# Patient Record
Sex: MALE | Race: WHITE
[De-identification: names, ages, dates, MRNs, and addresses within clinical notes are randomized per-mention and may not be internally consistent; named-entity substitution may affect disease eponyms.]

---

## 2017-03-31 ENCOUNTER — HOSPITAL ENCOUNTER (EMERGENCY)
Dept: HOSPITAL 47 - EC | Age: 82
Discharge: HOME | End: 2017-03-31
Payer: MEDICARE

## 2017-03-31 VITALS — TEMPERATURE: 97.8 F | SYSTOLIC BLOOD PRESSURE: 173 MMHG | HEART RATE: 62 BPM | DIASTOLIC BLOOD PRESSURE: 82 MMHG

## 2017-03-31 VITALS — RESPIRATION RATE: 16 BRPM

## 2017-03-31 DIAGNOSIS — J44.9: ICD-10-CM

## 2017-03-31 DIAGNOSIS — Z79.51: ICD-10-CM

## 2017-03-31 DIAGNOSIS — I82.431: Primary | ICD-10-CM

## 2017-03-31 DIAGNOSIS — Z79.899: ICD-10-CM

## 2017-03-31 DIAGNOSIS — Z79.52: ICD-10-CM

## 2017-03-31 DIAGNOSIS — M19.90: ICD-10-CM

## 2017-03-31 DIAGNOSIS — E78.5: ICD-10-CM

## 2017-03-31 DIAGNOSIS — Z87.891: ICD-10-CM

## 2017-03-31 DIAGNOSIS — Z87.01: ICD-10-CM

## 2017-03-31 PROCEDURE — 99284 EMERGENCY DEPT VISIT MOD MDM: CPT

## 2017-03-31 NOTE — ED
General Adult HPI





- General


Chief complaint: Extremity Injury, Lower


Stated complaint: POSS BLOODCLOT RT LEG


Time Seen by Provider: 17 15:18


Source: patient, RN notes reviewed, old records reviewed


Mode of arrival: ambulatory


Limitations: no limitations





- History of Present Illness


Initial comments: 





This is an 81-year-old man to the ER for evaluation.  The patient Bhanu dose 

of right lower extremity pain and swelling.  Patient was seen in the emergency 

center urgent care prior to arrival to ER.  Patient states he noted swelling 

for about 3 days with pain increased pain and swelling today.  No history of DVT





- Related Data


 Home Medications











 Medication  Instructions  Recorded  Confirmed


 


Albuterol Sulfate [Proair Hfa] 2 puff INHALATION RT-Q6H PRN 16


 


Simvastatin [Zocor] 40 mg PO DAILY 16


 


Ipratropium-Albuterol Nebulize 3 ml INHALATION RT-QID 16





[Duoneb 0.5 mg-3 mg/3 ml Soln]   


 


Fluticasone/Vilanterol [Breo 1 puff INHALATION RT-DAILY 17





Ellipta 200-25 Mcg INH]   


 


Lisinopril/Hydrochlorothiazide 1 tab PO DAILY 17





[Zestoretic 20-12.5 mg Tablet]   


 


predniSONE 5 mg PO DAILY 17








 Previous Rx's











 Medication  Instructions  Recorded


 


Apixaban [Eliquis] 5 mg PO BID #28 tab 17


 


Apixaban [Eliquis] 5 mg PO DAILY #30 tablet 17











 Allergies











Allergy/AdvReac Type Severity Reaction Status Date / Time


 


No Known Allergies Allergy   Verified 17 16:14














Review of Systems


ROS Statement: 


Those systems with pertinent positive or pertinent negative responses have been 

documented in the HPI.





ROS Other: All systems not noted in ROS Statement are negative.





Past Medical History


Past Medical History: COPD, Hearing Disorder / Deafness, Hyperlipidemia, 

Osteoarthritis (OA), Pneumonia


Additional Past Medical History / Comment(s): 16  Pt presented to [facility] ER 

via EMS with having started about 2 hours before presentation some L sided 

chest pain.  He thought it felt like heart burn so he took tums without relief.

  He called EMS and pain subsided.  Pain then returned and EMS gave NTG  with 

relief.  Pt was wheezing and having some UBALDO.  He is being admitted with 

clinical impression of unstable angina pectoris, renal insufficiency, elevated D

-dimer and pancreatitis. Pt was recently admitted to [facility] on 1/3/16 with acute 

exacerbation COPD, purulent tracheobronchitis, chronic bronchitis.    Other HX:

  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 

2L/NC.


History of Any Multi-Drug Resistant Organisms: None Reported


Past Surgical History: Tonsillectomy


Additional Past Surgical History / Comment(s): vasectomy


Past Anesthesia/Blood Transfusion Reactions: No Reported Reaction


Past Psychological History: No Psychological Hx Reported


Additional Psychological History / Comment(s): Pt resides with his spouse.  He 

has a nebulizer.  He just had home O2 delivered last nite and has been wearing 

it on and off at 2L/NC


Smoking Status: Former smoker


Past Alcohol Use History: Occasional


Additional Past Alcohol Use History / Comment(s): Pt started smoking in  

and quit in .  He at one time was smoking 2-3 ppd.


Past Drug Use History: None Reported





- Past Family History


  ** Father


Family Medical History: Cancer


Additional Family Medical History / Comment(s): cancer. client reported father 

had scoliosis.





  ** Mother


Additional Family Medical History / Comment(s): mother . unknown history





General Exam


Limitations: no limitations


General appearance: alert, in no apparent distress


Head exam: Present: atraumatic, normocephalic, normal inspection


Eye exam: Present: normal appearance, PERRL, EOMI.  Absent: scleral icterus, 

conjunctival injection, periorbital swelling


ENT exam: Present: normal exam, mucous membranes moist


Neck exam: Present: normal inspection.  Absent: tenderness, meningismus, 

lymphadenopathy


Respiratory exam: Present: normal lung sounds bilaterally.  Absent: respiratory 

distress, wheezes, rales, rhonchi, stridor


Cardiovascular Exam: Present: regular rate, normal rhythm, normal heart sounds.

  Absent: systolic murmur, diastolic murmur, rubs, gallop, clicks


GI/Abdominal exam: Present: soft, normal bowel sounds.  Absent: distended, 

tenderness, guarding, rebound, rigid


Extremities exam: Present: normal inspection, full ROM, normal capillary 

refill.  Absent: tenderness, pedal edema, joint swelling, calf tenderness


Back exam: Present: normal inspection


Neurological exam: Present: alert, oriented X3, CN II-XII intact


Psychiatric exam: Present: normal affect, normal mood


Skin exam: Present: warm, dry, intact, normal color.  Absent: rash





Course


 Vital Signs











  17





  15:59


 


Temperature 97.6 F


 


Pulse Rate 60


 


Respiratory 16





Rate 


 


Blood Pressure 136/60


 


O2 Sat by Pulse 98





Oximetry 














Medical Decision Making





- Medical Decision Making





A 1 Rachael with positive DVT, patient given ELICIA Hayes, discharge: Joss to follow 

up with primary care, patient is understanding and agreeable





- Radiology Data


Radiology results: report reviewed (Ultrasound is positive for DVT), image 

reviewed





Disposition


Clinical Impression: 


 Right leg DVT





Disposition: HOME SELF-CARE


Condition: Good


Instructions:  Deep Venous Thrombosis (ED)


Prescriptions: 


Apixaban [Eliquis] 5 mg PO BID #28 tab


Apixaban [Eliquis] 5 mg PO DAILY #30 tablet


Referrals: 


Nik Whatley MD [Primary Care Provider] - 1-2 days

## 2017-03-31 NOTE — US
EXAMINATION TYPE: US venous doppler duplex LE RT

 

DATE OF EXAM: 3/31/2017 4:31 PM

 

COMPARISON: Previous study

 

CLINICAL HISTORY: Pain. Tenderness right lower leg

 

SIDE PERFORMED: Right  

 

VESSELS IMAGED:

External Iliac Vein (EIV)

Common Femoral Vein

Deep Femoral Vein

Greater Saphenous Vein *

Femoral Vein

Popliteal Vein

Small Saphenous Vein *

Proximal Calf Veins

(* superficial vessels)

 

Right Leg:  ++Positive for DVT right lower popliteal vein

 

No popliteal fossa lesion was identified.

 

IMPRESSION:

## 2017-05-27 ENCOUNTER — HOSPITAL ENCOUNTER (EMERGENCY)
Dept: HOSPITAL 47 - EC | Age: 82
Discharge: HOME | End: 2017-05-27
Payer: MEDICARE

## 2017-05-27 VITALS — SYSTOLIC BLOOD PRESSURE: 138 MMHG | HEART RATE: 76 BPM | RESPIRATION RATE: 16 BRPM | DIASTOLIC BLOOD PRESSURE: 74 MMHG

## 2017-05-27 VITALS — TEMPERATURE: 97.2 F

## 2017-05-27 DIAGNOSIS — R31.9: Primary | ICD-10-CM

## 2017-05-27 DIAGNOSIS — Z79.899: ICD-10-CM

## 2017-05-27 DIAGNOSIS — M19.90: ICD-10-CM

## 2017-05-27 DIAGNOSIS — Z87.891: ICD-10-CM

## 2017-05-27 DIAGNOSIS — Z79.52: ICD-10-CM

## 2017-05-27 DIAGNOSIS — Z79.51: ICD-10-CM

## 2017-05-27 DIAGNOSIS — J44.9: ICD-10-CM

## 2017-05-27 DIAGNOSIS — Z86.718: ICD-10-CM

## 2017-05-27 DIAGNOSIS — E78.5: ICD-10-CM

## 2017-05-27 DIAGNOSIS — H91.90: ICD-10-CM

## 2017-05-27 LAB
ALP SERPL-CCNC: 61 U/L (ref 38–126)
ALT SERPL-CCNC: 20 U/L (ref 21–72)
ANION GAP SERPL CALC-SCNC: 9 MMOL/L
APTT BLD: 23.8 SEC (ref 22–30)
AST SERPL-CCNC: 25 U/L (ref 17–59)
BASOPHILS # BLD AUTO: 0.1 K/UL (ref 0–0.2)
BASOPHILS NFR BLD AUTO: 1 %
BUN SERPL-SCNC: 30 MG/DL (ref 9–20)
CALCIUM SPEC-MCNC: 9.7 MG/DL (ref 8.4–10.2)
CH: 29.6
CHCM: 32
CHLORIDE SERPL-SCNC: 105 MMOL/L (ref 98–107)
CO2 SERPL-SCNC: 27 MMOL/L (ref 22–30)
EOSINOPHIL # BLD AUTO: 0.6 K/UL (ref 0–0.7)
EOSINOPHIL NFR BLD AUTO: 8 %
ERYTHROCYTE [DISTWIDTH] IN BLOOD BY AUTOMATED COUNT: 3.76 M/UL (ref 4.3–5.9)
ERYTHROCYTE [DISTWIDTH] IN BLOOD: 14.6 % (ref 11.5–15.5)
GLUCOSE SERPL-MCNC: 84 MG/DL (ref 74–99)
HCT VFR BLD AUTO: 34.9 % (ref 39–53)
HDW: 2.45
HGB BLD-MCNC: 11.5 GM/DL (ref 13–17.5)
INR PPP: 1 (ref ?–1.1)
LUC NFR BLD AUTO: 1 %
LYMPHOCYTES # SPEC AUTO: 0.8 K/UL (ref 1–4.8)
LYMPHOCYTES NFR SPEC AUTO: 11 %
MCH RBC QN AUTO: 30.6 PG (ref 25–35)
MCHC RBC AUTO-ENTMCNC: 32.9 G/DL (ref 31–37)
MCV RBC AUTO: 92.9 FL (ref 80–100)
MONOCYTES # BLD AUTO: 0.4 K/UL (ref 0–1)
MONOCYTES NFR BLD AUTO: 5 %
NEUTROPHILS # BLD AUTO: 5.3 K/UL (ref 1.3–7.7)
NEUTROPHILS NFR BLD AUTO: 74 %
NON-AFRICAN AMERICAN GFR(MDRD): >60
PARTICLE COUNT: (no result)
POTASSIUM SERPL-SCNC: 4.5 MMOL/L (ref 3.5–5.1)
PROT SERPL-MCNC: 7 G/DL (ref 6.3–8.2)
PT BLD: 9.9 SEC (ref 9–12)
RBC UR QL: >182 /HPF (ref 0–5)
SODIUM SERPL-SCNC: 141 MMOL/L (ref 137–145)
UA BILLING (MACRO VS. MICRO): (no result)
WBC # BLD AUTO: 0.08 10*3/UL
WBC # BLD AUTO: 7.2 K/UL (ref 3.8–10.6)
WBC #/AREA URNS HPF: 65 /HPF (ref 0–5)
WBC (PEROX): 7.89

## 2017-05-27 PROCEDURE — 85025 COMPLETE CBC W/AUTO DIFF WBC: CPT

## 2017-05-27 PROCEDURE — 81001 URINALYSIS AUTO W/SCOPE: CPT

## 2017-05-27 PROCEDURE — 74000: CPT

## 2017-05-27 PROCEDURE — 99284 EMERGENCY DEPT VISIT MOD MDM: CPT

## 2017-05-27 PROCEDURE — 87086 URINE CULTURE/COLONY COUNT: CPT

## 2017-05-27 PROCEDURE — 85610 PROTHROMBIN TIME: CPT

## 2017-05-27 PROCEDURE — 80053 COMPREHEN METABOLIC PANEL: CPT

## 2017-05-27 PROCEDURE — 85730 THROMBOPLASTIN TIME PARTIAL: CPT

## 2017-05-27 PROCEDURE — 36415 COLL VENOUS BLD VENIPUNCTURE: CPT

## 2017-05-27 NOTE — US
EXAMINATION TYPE: US venous doppler duplex LE RT

 

DATE OF EXAM: 5/27/2017 10:42 AM

 

COMPARISON: Previous exam 31 March 2017

 

CLINICAL HISTORY: dvt, swelling. Prior DVT right leg 3/31/17, patient on blood thinner

 

SIDE PERFORMED: right  

 

TECHNIQUE:  The lower extremity deep venous system is examined utilizing real time linear array sonog
radha with graded compression, doppler sonography and color-flow sonography.

 

VESSELS IMAGED:

External Iliac Vein (EIV)

Common Femoral Vein

Deep Femoral Vein

Greater Saphenous Vein *

Femoral Vein

Popliteal Vein

Small Saphenous Vein *

Proximal Calf Veins

(* superficial vessels)

 

Popliteal vein appears somewhat less distended.

 

Right Leg:  Deep venous thrombosis right popliteal vein may be somewhat improved.

 

 

Some low-level internal echoes persists within the popliteal vein although there is some color flow,

 

IMPRESSION:  Grayscale, color doppler, spectral doppler imaging performed of the deep veins of the lo
wer extremities.  There is normal flow, compressibility, vascular waveforms bilaterally.  Deep venous
 thrombosis popliteal vein appears somewhat improved.

## 2017-05-27 NOTE — XR
Abdomen

 

HISTORY: Gross hematuria

 

Frontal view of the abdomen on 2 images correlated to previous of 11 April 2011

 

No significant interval change is evident. Suspect colonic interposition, bowel suspected beneath rig
ht hemidiaphragm. No evident bowel obstruction or pneumoperitoneum. There is a levoscoliosis of the m
id lumbar spine. No pathologic calcification is evident but technique is somewhat limited. Calcificat
ions within the pelvis may be vascular.

 

IMPRESSION: No acute abnormalities evident

## 2017-05-27 NOTE — ED
General Adult HPI





- General


Chief complaint: Urogenital


Stated complaint: hematuria


Time Seen by Provider: 17 09:45


Source: patient, family, RN notes reviewed


Mode of arrival: ambulatory


Limitations: no limitations





- History of Present Illness


Initial comments: 





Patient is a pleasant 81-year-old male presenting to the emergency Department 

with hematuria.  Patient had an episode last week.  Patient had 3 episodes 

prior to arrival this morning and another one while in the emergency 

department.  Patient is on elquis for a blood clot in the right leg.  This was 

started almost 2 months ago.  No chest pain or difficulty in breathing.  No 

other areas of bleeding.  No dysuria.  No pelvic pain.





- Related Data


 Home Medications











 Medication  Instructions  Recorded  Confirmed


 


Albuterol Sulfate [Proair Hfa] 2 puff INHALATION RT-Q6H PRN 16


 


Simvastatin [Zocor] 40 mg PO DAILY 16


 


Ipratropium-Albuterol Nebulize 3 ml INHALATION RT-QID 16





[Duoneb 0.5 mg-3 mg/3 ml Soln]   


 


Fluticasone/Vilanterol [Breo 1 puff INHALATION RT-DAILY 17





Ellipta 200-25 Mcg INH]   


 


Lisinopril/Hydrochlorothiazide 1 tab PO DAILY 17





[Zestoretic 20-12.5 mg Tablet]   


 


predniSONE 5 mg PO DAILY 17








 Previous Rx's











 Medication  Instructions  Recorded


 


Apixaban [Eliquis] 5 mg PO DAILY #30 tablet 17


 


Cephalexin [Keflex] 500 mg PO QID #28 cap 17











 Allergies











Allergy/AdvReac Type Severity Reaction Status Date / Time


 


No Known Allergies Allergy   Verified 17 11:24














Review of Systems


ROS Statement: 


Those systems with pertinent positive or pertinent negative responses have been 

documented in the HPI.





ROS Other: All systems not noted in ROS Statement are negative.


Constitutional: Denies: fever, chills


Eyes: Denies: eye pain


ENT: Denies: ear pain


Respiratory: Denies: cough


Cardiovascular: Denies: chest pain


Endocrine: Denies: fatigue


Gastrointestinal: Denies: abdominal pain


Genitourinary: Reports: hematuria.  Denies: dysuria


Musculoskeletal: Denies: back pain


Skin: Denies: rash


Neurological: Denies: weakness





Past Medical History


Past Medical History: COPD, Deep Vein Thrombosis (DVT), Hearing Disorder / 

Deafness, Hyperlipidemia, Osteoarthritis (OA), Pneumonia


Additional Past Medical History / Comment(s): 16  Pt presented to [facility] ER 

via EMS with having started about 2 hours before presentation some L sided 

chest pain.  He thought it felt like heart burn so he took tums without relief.

  He called EMS and pain subsided.  Pain then returned and EMS gave NTG  with 

relief.  Pt was wheezing and having some UBALDO.  He is being admitted with 

clinical impression of unstable angina pectoris, renal insufficiency, elevated D

-dimer and pancreatitis. Pt was recently admitted to [facility] on 1/3/16 with acute 

exacerbation COPD, purulent tracheobronchitis, chronic bronchitis.    Other HX:

  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 

2L/NC.


History of Any Multi-Drug Resistant Organisms: None Reported


Past Surgical History: Tonsillectomy


Additional Past Surgical History / Comment(s): vasectomy


Past Anesthesia/Blood Transfusion Reactions: No Reported Reaction


Past Psychological History: No Psychological Hx Reported


Additional Psychological History / Comment(s): Pt resides with his spouse.  He 

has a nebulizer.  He just had home O2 delivered last nite and has been wearing 

it on and off at 2L/NC


Smoking Status: Former smoker


Past Alcohol Use History: Occasional


Additional Past Alcohol Use History / Comment(s): Pt started smoking in 1955 

and quit in .  He at one time was smoking 2-3 ppd.


Past Drug Use History: None Reported





- Past Family History


  ** Father


Family Medical History: Cancer


Additional Family Medical History / Comment(s): cancer. client reported father 

had scoliosis.





  ** Mother


Additional Family Medical History / Comment(s): mother . unknown history





General Exam


Limitations: no limitations


General appearance: alert, in no apparent distress


Head exam: Present: atraumatic


Eye exam: Present: normal appearance, PERRL


ENT exam: Present: normal oropharynx


Neck exam: Present: normal inspection


Respiratory exam: Present: normal lung sounds bilaterally


Cardiovascular Exam: Present: regular rate, normal rhythm


  ** Expanded


Peripheral pulses: 2+: Dorsalis Pedis (R), Dorsalis Pedis (L)


GI/Abdominal exam: Present: soft.  Absent: tenderness


 exam: Present: normal inspection, other (Small easily reducible left 

inguinal hernia).  Absent: testicular tenderness, urethral discharge, scrotal 

swelling


Extremities exam: Present: normal inspection.  Absent: pedal edema, calf 

tenderness


Neurological exam: Present: alert


Psychiatric exam: Present: normal affect, normal mood


Skin exam: Present: normal color





Course


 Vital Signs











  17





  09:24


 


Temperature 97.2 F L


 


Pulse Rate 83


 


Respiratory 18





Rate 


 


Blood Pressure 185/73


 


O2 Sat by Pulse 96





Oximetry 














Medical Decision Making





- Medical Decision Making





Patient reevaluated and resting comfortably in bed.  Patient symptom-free.  

Patient and family updated on results.  Case discussed in detail with Dr. Frank

, covering for Dr. Whatley.  He does feel patient can be safely discharged.  

Recommends holding eliquis until follow-up on Tuesday with urology and primary 

care physician.  Messages will be left for primary care physician and urology 

for follow-up.





- Lab Data


Result diagrams: 


 17 10:23





 17 10:23


 Lab Results











  17 Range/Units





  10:23 10:23 10:23 


 


WBC  7.2    (3.8-10.6)  k/uL


 


RBC  3.76 L    (4.30-5.90)  m/uL


 


Hgb  11.5 L    (13.0-17.5)  gm/dL


 


Hct  34.9 L    (39.0-53.0)  %


 


MCV  92.9    (80.0-100.0)  fL


 


MCH  30.6    (25.0-35.0)  pg


 


MCHC  32.9    (31.0-37.0)  g/dL


 


RDW  14.6    (11.5-15.5)  %


 


Plt Count  366    (150-450)  k/uL


 


Neutrophils %  74    %


 


Lymphocytes %  11    %


 


Monocytes %  5    %


 


Eosinophils %  8    %


 


Basophils %  1    %


 


Neutrophils #  5.3    (1.3-7.7)  k/uL


 


Lymphocytes #  0.8 L    (1.0-4.8)  k/uL


 


Monocytes #  0.4    (0-1.0)  k/uL


 


Eosinophils #  0.6    (0-0.7)  k/uL


 


Basophils #  0.1    (0-0.2)  k/uL


 


PT    9.9  (9.0-12.0)  sec


 


INR    1.0  (<1.1)  


 


APTT    23.8  (22.0-30.0)  sec


 


Sodium   141   (137-145)  mmol/L


 


Potassium   4.5   (3.5-5.1)  mmol/L


 


Chloride   105   ()  mmol/L


 


Carbon Dioxide   27   (22-30)  mmol/L


 


Anion Gap   9   mmol/L


 


BUN   30 H   (9-20)  mg/dL


 


Creatinine   1.12   (0.66-1.25)  mg/dL


 


Est GFR (MDRD) Af Amer   >60   (>60 ml/min/1.73 sqM)  


 


Est GFR (MDRD) Non-Af   >60   (>60 ml/min/1.73 sqM)  


 


Glucose   84   (74-99)  mg/dL


 


Calcium   9.7   (8.4-10.2)  mg/dL


 


Total Bilirubin   0.5   (0.2-1.3)  mg/dL


 


AST   25   (17-59)  U/L


 


ALT   20 L   (21-72)  U/L


 


Alkaline Phosphatase   61   ()  U/L


 


Total Protein   7.0   (6.3-8.2)  g/dL


 


Albumin   4.0   (3.5-5.0)  g/dL


 


Urine Color     


 


Urine Appearance     (Clear)  


 


Urine RBC     (0-5)  /hpf


 


Urine WBC     (0-5)  /hpf














  17 Range/Units





  10:23 


 


WBC   (3.8-10.6)  k/uL


 


RBC   (4.30-5.90)  m/uL


 


Hgb   (13.0-17.5)  gm/dL


 


Hct   (39.0-53.0)  %


 


MCV   (80.0-100.0)  fL


 


MCH   (25.0-35.0)  pg


 


MCHC   (31.0-37.0)  g/dL


 


RDW   (11.5-15.5)  %


 


Plt Count   (150-450)  k/uL


 


Neutrophils %   %


 


Lymphocytes %   %


 


Monocytes %   %


 


Eosinophils %   %


 


Basophils %   %


 


Neutrophils #   (1.3-7.7)  k/uL


 


Lymphocytes #   (1.0-4.8)  k/uL


 


Monocytes #   (0-1.0)  k/uL


 


Eosinophils #   (0-0.7)  k/uL


 


Basophils #   (0-0.2)  k/uL


 


PT   (9.0-12.0)  sec


 


INR   (<1.1)  


 


APTT   (22.0-30.0)  sec


 


Sodium   (137-145)  mmol/L


 


Potassium   (3.5-5.1)  mmol/L


 


Chloride   ()  mmol/L


 


Carbon Dioxide   (22-30)  mmol/L


 


Anion Gap   mmol/L


 


BUN   (9-20)  mg/dL


 


Creatinine   (0.66-1.25)  mg/dL


 


Est GFR (MDRD) Af Amer   (>60 ml/min/1.73 sqM)  


 


Est GFR (MDRD) Non-Af   (>60 ml/min/1.73 sqM)  


 


Glucose   (74-99)  mg/dL


 


Calcium   (8.4-10.2)  mg/dL


 


Total Bilirubin   (0.2-1.3)  mg/dL


 


AST   (17-59)  U/L


 


ALT   (21-72)  U/L


 


Alkaline Phosphatase   ()  U/L


 


Total Protein   (6.3-8.2)  g/dL


 


Albumin   (3.5-5.0)  g/dL


 


Urine Color  Red  


 


Urine Appearance  Bloody  (Clear)  


 


Urine RBC  >182 H  (0-5)  /hpf


 


Urine WBC  65 H  (0-5)  /hpf














- Radiology Data


Radiology results: report reviewed (Ultrasound leg shows DVT in the popliteal 

vein appears improved.), image reviewed (KUB shows no acute process)





Disposition


Clinical Impression: 


 Hematuria





Disposition: HOME SELF-CARE


Condition: Stable


Instructions:  Hematuria (ED)


Additional Instructions: 


Please hold Eliquis until follow-up on Tuesday.  Please follow-up Tuesday with 

both urology and Dr. Whatley.  Return for increased bleeding, shortness of 

breath or shortness of breath with exertion, weakness, worsening symptoms or 

other concerns.


Prescriptions: 


Cephalexin [Keflex] 500 mg PO QID #28 cap


Referrals: 


Nik Whatley MD [Primary Care Provider] - 1-2 days


Asher Kim MD [STAFF PHYSICIAN] - 1-2 days


Time of Disposition: 11:31

## 2017-06-21 ENCOUNTER — HOSPITAL ENCOUNTER (OUTPATIENT)
Dept: HOSPITAL 47 - RADCTMAIN | Age: 82
Discharge: HOME | End: 2017-06-21
Payer: MEDICARE

## 2017-06-21 DIAGNOSIS — K40.90: Primary | ICD-10-CM

## 2017-06-21 DIAGNOSIS — N32.89: ICD-10-CM

## 2017-06-21 LAB
BUN SERPL-SCNC: 34 MG/DL (ref 9–20)
NON-AFRICAN AMERICAN GFR(MDRD): 60

## 2017-06-21 PROCEDURE — 36415 COLL VENOUS BLD VENIPUNCTURE: CPT

## 2017-06-21 PROCEDURE — 74178 CT ABD&PLV WO CNTR FLWD CNTR: CPT

## 2017-06-21 PROCEDURE — 74400 UROGRAPHY IV +-KUB TOMOG: CPT

## 2017-06-21 PROCEDURE — 82565 ASSAY OF CREATININE: CPT

## 2017-06-21 PROCEDURE — 84520 ASSAY OF UREA NITROGEN: CPT

## 2017-06-21 NOTE — CT
EXAMINATION TYPE: CT urogram wo/w con

 

DATE OF EXAM: 6/21/2017

 

COMPARISON: NONE

 

HISTORY: Hematuria for 2 months without pain

 

CT DLP: 995.6 mGycm, Automated Exposure Control for Dose Reduction was Utilized.

 

CONTRAST: 

CT scan of the abdomen and pelvis is performed without oral and without and with IV Contrast, patient
 injected with 100 mL of Visipaque 320. Urogram protocol with Three-D reconstructed images created on
 independent workstation and reviewed

 

FINDINGS:

 

KUB:  Noncontrast images show no renal calculi bilaterally. Postcontrast images show symmetric cortic
al medullary uptake and excretion from both kidneys without evidence of concerning solid or cystic re
nal mass or hydronephrosis bilaterally. There is eccentric enhancing mass involving the posterior rig
ht lateral margin just above the UVJ measuring 4.1 x 2.2 cm seen best on axial image 67 strongly susp
icious for uroepithelial bladder carcinoma.

 

LUNG BASES: Mild emphysematous change in lung bases is felt present. There is posterior left basilar 
scarring.

 

LIVER/GB:   No significant abnormality is appreciated.

 

PANCREAS:  No significant abnormality is seen.

 

SPLEEN:  No significant abnormality is seen.

 

ADRENALS:  No significant abnormality is seen.

 

KIDNEYS:  No significant abnormality is seen.

 

BOWEL: Sigmoid colonic diverticulosis is present. No suspicious bowel dilatation currently.

 

PROSTATE/SEMINAL VESICLES: Central zone calcification is seen in normal size prostate gland.

 

LYMPH NODES:  No greater than 1cm abdominal or pelvic lymph nodes are appreciated.

 

OSSEOUS STRUCTURES: Osseous structures are demineralized. Levoconvex scoliosis centered at L3 level i
s seen. There is straightening of lumbar spine. There is moderate to severe multilevel disc space fouzia
rowing and vacuum disc phenomenon with moderate to severe multilevel anterior and lateral spurring.

 

OTHER: There is moderate size left inguinal hernia containing fat portion of sigmoid colon seen best 
image 73 series 7 and coronal image 51.

 

IMPRESSION:

1. A 4.1 x 2.2 cm posterior right lateral bladder wall mass strongly suspicious for urothelial carcin
yashira. Cystoscopy for tissue confirmation advised.

2. Note is made of left inguinal hernia containing portion of sigmoid colon. No bowel obstruction cur
rently.

## 2017-11-30 ENCOUNTER — HOSPITAL ENCOUNTER (EMERGENCY)
Dept: HOSPITAL 47 - EC | Age: 82
Discharge: HOME | End: 2017-11-30
Payer: MEDICARE

## 2017-11-30 VITALS — SYSTOLIC BLOOD PRESSURE: 136 MMHG | DIASTOLIC BLOOD PRESSURE: 63 MMHG | HEART RATE: 79 BPM | RESPIRATION RATE: 16 BRPM

## 2017-11-30 VITALS — TEMPERATURE: 97.9 F

## 2017-11-30 DIAGNOSIS — R05: ICD-10-CM

## 2017-11-30 DIAGNOSIS — Z79.52: ICD-10-CM

## 2017-11-30 DIAGNOSIS — Z87.891: ICD-10-CM

## 2017-11-30 DIAGNOSIS — J44.9: ICD-10-CM

## 2017-11-30 DIAGNOSIS — M19.90: ICD-10-CM

## 2017-11-30 DIAGNOSIS — Z79.51: ICD-10-CM

## 2017-11-30 DIAGNOSIS — E78.5: ICD-10-CM

## 2017-11-30 DIAGNOSIS — M54.2: Primary | ICD-10-CM

## 2017-11-30 DIAGNOSIS — Z86.718: ICD-10-CM

## 2017-11-30 DIAGNOSIS — Z79.899: ICD-10-CM

## 2017-11-30 DIAGNOSIS — H91.90: ICD-10-CM

## 2017-11-30 LAB
ALP SERPL-CCNC: 76 U/L (ref 38–126)
ALT SERPL-CCNC: 28 U/L (ref 21–72)
ANION GAP SERPL CALC-SCNC: 6 MMOL/L
APTT BLD: 23.4 SEC (ref 22–30)
AST SERPL-CCNC: 19 U/L (ref 17–59)
BASOPHILS # BLD AUTO: 0 K/UL (ref 0–0.2)
BASOPHILS NFR BLD AUTO: 0 %
BUN SERPL-SCNC: 28 MG/DL (ref 9–20)
CALCIUM SPEC-MCNC: 9.1 MG/DL (ref 8.4–10.2)
CH: 28
CHCM: 31.6
CHLORIDE SERPL-SCNC: 103 MMOL/L (ref 98–107)
CK SERPL-CCNC: 110 U/L (ref 55–170)
CO2 SERPL-SCNC: 26 MMOL/L (ref 22–30)
EOSINOPHIL # BLD AUTO: 0.6 K/UL (ref 0–0.7)
EOSINOPHIL NFR BLD AUTO: 5 %
ERYTHROCYTE [DISTWIDTH] IN BLOOD BY AUTOMATED COUNT: 3.7 M/UL (ref 4.3–5.9)
ERYTHROCYTE [DISTWIDTH] IN BLOOD: 15.3 % (ref 11.5–15.5)
GLUCOSE SERPL-MCNC: 117 MG/DL (ref 74–99)
HCT VFR BLD AUTO: 33 % (ref 39–53)
HDW: 2.34
HGB BLD-MCNC: 10.5 GM/DL (ref 13–17.5)
INR PPP: 1 (ref ?–1.2)
LUC NFR BLD AUTO: 1 %
LYMPHOCYTES # SPEC AUTO: 0.9 K/UL (ref 1–4.8)
LYMPHOCYTES NFR SPEC AUTO: 8 %
MAGNESIUM SPEC-SCNC: 2 MG/DL (ref 1.6–2.3)
MCH RBC QN AUTO: 28.4 PG (ref 25–35)
MCHC RBC AUTO-ENTMCNC: 31.9 G/DL (ref 31–37)
MCV RBC AUTO: 89.1 FL (ref 80–100)
MONOCYTES # BLD AUTO: 0.8 K/UL (ref 0–1)
MONOCYTES NFR BLD AUTO: 7 %
NEUTROPHILS # BLD AUTO: 9 K/UL (ref 1.3–7.7)
NEUTROPHILS NFR BLD AUTO: 79 %
NON-AFRICAN AMERICAN GFR(MDRD): >60
POTASSIUM SERPL-SCNC: 4.3 MMOL/L (ref 3.5–5.1)
PROT SERPL-MCNC: 6.6 G/DL (ref 6.3–8.2)
PT BLD: 10.1 SEC (ref 9–12)
SODIUM SERPL-SCNC: 135 MMOL/L (ref 137–145)
TROPONIN I SERPL-MCNC: <0.012 NG/ML (ref 0–0.03)
WBC # BLD AUTO: 0.08 10*3/UL
WBC # BLD AUTO: 11.4 K/UL (ref 3.8–10.6)
WBC (PEROX): 11.82

## 2017-11-30 PROCEDURE — 85025 COMPLETE CBC W/AUTO DIFF WBC: CPT

## 2017-11-30 PROCEDURE — 36415 COLL VENOUS BLD VENIPUNCTURE: CPT

## 2017-11-30 PROCEDURE — 82553 CREATINE MB FRACTION: CPT

## 2017-11-30 PROCEDURE — 82550 ASSAY OF CK (CPK): CPT

## 2017-11-30 PROCEDURE — 85730 THROMBOPLASTIN TIME PARTIAL: CPT

## 2017-11-30 PROCEDURE — 94640 AIRWAY INHALATION TREATMENT: CPT

## 2017-11-30 PROCEDURE — 96374 THER/PROPH/DIAG INJ IV PUSH: CPT

## 2017-11-30 PROCEDURE — 99284 EMERGENCY DEPT VISIT MOD MDM: CPT

## 2017-11-30 PROCEDURE — 93005 ELECTROCARDIOGRAM TRACING: CPT

## 2017-11-30 PROCEDURE — 85610 PROTHROMBIN TIME: CPT

## 2017-11-30 PROCEDURE — 83880 ASSAY OF NATRIURETIC PEPTIDE: CPT

## 2017-11-30 PROCEDURE — 83735 ASSAY OF MAGNESIUM: CPT

## 2017-11-30 PROCEDURE — 80053 COMPREHEN METABOLIC PANEL: CPT

## 2017-11-30 PROCEDURE — 72050 X-RAY EXAM NECK SPINE 4/5VWS: CPT

## 2017-11-30 PROCEDURE — 84484 ASSAY OF TROPONIN QUANT: CPT

## 2017-11-30 PROCEDURE — 96375 TX/PRO/DX INJ NEW DRUG ADDON: CPT

## 2017-11-30 PROCEDURE — 71020: CPT

## 2017-11-30 NOTE — XR
EXAMINATION TYPE: XR cervical spine comp

 

DATE OF EXAM: 11/30/2017

 

TECHNIQUE: Frontal, lateral, oblique, swimmers, and open mouth view of the cervical spine are obtaine
d.

 

HISTORY:  Pain left sided chronic neck pain

 

COMPARISON: None

 

FINDINGS:  The cervical spine is visualized in its entirety from C1 thru the top of T1 level, there i
s grade 1 retrolisthesis of C5 on C6 without evidence of acute fracture or dislocation.  The pre-vert
ebral soft tissue appears within normal limits.  The C1-C2 articulation is within normal limits on th
e open mouth view.  

 

Vertebral body heights are maintained. There is moderate to severe spurring and disc space narrowing 
C5-C6 level. There is mild disc space narrowing C4-C5 and C6-C7 levels The oblique images show neural
 foraminal narrowing bilaterally C5-C6 level due to marginal spurring and to lesser degree at C6-C7 l
evel. There is calcification bilateral carotid bulbs more prominent in the left neck. Consider noneme
rgent carotid ultrasound follow-up.

 

IMPRESSION:  No acute fracture or dislocation is seen in the cervical spine. Spondylolisthesis with a
ssociated moderate to advanced degenerative changes C5-C6 level.

## 2017-11-30 NOTE — XR
EXAMINATION TYPE: XR chest 2V

 

DATE OF EXAM: 11/30/2017

 

COMPARISON: Chest x-ray January 8, 2016. CTA chest January 11, 2016

 

HISTORY: Difficulty in breathing, history of COPD

 

TECHNIQUE:  Frontal and lateral views of the chest are obtained.

 

FINDINGS: There is chronic emphysematous change redemonstrated There is no focal air space opacity, p
leural effusion, or pneumothorax seen.  The cardiac silhouette size is within normal limits.   The os
seous structures are intact.

 

IMPRESSION:  Chronic emphysematous change without acute pulmonary process.

## 2018-01-15 ENCOUNTER — HOSPITAL ENCOUNTER (INPATIENT)
Dept: HOSPITAL 47 - EC | Age: 83
LOS: 7 days | Discharge: HOME | DRG: 167 | End: 2018-01-22
Attending: FAMILY MEDICINE | Admitting: FAMILY MEDICINE
Payer: MEDICARE

## 2018-01-15 DIAGNOSIS — E78.5: ICD-10-CM

## 2018-01-15 DIAGNOSIS — J98.01: ICD-10-CM

## 2018-01-15 DIAGNOSIS — Z87.01: ICD-10-CM

## 2018-01-15 DIAGNOSIS — N17.9: ICD-10-CM

## 2018-01-15 DIAGNOSIS — F17.201: ICD-10-CM

## 2018-01-15 DIAGNOSIS — K21.0: ICD-10-CM

## 2018-01-15 DIAGNOSIS — Z85.51: ICD-10-CM
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
## 2018-01-15 DIAGNOSIS — J44.1: Primary | ICD-10-CM

## 2018-01-15 DIAGNOSIS — Z79.899: ICD-10-CM

## 2018-01-15 DIAGNOSIS — E87.5: ICD-10-CM

## 2018-01-15 DIAGNOSIS — T37.0X5A: ICD-10-CM

## 2018-01-15 DIAGNOSIS — T46.4X5A: ICD-10-CM

## 2018-01-15 DIAGNOSIS — Z79.52: ICD-10-CM

## 2018-01-15 DIAGNOSIS — J96.11: ICD-10-CM

## 2018-01-15 DIAGNOSIS — I10: ICD-10-CM

## 2018-01-15 DIAGNOSIS — Z86.718: ICD-10-CM

## 2018-01-15 DIAGNOSIS — M19.90: ICD-10-CM

## 2018-01-15 DIAGNOSIS — D64.9: ICD-10-CM

## 2018-01-15 DIAGNOSIS — H91.92: ICD-10-CM

## 2018-01-15 LAB
ALBUMIN SERPL-MCNC: 4 G/DL (ref 3.5–5)
ALP SERPL-CCNC: 74 U/L (ref 38–126)
ALT SERPL-CCNC: 29 U/L (ref 21–72)
ANION GAP SERPL CALC-SCNC: 10 MMOL/L
APTT BLD: 23.4 SEC (ref 22–30)
AST SERPL-CCNC: 23 U/L (ref 17–59)
BASOPHILS # BLD AUTO: 0 K/UL (ref 0–0.2)
BASOPHILS NFR BLD AUTO: 1 %
BUN SERPL-SCNC: 28 MG/DL (ref 9–20)
CALCIUM SPEC-MCNC: 9.4 MG/DL (ref 8.4–10.2)
CHLORIDE SERPL-SCNC: 102 MMOL/L (ref 98–107)
CK SERPL-CCNC: 151 U/L (ref 55–170)
CO2 SERPL-SCNC: 27 MMOL/L (ref 22–30)
EOSINOPHIL # BLD AUTO: 0.1 K/UL (ref 0–0.7)
EOSINOPHIL NFR BLD AUTO: 1 %
ERYTHROCYTE [DISTWIDTH] IN BLOOD BY AUTOMATED COUNT: 3.78 M/UL (ref 4.3–5.9)
ERYTHROCYTE [DISTWIDTH] IN BLOOD: 14.9 % (ref 11.5–15.5)
GLUCOSE BLD-MCNC: 168 MG/DL (ref 75–99)
GLUCOSE SERPL-MCNC: 119 MG/DL (ref 74–99)
HCT VFR BLD AUTO: 33.6 % (ref 39–53)
HGB BLD-MCNC: 10.5 GM/DL (ref 13–17.5)
INR PPP: 1 (ref ?–1.2)
LYMPHOCYTES # SPEC AUTO: 0.5 K/UL (ref 1–4.8)
LYMPHOCYTES NFR SPEC AUTO: 8 %
MAGNESIUM SPEC-SCNC: 2.3 MG/DL (ref 1.6–2.3)
MCH RBC QN AUTO: 27.7 PG (ref 25–35)
MCHC RBC AUTO-ENTMCNC: 31.1 G/DL (ref 31–37)
MCV RBC AUTO: 89.1 FL (ref 80–100)
MONOCYTES # BLD AUTO: 0.4 K/UL (ref 0–1)
MONOCYTES NFR BLD AUTO: 7 %
NEUTROPHILS # BLD AUTO: 5.2 K/UL (ref 1.3–7.7)
NEUTROPHILS NFR BLD AUTO: 82 %
PLATELET # BLD AUTO: 428 K/UL (ref 150–450)
POTASSIUM SERPL-SCNC: 5.3 MMOL/L (ref 3.5–5.1)
PROT SERPL-MCNC: 7.3 G/DL (ref 6.3–8.2)
PT BLD: 9.5 SEC (ref 9–12)
SODIUM SERPL-SCNC: 139 MMOL/L (ref 137–145)
TROPONIN I SERPL-MCNC: <0.012 NG/ML (ref 0–0.03)
WBC # BLD AUTO: 6.3 K/UL (ref 3.8–10.6)

## 2018-01-15 PROCEDURE — 36415 COLL VENOUS BLD VENIPUNCTURE: CPT

## 2018-01-15 PROCEDURE — 96372 THER/PROPH/DIAG INJ SC/IM: CPT

## 2018-01-15 PROCEDURE — 81001 URINALYSIS AUTO W/SCOPE: CPT

## 2018-01-15 PROCEDURE — 87529 HSV DNA AMP PROBE: CPT

## 2018-01-15 PROCEDURE — 87206 SMEAR FLUORESCENT/ACID STAI: CPT

## 2018-01-15 PROCEDURE — 31624 DX BRONCHOSCOPE/LAVAGE: CPT

## 2018-01-15 PROCEDURE — 85027 COMPLETE CBC AUTOMATED: CPT

## 2018-01-15 PROCEDURE — 87634 RSV DNA/RNA AMP PROBE: CPT

## 2018-01-15 PROCEDURE — 94760 N-INVAS EAR/PLS OXIMETRY 1: CPT

## 2018-01-15 PROCEDURE — 99291 CRITICAL CARE FIRST HOUR: CPT

## 2018-01-15 PROCEDURE — 83735 ASSAY OF MAGNESIUM: CPT

## 2018-01-15 PROCEDURE — 80048 BASIC METABOLIC PNL TOTAL CA: CPT

## 2018-01-15 PROCEDURE — 87205 SMEAR GRAM STAIN: CPT

## 2018-01-15 PROCEDURE — 71046 X-RAY EXAM CHEST 2 VIEWS: CPT

## 2018-01-15 PROCEDURE — 99214 OFFICE O/P EST MOD 30 MIN: CPT

## 2018-01-15 PROCEDURE — 93005 ELECTROCARDIOGRAM TRACING: CPT

## 2018-01-15 PROCEDURE — 85025 COMPLETE CBC W/AUTO DIFF WBC: CPT

## 2018-01-15 PROCEDURE — 96374 THER/PROPH/DIAG INJ IV PUSH: CPT

## 2018-01-15 PROCEDURE — 94644 CONT INHLJ TX 1ST HOUR: CPT

## 2018-01-15 PROCEDURE — 82553 CREATINE MB FRACTION: CPT

## 2018-01-15 PROCEDURE — 88305 TISSUE EXAM BY PATHOLOGIST: CPT

## 2018-01-15 PROCEDURE — 87502 INFLUENZA DNA AMP PROBE: CPT

## 2018-01-15 PROCEDURE — 87252 VIRUS INOCULATION TISSUE: CPT

## 2018-01-15 PROCEDURE — 85610 PROTHROMBIN TIME: CPT

## 2018-01-15 PROCEDURE — 87798 DETECT AGENT NOS DNA AMP: CPT

## 2018-01-15 PROCEDURE — 80053 COMPREHEN METABOLIC PANEL: CPT

## 2018-01-15 PROCEDURE — 87102 FUNGUS ISOLATION CULTURE: CPT

## 2018-01-15 PROCEDURE — 94660 CPAP INITIATION&MGMT: CPT

## 2018-01-15 PROCEDURE — 87070 CULTURE OTHR SPECIMN AEROBIC: CPT

## 2018-01-15 PROCEDURE — 84484 ASSAY OF TROPONIN QUANT: CPT

## 2018-01-15 PROCEDURE — 88108 CYTOPATH CONCENTRATE TECH: CPT

## 2018-01-15 PROCEDURE — 87040 BLOOD CULTURE FOR BACTERIA: CPT

## 2018-01-15 PROCEDURE — 87116 MYCOBACTERIA CULTURE: CPT

## 2018-01-15 PROCEDURE — 87498 ENTEROVIRUS PROBE&REVRS TRNS: CPT

## 2018-01-15 PROCEDURE — 83036 HEMOGLOBIN GLYCOSYLATED A1C: CPT

## 2018-01-15 PROCEDURE — 85730 THROMBOPLASTIN TIME PARTIAL: CPT

## 2018-01-15 PROCEDURE — 87496 CYTOMEG DNA AMP PROBE: CPT

## 2018-01-15 PROCEDURE — 89050 BODY FLUID CELL COUNT: CPT

## 2018-01-15 PROCEDURE — 94640 AIRWAY INHALATION TREATMENT: CPT

## 2018-01-15 PROCEDURE — 82550 ASSAY OF CK (CPK): CPT

## 2018-01-15 RX ADMIN — IPRATROPIUM BROMIDE AND ALBUTEROL SULFATE PRN ML: .5; 3 SOLUTION RESPIRATORY (INHALATION) at 21:46

## 2018-01-15 RX ADMIN — METHYLPREDNISOLONE SODIUM SUCCINATE SCH MG: 125 INJECTION, POWDER, FOR SOLUTION INTRAMUSCULAR; INTRAVENOUS at 23:24

## 2018-01-15 RX ADMIN — INSULIN ASPART SCH UNIT: 100 INJECTION, SOLUTION INTRAVENOUS; SUBCUTANEOUS at 21:29

## 2018-01-15 RX ADMIN — TAMSULOSIN HYDROCHLORIDE SCH MG: 0.4 CAPSULE ORAL at 21:29

## 2018-01-15 RX ADMIN — METHYLPREDNISOLONE SODIUM SUCCINATE SCH MG: 125 INJECTION, POWDER, FOR SOLUTION INTRAMUSCULAR; INTRAVENOUS at 18:50

## 2018-01-15 NOTE — XR
EXAMINATION TYPE: XR chest 2V

 

DATE OF EXAM: 1/15/2018

 

COMPARISON: 11/30/2017

 

HISTORY: Shortness of breath

 

TECHNIQUE:  Frontal and lateral views of the chest are obtained.

 

FINDINGS:

 

Scattered senescent parenchymal changes noted. Hyperinflation compatible with COPD. Chronic reticulon
odular density right lung base.

 

No evidence for infiltrate. No evidence for atelectasis.

 

Heart size is stable.

 

Mediastinal structures are stable and grossly unremarkable.

 

No evidence for hilar prominence.

 

Degenerative changes dorsal spine. 

 

IMPRESSION:

1. No evidence for acute pulmonary disease.

## 2018-01-15 NOTE — ED
General Adult HPI





- General


Chief complaint: Shortness of Breath


Stated complaint: SOB


Time Seen by Provider: 01/15/18 14:20


Source: patient, RN notes reviewed


Mode of arrival: wheelchair


Limitations: no limitations, physical limitation





- History of Present Illness


Initial comments: 





This is an 82-year-old male who presents emergency Department complaining of 

difficulty breathing.  Patient states been going on for 2-3 days.  Patient 

states he believes this is COPD.  Patient states he has an increased cough but 

not a lot of production.  Patient denies any palpitations.  Patient denies any 

chest pain.  Patient denies any fever chills.  Patient denies being lightheaded 

dizzy or having any near syncopal episodes.  Patient denies abdominal pain 

patient denies nausea vomiting diarrhea.  Patient denies any dysuria hematuria 

urinary frequency.  Patient denies any recent injury or trauma.





- Related Data


 Home Medications











 Medication  Instructions  Recorded  Confirmed


 


Albuterol Sulfate [Proair Hfa] 2 puff INHALATION RT-Q6H PRN 01/03/16 01/15/18


 


Simvastatin [Zocor] 40 mg PO DAILY 01/03/16 01/15/18


 


Ipratropium-Albuterol Nebulize 3 ml INHALATION RT-QID 01/04/16 01/15/18





[Duoneb 0.5 mg-3 mg/3 ml Soln]   


 


Fluticasone/Vilanterol [Breo 1 puff INHALATION RT-DAILY 03/31/17 01/15/18





Ellipta 200-25 Mcg INH]   


 


predniSONE 5 mg PO DAILY 03/31/17 01/15/18


 


Tamsulosin HCl [Flomax] 0.4 mg PO BID 11/30/17 01/15/18


 


Lisinopril [Zestril] 10 mg PO DAILY 01/15/18 01/15/18


 


Sulfamethox-Tmp 800-160Mg [Bactrim 1 tab PO Q12HR 01/15/18 01/15/18





-160 mg]   











 Allergies











Allergy/AdvReac Type Severity Reaction Status Date / Time


 


No Known Allergies Allergy   Verified 01/15/18 14:39














Review of Systems


ROS Statement: 


Those systems with pertinent positive or pertinent negative responses have been 

documented in the HPI.





ROS Other: All systems not noted in ROS Statement are negative.





Past Medical History


Past Medical History: COPD, Deep Vein Thrombosis (DVT), Hearing Disorder / 

Deafness, Hyperlipidemia, Osteoarthritis (OA), Pneumonia


Additional Past Medical History / Comment(s): 16  Pt presented to [facility] ER 

via EMS with having started about 2 hours before presentation some L sided 

chest pain.  He thought it felt like heart burn so he took tums without relief.

  He called EMS and pain subsided.  Pain then returned and EMS gave NTG  with 

relief.  Pt was wheezing and having some UBALDO.  He is being admitted with 

clinical impression of unstable angina pectoris, renal insufficiency, elevated D

-dimer and pancreatitis. Pt was recently admitted to [facility] on 1/3/16 with acute 

exacerbation COPD, purulent tracheobronchitis, chronic bronchitis.    Other HX:

  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 

2L/NC.


History of Any Multi-Drug Resistant Organisms: None Reported


Past Surgical History: Tonsillectomy


Additional Past Surgical History / Comment(s): vasectomy


Past Anesthesia/Blood Transfusion Reactions: No Reported Reaction


Past Psychological History: No Psychological Hx Reported


Smoking Status: Former smoker


Past Alcohol Use History: Occasional


Past Drug Use History: None Reported





- Past Family History


  ** Father


Family Medical History: Cancer


Additional Family Medical History / Comment(s): cancer. client reported father 

had scoliosis.





  ** Mother


Additional Family Medical History / Comment(s): mother . unknown history





General Exam





- General Exam Comments


Initial Comments: 





GENERAL:


Patient is well-developed and well-nourished.  Patient is nontoxic and well-

hydrated and is in moderate distress.





ENT:


Neck is soft and supple.  No significant lymphadenopathy is noted.  Oropharynx 

is clear.  Moist mucous membranes.  Neck has full range of motion without 

eliciting any pain.  





EYES:


The sclera were anicteric and conjunctiva were pink and moist.  Extraocular 

movements were intact and pupils were equal round and reactive to light.  

Eyelids were unremarkable.





PULMONARY:


Extra wheezing diffusely





CARDIOVASCULAR:


There is a regular rate and rhythm without any murmurs gallops or rubs. 





ABDOMEN:


Soft and nontender with normal bowel sounds.  No palpable organomegaly was 

noted.  There is no palpable pulsatile mass.





SKIN:


Skin is clear with no lesions or rashes and otherwise unremarkable.





NEUROLOGIC:


Patient is alert and oriented x3.  Cranial nerves II through XII are grossly 

intact.  Motor and sensory are also intact.  Normal speech, volume and content.

  Symmetrical smile.  





MUSCULOSKELETAL:


Normal extremities with adequate strength and full range of motion.  No lower 

extremity swelling or edema.  No calf tenderness.





LYMPHATICS:


No significant lymphadenopathy is noted





PSYCHIATRIC:


Normal psychiatric evaluation.  Normal interpersonal interactions appears 

functionally intact in deals appropriately with others.  No signs of 

depression.  No signs of anxiety. 


Limitations: no limitations, physical limitation





Course


 Vital Signs











  01/15/18 01/15/18 01/15/18





  14:19 14:40 15:07


 


Temperature 97.0 F L  


 


Pulse Rate 95  83


 


Respiratory 26 H 26 H 





Rate   


 


Blood Pressure 203/84  


 


O2 Sat by Pulse 95  





Oximetry   














  01/15/18





  15:32


 


Temperature 


 


Pulse Rate 88


 


Respiratory 





Rate 


 


Blood Pressure 


 


O2 Sat by Pulse 





Oximetry 














Medical Decision Making





- Medical Decision Making





EKG shows normal sinus rhythm at 94 bpm TN interval is 136 QRS is 84 QT 

interval 368 QT intervals 460.  Patient's EKG shows no ST segment elevation or 

depression or T wave abnormalities are noted.





Chest x-ray showed no acute abnormality.





I gave the patient 3 breathing treatments and steroids though the patient felt 

as though he was improved he did not really sound much better.





I spoke with Dr. Whatley he agreed to admit the patient I admitted the patient I 

wrote admitting orders I continued breathing treatments and steroids on the 

floor.





- Lab Data


Result diagrams: 


 01/15/18 14:30





 01/15/18 14:30


 Lab Results











  01/15/18 01/15/18 01/15/18 Range/Units





  14:30 14:30 14:30 


 


WBC   6.3   (3.8-10.6)  k/uL


 


RBC   3.78 L   (4.30-5.90)  m/uL


 


Hgb   10.5 L   (13.0-17.5)  gm/dL


 


Hct   33.6 L   (39.0-53.0)  %


 


MCV   89.1   (80.0-100.0)  fL


 


MCH   27.7   (25.0-35.0)  pg


 


MCHC   31.1   (31.0-37.0)  g/dL


 


RDW   14.9   (11.5-15.5)  %


 


Plt Count   428   (150-450)  k/uL


 


Neutrophils %   82   %


 


Lymphocytes %   8   %


 


Monocytes %   7   %


 


Eosinophils %   1   %


 


Basophils %   1   %


 


Neutrophils #   5.2   (1.3-7.7)  k/uL


 


Lymphocytes #   0.5 L   (1.0-4.8)  k/uL


 


Monocytes #   0.4   (0-1.0)  k/uL


 


Eosinophils #   0.1   (0-0.7)  k/uL


 


Basophils #   0.0   (0-0.2)  k/uL


 


Hypochromasia   Slight   


 


PT     (9.0-12.0)  sec


 


INR     (<1.2)  


 


APTT     (22.0-30.0)  sec


 


Sodium    139  (137-145)  mmol/L


 


Potassium    5.3 H  (3.5-5.1)  mmol/L


 


Chloride    102  ()  mmol/L


 


Carbon Dioxide    27  (22-30)  mmol/L


 


Anion Gap    10  mmol/L


 


BUN    28 H  (9-20)  mg/dL


 


Creatinine    1.44 H  (0.66-1.25)  mg/dL


 


Est GFR (MDRD) Af Amer    57  (>60 ml/min/1.73 sqM)  


 


Est GFR (MDRD) Non-Af    47  (>60 ml/min/1.73 sqM)  


 


Glucose    119 H  (74-99)  mg/dL


 


Calcium    9.4  (8.4-10.2)  mg/dL


 


Magnesium    2.3  (1.6-2.3)  mg/dL


 


Total Bilirubin    0.3  (0.2-1.3)  mg/dL


 


AST    23  (17-59)  U/L


 


ALT    29  (21-72)  U/L


 


Alkaline Phosphatase    74  ()  U/L


 


Total Creatine Kinase  151    ()  U/L


 


CK-MB (CK-2)  8.3 H*    (0.0-2.4)  ng/mL


 


CK-MB (CK-2) Rel Index  5.5    


 


Troponin I  <0.012    (0.000-0.034)  ng/mL


 


Total Protein    7.3  (6.3-8.2)  g/dL


 


Albumin    4.0  (3.5-5.0)  g/dL














  01/15/18 Range/Units





  14:30 


 


WBC   (3.8-10.6)  k/uL


 


RBC   (4.30-5.90)  m/uL


 


Hgb   (13.0-17.5)  gm/dL


 


Hct   (39.0-53.0)  %


 


MCV   (80.0-100.0)  fL


 


MCH   (25.0-35.0)  pg


 


MCHC   (31.0-37.0)  g/dL


 


RDW   (11.5-15.5)  %


 


Plt Count   (150-450)  k/uL


 


Neutrophils %   %


 


Lymphocytes %   %


 


Monocytes %   %


 


Eosinophils %   %


 


Basophils %   %


 


Neutrophils #   (1.3-7.7)  k/uL


 


Lymphocytes #   (1.0-4.8)  k/uL


 


Monocytes #   (0-1.0)  k/uL


 


Eosinophils #   (0-0.7)  k/uL


 


Basophils #   (0-0.2)  k/uL


 


Hypochromasia   


 


PT  9.5  (9.0-12.0)  sec


 


INR  1.0  (<1.2)  


 


APTT  23.4  (22.0-30.0)  sec


 


Sodium   (137-145)  mmol/L


 


Potassium   (3.5-5.1)  mmol/L


 


Chloride   ()  mmol/L


 


Carbon Dioxide   (22-30)  mmol/L


 


Anion Gap   mmol/L


 


BUN   (9-20)  mg/dL


 


Creatinine   (0.66-1.25)  mg/dL


 


Est GFR (MDRD) Af Amer   (>60 ml/min/1.73 sqM)  


 


Est GFR (MDRD) Non-Af   (>60 ml/min/1.73 sqM)  


 


Glucose   (74-99)  mg/dL


 


Calcium   (8.4-10.2)  mg/dL


 


Magnesium   (1.6-2.3)  mg/dL


 


Total Bilirubin   (0.2-1.3)  mg/dL


 


AST   (17-59)  U/L


 


ALT   (21-72)  U/L


 


Alkaline Phosphatase   ()  U/L


 


Total Creatine Kinase   ()  U/L


 


CK-MB (CK-2)   (0.0-2.4)  ng/mL


 


CK-MB (CK-2) Rel Index   


 


Troponin I   (0.000-0.034)  ng/mL


 


Total Protein   (6.3-8.2)  g/dL


 


Albumin   (3.5-5.0)  g/dL














Critical Care Time


Critical Care Time: Yes


Total Critical Care Time: 35





Disposition


Clinical Impression: 


 COPD with acute exacerbation





Disposition: ADMITTED AS IP TO THIS HOSP


Referrals: 


Nik Whatley MD [Primary Care Provider] - 1-2 days


Time of Disposition: 16:12

## 2018-01-16 LAB
ALBUMIN SERPL-MCNC: 3.8 G/DL (ref 3.5–5)
ALP SERPL-CCNC: 70 U/L (ref 38–126)
ALT SERPL-CCNC: 31 U/L (ref 21–72)
ANION GAP SERPL CALC-SCNC: 11 MMOL/L
AST SERPL-CCNC: 24 U/L (ref 17–59)
BUN SERPL-SCNC: 38 MG/DL (ref 9–20)
CALCIUM SPEC-MCNC: 9.6 MG/DL (ref 8.4–10.2)
CHLORIDE SERPL-SCNC: 102 MMOL/L (ref 98–107)
CO2 SERPL-SCNC: 27 MMOL/L (ref 22–30)
ERYTHROCYTE [DISTWIDTH] IN BLOOD BY AUTOMATED COUNT: 3.84 M/UL (ref 4.3–5.9)
ERYTHROCYTE [DISTWIDTH] IN BLOOD: 15.8 % (ref 11.5–15.5)
GLUCOSE BLD-MCNC: 134 MG/DL (ref 75–99)
GLUCOSE BLD-MCNC: 142 MG/DL (ref 75–99)
GLUCOSE BLD-MCNC: 151 MG/DL (ref 75–99)
GLUCOSE BLD-MCNC: 154 MG/DL (ref 75–99)
GLUCOSE SERPL-MCNC: 134 MG/DL (ref 74–99)
HBA1C MFR BLD: 6.3 % (ref 4–6)
HCT VFR BLD AUTO: 33.6 % (ref 39–53)
HGB BLD-MCNC: 10.3 GM/DL (ref 13–17.5)
MCH RBC QN AUTO: 26.9 PG (ref 25–35)
MCHC RBC AUTO-ENTMCNC: 30.8 G/DL (ref 31–37)
MCV RBC AUTO: 87.4 FL (ref 80–100)
PLATELET # BLD AUTO: 432 K/UL (ref 150–450)
POTASSIUM SERPL-SCNC: 5.7 MMOL/L (ref 3.5–5.1)
PROT SERPL-MCNC: 7.1 G/DL (ref 6.3–8.2)
SODIUM SERPL-SCNC: 140 MMOL/L (ref 137–145)
WBC # BLD AUTO: 5 K/UL (ref 3.8–10.6)

## 2018-01-16 RX ADMIN — IPRATROPIUM BROMIDE AND ALBUTEROL SULFATE SCH ML: .5; 3 SOLUTION RESPIRATORY (INHALATION) at 20:35

## 2018-01-16 RX ADMIN — INSULIN ASPART SCH UNIT: 100 INJECTION, SOLUTION INTRAVENOUS; SUBCUTANEOUS at 08:51

## 2018-01-16 RX ADMIN — CEFUROXIME AXETIL SCH MG: 250 TABLET ORAL at 20:10

## 2018-01-16 RX ADMIN — BUDESONIDE SCH MG: 1 SUSPENSION RESPIRATORY (INHALATION) at 20:35

## 2018-01-16 RX ADMIN — IPRATROPIUM BROMIDE AND ALBUTEROL SULFATE PRN ML: .5; 3 SOLUTION RESPIRATORY (INHALATION) at 07:34

## 2018-01-16 RX ADMIN — METHYLPREDNISOLONE SODIUM SUCCINATE SCH MG: 125 INJECTION, POWDER, FOR SOLUTION INTRAMUSCULAR; INTRAVENOUS at 12:03

## 2018-01-16 RX ADMIN — TAMSULOSIN HYDROCHLORIDE SCH MG: 0.4 CAPSULE ORAL at 20:10

## 2018-01-16 RX ADMIN — IPRATROPIUM BROMIDE AND ALBUTEROL SULFATE SCH ML: .5; 3 SOLUTION RESPIRATORY (INHALATION) at 16:41

## 2018-01-16 RX ADMIN — INSULIN ASPART SCH UNIT: 100 INJECTION, SOLUTION INTRAVENOUS; SUBCUTANEOUS at 12:48

## 2018-01-16 RX ADMIN — FORMOTEROL FUMARATE DIHYDRATE SCH MCG: 20 SOLUTION RESPIRATORY (INHALATION) at 20:35

## 2018-01-16 RX ADMIN — CEFUROXIME AXETIL SCH MG: 250 TABLET ORAL at 12:29

## 2018-01-16 RX ADMIN — INSULIN ASPART SCH UNIT: 100 INJECTION, SOLUTION INTRAVENOUS; SUBCUTANEOUS at 17:48

## 2018-01-16 RX ADMIN — IPRATROPIUM BROMIDE AND ALBUTEROL SULFATE SCH ML: .5; 3 SOLUTION RESPIRATORY (INHALATION) at 11:25

## 2018-01-16 RX ADMIN — INSULIN ASPART SCH UNIT: 100 INJECTION, SOLUTION INTRAVENOUS; SUBCUTANEOUS at 21:46

## 2018-01-16 RX ADMIN — LISINOPRIL SCH MG: 10 TABLET ORAL at 08:47

## 2018-01-16 RX ADMIN — METHYLPREDNISOLONE SODIUM SUCCINATE SCH MG: 125 INJECTION, POWDER, FOR SOLUTION INTRAMUSCULAR; INTRAVENOUS at 05:59

## 2018-01-16 RX ADMIN — TAMSULOSIN HYDROCHLORIDE SCH MG: 0.4 CAPSULE ORAL at 08:47

## 2018-01-16 RX ADMIN — METHYLPREDNISOLONE SODIUM SUCCINATE SCH MG: 125 INJECTION, POWDER, FOR SOLUTION INTRAMUSCULAR; INTRAVENOUS at 17:49

## 2018-01-16 RX ADMIN — ATORVASTATIN CALCIUM SCH MG: 20 TABLET, FILM COATED ORAL at 08:47

## 2018-01-16 RX ADMIN — SODIUM BICARBONATE SCH MLS/HR: 84 INJECTION, SOLUTION INTRAVENOUS at 14:52

## 2018-01-16 NOTE — P.HPIM
History of Present Illness


H&P Date: 18


Chief Complaint: Dyspnea





This is a history of physical and 82-year-old white male with known history of 

hypertension who has an underlying history of COPD.  He states for last several 

days she's becoming more dyspneic.  In fact, I recently saw the patient about 2 

weeks ago and he is fairly "stable."  The patient states that he has had 

similar episodes in the past.  Typically, he gets admitted for exacerbation 

about every year or every 6-12 months.  The patient states similar symptoms.  

The patient is currently a nonsmoker.  We will go ahead and consult pulmonology 

at this time.  He's been placed on appropriate COPD exacerbation protocol.  He 

is O2 dependent at this time and is holding conversation appropriately.  No 

voiding difficulties are stated.





Review of Systems


Constitutional: Denies chills, Denies fever


Eyes: denies blurred vision, denies pain


Ears, nose, mouth and throat: Denies headache, Denies sore throat


Cardiovascular: Denies chest pain, Denies shortness of breath


Respiratory: Reports as per HPI, Reports dyspnea, Reports pleurisy


Gastrointestinal: Denies abdominal pain, Denies diarrhea, Denies nausea, Denies 

vomiting


Musculoskeletal: Denies myalgias


Integumentary: Denies pruritus, Denies rash


Neurological: Denies numbness, Denies weakness





Past Medical History


Past Medical History: Cancer, Chest Pain / Angina, COPD, Deep Vein Thrombosis (

DVT), Hyperlipidemia, Hypertension, Osteoarthritis (OA), Pneumonia


Additional Past Medical History / Comment(s): unstable angina pectoris. COPD, 

purulent tracheobronchitis, chronic bronchitis.   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 

OA, back pain, pneumonia , home O2 at 2L/NC.prn, bladder cancer


History of Any Multi-Drug Resistant Organisms: None Reported


Past Surgical History: Bladder Surgery, Tonsillectomy


Additional Past Surgical History / Comment(s): vasectomy


Past Anesthesia/Blood Transfusion Reactions: No Reported Reaction


Smoking Status: Former smoker





- Past Family History


  ** Father


Family Medical History: Cancer


Additional Family Medical History / Comment(s): cancer. client reported father 

had scoliosis.





  ** Mother


Additional Family Medical History / Comment(s): mother . unknown history





Medications and Allergies


 Home Medications











 Medication  Instructions  Recorded  Confirmed  Type


 


Albuterol Sulfate [Proair Hfa] 2 puff INHALATION RT-Q6H PRN 01/03/16 01/15/18 

History


 


Simvastatin [Zocor] 40 mg PO DAILY 01/03/16 01/15/18 History


 


Ipratropium-Albuterol Nebulize 3 ml INHALATION RT-QID 01/04/16 01/15/18 History





[Duoneb 0.5 mg-3 mg/3 ml Soln]    


 


Fluticasone/Vilanterol [Breo 1 puff INHALATION RT-DAILY 03/31/17 01/15/18 

History





Ellipta 200-25 Mcg INH]    


 


predniSONE 5 mg PO DAILY 03/31/17 01/15/18 History


 


Tamsulosin HCl [Flomax] 0.4 mg PO BID 11/30/17 01/15/18 History


 


Lisinopril [Zestril] 10 mg PO DAILY 01/15/18 01/15/18 History


 


Sulfamethox-Tmp 800-160Mg [Bactrim 1 tab PO Q12HR 01/15/18 01/15/18 History





-160 mg]    











 Allergies











Allergy/AdvReac Type Severity Reaction Status Date / Time


 


No Known Allergies Allergy   Verified 01/15/18 14:39














Physical Exam


Vitals: 


 Vital Signs











  Temp Pulse Pulse Resp BP BP Pulse Ox


 


 01/15/18 23:00  96.3 F L   86  16   136/64  96


 


 01/15/18 21:56   80     


 


 01/15/18 21:46   86     


 


 01/15/18 20:35        95


 


 01/15/18 18:05  96.6 F L   100  18   148/65  93 L


 


 01/15/18 17:43   100   18  149/68   94 L


 


 01/15/18 16:32   92   18  141/61   94 L


 


 01/15/18 15:32   88     


 


 01/15/18 15:07   83     


 


 01/15/18 14:40     26 H   


 


 01/15/18 14:19  97.0 F L  95   26 H  203/84   95








 Intake and Output











 01/15/18 01/16/18 01/16/18





 22:59 06:59 14:59


 


Other:   


 


  Voiding Method Toilet Toilet 





 Urinal Urinal 


 


  # Voids 1 1 














- Constitutional


General appearance: no acute distress





- EENT


Eyes: EOMI





- Neck


Neck: no lymphadenopathy





- Respiratory


Respiratory: bilateral: CTA





- Cardiovascular


Rhythm: regular


Heart sounds: normal: S1, S2





- Gastrointestinal


General gastrointestinal: soft, no tenderness





- Integumentary


Integumentary: no cellulitis





- Neurologic


Neurologic: CNII-XII intact





- Musculoskeletal


Musculoskeletal: gait normal





Results


CBC & Chem 7: 


 01/15/18 14:30





 01/15/18 14:30


Labs: 


 Abnormal Lab Results - Last 24 Hours (Table)











  01/15/18 01/15/18 01/15/18 Range/Units





  14:30 14:30 14:30 


 


RBC   3.78 L   (4.30-5.90)  m/uL


 


Hgb   10.5 L   (13.0-17.5)  gm/dL


 


Hct   33.6 L   (39.0-53.0)  %


 


Lymphocytes #   0.5 L   (1.0-4.8)  k/uL


 


Potassium    5.3 H  (3.5-5.1)  mmol/L


 


BUN    28 H  (9-20)  mg/dL


 


Creatinine    1.44 H  (0.66-1.25)  mg/dL


 


Glucose    119 H  (74-99)  mg/dL


 


POC Glucose (mg/dL)     (75-99)  mg/dL


 


CK-MB (CK-2)  8.3 H*    (0.0-2.4)  ng/mL














  01/15/18 Range/Units





  21:06 


 


RBC   (4.30-5.90)  m/uL


 


Hgb   (13.0-17.5)  gm/dL


 


Hct   (39.0-53.0)  %


 


Lymphocytes #   (1.0-4.8)  k/uL


 


Potassium   (3.5-5.1)  mmol/L


 


BUN   (9-20)  mg/dL


 


Creatinine   (0.66-1.25)  mg/dL


 


Glucose   (74-99)  mg/dL


 


POC Glucose (mg/dL)  168 H  (75-99)  mg/dL


 


CK-MB (CK-2)   (0.0-2.4)  ng/mL














Thrombosis Risk Factor Assmnt





- Choose All That Apply


Any of the Below Risk Factors Present?: Yes


Each Factor Represents 1 point: Abnormal pulmonary function (COPD)


Other Risk Factors: Yes


Each Risk Factor Represents 2 Points: Malignancy


Each Risk Factor Represents 3 Points: Age 75 years or older, History of DVT/PE


Other congenital or acquired thrombophilia - If yes, enter type in comment: No


Thrombosis Risk Factor Assessment Total Risk Factor Score: 9


Thrombosis Risk Factor Assessment Level: High Risk





Assessment and Plan


(1) Hypertension


Current Visit: Yes   Status: Acute   Code(s): I10 - ESSENTIAL (PRIMARY) 

HYPERTENSION   SNOMED Code(s): 15532782


   





(2) Hyperlipidemia


Current Visit: Yes   Status: Acute   Code(s): E78.5 - HYPERLIPIDEMIA, 

UNSPECIFIED   SNOMED Code(s): 33945024


   





(3) COPD with acute exacerbation


Current Visit: Yes   Status: Acute   Code(s): J44.1 - CHRONIC OBSTRUCTIVE 

PULMONARY DISEASE W (ACUTE) EXACERBATION   SNOMED Code(s): 211162932


   


Plan: 





Reconcile home medications.





Check labs in a.m.  New





Consult Dr. Terrazas.





Continue current regimen of treatment.





We'll continue to follow.





Hopefully we can discharge the patient within the next 72 hours if stabilizing.





At this time, patient is a full code.

## 2018-01-16 NOTE — P.CNPUL
History of Present Illness


Consult date: 18


Requesting physician: Nik Whatley


Reason for consult: dyspnea, cough, COPD


Chief complaint: increased shortness of breath, cough, chest congestion


History of present illness: 


Linwood is a 82-year-old white male patient, who sees Dr. Terrazas in our office 

for his advanced steroid-dependent COPD, with the baseline FEV1 of 33% 

predicted value, presented to the emergency room on 01/15/2018 at 1400 with 

complaints of increasing shortness of breath, wheezing, chest congestion, 

productive cough with yellow sputum.  Denied any fever, did have some chills.  

Denied any chest pain, hemoptysis.  Patient was seen by Dr. Terrazas in the office 

last Friday on 2018 with COPD exacerbation, he was given Depo-Medrol IM, 

was started on his own taper and Bactrim DS.  However over the weekend his 

symptoms did not improve and actually got worse, and patient did present to the 

ED for further evaluation and treatment. Chest x-ray from 01/15/2018 shows no 

evidence for acute pulmonary disease. Lab work showed that CBC within normal 

limits of 6.3, hemoglobin of 10.5, no evidence of coagulopathy, sodium is 139, 

potassium is 5.3, BUN of 28, and creatinine is 1.44. CK-MB was 8.3, troponin 

was negative 1.  Follow-up blood work from today still shows normal white count

, still with stable hemoglobin of 10.3, sodium of 140, his potassium is 

slightly increased to 5.7, BUN is 38, creatinine is 1.46. There is evidence of 

of acute kidney injury, patient denies decreased oral intake, no evidence of 

hypotension, on presentation to the emergency room, he was actually very 

hypertensive with a blood pressure of 203/84, but at the time he was in 

respiratory distress as well, and they're ranging from systolic blood pressure 

in the 130s to 180s, and diastolic pressure from 60s to 90s.  Patient was 

started on DuoNeb nebulized treatments, IV Solu-Medrol, and admitted to the 

floor for further management.





Review of Systems


All systems: negative


Constitutional: Denies chills, Denies fever


Eyes: denies blurred vision, denies pain


Ears, nose, mouth and throat: Denies headache, Denies sore throat


Cardiovascular: Denies chest pain, Denies shortness of breath


Respiratory: Denies cough


Gastrointestinal: Denies abdominal pain, Denies diarrhea, Denies nausea, Denies 

vomiting


Musculoskeletal: Denies myalgias


Integumentary: Denies pruritus, Denies rash


Neurological: Denies numbness, Denies weakness


Psychiatric: Denies anxiety, Denies depression


Endocrine: Denies fatigue, Denies weight change





Past Medical History


Past Medical History: Cancer, Chest Pain / Angina, COPD, Deep Vein Thrombosis (

DVT), Hyperlipidemia, Hypertension, Osteoarthritis (OA), Pneumonia


Additional Past Medical History / Comment(s): unstable angina pectoris. COPD, 

purulent tracheobronchitis, chronic bronchitis.   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 

OA, back pain, pneumonia , home O2 at 2L/NC.prn, bladder cancer


History of Any Multi-Drug Resistant Organisms: None Reported


Past Surgical History: Bladder Surgery, Tonsillectomy


Additional Past Surgical History / Comment(s): vasectomy


Past Anesthesia/Blood Transfusion Reactions: No Reported Reaction


Smoking Status: Former smoker





- Past Family History


  ** Father


Family Medical History: Cancer


Additional Family Medical History / Comment(s): cancer. client reported father 

had scoliosis.





  ** Mother


Additional Family Medical History / Comment(s): mother . unknown history





Medications and Allergies


 Home Medications











 Medication  Instructions  Recorded  Confirmed  Type


 


Albuterol Sulfate [Proair Hfa] 2 puff INHALATION RT-Q6H PRN 01/03/16 01/15/18 

History


 


Simvastatin [Zocor] 40 mg PO DAILY 01/03/16 01/15/18 History


 


Ipratropium-Albuterol Nebulize 3 ml INHALATION RT-QID 01/04/16 01/15/18 History





[Duoneb 0.5 mg-3 mg/3 ml Soln]    


 


Fluticasone/Vilanterol [Breo 1 puff INHALATION RT-DAILY 03/31/17 01/15/18 

History





Ellipta 200-25 Mcg INH]    


 


predniSONE 5 mg PO DAILY 03/31/17 01/15/18 History


 


Tamsulosin HCl [Flomax] 0.4 mg PO BID 11/30/17 01/15/18 History


 


Lisinopril [Zestril] 10 mg PO DAILY 01/15/18 01/15/18 History


 


Sulfamethox-Tmp 800-160Mg [Bactrim 1 tab PO Q12HR 01/15/18 01/15/18 History





-160 mg]    











 Allergies











Allergy/AdvReac Type Severity Reaction Status Date / Time


 


No Known Allergies Allergy   Verified 01/15/18 14:39














Physical Exam


Vitals: 


 Vital Signs











  Temp Pulse Pulse Resp BP BP BP


 


 18 10:46    100     155/67


 


 18 07:50   80     


 


 18 07:37   86     


 


 18 07:00  97.8 F   91  18   182/97 


 


 01/15/18 23:00  96.3 F L   86  16   136/64 


 


 01/15/18 21:56   80     


 


 01/15/18 21:46   86     


 


 01/15/18 20:35       


 


 01/15/18 18:05  96.6 F L   100  18   148/65 


 


 01/15/18 17:43   100   18  149/68  


 


 01/15/18 16:32   92   18  141/61  


 


 01/15/18 15:32   88     


 


 01/15/18 15:07   83     


 


 01/15/18 14:40     26 H   


 


 01/15/18 14:19  97.0 F L  95   26 H  203/84  














  Pulse Ox


 


 18 10:46  97


 


 18 07:50 


 


 18 07:37  96


 


 18 07:00  99


 


 01/15/18 23:00  96


 


 01/15/18 21:56 


 


 01/15/18 21:46 


 


 01/15/18 20:35  95


 


 01/15/18 18:05  93 L


 


 01/15/18 17:43  94 L


 


 01/15/18 16:32  94 L


 


 01/15/18 15:32 


 


 01/15/18 15:07 


 


 01/15/18 14:40 


 


 01/15/18 14:19  95








 Intake and Output











 01/15/18 01/16/18 01/16/18





 22:59 06:59 14:59


 


Intake Total   200


 


Balance   200


 


Intake:   


 


  Oral   200


 


Other:   


 


  Voiding Method Toilet Toilet 





 Urinal Urinal 


 


  # Voids 1 1 











GENERAL EXAM: Alert, pleasant, thin, 82-year-old white male, mildly short of 

breath at rest, but fairly comfortable


HEAD: Normocephalic/atraumatic.


EYES: Normal reaction of pupils, equal size.  Conjunctiva pink, sclera white.


NOSE: Clear with pink turbinates.


THROAT: No erythema or exudates.


NECK: No masses, no JVD, no thyroid enlargement, no adenopathy.


CHEST: No chest wall deformity.  Symmetrical expansion. 


LUNGS: Equal air entry with scattered rhonchi, and wheezing throughout the lung 

fields.


CVS: Regular rate and rhythm, normal S1 and S2, no gallops, no murmurs, no rubs


ABDOMEN: Soft, nontender.  No hepatosplenomegaly, normal bowel sounds, no 

guarding or rigidity.


EXTREMITIES: No clubbing, no edema, no cyanosis, 2+ pulses and upper and lower 

extremities.


MUSCULOSKELETAL: Muscle strength and tone normal.


SPINE: No scoliosis or deformity


SKIN: No rashes


CENTRAL NERVOUS SYSTEM: Alert and oriented -3.  No focal deficits, tone is 

normal in all 4 extremities.


PSYCHIATRIC: Alert and oriented -3.  Appropriate affect.  Intact judgment and 

insight. 











Results





- Laboratory Findings


CBC and BMP: 


 18 07:19





 18 07:19


PT/INR, D-dimer











PT  9.5 sec (9.0-12.0)   01/15/18  14:30    


 


INR  1.0  (<1.2)   01/15/18  14:30    








Abnormal lab findings: 


 Abnormal Labs











  01/15/18 01/15/18 01/15/18





  14:30 14:30 14:30


 


RBC   3.78 L 


 


Hgb   10.5 L 


 


Hct   33.6 L 


 


MCHC   


 


RDW   


 


Lymphocytes #   0.5 L 


 


Potassium    5.3 H


 


BUN    28 H


 


Creatinine    1.44 H


 


Glucose    119 H


 


POC Glucose (mg/dL)   


 


CK-MB (CK-2)  8.3 H*  














  01/15/18 01/16/18 01/16/18





  21:06 07:06 07:19


 


RBC    3.84 L


 


Hgb    10.3 L


 


Hct    33.6 L


 


MCHC    30.8 L


 


RDW    15.8 H


 


Lymphocytes #   


 


Potassium   


 


BUN   


 


Creatinine   


 


Glucose   


 


POC Glucose (mg/dL)  168 H  134 H 


 


CK-MB (CK-2)   














  18





  07:19


 


RBC 


 


Hgb 


 


Hct 


 


MCHC 


 


RDW 


 


Lymphocytes # 


 


Potassium  5.7 H


 


BUN  38 H


 


Creatinine  1.46 H


 


Glucose  134 H


 


POC Glucose (mg/dL) 


 


CK-MB (CK-2) 














- Diagnostic Findings


Chest x-ray: report reviewed


Additional studies: 


Twelve-lead EKG reviewed.








Assessment and Plan


Plan: 


Assessment:





#1.  Acute COPD exacerbation completed by tracheobronchitis, with failed 

outpatient treatment





#2.  Advanced steroid dependent COPD, with a baseline FEV1 of 33% of predicted





#3.  Acute kidney injury, of unclear etiology, patient presented with a BUN of 

28, and creatinine of 1.44, which subsequently increased to 38 and 1.46 

respectively on today's lab work from 2018. his previous creatinine from 

2017 was within normal limits at 1.10. 





#4.  Hyperkalemia, possibly related to acute kidney injury or Bactrim therapy 





#5. nicotine dependence, currently in remission,





#6. hyperlipidemia





#7.  Hypertension





#8.  Neoplasm of bladder





#9.  History of deep venous thrombosis





Plan:





Continue DuoNeb nebulized treatments, we will add Pulmicort and Perforomist.  

Agree with IV Solu-Medrol, we initially restarted his Bactrim DS that was 

prescribed for him on 2018, but upon further review patient is 

hyperkalemic, could be due to Bactrim.  We will stop the Bactrim and switch to 

a different antibiotic, oral Ceftin. Will obtain influenza screen. Continue all 

other treatments. 





I performed a history & physical examination of the patient and discussed their 

management with my nurse practitioner, Mayda Álvarez.  I reviewed the nurse 

practitioner's note and agree with the documented findings and plan of care.  

Lung sounds are positive for diffuse wheezes and rhonchi throughout the lung 

fields.  The findings and the impression was discussed with the patient.  I 

attest to the documentation by the nurse practitioner. 








Time with Patient: Greater than 30

## 2018-01-17 LAB
ALBUMIN SERPL-MCNC: 3.7 G/DL (ref 3.5–5)
ALP SERPL-CCNC: 54 U/L (ref 38–126)
ALT SERPL-CCNC: 29 U/L (ref 21–72)
ANION GAP SERPL CALC-SCNC: 18 MMOL/L
ANION GAP SERPL CALC-SCNC: 9 MMOL/L
AST SERPL-CCNC: 35 U/L (ref 17–59)
BUN SERPL-SCNC: 54 MG/DL (ref 9–20)
BUN SERPL-SCNC: 66 MG/DL (ref 9–20)
CALCIUM SPEC-MCNC: 9.4 MG/DL (ref 8.4–10.2)
CALCIUM SPEC-MCNC: 9.7 MG/DL (ref 8.4–10.2)
CHLORIDE SERPL-SCNC: 101 MMOL/L (ref 98–107)
CHLORIDE SERPL-SCNC: 103 MMOL/L (ref 98–107)
CO2 SERPL-SCNC: 21 MMOL/L (ref 22–30)
CO2 SERPL-SCNC: 27 MMOL/L (ref 22–30)
ERYTHROCYTE [DISTWIDTH] IN BLOOD BY AUTOMATED COUNT: 3.67 M/UL (ref 4.3–5.9)
ERYTHROCYTE [DISTWIDTH] IN BLOOD: 14.8 % (ref 11.5–15.5)
GLUCOSE BLD-MCNC: 109 MG/DL (ref 75–99)
GLUCOSE BLD-MCNC: 122 MG/DL (ref 75–99)
GLUCOSE BLD-MCNC: 127 MG/DL (ref 75–99)
GLUCOSE BLD-MCNC: 70 MG/DL (ref 75–99)
GLUCOSE SERPL-MCNC: 108 MG/DL (ref 74–99)
GLUCOSE SERPL-MCNC: 121 MG/DL (ref 74–99)
HCT VFR BLD AUTO: 33.1 % (ref 39–53)
HGB BLD-MCNC: 9.9 GM/DL (ref 13–17.5)
MCH RBC QN AUTO: 27 PG (ref 25–35)
MCHC RBC AUTO-ENTMCNC: 29.9 G/DL (ref 31–37)
MCV RBC AUTO: 90.2 FL (ref 80–100)
PLATELET # BLD AUTO: 444 K/UL (ref 150–450)
POTASSIUM SERPL-SCNC: 5.9 MMOL/L (ref 3.5–5.1)
POTASSIUM SERPL-SCNC: 6 MMOL/L (ref 3.5–5.1)
PROT SERPL-MCNC: 7 G/DL (ref 6.3–8.2)
SODIUM SERPL-SCNC: 139 MMOL/L (ref 137–145)
SODIUM SERPL-SCNC: 140 MMOL/L (ref 137–145)
WBC # BLD AUTO: 10.1 K/UL (ref 3.8–10.6)

## 2018-01-17 RX ADMIN — ATORVASTATIN CALCIUM SCH MG: 20 TABLET, FILM COATED ORAL at 09:06

## 2018-01-17 RX ADMIN — FORMOTEROL FUMARATE DIHYDRATE SCH MCG: 20 SOLUTION RESPIRATORY (INHALATION) at 20:06

## 2018-01-17 RX ADMIN — CEFUROXIME AXETIL SCH MG: 250 TABLET ORAL at 09:06

## 2018-01-17 RX ADMIN — METHYLPREDNISOLONE SODIUM SUCCINATE SCH MG: 125 INJECTION, POWDER, FOR SOLUTION INTRAMUSCULAR; INTRAVENOUS at 18:01

## 2018-01-17 RX ADMIN — IPRATROPIUM BROMIDE AND ALBUTEROL SULFATE SCH ML: .5; 3 SOLUTION RESPIRATORY (INHALATION) at 12:00

## 2018-01-17 RX ADMIN — TAMSULOSIN HYDROCHLORIDE SCH MG: 0.4 CAPSULE ORAL at 21:41

## 2018-01-17 RX ADMIN — INSULIN ASPART SCH: 100 INJECTION, SOLUTION INTRAVENOUS; SUBCUTANEOUS at 21:42

## 2018-01-17 RX ADMIN — LISINOPRIL SCH MG: 10 TABLET ORAL at 09:05

## 2018-01-17 RX ADMIN — IPRATROPIUM BROMIDE AND ALBUTEROL SULFATE SCH ML: .5; 3 SOLUTION RESPIRATORY (INHALATION) at 08:30

## 2018-01-17 RX ADMIN — SODIUM BICARBONATE SCH: 84 INJECTION, SOLUTION INTRAVENOUS at 09:06

## 2018-01-17 RX ADMIN — TAMSULOSIN HYDROCHLORIDE SCH MG: 0.4 CAPSULE ORAL at 09:05

## 2018-01-17 RX ADMIN — METHYLPREDNISOLONE SODIUM SUCCINATE SCH MG: 125 INJECTION, POWDER, FOR SOLUTION INTRAMUSCULAR; INTRAVENOUS at 06:24

## 2018-01-17 RX ADMIN — METHYLPREDNISOLONE SODIUM SUCCINATE SCH MG: 125 INJECTION, POWDER, FOR SOLUTION INTRAMUSCULAR; INTRAVENOUS at 00:20

## 2018-01-17 RX ADMIN — BUDESONIDE SCH MG: 1 SUSPENSION RESPIRATORY (INHALATION) at 08:30

## 2018-01-17 RX ADMIN — INSULIN ASPART SCH: 100 INJECTION, SOLUTION INTRAVENOUS; SUBCUTANEOUS at 07:52

## 2018-01-17 RX ADMIN — SODIUM BICARBONATE SCH MLS/HR: 84 INJECTION, SOLUTION INTRAVENOUS at 21:41

## 2018-01-17 RX ADMIN — METHYLPREDNISOLONE SODIUM SUCCINATE SCH MG: 125 INJECTION, POWDER, FOR SOLUTION INTRAMUSCULAR; INTRAVENOUS at 12:37

## 2018-01-17 RX ADMIN — FORMOTEROL FUMARATE DIHYDRATE SCH MCG: 20 SOLUTION RESPIRATORY (INHALATION) at 08:30

## 2018-01-17 RX ADMIN — BUDESONIDE SCH MG: 1 SUSPENSION RESPIRATORY (INHALATION) at 20:06

## 2018-01-17 RX ADMIN — SODIUM BICARBONATE SCH: 84 INJECTION, SOLUTION INTRAVENOUS at 15:44

## 2018-01-17 RX ADMIN — INSULIN ASPART SCH: 100 INJECTION, SOLUTION INTRAVENOUS; SUBCUTANEOUS at 12:34

## 2018-01-17 RX ADMIN — IPRATROPIUM BROMIDE AND ALBUTEROL SULFATE SCH ML: .5; 3 SOLUTION RESPIRATORY (INHALATION) at 16:06

## 2018-01-17 RX ADMIN — CEFUROXIME AXETIL SCH MG: 250 TABLET ORAL at 21:40

## 2018-01-17 RX ADMIN — IPRATROPIUM BROMIDE AND ALBUTEROL SULFATE SCH: .5; 3 SOLUTION RESPIRATORY (INHALATION) at 20:07

## 2018-01-17 RX ADMIN — SODIUM POLYSTYRENE SULFONATE SCH GM: 15 SUSPENSION ORAL; RECTAL at 14:29

## 2018-01-17 RX ADMIN — INSULIN ASPART SCH: 100 INJECTION, SOLUTION INTRAVENOUS; SUBCUTANEOUS at 17:44

## 2018-01-17 RX ADMIN — SODIUM POLYSTYRENE SULFONATE SCH GM: 15 SUSPENSION ORAL; RECTAL at 22:02

## 2018-01-17 NOTE — P.PN
Subjective


Principal diagnosis: 





Continuing care.  Exacerbation of COPD.





This is an 82-year-old white male with known history of COPD who comes in for 

side exacerbation.  The patient has been doing quite well up until 3 days ago 

where he progressively became worse.  He was seen by pulmonology and was 

started on Bactrim last week.  The patient now after having Solu-Medrol is 

improved.  No voiding difficulties.  He seems to be tolerating diet properly.  

No significant pain is noted.





Objective





- Vital Signs


Vital signs: 


 Vital Signs











Temp  97.1 F L  01/17/18 07:00


 


Pulse  80   01/17/18 08:30


 


Resp  20   01/17/18 07:00


 


BP  153/67   01/17/18 07:00


 


Pulse Ox  96   01/17/18 07:05








 Intake & Output











 01/16/18 01/17/18 01/17/18





 18:59 06:59 18:59


 


Intake Total 440  


 


Balance 440  


 


Intake:   


 


  Oral 440  


 


Other:   


 


  Voiding Method  Toilet 





  Urinal 


 


  # Voids 2 1 


 


  # Bowel Movements 0  














- Constitutional


General appearance: Present: average body habitus





- EENT


Eyes: Absent: abnormal pupil





- Respiratory


Respiratory: bilateral: prolonged expiration





- Cardiovascular


Rhythm: regular


Heart sounds: normal: S1, S2





- Gastrointestinal


General gastrointestinal: Present: soft.  Absent: tenderness





- Neurologic


Neurologic: Absent: focal deficits





- Labs


CBC & Chem 7: 


 01/17/18 07:34





 01/17/18 07:34


Labs: 


 Abnormal Lab Results - Last 24 Hours (Table)











  01/15/18 01/16/18 01/16/18 Range/Units





  14:30 11:33 17:33 


 


RBC     (4.30-5.90)  m/uL


 


Hgb     (13.0-17.5)  gm/dL


 


Hct     (39.0-53.0)  %


 


MCHC     (31.0-37.0)  g/dL


 


Potassium     (3.5-5.1)  mmol/L


 


BUN     (9-20)  mg/dL


 


Creatinine     (0.66-1.25)  mg/dL


 


Glucose     (74-99)  mg/dL


 


POC Glucose (mg/dL)   142 H  154 H  (75-99)  mg/dL


 


Hemoglobin A1c  6.3 H    (4.0-6.0)  %














  01/16/18 01/17/18 01/17/18 Range/Units





  21:03 07:13 07:34 


 


RBC    3.67 L  (4.30-5.90)  m/uL


 


Hgb    9.9 L  (13.0-17.5)  gm/dL


 


Hct    33.1 L  (39.0-53.0)  %


 


MCHC    29.9 L  (31.0-37.0)  g/dL


 


Potassium     (3.5-5.1)  mmol/L


 


BUN     (9-20)  mg/dL


 


Creatinine     (0.66-1.25)  mg/dL


 


Glucose     (74-99)  mg/dL


 


POC Glucose (mg/dL)  151 H  127 H   (75-99)  mg/dL


 


Hemoglobin A1c     (4.0-6.0)  %














  01/17/18 Range/Units





  07:34 


 


RBC   (4.30-5.90)  m/uL


 


Hgb   (13.0-17.5)  gm/dL


 


Hct   (39.0-53.0)  %


 


MCHC   (31.0-37.0)  g/dL


 


Potassium  6.0 H  (3.5-5.1)  mmol/L


 


BUN  54 H  (9-20)  mg/dL


 


Creatinine  1.52 H  (0.66-1.25)  mg/dL


 


Glucose  121 H  (74-99)  mg/dL


 


POC Glucose (mg/dL)   (75-99)  mg/dL


 


Hemoglobin A1c   (4.0-6.0)  %








 Microbiology - Last 24 Hours (Table)











 01/15/18 14:30 Blood Culture - Preliminary





 Blood    No Growth after 24 hours














Assessment and Plan


(1) Hypertension


Current Visit: Yes   Status: Acute   Code(s): I10 - ESSENTIAL (PRIMARY) 

HYPERTENSION   SNOMED Code(s): 12011129


   





(2) Hyperlipidemia


Current Visit: Yes   Status: Acute   Code(s): E78.5 - HYPERLIPIDEMIA, 

UNSPECIFIED   SNOMED Code(s): 32690533


   





(3) COPD with acute exacerbation


Current Visit: Yes   Status: Acute   Code(s): J44.1 - CHRONIC OBSTRUCTIVE 

PULMONARY DISEASE W (ACUTE) EXACERBATION   SNOMED Code(s): 959364209


   


Plan: 





Continue current regimen





Check CBC ad CMP in AM





Definite progressive improvement

## 2018-01-17 NOTE — CDI
Documentation Clarification Form







Date: 1/17/2018 09:45 A.M.

From: Jes Lam RN

Phone: [phone]

MRN: [id number]

Admit Date: 01/15/2018

Patient Name: Linwood Barragan

Visit number: [id number] 





Dr. Nik Whatley,





The patient presented with the following respiratory symptoms,  difficulty 
breathing

Documentation in the H&P states "Pt is 02 dependent " 



History/Risk Factors:

   COPD, chronic bronchitis, pneumonia

   Home oxygen: yes

   smoker



Clinical Indicators: 

   Vital signs: on admission  T 97.0, P95, R 26, 203/84, 95%RA

   Pulse oximetry: 94% 2L currently



Treatment:

   O2 2L

   Pulmonary consult



In your professional opinion, can you please clarify if these findings signify 
one of the following conditions?  



Chronic Respiratory Failure, further specify (if known):

     With hypercapnia?

     With hypoxia?

Other Diagnosis, please specify 

Unable to determine

________________________________________________________________________________
______________________________________________________________

MTDD

## 2018-01-17 NOTE — P.PN
Subjective


Progress Note Date: 01/17/18


Principal diagnosis: 


Acute COPD exacerbation, tracheobronchitis, with failed outpatient treatment





Linwood is a 82-year-old white male patient, who sees Dr. Terrazas in our office 

for his advanced steroid-dependent COPD, with the baseline FEV1 of 33% 

predicted value, presented to the emergency room on 01/15/2018 at 1400 with 

complaints of increasing shortness of breath, wheezing, chest congestion, 

productive cough with yellow sputum.  Denied any fever, did have some chills.  

Denied any chest pain, hemoptysis.  Patient was seen by Dr. Terrazas in the office 

last Friday on 01/12/2018 with COPD exacerbation, he was given Depo-Medrol IM, 

was started on his own taper and Bactrim DS.  However over the weekend his 

symptoms did not improve and actually got worse, and patient did present to the 

ED for further evaluation and treatment. Chest x-ray from 01/15/2018 shows no 

evidence for acute pulmonary disease. Lab work showed that CBC within normal 

limits of 6.3, hemoglobin of 10.5, no evidence of coagulopathy, sodium is 139, 

potassium is 5.3, BUN of 28, and creatinine is 1.44. CK-MB was 8.3, troponin 

was negative 1.  Follow-up blood work from today still shows normal white count

, still with stable hemoglobin of 10.3, sodium of 140, his potassium is 

slightly increased to 5.7, BUN is 38, creatinine is 1.46. There is evidence of 

of acute kidney injury, patient denies decreased oral intake, no evidence of 

hypotension, on presentation to the emergency room, he was actually very 

hypertensive with a blood pressure of 203/84, but at the time he was in 

respiratory distress as well, and they're ranging from systolic blood pressure 

in the 130s to 180s, and diastolic pressure from 60s to 90s.  Patient was 

started on DuoNeb nebulized treatments, IV Solu-Medrol, and admitted to the 

floor for further management.





On 01/17/2018 patient is seen in follow-up.  Continues to be significantly 

congested, wheezy, and rhonchorous.  Is not able to bring up much sputum.  

Patient's vital signs remain stable, on 3 L per nasal cannula he is satting 96%

.  Afebrile, hemodynamically stable.  Today's lab work was reviewed, WBC is 10.1

, hemoglobin is 9.9, sodium is 139, potassium is up to 6.0, there is further 

worsening of his renal function, his BUN is 54, and creatinine is 1.52.  We 

will ask the attending physician whether he wants to consult nephrology in 

regards to the patient's worsening renal function.  We will go ahead and treat 

his hyperkalemia, we will stop the lisinopril, and give calcium gluconate 1 g 

IV piggyback 1, 1 amp of D50, 10 units of Humulin R, and start the patient on 

Kayexalate 30 g by mouth twice a day.  We will recheck his BMP in 4 hours. 








Objective





- Vital Signs


Vital signs: 


 Vital Signs











Temp  97.1 F L  01/17/18 07:00


 


Pulse  80   01/17/18 12:16


 


Resp  20   01/17/18 07:00


 


BP  153/67   01/17/18 07:00


 


Pulse Ox  96   01/17/18 07:05








 Intake & Output











 01/16/18 01/17/18 01/17/18





 18:59 06:59 18:59


 


Intake Total 440  


 


Balance 440  


 


Intake:   


 


  Oral 440  


 


Other:   


 


  Voiding Method  Toilet 





  Urinal 


 


  # Voids 2 1 


 


  # Bowel Movements 0  














- Exam


GENERAL EXAM: Alert, pleasant, thin, 82-year-old white male, mildly short of 

breath at rest, but fairly comfortable


HEAD: Normocephalic/atraumatic.


EYES: Normal reaction of pupils, equal size.  Conjunctiva pink, sclera white.


NOSE: Clear with pink turbinates.


THROAT: No erythema or exudates.


NECK: No masses, no JVD, no thyroid enlargement, no adenopathy.


CHEST: No chest wall deformity.  Symmetrical expansion. 


LUNGS: Equal air entry with scattered rhonchi, and wheezing throughout the lung 

fields.


CVS: Regular rate and rhythm, normal S1 and S2, no gallops, no murmurs, no rubs


ABDOMEN: Soft, nontender.  No hepatosplenomegaly, normal bowel sounds, no 

guarding or rigidity.


EXTREMITIES: No clubbing, no edema, no cyanosis, 2+ pulses and upper and lower 

extremities.


MUSCULOSKELETAL: Muscle strength and tone normal.


SPINE: No scoliosis or deformity


SKIN: No rashes


CENTRAL NERVOUS SYSTEM: Alert and oriented -3.  No focal deficits, tone is 

normal in all 4 extremities.


PSYCHIATRIC: Alert and oriented -3.  Appropriate affect.  Intact judgment and 

insight. 

















- Labs


CBC & Chem 7: 


 01/17/18 07:34





 01/17/18 07:34


Labs: 


 Abnormal Lab Results - Last 24 Hours (Table)











  01/15/18 01/16/18 01/16/18 Range/Units





  14:30 17:33 21:03 


 


RBC     (4.30-5.90)  m/uL


 


Hgb     (13.0-17.5)  gm/dL


 


Hct     (39.0-53.0)  %


 


MCHC     (31.0-37.0)  g/dL


 


Potassium     (3.5-5.1)  mmol/L


 


BUN     (9-20)  mg/dL


 


Creatinine     (0.66-1.25)  mg/dL


 


Glucose     (74-99)  mg/dL


 


POC Glucose (mg/dL)   154 H  151 H  (75-99)  mg/dL


 


Hemoglobin A1c  6.3 H    (4.0-6.0)  %














  01/17/18 01/17/18 01/17/18 Range/Units





  07:13 07:34 07:34 


 


RBC   3.67 L   (4.30-5.90)  m/uL


 


Hgb   9.9 L   (13.0-17.5)  gm/dL


 


Hct   33.1 L   (39.0-53.0)  %


 


MCHC   29.9 L   (31.0-37.0)  g/dL


 


Potassium    6.0 H  (3.5-5.1)  mmol/L


 


BUN    54 H  (9-20)  mg/dL


 


Creatinine    1.52 H  (0.66-1.25)  mg/dL


 


Glucose    121 H  (74-99)  mg/dL


 


POC Glucose (mg/dL)  127 H    (75-99)  mg/dL


 


Hemoglobin A1c     (4.0-6.0)  %














  01/17/18 Range/Units





  12:18 


 


RBC   (4.30-5.90)  m/uL


 


Hgb   (13.0-17.5)  gm/dL


 


Hct   (39.0-53.0)  %


 


MCHC   (31.0-37.0)  g/dL


 


Potassium   (3.5-5.1)  mmol/L


 


BUN   (9-20)  mg/dL


 


Creatinine   (0.66-1.25)  mg/dL


 


Glucose   (74-99)  mg/dL


 


POC Glucose (mg/dL)  122 H  (75-99)  mg/dL


 


Hemoglobin A1c   (4.0-6.0)  %








 Microbiology - Last 24 Hours (Table)











 01/15/18 14:30 Blood Culture - Preliminary





 Blood    No Growth after 24 hours














Assessment and Plan


Plan: 


Assessment:





#1.  Acute COPD exacerbation completed by tracheobronchitis, with failed 

outpatient treatment





#2.  Advanced steroid dependent COPD, with a baseline FEV1 of 33% of predicted





#3.  Acute kidney injury, possibly related to administration of Bactrim DS and 

lisinopril, patient presented with a BUN of 28, and creatinine of 1.44, which 

subsequently increased to 38 and 1.46 respectively on today's lab work from 01/ 16/2018. his previous creatinine from 11/30/2017 was within normal limits at 

1.10.  Today's blood work shows further worsening of his renal function BUN is 

up to 54, and creatinine is up to 1.5 to





#4.  Hyperkalemia, possibly related to acute kidney injury or Bactrim and 

lisinopril therapy, today's blood work shows serum potassium is up to 6.0, 

Bactrim has been discontinued yesterday, we will stop the lisinopril, we will 

treat the hyperkalemia





#5. nicotine dependence, currently in remission,





#6. hyperlipidemia





#7.  Hypertension





#8.  Neoplasm of bladder





#9.  History of deep venous thrombosis





Plan:





Patient continues to be significantly congested on today's exam, and dyspneic. 

Continue DuoNeb nebulized treatments, we will add Pulmicort and Perforomist.  

Continue IV Solu-Medrol, his antibiotics were switched from Bactrim to Ceftin 

yesterday in view of his worsening renal failure.  We will stop the lisinopril. 

Today's lab work shows further increase in patient's BUN and creatinine, as 

well as his serum potassium.  We will give 1 g of calcium gluconate IV piggyback

, we will give 10 units of Humulin R, with 1 amp of D50, we will start 

Kayexalate 30 g twice a day, we will recheck the BMP in 4 hours.  We will check 

with the attending physician whether he wants to consult nephrology.  Patient 

is scheduled for bronchoscopy with bronchoalveolar lavage tomorrow with Dr. Terrazas, but if his electrolyte profile does not become normal there is a 

likelihood the procedure may be canceled.





I performed a history & physical examination of the patient and discussed their 

management with my nurse practitioner, Mayda Álvarez.  I reviewed the nurse 

practitioner's note and agree with the documented findings and plan of care.  

Lung sounds are positive for diffuse wheezes and rhonchi throughout the lung 

fields.  The findings and the impression was discussed with the patient.  I 

attest to the documentation by the nurse practitioner. 








Time with Patient: Less than 30

## 2018-01-18 LAB
ANION GAP SERPL CALC-SCNC: 11 MMOL/L
ANION GAP SERPL CALC-SCNC: 12 MMOL/L
BUN SERPL-SCNC: 65 MG/DL (ref 9–20)
BUN SERPL-SCNC: 79 MG/DL (ref 9–20)
CALCIUM SPEC-MCNC: 8.7 MG/DL (ref 8.4–10.2)
CALCIUM SPEC-MCNC: 9.4 MG/DL (ref 8.4–10.2)
CELL CNT PNL FLD: 100
CHLORIDE SERPL-SCNC: 101 MMOL/L (ref 98–107)
CHLORIDE SERPL-SCNC: 98 MMOL/L (ref 98–107)
CO2 SERPL-SCNC: 27 MMOL/L (ref 22–30)
CO2 SERPL-SCNC: 30 MMOL/L (ref 22–30)
DEPRECATED POLYS # FLD: 97 %
GLUCOSE BLD-MCNC: 129 MG/DL (ref 75–99)
GLUCOSE BLD-MCNC: 136 MG/DL (ref 75–99)
GLUCOSE BLD-MCNC: 136 MG/DL (ref 75–99)
GLUCOSE BLD-MCNC: 151 MG/DL (ref 75–99)
GLUCOSE BLD-MCNC: 160 MG/DL (ref 75–99)
GLUCOSE SERPL-MCNC: 126 MG/DL (ref 74–99)
GLUCOSE SERPL-MCNC: 163 MG/DL (ref 74–99)
HYALINE CASTS UR QL AUTO: 40 /LPF (ref 0–2)
MONONUC CELLS # FLD: 3 %
NUC CELL # FLD: 785 /UL
PH UR: 5.5 [PH] (ref 5–8)
POTASSIUM SERPL-SCNC: 4.7 MMOL/L (ref 3.5–5.1)
POTASSIUM SERPL-SCNC: 5.1 MMOL/L (ref 3.5–5.1)
RBC UR QL: 6 /HPF (ref 0–5)
SODIUM SERPL-SCNC: 137 MMOL/L (ref 137–145)
SODIUM SERPL-SCNC: 142 MMOL/L (ref 137–145)
SP GR UR: 1.02 (ref 1–1.03)
SQUAMOUS UR QL AUTO: 4 /HPF (ref 0–4)
UROBILINOGEN UR QL STRIP: <2 MG/DL (ref ?–2)
WBC #/AREA URNS HPF: 13 /HPF (ref 0–5)

## 2018-01-18 PROCEDURE — 0B9J8ZX DRAINAGE OF LEFT LOWER LUNG LOBE, VIA NATURAL OR ARTIFICIAL OPENING ENDOSCOPIC, DIAGNOSTIC: ICD-10-PCS

## 2018-01-18 PROCEDURE — 5A09357 ASSISTANCE WITH RESPIRATORY VENTILATION, LESS THAN 24 CONSECUTIVE HOURS, CONTINUOUS POSITIVE AIRWAY PRESSURE: ICD-10-PCS

## 2018-01-18 RX ADMIN — SODIUM POLYSTYRENE SULFONATE SCH: 15 SUSPENSION ORAL; RECTAL at 21:52

## 2018-01-18 RX ADMIN — IPRATROPIUM BROMIDE AND ALBUTEROL SULFATE SCH ML: .5; 3 SOLUTION RESPIRATORY (INHALATION) at 16:02

## 2018-01-18 RX ADMIN — INSULIN ASPART SCH: 100 INJECTION, SOLUTION INTRAVENOUS; SUBCUTANEOUS at 07:36

## 2018-01-18 RX ADMIN — BUDESONIDE SCH MG: 1 SUSPENSION RESPIRATORY (INHALATION) at 20:14

## 2018-01-18 RX ADMIN — FORMOTEROL FUMARATE DIHYDRATE SCH MCG: 20 SOLUTION RESPIRATORY (INHALATION) at 20:14

## 2018-01-18 RX ADMIN — INSULIN ASPART SCH UNIT: 100 INJECTION, SOLUTION INTRAVENOUS; SUBCUTANEOUS at 12:05

## 2018-01-18 RX ADMIN — SODIUM POLYSTYRENE SULFONATE SCH GM: 15 SUSPENSION ORAL; RECTAL at 08:08

## 2018-01-18 RX ADMIN — IPRATROPIUM BROMIDE AND ALBUTEROL SULFATE SCH ML: .5; 3 SOLUTION RESPIRATORY (INHALATION) at 10:35

## 2018-01-18 RX ADMIN — CEFUROXIME AXETIL SCH MG: 250 TABLET ORAL at 08:09

## 2018-01-18 RX ADMIN — SODIUM POLYSTYRENE SULFONATE SCH GM: 15 SUSPENSION ORAL; RECTAL at 21:49

## 2018-01-18 RX ADMIN — BUDESONIDE SCH MG: 1 SUSPENSION RESPIRATORY (INHALATION) at 07:36

## 2018-01-18 RX ADMIN — METHYLPREDNISOLONE SODIUM SUCCINATE SCH MG: 125 INJECTION, POWDER, FOR SOLUTION INTRAMUSCULAR; INTRAVENOUS at 11:51

## 2018-01-18 RX ADMIN — SODIUM BICARBONATE SCH MLS/HR: 84 INJECTION, SOLUTION INTRAVENOUS at 08:08

## 2018-01-18 RX ADMIN — ATORVASTATIN CALCIUM SCH MG: 20 TABLET, FILM COATED ORAL at 08:09

## 2018-01-18 RX ADMIN — METHYLPREDNISOLONE SODIUM SUCCINATE SCH MG: 125 INJECTION, POWDER, FOR SOLUTION INTRAMUSCULAR; INTRAVENOUS at 17:24

## 2018-01-18 RX ADMIN — CEFUROXIME AXETIL SCH MG: 250 TABLET ORAL at 21:48

## 2018-01-18 RX ADMIN — SODIUM POLYSTYRENE SULFONATE SCH: 15 SUSPENSION ORAL; RECTAL at 10:12

## 2018-01-18 RX ADMIN — TAMSULOSIN HYDROCHLORIDE SCH MG: 0.4 CAPSULE ORAL at 08:09

## 2018-01-18 RX ADMIN — METHYLPREDNISOLONE SODIUM SUCCINATE SCH MG: 125 INJECTION, POWDER, FOR SOLUTION INTRAMUSCULAR; INTRAVENOUS at 00:57

## 2018-01-18 RX ADMIN — TAMSULOSIN HYDROCHLORIDE SCH MG: 0.4 CAPSULE ORAL at 21:50

## 2018-01-18 RX ADMIN — METHYLPREDNISOLONE SODIUM SUCCINATE SCH MG: 125 INJECTION, POWDER, FOR SOLUTION INTRAMUSCULAR; INTRAVENOUS at 06:46

## 2018-01-18 RX ADMIN — INSULIN ASPART SCH UNIT: 100 INJECTION, SOLUTION INTRAVENOUS; SUBCUTANEOUS at 21:49

## 2018-01-18 RX ADMIN — INSULIN ASPART SCH: 100 INJECTION, SOLUTION INTRAVENOUS; SUBCUTANEOUS at 17:55

## 2018-01-18 RX ADMIN — IPRATROPIUM BROMIDE AND ALBUTEROL SULFATE SCH ML: .5; 3 SOLUTION RESPIRATORY (INHALATION) at 07:36

## 2018-01-18 RX ADMIN — SODIUM BICARBONATE SCH MLS/HR: 84 INJECTION, SOLUTION INTRAVENOUS at 16:07

## 2018-01-18 RX ADMIN — FORMOTEROL FUMARATE DIHYDRATE SCH MCG: 20 SOLUTION RESPIRATORY (INHALATION) at 07:36

## 2018-01-18 RX ADMIN — IPRATROPIUM BROMIDE AND ALBUTEROL SULFATE SCH ML: .5; 3 SOLUTION RESPIRATORY (INHALATION) at 20:15

## 2018-01-18 NOTE — PCN
PROCEDURE NOTE



PROCEDURE:

Bronchoscopy airway examination, therapeutic lavage, BAL left lower lobe.



PREOP DIAGNOSIS:

Severe COPD, retained secretions.



POSTOPERATIVE DIAGNOSIS:

Severe COPD, retained secretions.



There was informed consent. There was universal timeout, CRNA provided conscious

sedation with general anesthesia.  After the patient was adequately sedated and being

fully monitored, the bronchoscope was inserted through the right nostril.  It passed

through the right nasopharynx into the oropharynx.  The hypopharynx was identified and

topicalized.  The hypopharyngeal structures appeared to be relatively normal.  This

includes anterior commissure, true cords, false cords, arytenoids, piriform sinuses,

right and left vallecula.  After topicalization, the bronchoscope was placed through

the glottic opening into the trachea.  There was secretions noted bubbling up to the

mid trachea.  They were foamy and yellow-green in color.  They were suctioned without

difficulty.  Tracheal hugh was sharp.  The right and left mainstem were topicalized.

After topicalization, the bronchoscope was pushed into the right lung.  The right upper

lobe and its 3 segments, right middle lobe and its 2 segments, right lower lobe and its

5 segments, left upper lobe and its 2 segments, lingula and its 2 segments and left

lower lobe and its 4 segments all had similar findings of diffuse moderate bronchitis.

There was mucosal erythema and hyperemia.  The mucosa was somewhat friable.  There were

thick secretions noted throughout.  They were yellow in color.  No dominant mass or

tumor.  No active bleeding.  The bronchoscope was wedged into the left lower lobe.  The

BAL took place.  Patient tolerated the procedure well.  The specimens were sent to the

laboratory for analysis.  There was no immediate complication.





MMODL / IJN: [id number] / Job#: [id number]

## 2018-01-18 NOTE — P.PN
Subjective


Progress Note Date: 01/18/18


Principal diagnosis: 





Acute COPD exacerbation, tracheobronchitis, failed outpatient therapy.








Linwood is a 82-year-old white male patient, who sees Dr. Terrazas in our office 

for his advanced steroid-dependent COPD, with the baseline FEV1 of 33% 

predicted value, presented to the emergency room on 01/15/2018 at 1400 with 

complaints of increasing shortness of breath, wheezing, chest congestion, 

productive cough with yellow sputum.  Denied any fever, did have some chills.  

Denied any chest pain, hemoptysis.  Patient was seen by Dr. Terrazas in the office 

last Friday on 01/12/2018 with COPD exacerbation, he was given Depo-Medrol IM, 

was started on his own taper and Bactrim DS.  However over the weekend his 

symptoms did not improve and actually got worse, and patient did present to the 

ED for further evaluation and treatment. Chest x-ray from 01/15/2018 shows no 

evidence for acute pulmonary disease. Lab work showed that CBC within normal 

limits of 6.3, hemoglobin of 10.5, no evidence of coagulopathy, sodium is 139, 

potassium is 5.3, BUN of 28, and creatinine is 1.44. CK-MB was 8.3, troponin 

was negative 1.  Follow-up blood work from today still shows normal white count

, still with stable hemoglobin of 10.3, sodium of 140, his potassium is 

slightly increased to 5.7, BUN is 38, creatinine is 1.46. There is evidence of 

of acute kidney injury, patient denies decreased oral intake, no evidence of 

hypotension, on presentation to the emergency room, he was actually very 

hypertensive with a blood pressure of 203/84, but at the time he was in 

respiratory distress as well, and they're ranging from systolic blood pressure 

in the 130s to 180s, and diastolic pressure from 60s to 90s.  Patient was 

started on DuoNeb nebulized treatments, IV Solu-Medrol, and admitted to the 

floor for further management.





On 01/17/2018 patient is seen in follow-up.  Continues to be significantly 

congested, wheezy, and rhonchorous.  Is not able to bring up much sputum.  

Patient's vital signs remain stable, on 3 L per nasal cannula he is satting 96%

.  Afebrile, hemodynamically stable.  Today's lab work was reviewed, WBC is 10.1

, hemoglobin is 9.9, sodium is 139, potassium is up to 6.0, there is further 

worsening of his renal function, his BUN is 54, and creatinine is 1.52.  We 

will ask the attending physician whether he wants to consult nephrology in 

regards to the patient's worsening renal function.  We will go ahead and treat 

his hyperkalemia, we will stop the lisinopril, and give calcium gluconate 1 g 

IV piggyback 1, 1 amp of D50, 10 units of Humulin R, and start the patient on 

Kayexalate 30 g by mouth twice a day.  We will recheck his BMP in 4 hours. 





The patient was seen again today 01/18/2018 in follow-up in the [facility] suite.  

He is awake and alert in no acute distress.  He continues with a loose 

nonproductive cough.  He is maintaining good O2 saturations in the 90s on 2 L/m 

per nasal cannula.  He's been afebrile.  Hemodynamically stable.  He is 

undergoing bronchoscopy with BAL with Dr. Terrazas today.  Blood cultures reveal 

no growth.





Objective





- Vital Signs


Vital signs: 


 Vital Signs











Temp  97.9 F   01/18/18 08:14


 


Pulse  95   01/18/18 08:14


 


Resp  20   01/18/18 08:14


 


BP  142/80   01/18/18 08:14


 


Pulse Ox  99   01/18/18 07:00








 Intake & Output











 01/17/18 01/18/18 01/18/18





 18:59 06:59 18:59


 


Intake Total 400  


 


Output Total  1850 


 


Balance 400 -1850 


 


Intake:   


 


  Intake, IV Titration 400  





  Amount   


 


    Calcium Gluconate 1,000 100  





    mg In Sodium Chloride 0.9   





    % 100 ml @ 100 mls/hr   





    IVPB ONCE ONE Rx#:   





    [id number]   


 


    Sodium Chloride 0.45% 1, 300  





    000 ml @ 100 mls/hr IV .   





    Q10H [facility] Rx#:[id number]   


 


Output:   


 


  Urine  1850 


 


    Uretheral (Null)  425 


 


Other:   


 


  Voiding Method Toilet  





 Urinal  


 


  # Voids 2  


 


  # Bowel Movements 1  














- Exam





GENERAL EXAM: Alert, pleasant, thin, 82-year-old white male, mildly short of 

breath at rest, but fairly comfortable


HEAD: Normocephalic/atraumatic.


EYES: Normal reaction of pupils, equal size.  Conjunctiva pink, sclera white.


NOSE: Clear with pink turbinates.


THROAT: No erythema or exudates.


NECK: No masses, no JVD, no thyroid enlargement, no adenopathy.


CHEST: No chest wall deformity.  Symmetrical expansion. 


LUNGS: Equal air entry with scattered rhonchi, and wheezing throughout the lung 

fields.


CVS: Regular rate and rhythm, normal S1 and S2, no gallops, no murmurs, no rubs


ABDOMEN: Soft, nontender.  No hepatosplenomegaly, normal bowel sounds, no 

guarding or rigidity.


EXTREMITIES: No clubbing, no edema, no cyanosis, 2+ pulses and upper and lower 

extremities.


MUSCULOSKELETAL: Muscle strength and tone normal.


SPINE: No scoliosis or deformity


SKIN: No rashes


CENTRAL NERVOUS SYSTEM: Alert and oriented -3.  No focal deficits, tone is 

normal in all 4 extremities.


PSYCHIATRIC: Alert and oriented -3.  Appropriate affect.  Intact judgment and 

insight. 





- Labs


CBC & Chem 7: 


 01/17/18 07:34





 01/18/18 07:27


Labs: 


 Abnormal Lab Results - Last 24 Hours (Table)











  01/17/18 01/17/18 01/17/18 Range/Units





  12:18 17:07 17:17 


 


Potassium   5.9 H   (3.5-5.1)  mmol/L


 


Carbon Dioxide   21 L   (22-30)  mmol/L


 


BUN   66 H   (9-20)  mg/dL


 


Creatinine   1.90 H   (0.66-1.25)  mg/dL


 


Glucose   108 H   (74-99)  mg/dL


 


POC Glucose (mg/dL)  122 H   109 H  (75-99)  mg/dL














  01/17/18 01/18/18 01/18/18 Range/Units





  20:33 00:03 00:06 


 


Potassium     (3.5-5.1)  mmol/L


 


Carbon Dioxide     (22-30)  mmol/L


 


BUN   79 H   (9-20)  mg/dL


 


Creatinine   2.30 H   (0.66-1.25)  mg/dL


 


Glucose   163 H   (74-99)  mg/dL


 


POC Glucose (mg/dL)  70 L   160 H  (75-99)  mg/dL














  01/18/18 01/18/18 Range/Units





  07:22 07:27 


 


Potassium    (3.5-5.1)  mmol/L


 


Carbon Dioxide    (22-30)  mmol/L


 


BUN   65 H  (9-20)  mg/dL


 


Creatinine   1.66 H  (0.66-1.25)  mg/dL


 


Glucose   126 H  (74-99)  mg/dL


 


POC Glucose (mg/dL)  136 H   (75-99)  mg/dL








 Microbiology - Last 24 Hours (Table)











 01/15/18 14:30 Blood Culture - Preliminary





 Blood    No Growth after 48 hours














Assessment and Plan


Assessment: 





Assessment:





#1.  Acute COPD exacerbation completed by tracheobronchitis, with failed 

outpatient treatment





#2.  Advanced steroid dependent COPD, with a baseline FEV1 of 33% of predicted





#3.  Acute kidney injury, possibly related to administration of Bactrim DS and 

lisinopril, patient presented with a BUN of 28, and creatinine of 1.44, which 

subsequently increased to 38 and 1.46 respectively on today's lab work from 01/ 16/2018. his previous creatinine from 11/30/2017 was within normal limits at 

1.10.  Today's blood work shows further worsening of his renal function BUN is 

up to 54, and creatinine is up to 1.5 to





#4.  Hyperkalemia, possibly related to acute kidney injury or Bactrim and 

lisinopril therapy, today's blood work shows serum potassium is up to 6.0, 

Bactrim has been discontinued yesterday, we will stop the lisinopril, we will 

treat the hyperkalemia





#5. nicotine dependence, currently in remission,





#6. hyperlipidemia





#7.  Hypertension





#8.  Neoplasm of bladder





#9.  History of deep venous thrombosis





Plan:





The patient was seen and evaluated by Dr. Terrazas.  His labs were reviewed.  

Potassium 4.7.  Creatinine 1.66.  He will undergo bronchoscopy with BAL today.  

Bronchial washings will be sent to lab for analysis.  We'll continue with his 

current medications.  We'll increase his activity as tolerated.  We'll continue 

to follow.





I, the cosigning physician, performed a history & physical examination of the 

patient. Lungs sounds have bilateral scattered rhonchi.  Diminished..  

Maintaining good O2 saturations in the 90s on 2 L/m per nasal cannula. I 

discussed the assessment and plan of care with my nurse practitioner, Khalida Rodriguez. I attest to the above note as dictated by her.

## 2018-01-18 NOTE — P.PN
Subjective


Principal diagnosis: 





Continuing care.  Exacerbation of COPD.





This is a continue pressure 82-year-old white male who essentially minute of 

acute exacerbation of COPD.  The patient has developed hyperkalemia over the 

last 48 hours and was given insulin and Kayexalate.  His potassium level is now 

controlled.  I suspect he had an element of prerenal azotemia and after 

increasing his IV fluid, he seems to have been stabilized.  He is scheduled for 

bronchoscopy today.  The patient otherwise seems tachypneic this morning.





Objective





- Vital Signs


Vital signs: 


 Vital Signs











Temp  97.9 F   01/18/18 07:00


 


Pulse  100   01/18/18 07:50


 


Resp  22   01/18/18 07:00


 


BP  142/80   01/18/18 07:00


 


Pulse Ox  99   01/18/18 07:00








 Intake & Output











 01/17/18 01/18/18 01/18/18





 18:59 06:59 18:59


 


Intake Total 400  


 


Output Total  1850 


 


Balance 400 -1850 


 


Intake:   


 


  Intake, IV Titration 400  





  Amount   


 


    Calcium Gluconate 1,000 100  





    mg In Sodium Chloride 0.9   





    % 100 ml @ 100 mls/hr   





    IVPB ONCE ONE Rx#:   





    [id number]   


 


    Sodium Chloride 0.45% 1, 300  





    000 ml @ 100 mls/hr IV .   





    Q10H [facility] Rx#:[id number]   


 


Output:   


 


  Urine  1850 


 


    Uretheral (Null)  425 


 


Other:   


 


  Voiding Method Toilet  





 Urinal  


 


  # Voids 2  


 


  # Bowel Movements 1  














- Constitutional


General appearance: Present: average body habitus





- EENT


Eyes: Absent: abnormal pupil





- Neck


Neck: Absent: lymphadenopathy





- Respiratory


Respiratory: bilateral: wheezing, prolonged expiration





- Cardiovascular


Rhythm: regular


Abnormal Heart Sounds: Absent: S3 Gallop





- Gastrointestinal


General gastrointestinal: Present: soft.  Absent: tenderness





- Labs


CBC & Chem 7: 


 01/17/18 07:34





 01/18/18 00:03


Labs: 


 Abnormal Lab Results - Last 24 Hours (Table)











  01/17/18 01/17/18 01/17/18 Range/Units





  07:34 07:34 12:18 


 


RBC  3.67 L    (4.30-5.90)  m/uL


 


Hgb  9.9 L    (13.0-17.5)  gm/dL


 


Hct  33.1 L    (39.0-53.0)  %


 


MCHC  29.9 L    (31.0-37.0)  g/dL


 


Potassium   6.0 H   (3.5-5.1)  mmol/L


 


Carbon Dioxide     (22-30)  mmol/L


 


BUN   54 H   (9-20)  mg/dL


 


Creatinine   1.52 H   (0.66-1.25)  mg/dL


 


Glucose   121 H   (74-99)  mg/dL


 


POC Glucose (mg/dL)    122 H  (75-99)  mg/dL














  01/17/18 01/17/18 01/17/18 Range/Units





  17:07 17:17 20:33 


 


RBC     (4.30-5.90)  m/uL


 


Hgb     (13.0-17.5)  gm/dL


 


Hct     (39.0-53.0)  %


 


MCHC     (31.0-37.0)  g/dL


 


Potassium  5.9 H    (3.5-5.1)  mmol/L


 


Carbon Dioxide  21 L    (22-30)  mmol/L


 


BUN  66 H    (9-20)  mg/dL


 


Creatinine  1.90 H    (0.66-1.25)  mg/dL


 


Glucose  108 H    (74-99)  mg/dL


 


POC Glucose (mg/dL)   109 H  70 L  (75-99)  mg/dL














  01/18/18 01/18/18 01/18/18 Range/Units





  00:03 00:06 07:22 


 


RBC     (4.30-5.90)  m/uL


 


Hgb     (13.0-17.5)  gm/dL


 


Hct     (39.0-53.0)  %


 


MCHC     (31.0-37.0)  g/dL


 


Potassium     (3.5-5.1)  mmol/L


 


Carbon Dioxide     (22-30)  mmol/L


 


BUN  79 H    (9-20)  mg/dL


 


Creatinine  2.30 H    (0.66-1.25)  mg/dL


 


Glucose  163 H    (74-99)  mg/dL


 


POC Glucose (mg/dL)   160 H  136 H  (75-99)  mg/dL








 Microbiology - Last 24 Hours (Table)











 01/15/18 14:30 Blood Culture - Preliminary





 Blood    No Growth after 48 hours














Assessment and Plan


(1) Hypertension


Current Visit: Yes   Status: Acute   Code(s): I10 - ESSENTIAL (PRIMARY) 

HYPERTENSION   SNOMED Code(s): 08287896


   





(2) Hyperlipidemia


Current Visit: Yes   Status: Acute   Code(s): E78.5 - HYPERLIPIDEMIA, 

UNSPECIFIED   SNOMED Code(s): 70917425


   





(3) COPD with acute exacerbation


Current Visit: Yes   Status: Acute   Code(s): J44.1 - CHRONIC OBSTRUCTIVE 

PULMONARY DISEASE W (ACUTE) EXACERBATION   SNOMED Code(s): 298197755


   





(4) Acute hyperkalemia


Current Visit: Yes   Status: Acute   Code(s): E87.5 - HYPERKALEMIA   SNOMED Code

(s): 1579268


   


Plan: 





Watch her I lites closely.





Check CMP in a.m.











We'll continue to follow with appropriate consultants.





Slow improvement is noted otherwise.





See orders otherwise.

## 2018-01-18 NOTE — P.NPCON
History of Present Illness





- Reason for Consult


acute renal failure





- History of Present Illness





Reason for consultation: Acute kidney injury and hyperkalemia





History of present illness:


Patient is a 82-year-old male seen in renal consultation for acute kidney 

injury.  Patient's baseline creatinine is near 1 and peaked at 2.3 this 

admission.  It is down to 1.66 today.  Patient presented to the hospital with 

dyspnea and is currently being treated for COPD exacerbation.  Patient remains 

dyspneic and underwent a bronchoscopy this morning.  He was noted to have quite 

a bit of retained secretions which were removed.  He was subsequently 

transferred to the intensive care unit.  He is currently wearing a BiPAP and is 

therefore not a very reliable historian.  He denies regular use of NSAIDs.  He 

admits to good urine output.  No hematuria or dysuria.  Denies chest pain.  His 

potassium level was also up to 6.0 as of yesterday which was medically treated.

  It is down to 4.7 this morning.  Bactrim as well as lisinopril have been 

discontinued.  Hemodynamically stable.  He is nonoliguric.





Vital signs are stable.


General: The patient appeared well nourished and normally developed. 


HEENT: Head exam is unremarkable. Neck is without jugular venous distension.


LUNGS: Breath sounds decreased.  Scattered rhonchi.


HEART: Rate and Rhythm are regular. First and second heart sounds normal. No 

murmurs, rubs or gallops. 


ABDOMEN: Abdominal exam reveals normal bowel sounds. Non-tender and non-

distended. No evidence of peritonitis.


EXTREMITITES: No clubbing, cyanosis, or edema.





Past Medical History


Past Medical History: Cancer, Chest Pain / Angina, COPD, Deep Vein Thrombosis (

DVT), Hyperlipidemia, Hypertension, Osteoarthritis (OA), Pneumonia


Additional Past Medical History / Comment(s): unstable angina pectoris. COPD, 

purulent tracheobronchitis, chronic bronchitis.   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 

OA, back pain, pneumonia , home O2 at 2L/NC.prn, bladder cancer


History of Any Multi-Drug Resistant Organisms: None Reported


Past Surgical History: Bladder Surgery, Tonsillectomy


Additional Past Surgical History / Comment(s): vasectomy


Past Anesthesia/Blood Transfusion Reactions: No Reported Reaction


Smoking Status: Former smoker





- Past Family History


  ** Father


Family Medical History: Cancer


Additional Family Medical History / Comment(s): cancer. client reported father 

had scoliosis.





  ** Mother


Additional Family Medical History / Comment(s): mother . unknown history





Medications and Allergies


 Home Medications











 Medication  Instructions  Recorded  Confirmed  Type


 


Albuterol Sulfate [Proair Hfa] 2 puff INHALATION RT-Q6H PRN 01/03/16 01/15/18 

History


 


Simvastatin [Zocor] 40 mg PO DAILY 01/03/16 01/15/18 History


 


Ipratropium-Albuterol Nebulize 3 ml INHALATION RT-QID 01/04/16 01/15/18 History





[Duoneb 0.5 mg-3 mg/3 ml Soln]    


 


Fluticasone/Vilanterol [Breo 1 puff INHALATION RT-DAILY 03/31/17 01/15/18 

History





Ellipta 200-25 Mcg INH]    


 


predniSONE 5 mg PO DAILY 03/31/17 01/15/18 History


 


Tamsulosin HCl [Flomax] 0.4 mg PO BID 11/30/17 01/15/18 History


 


Lisinopril [Zestril] 10 mg PO DAILY 01/15/18 01/15/18 History


 


Sulfamethox-Tmp 800-160Mg [Bactrim 1 tab PO Q12HR 01/15/18 01/15/18 History





-160 mg]    











 Allergies











Allergy/AdvReac Type Severity Reaction Status Date / Time


 


No Known Allergies Allergy   Verified 01/15/18 14:39














Physical Exam


Vitals: 


 Vital Signs











  Temp Pulse Pulse Resp BP BP BP


 


 18 11:10   101 H   22  143/64  


 


 18 11:00  97.1 F L  107 H   22  143/64  


 


 18 10:45   110 H     


 


 18 10:35   108 H     


 


 18 08:14  97.9 F   95  20    142/80


 


 18 08:03   100     


 


 18 07:50   100     


 


 18 07:49   100     


 


 18 07:36   96     


 


 18 07:00  97.9 F   95  22    142/80


 


 18 23:00  97.3 F L   110 H  16    147/65


 


 18 20:33   108 H     


 


 18 20:23   108 H     


 


 18 20:22   108 H     


 


 18 20:16   106 H     


 


 18 20:15   106 H     


 


 18 19:20   98     


 


 18 19:03   92     


 


 18 16:19   80     


 


 18 16:09       


 


 18 16:06   78     


 


 18 14:55  97.6 F   101 H  22   137/53 


 


 18 12:16   80     


 


 18 12:00   80     














  Pulse Ox


 


 18 11:10  97


 


 18 11:00  96


 


 18 10:45 


 


 18 10:35 


 


 18 08:14 


 


 18 08:03 


 


 18 07:50 


 


 18 07:49 


 


 18 07:36 


 


 18 07:00  99


 


 18 23:00  96


 


 18 20:33 


 


 18 20:23 


 


 18 20:22 


 


 18 20:16 


 


 18 20:15 


 


 18 19:20 


 


 18 19:03 


 


 18 16:19 


 


 18 16:09  97


 


 18 16:06 


 


 18 14:55  97


 


 18 12:16 


 


 18 12:00 








 Intake and Output











 18





 22:59 06:59 14:59


 


Intake Total 400  


 


Output Total 850 1000 1000


 


Balance -450 -1000 -1000


 


Intake:   


 


  Intake, IV Titration 400  





  Amount   


 


    Calcium Gluconate 1,000 100  





    mg In Sodium Chloride 0.9   





    % 100 ml @ 100 mls/hr   





    IVPB ONCE ONE Rx#:   





    [id number]   


 


    Sodium Chloride 0.45% 1, 300  





    000 ml @ 100 mls/hr IV .   





    Q10H [facility] Rx#:[id number]   


 


Output:   


 


  Urine 850 1000 1000


 


    Uretheral (Null) 425  


 


Other:   


 


  Voiding Method Toilet  





 Urinal  


 


  # Voids 2  


 


  # Bowel Movements 1  














Results





- Lab Results


 Most recent lab results











Calcium  9.4 mg/dL (8.4-10.2)   18  07:27    


 


Magnesium  2.3 mg/dL (1.6-2.3)   01/15/18  14:30    














 18 07:34





 18 07:27





Assessment and Plan


Plan: 





Assessment:


#1.  Nonoliguric acute kidney injury mostly prerenal from poor oral intake and 

from use of ACE inhibitor as well as Bactrim.  Creatinine peaked at 2.3 this 

admission and is down to 1.66 today.  Baseline creatinine is near 1.


#2.  Acute COPD exacerbation.  Status post bronchoscopy this morning.


#3.  Tracheobronchitis maintained on antibiotics.


#4.  Hyperkalemia secondary to acute kidney injury and further worsened with 

the use of lisinopril and Bactrim.  Resolved.


#5.  Bladder cancer.  





Plan:


Maintain half-normal saline to be run at 100 mL an hour.


Avoid nephrotoxic agents and hypotensive episodes.


Continue to monitor renal function and urine output.


Repeat electrolytes in the morning.





Thank you for the consultation.  I will continue to follow the patient with you 

during his hospital stay.

## 2018-01-19 LAB
ANION GAP SERPL CALC-SCNC: 8 MMOL/L
BUN SERPL-SCNC: 45 MG/DL (ref 9–20)
CALCIUM SPEC-MCNC: 9.1 MG/DL (ref 8.4–10.2)
CHLORIDE SERPL-SCNC: 102 MMOL/L (ref 98–107)
CO2 SERPL-SCNC: 32 MMOL/L (ref 22–30)
GLUCOSE BLD-MCNC: 122 MG/DL (ref 75–99)
GLUCOSE BLD-MCNC: 139 MG/DL (ref 75–99)
GLUCOSE BLD-MCNC: 149 MG/DL (ref 75–99)
GLUCOSE BLD-MCNC: 152 MG/DL (ref 75–99)
GLUCOSE SERPL-MCNC: 131 MG/DL (ref 74–99)
MAGNESIUM SPEC-SCNC: 2.4 MG/DL (ref 1.6–2.3)
POTASSIUM SERPL-SCNC: 4.7 MMOL/L (ref 3.5–5.1)
SODIUM SERPL-SCNC: 142 MMOL/L (ref 137–145)

## 2018-01-19 RX ADMIN — IPRATROPIUM BROMIDE AND ALBUTEROL SULFATE SCH ML: .5; 3 SOLUTION RESPIRATORY (INHALATION) at 11:15

## 2018-01-19 RX ADMIN — IPRATROPIUM BROMIDE AND ALBUTEROL SULFATE SCH ML: .5; 3 SOLUTION RESPIRATORY (INHALATION) at 07:35

## 2018-01-19 RX ADMIN — ATORVASTATIN CALCIUM SCH MG: 20 TABLET, FILM COATED ORAL at 07:44

## 2018-01-19 RX ADMIN — INSULIN ASPART SCH UNIT: 100 INJECTION, SOLUTION INTRAVENOUS; SUBCUTANEOUS at 21:59

## 2018-01-19 RX ADMIN — SODIUM BICARBONATE SCH MLS/HR: 84 INJECTION, SOLUTION INTRAVENOUS at 12:33

## 2018-01-19 RX ADMIN — BUDESONIDE SCH MG: 1 SUSPENSION RESPIRATORY (INHALATION) at 07:35

## 2018-01-19 RX ADMIN — SODIUM POLYSTYRENE SULFONATE SCH: 15 SUSPENSION ORAL; RECTAL at 10:27

## 2018-01-19 RX ADMIN — IPRATROPIUM BROMIDE AND ALBUTEROL SULFATE SCH ML: .5; 3 SOLUTION RESPIRATORY (INHALATION) at 14:56

## 2018-01-19 RX ADMIN — CEFUROXIME AXETIL SCH MG: 250 TABLET ORAL at 22:48

## 2018-01-19 RX ADMIN — METHYLPREDNISOLONE SODIUM SUCCINATE SCH MG: 125 INJECTION, POWDER, FOR SOLUTION INTRAMUSCULAR; INTRAVENOUS at 18:44

## 2018-01-19 RX ADMIN — METHYLPREDNISOLONE SODIUM SUCCINATE SCH MG: 125 INJECTION, POWDER, FOR SOLUTION INTRAMUSCULAR; INTRAVENOUS at 23:13

## 2018-01-19 RX ADMIN — IPRATROPIUM BROMIDE AND ALBUTEROL SULFATE PRN ML: .5; 3 SOLUTION RESPIRATORY (INHALATION) at 04:24

## 2018-01-19 RX ADMIN — FORMOTEROL FUMARATE DIHYDRATE SCH MCG: 20 SOLUTION RESPIRATORY (INHALATION) at 20:48

## 2018-01-19 RX ADMIN — CEFUROXIME AXETIL SCH MG: 250 TABLET ORAL at 07:44

## 2018-01-19 RX ADMIN — METHYLPREDNISOLONE SODIUM SUCCINATE SCH MG: 125 INJECTION, POWDER, FOR SOLUTION INTRAMUSCULAR; INTRAVENOUS at 00:05

## 2018-01-19 RX ADMIN — IPRATROPIUM BROMIDE AND ALBUTEROL SULFATE SCH ML: .5; 3 SOLUTION RESPIRATORY (INHALATION) at 20:48

## 2018-01-19 RX ADMIN — TAMSULOSIN HYDROCHLORIDE SCH MG: 0.4 CAPSULE ORAL at 07:45

## 2018-01-19 RX ADMIN — SODIUM BICARBONATE SCH MLS/HR: 84 INJECTION, SOLUTION INTRAVENOUS at 04:02

## 2018-01-19 RX ADMIN — METHYLPREDNISOLONE SODIUM SUCCINATE SCH MG: 125 INJECTION, POWDER, FOR SOLUTION INTRAMUSCULAR; INTRAVENOUS at 05:56

## 2018-01-19 RX ADMIN — SODIUM POLYSTYRENE SULFONATE SCH: 15 SUSPENSION ORAL; RECTAL at 22:47

## 2018-01-19 RX ADMIN — INSULIN ASPART SCH: 100 INJECTION, SOLUTION INTRAVENOUS; SUBCUTANEOUS at 17:20

## 2018-01-19 RX ADMIN — METHYLPREDNISOLONE SODIUM SUCCINATE SCH MG: 125 INJECTION, POWDER, FOR SOLUTION INTRAMUSCULAR; INTRAVENOUS at 12:31

## 2018-01-19 RX ADMIN — TAMSULOSIN HYDROCHLORIDE SCH MG: 0.4 CAPSULE ORAL at 22:48

## 2018-01-19 RX ADMIN — BUDESONIDE SCH MG: 1 SUSPENSION RESPIRATORY (INHALATION) at 20:48

## 2018-01-19 RX ADMIN — INSULIN ASPART SCH UNIT: 100 INJECTION, SOLUTION INTRAVENOUS; SUBCUTANEOUS at 12:30

## 2018-01-19 RX ADMIN — INSULIN ASPART SCH UNIT: 100 INJECTION, SOLUTION INTRAVENOUS; SUBCUTANEOUS at 07:44

## 2018-01-19 RX ADMIN — SODIUM BICARBONATE SCH MLS/HR: 84 INJECTION, SOLUTION INTRAVENOUS at 15:30

## 2018-01-19 RX ADMIN — FORMOTEROL FUMARATE DIHYDRATE SCH MCG: 20 SOLUTION RESPIRATORY (INHALATION) at 07:35

## 2018-01-19 NOTE — P.PN
Subjective


Principal diagnosis: 





Continuing care.  Exacerbation of COPD.





This continue present 82-year-old white male essentially admitted for acute 

exacerbation of COPD.  He is postop day #1 for bronchoscopy.  Chest x-ray is 

now pending.  He states he had somewhat of a rough night secondary to heartburn/

reflux esophagitis.  Otherwise no new voiding difficulties.  No significant 

nausea or vomiting.  He seems somewhat more gasping today.





Objective





- Vital Signs


Vital signs: 


 Vital Signs











Temp  97.1 F L  01/19/18 07:00


 


Pulse  88   01/19/18 08:04


 


Resp  20   01/19/18 07:00


 


BP  157/69   01/19/18 07:00


 


Pulse Ox  96   01/19/18 07:00








 Intake & Output











 01/18/18 01/19/18 01/19/18





 18:59 06:59 18:59


 


Intake Total 400 350 


 


Output Total 1605 1800 


 


Balance -1205 -1450 


 


Intake:   


 


  Intake, IV Titration 400  





  Amount   


 


    Sodium Chloride 0.45% 1, 400  





    000 ml @ 100 mls/hr IV .   





    Q10H [facility] Rx#:[id number]   


 


  Oral  350 


 


Output:   


 


  Urine 1605 1800 


 


    Uretheral (Null) 425  


 


Other:   


 


  Voiding Method Toilet  





 Urinal  














- Constitutional


General appearance: Present: average body habitus





- EENT


Eyes: Absent: abnormal pupil





- Respiratory


Respiratory: bilateral: diminished, wheezing





- Cardiovascular


Rhythm: regular


Heart sounds: normal: S1, S2





- Gastrointestinal


General gastrointestinal: Present: soft.  Absent: tenderness, umbilical hernia





- Integumentary


Integumentary: Absent: cellulitis





- Neurologic


Neurologic: Present: CNII-XII intact.  Absent: focal deficits





- Musculoskeletal


Musculoskeletal: Present: gait normal





- Psychiatric


Psychiatric: Present: A&O x's 3, appropriate affect





- Labs


CBC & Chem 7: 


 01/17/18 07:34





 01/18/18 07:27


Labs: 


 Abnormal Lab Results - Last 24 Hours (Table)











  01/18/18 01/18/18 01/18/18 Range/Units





  10:57 12:00 17:13 


 


POC Glucose (mg/dL)  136 H   129 H  (75-99)  mg/dL


 


Urine Blood   Small H   (Negative)  


 


Ur Leukocyte Esterase   Small H   (Negative)  


 


Urine RBC   6 H   (0-5)  /hpf


 


Urine WBC   13 H   (0-5)  /hpf


 


Urine Bacteria   Occasional H   (None)  /hpf


 


Hyaline Casts   40 H   (0-2)  /lpf


 


Urine Mucus   Rare H   (None)  /hpf














  01/18/18 01/19/18 Range/Units





  21:05 07:37 


 


POC Glucose (mg/dL)  151 H  152 H  (75-99)  mg/dL


 


Urine Blood    (Negative)  


 


Ur Leukocyte Esterase    (Negative)  


 


Urine RBC    (0-5)  /hpf


 


Urine WBC    (0-5)  /hpf


 


Urine Bacteria    (None)  /hpf


 


Hyaline Casts    (0-2)  /lpf


 


Urine Mucus    (None)  /hpf








 Microbiology - Last 24 Hours (Table)











 01/18/18 10:00 Acid Fast Bacilli Smear - Final





 Bronchial Washings - Left Acid Fast Bacilli Culture - Preliminary


 


 01/18/18 10:00 Gram Stain - Preliminary





 Bronchial Washings - Left Bronchial Washings Culture - Preliminary


 


 01/18/18 10:00 Fungal Culture - Preliminary





 Bronchial Washings - Left 


 


 01/15/18 14:30 Blood Culture - Preliminary





 Blood    No Growth after 72 hours














Assessment and Plan


(1) Hypertension


Current Visit: Yes   Status: Acute   Code(s): I10 - ESSENTIAL (PRIMARY) 

HYPERTENSION   SNOMED Code(s): 40313147


   





(2) Hyperlipidemia


Current Visit: Yes   Status: Acute   Code(s): E78.5 - HYPERLIPIDEMIA, 

UNSPECIFIED   SNOMED Code(s): 14159497


   





(3) COPD with acute exacerbation


Current Visit: Yes   Status: Acute   Code(s): J44.1 - CHRONIC OBSTRUCTIVE 

PULMONARY DISEASE W (ACUTE) EXACERBATION   SNOMED Code(s): 673182263


   





(4) Acute hyperkalemia


Current Visit: Yes   Status: Acute   Code(s): E87.5 - HYPERKALEMIA   SNOMED Code

(s): 9907506


   


Plan: 





Continue current regimen or treatment.





We will continue steroids at 60 mg every 6 until chest x-ray and more clinical 

improvement is ascertained.





Check CBC and CMP in a.m. secondary to his nonoliguric renal hitch this week.





Dr. Frank's group will be covering for the weekend.





Anticipate discharge in next 48-72 hours once we can wean Medrol Dosing.





See Orders Otherwise.


Time with Patient: Greater than 30

## 2018-01-19 NOTE — P.PN
Subjective





Patient is seen in follow-up for acute kidney injury.  Renal function is 

improving with creatinine down to 1.12 today.  Patient's currently being 

treated for COPD.  He underwent a bronchoscopy yesterday with secretions 

removed.  He does admit to a cough.  Still has dyspnea but improved compared to 

yesterday.  No vomiting or diarrhea.  Oral intake is fair.  Admits to good 

urine output.





Vital signs are stable.


General: The patient appeared well nourished and normally developed. 


HEENT: Head exam is unremarkable. Neck is without jugular venous distension.


LUNGS: Breath sounds decreased.  Scattered rhonchi.


HEART: Rate and Rhythm are regular. First and second heart sounds normal. No 

murmurs, rubs or gallops. 


ABDOMEN: Abdominal exam reveals normal bowel sounds. Non-tender and non-

distended. No evidence of peritonitis.


EXTREMITITES: No clubbing, cyanosis, or edema.





Objective





- Vital Signs


Vital signs: 


 Vital Signs











Temp  97.1 F L  01/19/18 07:00


 


Pulse  108 H  01/19/18 11:29


 


Resp  20   01/19/18 07:00


 


BP  157/69   01/19/18 07:00


 


Pulse Ox  96   01/19/18 07:00








 Intake & Output











 01/18/18 01/19/18 01/19/18





 18:59 06:59 18:59


 


Intake Total 400 350 


 


Output Total 1605 1800 


 


Balance -1205 -1450 


 


Intake:   


 


  Intake, IV Titration 400  





  Amount   


 


    Sodium Chloride 0.45% 1, 400  





    000 ml @ 100 mls/hr IV .   





    Q10H [facility] Rx#:[id number]   


 


  Oral  350 


 


Output:   


 


  Urine 1605 1800 


 


    Uretheral (Null) 425  


 


Other:   


 


  Voiding Method Toilet  





 Urinal  














- Labs


CBC & Chem 7: 


 01/17/18 07:34





 01/19/18 08:11


Labs: 


 Abnormal Lab Results - Last 24 Hours (Table)











  01/18/18 01/18/18 01/19/18 Range/Units





  17:13 21:05 07:37 


 


Carbon Dioxide     (22-30)  mmol/L


 


BUN     (9-20)  mg/dL


 


Glucose     (74-99)  mg/dL


 


POC Glucose (mg/dL)  129 H  151 H  152 H  (75-99)  mg/dL


 


Magnesium     (1.6-2.3)  mg/dL














  01/19/18 01/19/18 Range/Units





  08:11 12:10 


 


Carbon Dioxide  32 H   (22-30)  mmol/L


 


BUN  45 H   (9-20)  mg/dL


 


Glucose  131 H   (74-99)  mg/dL


 


POC Glucose (mg/dL)   149 H  (75-99)  mg/dL


 


Magnesium  2.4 H   (1.6-2.3)  mg/dL








 Microbiology - Last 24 Hours (Table)











 01/18/18 10:00 Acid Fast Bacilli Smear - Final





 Bronchial Washings - Left Acid Fast Bacilli Culture - Preliminary


 


 01/18/18 10:00 Gram Stain - Preliminary





 Bronchial Washings - Left Bronchial Washings Culture - Preliminary


 


 01/18/18 10:00 Fungal Culture - Preliminary





 Bronchial Washings - Left 


 


 01/15/18 14:30 Blood Culture - Preliminary





 Blood    No Growth after 72 hours














Assessment and Plan


Plan: 





Assessment:


#1.  Nonoliguric acute kidney injury mostly prerenal from poor oral intake and 

from use of ACE inhibitor as well as Bactrim.  Creatinine peaked at 2.3 this 

admission and is down to 1.12 today.  Baseline creatinine is near 1.


#2.  Acute COPD exacerbation.  Status post bronchoscopy 1/18/18.


#3.  Tracheobronchitis maintained on antibiotics.


#4.  Hyperkalemia secondary to acute kidney injury and further worsened with 

the use of lisinopril and Bactrim.  Resolved.


#5.  Bladder cancer.  





Plan:


Hep-Lock IV fluids.


Encouraged oral intake.


Avoid nephrotoxic agents and hypotensive episodes.


Continue to monitor renal function and urine output.


Repeat electrolytes in the morning.

## 2018-01-19 NOTE — XR
EXAMINATION TYPE: XR chest 2V

 

DATE OF EXAM: 1/19/2018

 

COMPARISON: 1/15/2018

 

HISTORY: 82 year-old male shortness of breath

 

TECHNIQUE:  Frontal and lateral views

 

FINDINGS:  

Heart normal size. Mild atherosclerotic arch calcifications. Vasculature within normal limits. Multip
le lines projecting at the right base. Strandy atelectasis in the lower lungs. No consolidation or pl
eural effusion seen. Nipple shadow at the left base. Mild hyperinflation.

 

 

IMPRESSION:  

Correlate for possible underlying COPD. Otherwise, stable exam without acute process seen.

## 2018-01-19 NOTE — P.PN
Subjective


Progress Note Date: 01/19/18


Principal diagnosis: 


Acute COPD exacerbation, tracheobronchitis, with failed outpatient treatment





Linwood is a 82-year-old white male patient, who sees Dr. Terrazas in our office 

for his advanced steroid-dependent COPD, with the baseline FEV1 of 33% 

predicted value, presented to the emergency room on 01/15/2018 at 1400 with 

complaints of increasing shortness of breath, wheezing, chest congestion, 

productive cough with yellow sputum.  Denied any fever, did have some chills.  

Denied any chest pain, hemoptysis.  Patient was seen by Dr. Terrazas in the office 

last Friday on 01/12/2018 with COPD exacerbation, he was given Depo-Medrol IM, 

was started on his own taper and Bactrim DS.  However over the weekend his 

symptoms did not improve and actually got worse, and patient did present to the 

ED for further evaluation and treatment. Chest x-ray from 01/15/2018 shows no 

evidence for acute pulmonary disease. Lab work showed that CBC within normal 

limits of 6.3, hemoglobin of 10.5, no evidence of coagulopathy, sodium is 139, 

potassium is 5.3, BUN of 28, and creatinine is 1.44. CK-MB was 8.3, troponin 

was negative 1.  Follow-up blood work from today still shows normal white count

, still with stable hemoglobin of 10.3, sodium of 140, his potassium is 

slightly increased to 5.7, BUN is 38, creatinine is 1.46. There is evidence of 

of acute kidney injury, patient denies decreased oral intake, no evidence of 

hypotension, on presentation to the emergency room, he was actually very 

hypertensive with a blood pressure of 203/84, but at the time he was in 

respiratory distress as well, and they're ranging from systolic blood pressure 

in the 130s to 180s, and diastolic pressure from 60s to 90s.  Patient was 

started on DuoNeb nebulized treatments, IV Solu-Medrol, and admitted to the 

floor for further management.





On 01/17/2018 patient is seen in follow-up.  Continues to be significantly 

congested, wheezy, and rhonchorous.  Is not able to bring up much sputum.  

Patient's vital signs remain stable, on 3 L per nasal cannula he is satting 96%

.  Afebrile, hemodynamically stable.  Today's lab work was reviewed, WBC is 10.1

, hemoglobin is 9.9, sodium is 139, potassium is up to 6.0, there is further 

worsening of his renal function, his BUN is 54, and creatinine is 1.52.  We 

will ask the attending physician whether he wants to consult nephrology in 

regards to the patient's worsening renal function.  We will go ahead and treat 

his hyperkalemia, we will stop the lisinopril, and give calcium gluconate 1 g 

IV piggyback 1, 1 amp of D50, 10 units of Humulin R, and start the patient on 

Kayexalate 30 g by mouth twice a day.  We will recheck his BMP in 4 hours. 





On 01/19/2018 patient is seen in follow-up on medical surgical floor.  Still 

complains of significant exertional dyspnea, activity limitation.  Lung sounds 

are improved, but still positive for scattered wheezes, and remained diminished 

overall.  Was complaining of heartburn last night.  Remains afebrile, vital 

signs are stable, yesterday after the bronchoscopy he was briefly placed on 

BiPAP for significant bronchospasm and dyspnea, and monitored in the ICU for a 

few hours, improved, and transferred back to medical surgical floor.  He is 

currently off BiPAP, on 3 L per nasal cannula, O2 sat at 96%.  Bronchial 

washing cultures are pending.  Continue with current medical treatments, not 

ready for discharge as.  We will decrease the IV fluids down KVO.  Patient has 

adequate oral intake. 








Objective





- Vital Signs


Vital signs: 


 Vital Signs











Temp  97.1 F L  01/19/18 07:00


 


Pulse  104 H  01/19/18 11:15


 


Resp  20   01/19/18 07:00


 


BP  157/69   01/19/18 07:00


 


Pulse Ox  96   01/19/18 07:00








 Intake & Output











 01/18/18 01/19/18 01/19/18





 18:59 06:59 18:59


 


Intake Total 400 350 


 


Output Total 1605 1800 


 


Balance -1205 -1450 


 


Intake:   


 


  Intake, IV Titration 400  





  Amount   


 


    Sodium Chloride 0.45% 1, 400  





    000 ml @ 100 mls/hr IV .   





    Q10H [facility] Rx#:[id number]   


 


  Oral  350 


 


Output:   


 


  Urine 1605 1800 


 


    Uretheral (Null) 425  


 


Other:   


 


  Voiding Method Toilet  





 Urinal  














- Exam


GENERAL EXAM: Alert, pleasant, thin, 82-year-old white male, mildly short of 

breath at rest, but fairly comfortable


HEAD: Normocephalic/atraumatic.


EYES: Normal reaction of pupils, equal size.  Conjunctiva pink, sclera white.


NOSE: Clear with pink turbinates.


THROAT: No erythema or exudates.


NECK: No masses, no JVD, no thyroid enlargement, no adenopathy.


CHEST: No chest wall deformity.  Symmetrical expansion. 


LUNGS: Equal air entry with scattered wheezing throughout the lung fields.


CVS: Regular rate and rhythm, normal S1 and S2, no gallops, no murmurs, no rubs


ABDOMEN: Soft, nontender.  No hepatosplenomegaly, normal bowel sounds, no 

guarding or rigidity.


EXTREMITIES: No clubbing, no edema, no cyanosis, 2+ pulses and upper and lower 

extremities.


MUSCULOSKELETAL: Muscle strength and tone normal.


SPINE: No scoliosis or deformity


SKIN: No rashes


CENTRAL NERVOUS SYSTEM: Alert and oriented -3.  No focal deficits, tone is 

normal in all 4 extremities.


PSYCHIATRIC: Alert and oriented -3.  Appropriate affect.  Intact judgment and 

insight. 

















- Labs


CBC & Chem 7: 


 01/17/18 07:34





 01/19/18 08:11


Labs: 


 Abnormal Lab Results - Last 24 Hours (Table)











  01/18/18 01/18/18 01/18/18 Range/Units





  12:00 17:13 21:05 


 


Carbon Dioxide     (22-30)  mmol/L


 


BUN     (9-20)  mg/dL


 


Glucose     (74-99)  mg/dL


 


POC Glucose (mg/dL)   129 H  151 H  (75-99)  mg/dL


 


Magnesium     (1.6-2.3)  mg/dL


 


Urine Blood  Small H    (Negative)  


 


Ur Leukocyte Esterase  Small H    (Negative)  


 


Urine RBC  6 H    (0-5)  /hpf


 


Urine WBC  13 H    (0-5)  /hpf


 


Urine Bacteria  Occasional H    (None)  /hpf


 


Hyaline Casts  40 H    (0-2)  /lpf


 


Urine Mucus  Rare H    (None)  /hpf














  01/19/18 01/19/18 Range/Units





  07:37 08:11 


 


Carbon Dioxide   32 H  (22-30)  mmol/L


 


BUN   45 H  (9-20)  mg/dL


 


Glucose   131 H  (74-99)  mg/dL


 


POC Glucose (mg/dL)  152 H   (75-99)  mg/dL


 


Magnesium   2.4 H  (1.6-2.3)  mg/dL


 


Urine Blood    (Negative)  


 


Ur Leukocyte Esterase    (Negative)  


 


Urine RBC    (0-5)  /hpf


 


Urine WBC    (0-5)  /hpf


 


Urine Bacteria    (None)  /hpf


 


Hyaline Casts    (0-2)  /lpf


 


Urine Mucus    (None)  /hpf








 Microbiology - Last 24 Hours (Table)











 01/18/18 10:00 Acid Fast Bacilli Smear - Final





 Bronchial Washings - Left Acid Fast Bacilli Culture - Preliminary


 


 01/18/18 10:00 Gram Stain - Preliminary





 Bronchial Washings - Left Bronchial Washings Culture - Preliminary


 


 01/18/18 10:00 Fungal Culture - Preliminary





 Bronchial Washings - Left 


 


 01/15/18 14:30 Blood Culture - Preliminary





 Blood    No Growth after 72 hours














Assessment and Plan


Plan: 


Assessment:





#1.  Acute COPD exacerbation completed by tracheobronchitis, with failed 

outpatient treatment, status post bronchoscopy with BAL on 01/18/2018





#2.  Advanced steroid dependent COPD, with a baseline FEV1 of 33% of predicted





#3.  Acute kidney injury, possibly related to administration of Bactrim DS and 

lisinopril, improving.  Bactrim and lisinopril had been discontinued, patient 

was given IV hydration, on today's blood work BUN is 45, and creatinine is 1.12





#4.  Hyperkalemia, possibly related to acute kidney injury or Bactrim and 

lisinopril therapy, today's blood work shows serum potassium is up to 6.0, 

Bactrim has been discontinued yesterday, we will stop the lisinopril, we will 

treat the hyperkalemia.  Improved, today's  potassium is 4.7.





#5. nicotine dependence, currently in remission,





#6. hyperlipidemia





#7.  Hypertension





#8.  Neoplasm of bladder





#9.  History of deep venous thrombosis





Plan:





Patient is status post bronchoscopy with BAL on 01/18/2018.  Did require brief 

BiPAP therapy for significant bronchospasm after the bronchoscopy, was 

monitored for a few hours in the ICU, improved, placed back on nasal cannula 

and transferred back to the medical surgical floor last night.  Sounds better 

today, although remains significantly dyspneic with any exertion, and the 

wheezing persists although improved.  Continue DuoNeb nebulized treatments, 

Pulmicort and Perforomist.  Continue IV Solu-Medrol, Ceftin.  Patient's renal 

profile is improving, BUN is down to 45, creatinine is 1.12.  Patient is having 

adequate oral intake, we will decrease his IV fluids down to KVO.  Chest x-ray 

was reviewed and shows underlying COPD, without any acute process.





I performed a history & physical examination of the patient and discussed their 

management with my nurse practitioner, Mayda Álvarez.  I reviewed the nurse 

practitioner's note and agree with the documented findings and plan of care.  

Lung sounds are positive for diffuse wheezes.  The findings and the impression 

was discussed with the patient.  I attest to the documentation by the nurse 

practitioner. 








Time with Patient: Less than 30

## 2018-01-20 LAB
ALBUMIN SERPL-MCNC: 3.3 G/DL (ref 3.5–5)
ALP SERPL-CCNC: 50 U/L (ref 38–126)
ALT SERPL-CCNC: 49 U/L (ref 21–72)
ANION GAP SERPL CALC-SCNC: 6 MMOL/L
AST SERPL-CCNC: 52 U/L (ref 17–59)
BUN SERPL-SCNC: 42 MG/DL (ref 9–20)
CALCIUM SPEC-MCNC: 8.7 MG/DL (ref 8.4–10.2)
CHLORIDE SERPL-SCNC: 102 MMOL/L (ref 98–107)
CO2 SERPL-SCNC: 35 MMOL/L (ref 22–30)
ERYTHROCYTE [DISTWIDTH] IN BLOOD BY AUTOMATED COUNT: 3.65 M/UL (ref 4.3–5.9)
ERYTHROCYTE [DISTWIDTH] IN BLOOD: 16.2 % (ref 11.5–15.5)
GLUCOSE BLD-MCNC: 119 MG/DL (ref 75–99)
GLUCOSE BLD-MCNC: 128 MG/DL (ref 75–99)
GLUCOSE BLD-MCNC: 141 MG/DL (ref 75–99)
GLUCOSE BLD-MCNC: 176 MG/DL (ref 75–99)
GLUCOSE SERPL-MCNC: 129 MG/DL (ref 74–99)
HCT VFR BLD AUTO: 33.3 % (ref 39–53)
HGB BLD-MCNC: 10 GM/DL (ref 13–17.5)
MCH RBC QN AUTO: 27.3 PG (ref 25–35)
MCHC RBC AUTO-ENTMCNC: 30 G/DL (ref 31–37)
MCV RBC AUTO: 91.2 FL (ref 80–100)
PLATELET # BLD AUTO: 411 K/UL (ref 150–450)
POTASSIUM SERPL-SCNC: 4.6 MMOL/L (ref 3.5–5.1)
PROT SERPL-MCNC: 6.1 G/DL (ref 6.3–8.2)
SODIUM SERPL-SCNC: 143 MMOL/L (ref 137–145)
WBC # BLD AUTO: 6.8 K/UL (ref 3.8–10.6)

## 2018-01-20 RX ADMIN — SODIUM POLYSTYRENE SULFONATE SCH GM: 15 SUSPENSION ORAL; RECTAL at 21:50

## 2018-01-20 RX ADMIN — IPRATROPIUM BROMIDE AND ALBUTEROL SULFATE SCH ML: .5; 3 SOLUTION RESPIRATORY (INHALATION) at 08:12

## 2018-01-20 RX ADMIN — BUDESONIDE AND FORMOTEROL FUMARATE DIHYDRATE SCH PUFF: 160; 4.5 AEROSOL RESPIRATORY (INHALATION) at 20:39

## 2018-01-20 RX ADMIN — SODIUM BICARBONATE SCH MLS/HR: 84 INJECTION, SOLUTION INTRAVENOUS at 21:49

## 2018-01-20 RX ADMIN — INSULIN ASPART SCH: 100 INJECTION, SOLUTION INTRAVENOUS; SUBCUTANEOUS at 12:23

## 2018-01-20 RX ADMIN — TAMSULOSIN HYDROCHLORIDE SCH MG: 0.4 CAPSULE ORAL at 21:49

## 2018-01-20 RX ADMIN — SODIUM POLYSTYRENE SULFONATE SCH GM: 15 SUSPENSION ORAL; RECTAL at 08:04

## 2018-01-20 RX ADMIN — CEFUROXIME AXETIL SCH MG: 250 TABLET ORAL at 08:04

## 2018-01-20 RX ADMIN — INSULIN ASPART SCH: 100 INJECTION, SOLUTION INTRAVENOUS; SUBCUTANEOUS at 17:43

## 2018-01-20 RX ADMIN — METHYLPREDNISOLONE SODIUM SUCCINATE SCH MG: 125 INJECTION, POWDER, FOR SOLUTION INTRAMUSCULAR; INTRAVENOUS at 12:23

## 2018-01-20 RX ADMIN — INSULIN ASPART SCH UNIT: 100 INJECTION, SOLUTION INTRAVENOUS; SUBCUTANEOUS at 08:05

## 2018-01-20 RX ADMIN — TAMSULOSIN HYDROCHLORIDE SCH MG: 0.4 CAPSULE ORAL at 08:04

## 2018-01-20 RX ADMIN — METHYLPREDNISOLONE SODIUM SUCCINATE SCH MG: 125 INJECTION, POWDER, FOR SOLUTION INTRAMUSCULAR; INTRAVENOUS at 05:23

## 2018-01-20 RX ADMIN — ATORVASTATIN CALCIUM SCH MG: 20 TABLET, FILM COATED ORAL at 08:05

## 2018-01-20 RX ADMIN — INSULIN ASPART SCH UNIT: 100 INJECTION, SOLUTION INTRAVENOUS; SUBCUTANEOUS at 21:49

## 2018-01-20 RX ADMIN — IPRATROPIUM BROMIDE AND ALBUTEROL SULFATE SCH ML: .5; 3 SOLUTION RESPIRATORY (INHALATION) at 15:57

## 2018-01-20 RX ADMIN — BUDESONIDE SCH MG: 1 SUSPENSION RESPIRATORY (INHALATION) at 08:12

## 2018-01-20 RX ADMIN — FORMOTEROL FUMARATE DIHYDRATE SCH MCG: 20 SOLUTION RESPIRATORY (INHALATION) at 08:12

## 2018-01-20 RX ADMIN — CEFUROXIME AXETIL SCH MG: 250 TABLET ORAL at 21:49

## 2018-01-20 RX ADMIN — IPRATROPIUM BROMIDE AND ALBUTEROL SULFATE SCH ML: .5; 3 SOLUTION RESPIRATORY (INHALATION) at 11:29

## 2018-01-20 RX ADMIN — IPRATROPIUM BROMIDE AND ALBUTEROL SULFATE SCH ML: .5; 3 SOLUTION RESPIRATORY (INHALATION) at 20:39

## 2018-01-20 NOTE — P.PN
Subjective


Progress Note Date: 01/20/18


Principal diagnosis: 





COPD exacerbation





Progress note dated 01/20/2018





The patient is doing much better today.  Feeling much better.  Today he is in 

the room with his 3 daughters.  The patient underwent bronchoscopy on Thursday.

  He developed some acute bronchospasm after the procedure and we kept him up 

into the ICU for about 6-8 hours on BiPAP.  He recovered nicely was transferred 

back down to the fourth floor.  He likely could be discharged home in a day or 

so.  We'll look his medications since make the appropriate changes.  The patient

's coughing a bit.  Not producing any phlegm.  Much less short of breath.  The 

patient had a good night last night.  Patient's not having any fever or chills.

  No chest pain.  No nausea vomiting or diarrhea.  As I mentioned, he sitting 

at side of the bed talking to his 3 daughters.





Objective





- Vital Signs


Vital signs: 


 Vital Signs











Temp  97.2 F L  01/20/18 06:05


 


Pulse  92   01/20/18 11:39


 


Resp  16   01/20/18 08:00


 


BP  159/78   01/20/18 06:05


 


Pulse Ox  97   01/20/18 06:05








 Intake & Output











 01/19/18 01/20/18 01/20/18





 18:59 06:59 18:59


 


Intake Total 800  


 


Output Total  400 


 


Balance 800 -400 


 


Intake:   


 


  Oral 800  


 


Output:   


 


  Urine  400 


 


Other:   


 


  Voiding Method Toilet Urinal 





 Urinal  


 


  # Voids 1 1 














- Exam





No acute distress, oriented 3.





HEENT examination is grossly unremarkable.  Mucous membranes are moist.  No 

oral lesions.





Neck supple.  Full range of motion.  No adenopathy thyromegaly or neck vein 

distention.





Cardiovascular examination reveals regular rhythm rate.  S1-S2 normal.  No S3 

or S4.  No discernible murmur noted.





Lungs reveal diffuse bilateral rhonchi.  Breath sounds are coarse.  This 

prolongation on forced maneuver.  There is some expiratory wheezing.  No 

crackles..





Abdomen soft bowel sounds are heard.  No masses or tenderness.





Extremities are intact.  No cyanosis clubbing or edema.





Skin is without rash or lesion.





Neurologic examination is brief but nonfocal.





- Labs


CBC & Chem 7: 


 01/20/18 07:50





 01/20/18 07:50


Labs: 


 Abnormal Lab Results - Last 24 Hours (Table)











  01/18/18 01/19/18 01/19/18 Range/Units





  10:00 17:08 20:47 


 


RBC     (4.30-5.90)  m/uL


 


Hgb     (13.0-17.5)  gm/dL


 


Hct     (39.0-53.0)  %


 


MCHC     (31.0-37.0)  g/dL


 


RDW     (11.5-15.5)  %


 


Carbon Dioxide     (22-30)  mmol/L


 


BUN     (9-20)  mg/dL


 


Glucose     (74-99)  mg/dL


 


POC Glucose (mg/dL)   122 H  139 H  (75-99)  mg/dL


 


Total Protein     (6.3-8.2)  g/dL


 


Albumin     (3.5-5.0)  g/dL


 


Viral Test  See Below H    














  01/20/18 01/20/18 01/20/18 Range/Units





  07:05 07:50 07:50 


 


RBC    3.65 L  (4.30-5.90)  m/uL


 


Hgb    10.0 L  (13.0-17.5)  gm/dL


 


Hct    33.3 L  (39.0-53.0)  %


 


MCHC    30.0 L  (31.0-37.0)  g/dL


 


RDW    16.2 H  (11.5-15.5)  %


 


Carbon Dioxide   35 H   (22-30)  mmol/L


 


BUN   42 H   (9-20)  mg/dL


 


Glucose   129 H   (74-99)  mg/dL


 


POC Glucose (mg/dL)  141 H    (75-99)  mg/dL


 


Total Protein   6.1 L   (6.3-8.2)  g/dL


 


Albumin   3.3 L   (3.5-5.0)  g/dL


 


Viral Test     














  01/20/18 Range/Units





  12:08 


 


RBC   (4.30-5.90)  m/uL


 


Hgb   (13.0-17.5)  gm/dL


 


Hct   (39.0-53.0)  %


 


MCHC   (31.0-37.0)  g/dL


 


RDW   (11.5-15.5)  %


 


Carbon Dioxide   (22-30)  mmol/L


 


BUN   (9-20)  mg/dL


 


Glucose   (74-99)  mg/dL


 


POC Glucose (mg/dL)  128 H  (75-99)  mg/dL


 


Total Protein   (6.3-8.2)  g/dL


 


Albumin   (3.5-5.0)  g/dL


 


Viral Test   








 Microbiology - Last 24 Hours (Table)











 01/18/18 10:00 Gram Stain - Final





 Bronchial Washings - Left Bronchial Washings Culture - Final


 


 01/15/18 14:30 Blood Culture - Preliminary





 Blood    No Growth after 96 hours














Assessment and Plan


Assessment: 





Assessment





COPD exacerbation, treated by tracheobronchitis, status post bronchoscopy on 

January 18 with subsequent bronchospasm requiring a short stay in the intensive 

care unit





Advanced steroid and oxygen dependent COPD





Acute kidney injury





Hyperkalemia





Previous history of heavy tobacco use





Hyperlipidemia





Hypertension





Neoplasm of the bladder





History of DVT








Plan: 





Plan dated 01/20/2018





The patient's doing much better.  We'll continue to monitor his medications 

vital signs and so forth.  The patient's bronchus results will be monitored and 

followed.  Additional recommendations and suggestions are forthcoming.  Hopeful 

discharge in next 24-48 hours or sooner.


Time with Patient: Less than 30

## 2018-01-21 LAB
GLUCOSE BLD-MCNC: 121 MG/DL (ref 75–99)
GLUCOSE BLD-MCNC: 135 MG/DL (ref 75–99)
GLUCOSE BLD-MCNC: 174 MG/DL (ref 75–99)
GLUCOSE BLD-MCNC: 87 MG/DL (ref 75–99)

## 2018-01-21 RX ADMIN — INSULIN ASPART SCH UNIT: 100 INJECTION, SOLUTION INTRAVENOUS; SUBCUTANEOUS at 20:58

## 2018-01-21 RX ADMIN — INSULIN ASPART SCH: 100 INJECTION, SOLUTION INTRAVENOUS; SUBCUTANEOUS at 18:38

## 2018-01-21 RX ADMIN — BUDESONIDE AND FORMOTEROL FUMARATE DIHYDRATE SCH PUFF: 160; 4.5 AEROSOL RESPIRATORY (INHALATION) at 08:13

## 2018-01-21 RX ADMIN — ATORVASTATIN CALCIUM SCH MG: 20 TABLET, FILM COATED ORAL at 08:27

## 2018-01-21 RX ADMIN — SODIUM POLYSTYRENE SULFONATE SCH GM: 15 SUSPENSION ORAL; RECTAL at 08:27

## 2018-01-21 RX ADMIN — IPRATROPIUM BROMIDE AND ALBUTEROL SULFATE SCH ML: .5; 3 SOLUTION RESPIRATORY (INHALATION) at 19:43

## 2018-01-21 RX ADMIN — TAMSULOSIN HYDROCHLORIDE SCH MG: 0.4 CAPSULE ORAL at 20:40

## 2018-01-21 RX ADMIN — CEFUROXIME AXETIL SCH MG: 250 TABLET ORAL at 20:40

## 2018-01-21 RX ADMIN — IPRATROPIUM BROMIDE AND ALBUTEROL SULFATE SCH ML: .5; 3 SOLUTION RESPIRATORY (INHALATION) at 08:13

## 2018-01-21 RX ADMIN — IPRATROPIUM BROMIDE AND ALBUTEROL SULFATE SCH ML: .5; 3 SOLUTION RESPIRATORY (INHALATION) at 15:20

## 2018-01-21 RX ADMIN — SODIUM BICARBONATE SCH: 84 INJECTION, SOLUTION INTRAVENOUS at 18:38

## 2018-01-21 RX ADMIN — SODIUM POLYSTYRENE SULFONATE SCH GM: 15 SUSPENSION ORAL; RECTAL at 20:41

## 2018-01-21 RX ADMIN — CEFUROXIME AXETIL SCH MG: 250 TABLET ORAL at 08:27

## 2018-01-21 RX ADMIN — BUDESONIDE AND FORMOTEROL FUMARATE DIHYDRATE SCH PUFF: 160; 4.5 AEROSOL RESPIRATORY (INHALATION) at 19:43

## 2018-01-21 RX ADMIN — INSULIN ASPART SCH UNIT: 100 INJECTION, SOLUTION INTRAVENOUS; SUBCUTANEOUS at 13:25

## 2018-01-21 RX ADMIN — TAMSULOSIN HYDROCHLORIDE SCH MG: 0.4 CAPSULE ORAL at 08:27

## 2018-01-21 RX ADMIN — INSULIN ASPART SCH: 100 INJECTION, SOLUTION INTRAVENOUS; SUBCUTANEOUS at 08:28

## 2018-01-21 RX ADMIN — IPRATROPIUM BROMIDE AND ALBUTEROL SULFATE SCH ML: .5; 3 SOLUTION RESPIRATORY (INHALATION) at 11:21

## 2018-01-21 NOTE — P.PN
Subjective


Progress Note Date: 01/21/18


Principal diagnosis: 





COPD exacerbation





Progress note dated 01/20/2018





The patient is doing much better today.  Feeling much better.  Today he is in 

the room with his 3 daughters.  The patient underwent bronchoscopy on Thursday.

  He developed some acute bronchospasm after the procedure and we kept him up 

into the ICU for about 6-8 hours on BiPAP.  He recovered nicely was transferred 

back down to the fourth floor.  He likely could be discharged home in a day or 

so.  We'll look his medications since make the appropriate changes.  The patient

's coughing a bit.  Not producing any phlegm.  Much less short of breath.  The 

patient had a good night last night.  Patient's not having any fever or chills.

  No chest pain.  No nausea vomiting or diarrhea.  As I mentioned, he sitting 

at side of the bed talking to his 3 daughters.





Progress note dated 01/21/2018





82-year-old male admitted with a diagnosis of COPD exacerbation.  The patient 

underwent bronchoscopy a couple days back.  Feeling much better.  He had lots 

of secretions in the lower respiratory tract.  There were suctioned.  Cultures 

are currently pending or negative.  His breathing is much improved.  He still 

coughing.  So short of breath.  He likely can be discharged in 24-48 hours.  

Other than that the patient seemed be doing relatively well.  We'll make sure 

that he follows up with us in the office.  Denies any fever chills.  No chest 

pain.  No chest discomfort.





Objective





- Vital Signs


Vital signs: 


 Vital Signs











Temp  96.7 F L  01/21/18 07:00


 


Pulse  96   01/21/18 11:34


 


Resp  22   01/21/18 07:00


 


BP  149/89   01/21/18 07:00


 


Pulse Ox  98   01/21/18 07:00








 Intake & Output











 01/20/18 01/21/18 01/21/18





 18:59 06:59 18:59


 


Intake Total   200


 


Output Total  400 


 


Balance  -400 200


 


Intake:   


 


  Oral   200


 


Output:   


 


  Urine  400 


 


Other:   


 


  # Voids 3 2 1














- Exam





No acute distress, oriented 3.





HEENT examination is grossly unremarkable.  Mucous membranes are moist.  No 

oral lesions.





Neck supple.  Full range of motion.  No adenopathy thyromegaly or neck vein 

distention.





Cardiovascular examination reveals regular rhythm rate.  S1-S2 normal.  No S3 

or S4.  No discernible murmur noted.





Lungs reveal diffuse bilateral rhonchi.  Breath sounds are coarse.  This 

prolongation on forced maneuver.  There is some expiratory wheezing.  No 

crackles..





Abdomen soft bowel sounds are heard.  No masses or tenderness.





Extremities are intact.  No cyanosis clubbing or edema.





Skin is without rash or lesion.





Neurologic examination is brief but nonfocal.





- Labs


CBC & Chem 7: 


 01/20/18 07:50





 01/20/18 07:50


Labs: 


 Abnormal Lab Results - Last 24 Hours (Table)











  01/20/18 01/20/18 01/21/18 Range/Units





  17:30 20:54 12:11 


 


POC Glucose (mg/dL)  119 H  176 H  135 H  (75-99)  mg/dL








 Microbiology - Last 24 Hours (Table)











 01/15/18 14:30 Blood Culture - Preliminary





 Blood    No Growth after 120 hours


 


 01/18/18 10:00 Gram Stain - Final





 Bronchial Washings - Left Bronchial Washings Culture - Final














Assessment and Plan


Assessment: 





Assessment





COPD exacerbation, treated by tracheobronchitis, status post bronchoscopy on 

January 18 with subsequent bronchospasm requiring a short stay in the intensive 

care unit





Advanced steroid and oxygen dependent COPD





Acute kidney injury





Hyperkalemia





Previous history of heavy tobacco use





Hyperlipidemia





Hypertension





Neoplasm of the bladder





History of DVT








Plan: 





Plan dated 01/20/2018





The patient's doing much better.  We'll continue to monitor his medications 

vital signs and so forth.  The patient's bronchus results will be monitored and 

followed.  Additional recommendations and suggestions are forthcoming.  Hopeful 

discharge in next 24-48 hours or sooner.





Plan dated 01/21/2018





The patient will likely be discharged in the next 24-48 hours.  Labs x-rays and 

medications as well as bronchial results are all reviewed.  The patient had a 

good night last night.  Seemed be moving in the right direction.  No discharge 

recommendations are made.


Time with Patient: Less than 30 Ambulatory

## 2018-01-22 VITALS — TEMPERATURE: 97.2 F | DIASTOLIC BLOOD PRESSURE: 95 MMHG | SYSTOLIC BLOOD PRESSURE: 158 MMHG | RESPIRATION RATE: 18 BRPM

## 2018-01-22 VITALS — HEART RATE: 90 BPM

## 2018-01-22 LAB — GLUCOSE BLD-MCNC: 78 MG/DL (ref 75–99)

## 2018-01-22 RX ADMIN — BUDESONIDE AND FORMOTEROL FUMARATE DIHYDRATE SCH PUFF: 160; 4.5 AEROSOL RESPIRATORY (INHALATION) at 08:08

## 2018-01-22 RX ADMIN — INSULIN ASPART SCH: 100 INJECTION, SOLUTION INTRAVENOUS; SUBCUTANEOUS at 08:31

## 2018-01-22 RX ADMIN — ATORVASTATIN CALCIUM SCH MG: 20 TABLET, FILM COATED ORAL at 08:31

## 2018-01-22 RX ADMIN — TAMSULOSIN HYDROCHLORIDE SCH MG: 0.4 CAPSULE ORAL at 08:31

## 2018-01-22 RX ADMIN — SODIUM POLYSTYRENE SULFONATE SCH: 15 SUSPENSION ORAL; RECTAL at 08:31

## 2018-01-22 RX ADMIN — IPRATROPIUM BROMIDE AND ALBUTEROL SULFATE SCH ML: .5; 3 SOLUTION RESPIRATORY (INHALATION) at 08:08

## 2018-01-22 RX ADMIN — CEFUROXIME AXETIL SCH MG: 250 TABLET ORAL at 08:31

## 2018-01-22 NOTE — P.PN
Subjective


Progress Note Date: 01/20/18


Principal diagnosis: 





Acute COPD exacerbation





This continue present 82-year-old white male essentially admitted for acute 

exacerbation of COPD.  He is postop day #2 for bronchoscopy.  Chest x-ray howed 

correlated for COPD. 





on 01/20/2018


Patient continues to have





The patient is doing much better today.  Feeling much better.  Today he is in 

the room with his 3 daughters.  The patient underwent bronchoscopy on Thursday.

  He developed some acute bronchospasm after the procedure and we kept him up 

into the ICU for about 6-8 hours on BiPAP.  He recovered nicely was transferred 

back down to the fourth floor.  He likely could be discharged home in a day or 

so.  We'll look his medications since make the appropriate changes.  The patient

's coughing a bit.  Not producing any phlegm.  Much less short of breath.  The 

patient had a good night last night.  Patient's not having any fever or chills.

  No chest pain.  No nausea vomiting or diarrhea.  As I mentioned, he sitting 

at side of the bed talking to his 3 daughters.





Objective





- Vital Signs


Vital signs: 


 Vital Signs











Temp  97.9 F   01/20/18 15:00


 


Pulse  96   01/20/18 20:56


 


Resp  16   01/20/18 16:00


 


BP  137/76   01/20/18 15:00


 


Pulse Ox  97   01/20/18 20:40








 Intake & Output











 01/20/18 01/20/18 01/21/18





 06:59 18:59 06:59


 


Output Total 400  


 


Balance -400  


 


Output:   


 


  Urine 400  


 


Other:   


 


  Voiding Method Urinal  


 


  # Voids 1 3 














- Labs


CBC & Chem 7: 


 01/20/18 07:50





 01/20/18 07:50


Labs: 


 Abnormal Lab Results - Last 24 Hours (Table)











  01/20/18 01/20/18 01/20/18 Range/Units





  07:05 07:50 07:50 


 


RBC    3.65 L  (4.30-5.90)  m/uL


 


Hgb    10.0 L  (13.0-17.5)  gm/dL


 


Hct    33.3 L  (39.0-53.0)  %


 


MCHC    30.0 L  (31.0-37.0)  g/dL


 


RDW    16.2 H  (11.5-15.5)  %


 


Carbon Dioxide   35 H   (22-30)  mmol/L


 


BUN   42 H   (9-20)  mg/dL


 


Glucose   129 H   (74-99)  mg/dL


 


POC Glucose (mg/dL)  141 H    (75-99)  mg/dL


 


Total Protein   6.1 L   (6.3-8.2)  g/dL


 


Albumin   3.3 L   (3.5-5.0)  g/dL














  01/20/18 01/20/18 01/20/18 Range/Units





  12:08 17:30 20:54 


 


RBC     (4.30-5.90)  m/uL


 


Hgb     (13.0-17.5)  gm/dL


 


Hct     (39.0-53.0)  %


 


MCHC     (31.0-37.0)  g/dL


 


RDW     (11.5-15.5)  %


 


Carbon Dioxide     (22-30)  mmol/L


 


BUN     (9-20)  mg/dL


 


Glucose     (74-99)  mg/dL


 


POC Glucose (mg/dL)  128 H  119 H  176 H  (75-99)  mg/dL


 


Total Protein     (6.3-8.2)  g/dL


 


Albumin     (3.5-5.0)  g/dL








 Microbiology - Last 24 Hours (Table)











 01/15/18 14:30 Blood Culture - Preliminary





 Blood    No Growth after 120 hours


 


 01/18/18 10:00 Gram Stain - Final





 Bronchial Washings - Left Bronchial Washings Culture - Final














Assessment and Plan


Assessment: 





Acute COPD exacerbation due totracheobronchitis status post bronchoscopyon 

January 18


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


Acute kidney injury due to Bactrim and ACEI


Hyperkalemia secondary to acute kidney injury


istory of smoking


Hypertension


hyperlipidemia


bladder cancer


ormocytic anemia


History of DVT





Patient be continued on steroids and breathing treatments and follow 

closely.continue with antibiotics in the form of Ceftin. BAL fluid cultures 

have been negative so far. pulmonary and nephrology is following.


Patient is improving slowly  Further recommendations based on the clinical 

course.





Time with Patient: Greater than 30

## 2018-01-22 NOTE — P.PN
Subjective


Progress Note Date: 01/22/18


Principal diagnosis: 


Acute COPD exacerbation, tracheobronchitis, with failed outpatient treatment





Linwood is a 82-year-old white male patient, who sees Dr. Terrazas in our office 

for his advanced steroid-dependent COPD, with the baseline FEV1 of 33% 

predicted value, presented to the emergency room on 01/15/2018 at 1400 with 

complaints of increasing shortness of breath, wheezing, chest congestion, 

productive cough with yellow sputum.  Denied any fever, did have some chills.  

Denied any chest pain, hemoptysis.  Patient was seen by Dr. Terrazas in the office 

last Friday on 01/12/2018 with COPD exacerbation, he was given Depo-Medrol IM, 

was started on his own taper and Bactrim DS.  However over the weekend his 

symptoms did not improve and actually got worse, and patient did present to the 

ED for further evaluation and treatment. Chest x-ray from 01/15/2018 shows no 

evidence for acute pulmonary disease. Lab work showed that CBC within normal 

limits of 6.3, hemoglobin of 10.5, no evidence of coagulopathy, sodium is 139, 

potassium is 5.3, BUN of 28, and creatinine is 1.44. CK-MB was 8.3, troponin 

was negative 1.  Follow-up blood work from today still shows normal white count

, still with stable hemoglobin of 10.3, sodium of 140, his potassium is 

slightly increased to 5.7, BUN is 38, creatinine is 1.46. There is evidence of 

of acute kidney injury, patient denies decreased oral intake, no evidence of 

hypotension, on presentation to the emergency room, he was actually very 

hypertensive with a blood pressure of 203/84, but at the time he was in 

respiratory distress as well, and they're ranging from systolic blood pressure 

in the 130s to 180s, and diastolic pressure from 60s to 90s.  Patient was 

started on DuoNeb nebulized treatments, IV Solu-Medrol, and admitted to the 

floor for further management.





On 01/17/2018 patient is seen in follow-up.  Continues to be significantly 

congested, wheezy, and rhonchorous.  Is not able to bring up much sputum.  

Patient's vital signs remain stable, on 3 L per nasal cannula he is satting 96%

.  Afebrile, hemodynamically stable.  Today's lab work was reviewed, WBC is 10.1

, hemoglobin is 9.9, sodium is 139, potassium is up to 6.0, there is further 

worsening of his renal function, his BUN is 54, and creatinine is 1.52.  We 

will ask the attending physician whether he wants to consult nephrology in 

regards to the patient's worsening renal function.  We will go ahead and treat 

his hyperkalemia, we will stop the lisinopril, and give calcium gluconate 1 g 

IV piggyback 1, 1 amp of D50, 10 units of Humulin R, and start the patient on 

Kayexalate 30 g by mouth twice a day.  We will recheck his BMP in 4 hours. 





On 01/19/2018 patient is seen in follow-up on medical surgical floor.  Still 

complains of significant exertional dyspnea, activity limitation.  Lung sounds 

are improved, but still positive for scattered wheezes, and remained diminished 

overall.  Was complaining of heartburn last night.  Remains afebrile, vital 

signs are stable, yesterday after the bronchoscopy he was briefly placed on 

BiPAP for significant bronchospasm and dyspnea, and monitored in the ICU for a 

few hours, improved, and transferred back to medical surgical floor.  He is 

currently off BiPAP, on 3 L per nasal cannula, O2 sat at 96%.  Bronchial 

washing cultures are pending.  Continue with current medical treatments, not 

ready for discharge as.  We will decrease the IV fluids down KVO.  Patient has 

adequate oral intake. 





On 01/22/2018 patient seen in follow-up on medical surgical floor.  Doing much 

better, denies any worsening dyspnea.  Has been ambulating with in the room, 

tolerating well.  Lung sounds are for a few scattered end expiratory wheezes, 

but good air entry noted bilaterally.  Afebrile, vital signs are stable, 

currently on 2 per nasal cannula with O2 sat at 97%.  Bronchial washing 

cultures positive for moderate normal respiratory irving, blood cultures 

negative.  Currently on oral Ceftin, nebulized treatments, and oral prednisone.

  From pulmonary standpoint he is stable for discharge home today.  He'll need 

a follow-up appointment with Dr. Terrazas in the office in one week.








Objective





- Vital Signs


Vital signs: 


 Vital Signs











Temp  97.2 F L  01/22/18 07:00


 


Pulse  90   01/22/18 08:19


 


Resp  18   01/22/18 07:00


 


BP  158/95   01/22/18 07:00


 


Pulse Ox  97   01/22/18 07:00








 Intake & Output











 01/21/18 01/22/18 01/22/18





 18:59 06:59 18:59


 


Intake Total 400 420 


 


Balance 400 420 


 


Intake:   


 


  Oral 400 420 


 


Other:   


 


  # Voids 2 2 














- Exam


GENERAL EXAM: Alert, pleasant, thin, 82-year-old white male, mildly short of 

breath at rest, but fairly comfortable


HEAD: Normocephalic/atraumatic.


EYES: Normal reaction of pupils, equal size.  Conjunctiva pink, sclera white.


NOSE: Clear with pink turbinates.


THROAT: No erythema or exudates.


NECK: No masses, no JVD, no thyroid enlargement, no adenopathy.


CHEST: No chest wall deformity.  Symmetrical expansion. 


LUNGS: Equal air entry with scattered wheezing throughout the lung fields.


CVS: Regular rate and rhythm, normal S1 and S2, no gallops, no murmurs, no rubs


ABDOMEN: Soft, nontender.  No hepatosplenomegaly, normal bowel sounds, no 

guarding or rigidity.


EXTREMITIES: No clubbing, no edema, no cyanosis, 2+ pulses and upper and lower 

extremities.


MUSCULOSKELETAL: Muscle strength and tone normal.


SPINE: No scoliosis or deformity


SKIN: No rashes


CENTRAL NERVOUS SYSTEM: Alert and oriented -3.  No focal deficits, tone is 

normal in all 4 extremities.


PSYCHIATRIC: Alert and oriented -3.  Appropriate affect.  Intact judgment and 

insight. 

















- Labs


CBC & Chem 7: 


 01/20/18 07:50





 01/20/18 07:50


Labs: 


 Abnormal Lab Results - Last 24 Hours (Table)











  01/21/18 01/21/18 01/21/18 Range/Units





  12:11 17:20 20:15 


 


POC Glucose (mg/dL)  135 H  121 H  174 H  (75-99)  mg/dL








 Microbiology - Last 24 Hours (Table)











 01/15/18 14:30 Blood Culture - Final





 Blood    No Growth after 144 hours














Assessment and Plan


Plan: 


Assessment:





#1.  Acute COPD exacerbation completed by tracheobronchitis, with failed 

outpatient treatment, status post bronchoscopy with BAL on 01/18/2018





#2.  Advanced steroid dependent COPD, with a baseline FEV1 of 33% of predicted





#3.  Acute kidney injury, possibly related to administration of Bactrim DS and 

lisinopril, improved.





#4.  Hyperkalemia, possibly related to acute kidney injury or Bactrim and 

lisinopril therapy, today's blood work shows serum potassium is up to 6.0, 

Bactrim has been discontinued yesterday, we will stop the lisinopril, we will 

treat the hyperkalemia.  Improved, today's  potassium is 4.6.





#5. nicotine dependence, currently in remission,





#6. hyperlipidemia





#7.  Hypertension





#8.  Neoplasm of bladder





#9.  History of deep venous thrombosis





Plan:





Is doing well, denies any worsening dyspnea, vital signs are stable, improved 

and cautioned cultures remain negative so far.  Pulmonary standpoint he stable 

for discharge home today, finish outpatient course of Ceftin, prednisone taper, 

continue maintenance inhalers and nebulized treatments.  Follow-up with Dr. Terrazas in the office in one week. 





I performed a history & physical examination of the patient and discussed their 

management with my nurse practitioner, Mayda Álvarez.  I reviewed the nurse 

practitioner's note and agree with the documented findings and plan of care.  

Lung sounds are positive for few scattered wheezes.  The findings and the 

impression was discussed with the patient.  I attest to the documentation by 

the nurse practitioner. 








Time with Patient: Less than 30

## 2018-01-22 NOTE — P.PN
Subjective


Progress Note Date: 01/21/18


Principal diagnosis: 





Acute COPD exacerbation





This continue present 82-year-old white male essentially admitted for acute 

exacerbation of COPD.  He is postop day #3 for bronchoscopy. patient was 

briefly in the due to acute bronchospasm after the procedure. Chest x-ray howed 

correlated for COPD. 





on 01/21/2018


Patient continues to improve slowly. Steroids have been changed to oral now. 

renal function is much improved. 


  Patient's not having any fever or chills.  No chest pain.  No nausea vomiting 

or diarrhea. all otherreview of systems negative except the above


current medications reviewed








Objective





- Vital Signs


Vital signs: 


 Vital Signs











Temp  97.7 F   01/21/18 15:00


 


Pulse  86   01/21/18 20:03


 


Resp  18   01/21/18 16:00


 


BP  145/75   01/21/18 15:00


 


Pulse Ox  91 L  01/21/18 15:20








 Intake & Output











 01/21/18 01/21/18 01/22/18





 06:59 18:59 06:59


 


Intake Total  400 320


 


Output Total 400  


 


Balance -400 400 320


 


Intake:   


 


  Oral  400 320


 


Output:   


 


  Urine 400  


 


Other:   


 


  # Voids 2 2 2














- Exam





PHYSICAL EXAMINATION: 


Patient is lying in the bed comfortably, no acute distress, awake alert and 

oriented.. 


HEENT: Normocephalic. Neck is supple. Pupils reactive. Nostrils clear. Oral 

cavity is moist. Ears reveal no drainage. 


Neck reveals no JVD, carotid bruits, or thyromegaly. 


CHEST EXAMINATION: Trachea is central. Symmetrical expansion. Bilateral diffuse 

wheezing. 


CARDIAC: Normal S1, S2 with no gallops. No murmurs 


ABDOMEN: Soft. Bowel sounds normal. No organomegaly. No abdominal bruits. 


Extremities: reveal no edema.  No clubbing or cyanosis


Neurologically awake, alert, oriented x3 with well-coordinated movements.  No 

focal deficits noted


Skin: No rash or skin lesions. 


Psychiatric: Coperative.  Nonsuicidal


Musculoskeletal: No joint swelling or deformity.  Normal range of motion.








- Labs


CBC & Chem 7: 


 01/20/18 07:50





 01/20/18 07:50


Labs: 


 Abnormal Lab Results - Last 24 Hours (Table)











  01/21/18 01/21/18 01/21/18 Range/Units





  12:11 17:20 20:15 


 


POC Glucose (mg/dL)  135 H  121 H  174 H  (75-99)  mg/dL








 Microbiology - Last 24 Hours (Table)











 01/15/18 14:30 Blood Culture - Final





 Blood    No Growth after 144 hours














Assessment and Plan


Assessment: 





Acute COPD exacerbation due totracheobronchitis status post bronchoscopyon 

January 18


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


Acute kidney injury due to Bactrim and ACEI  Improved


Hyperkalemia secondary to acute kidney injury


istory of smoking


Hypertension


hyperlipidemia


bladder cancer


ormocytic anemia


History of DVT





Patient be continued on steroids and breathing treatments and follow 

closely.continue with antibiotics in the form of Ceftin. BAL fluid cultures 

have been negative so far. pulmonary and nephrology is following.


Patient is improving slowly  Further recommendations based on the clinical 

course.

## 2018-02-09 ENCOUNTER — HOSPITAL ENCOUNTER (INPATIENT)
Dept: HOSPITAL 47 - EC | Age: 83
LOS: 9 days | Discharge: HOME HEALTH | DRG: 191 | End: 2018-02-18
Attending: FAMILY MEDICINE | Admitting: FAMILY MEDICINE
Payer: MEDICARE

## 2018-02-09 VITALS — BODY MASS INDEX: 18.7 KG/M2

## 2018-02-09 DIAGNOSIS — Z86.718: ICD-10-CM

## 2018-02-09 DIAGNOSIS — E78.5: ICD-10-CM

## 2018-02-09 DIAGNOSIS — I10: ICD-10-CM

## 2018-02-09 DIAGNOSIS — J44.1: Primary | ICD-10-CM

## 2018-02-09 DIAGNOSIS — J96.11: ICD-10-CM

## 2018-02-09 DIAGNOSIS — Z79.51: ICD-10-CM

## 2018-02-09 DIAGNOSIS — H91.90: ICD-10-CM

## 2018-02-09 DIAGNOSIS — Z85.51: ICD-10-CM

## 2018-02-09 DIAGNOSIS — Z87.01: ICD-10-CM

## 2018-02-09 DIAGNOSIS — Z79.899: ICD-10-CM

## 2018-02-09 DIAGNOSIS — Z87.891: ICD-10-CM

## 2018-02-09 DIAGNOSIS — Z80.9: ICD-10-CM

## 2018-02-09 DIAGNOSIS — Z79.52: ICD-10-CM

## 2018-02-09 DIAGNOSIS — Z91.19: ICD-10-CM

## 2018-02-09 DIAGNOSIS — Z99.81: ICD-10-CM

## 2018-02-09 LAB
ALBUMIN SERPL-MCNC: 3.6 G/DL (ref 3.5–5)
ALP SERPL-CCNC: 69 U/L (ref 38–126)
ALT SERPL-CCNC: 33 U/L (ref 21–72)
ANION GAP SERPL CALC-SCNC: 7 MMOL/L
APTT BLD: 21.1 SEC (ref 22–30)
AST SERPL-CCNC: 21 U/L (ref 17–59)
BASOPHILS # BLD AUTO: 0 K/UL (ref 0–0.2)
BASOPHILS NFR BLD AUTO: 0 %
BUN SERPL-SCNC: 50 MG/DL (ref 9–20)
CALCIUM SPEC-MCNC: 9.3 MG/DL (ref 8.4–10.2)
CHLORIDE SERPL-SCNC: 105 MMOL/L (ref 98–107)
CK SERPL-CCNC: 56 U/L (ref 55–170)
CO2 SERPL-SCNC: 30 MMOL/L (ref 22–30)
EOSINOPHIL # BLD AUTO: 0 K/UL (ref 0–0.7)
EOSINOPHIL NFR BLD AUTO: 0 %
ERYTHROCYTE [DISTWIDTH] IN BLOOD BY AUTOMATED COUNT: 3.86 M/UL (ref 4.3–5.9)
ERYTHROCYTE [DISTWIDTH] IN BLOOD: 16 % (ref 11.5–15.5)
GLUCOSE SERPL-MCNC: 108 MG/DL (ref 74–99)
HCT VFR BLD AUTO: 34.7 % (ref 39–53)
HGB BLD-MCNC: 10.4 GM/DL (ref 13–17.5)
INR PPP: 1 (ref ?–1.2)
LYMPHOCYTES # SPEC AUTO: 0.4 K/UL (ref 1–4.8)
LYMPHOCYTES NFR SPEC AUTO: 4 %
MCH RBC QN AUTO: 27 PG (ref 25–35)
MCHC RBC AUTO-ENTMCNC: 30 G/DL (ref 31–37)
MCV RBC AUTO: 90 FL (ref 80–100)
MONOCYTES # BLD AUTO: 0.5 K/UL (ref 0–1)
MONOCYTES NFR BLD AUTO: 6 %
NEUTROPHILS # BLD AUTO: 8 K/UL (ref 1.3–7.7)
NEUTROPHILS NFR BLD AUTO: 89 %
PLATELET # BLD AUTO: 380 K/UL (ref 150–450)
POTASSIUM SERPL-SCNC: 5.7 MMOL/L (ref 3.5–5.1)
PROT SERPL-MCNC: 6.5 G/DL (ref 6.3–8.2)
PT BLD: 9.6 SEC (ref 9–12)
SODIUM SERPL-SCNC: 142 MMOL/L (ref 137–145)
TROPONIN I SERPL-MCNC: 0.01 NG/ML (ref 0–0.03)
WBC # BLD AUTO: 9 K/UL (ref 3.8–10.6)

## 2018-02-09 PROCEDURE — 83036 HEMOGLOBIN GLYCOSYLATED A1C: CPT

## 2018-02-09 PROCEDURE — 99285 EMERGENCY DEPT VISIT HI MDM: CPT

## 2018-02-09 PROCEDURE — 85025 COMPLETE CBC W/AUTO DIFF WBC: CPT

## 2018-02-09 PROCEDURE — 85610 PROTHROMBIN TIME: CPT

## 2018-02-09 PROCEDURE — 94640 AIRWAY INHALATION TREATMENT: CPT

## 2018-02-09 PROCEDURE — 87502 INFLUENZA DNA AMP PROBE: CPT

## 2018-02-09 PROCEDURE — 96361 HYDRATE IV INFUSION ADD-ON: CPT

## 2018-02-09 PROCEDURE — 80048 BASIC METABOLIC PNL TOTAL CA: CPT

## 2018-02-09 PROCEDURE — 82553 CREATINE MB FRACTION: CPT

## 2018-02-09 PROCEDURE — 87186 SC STD MICRODIL/AGAR DIL: CPT

## 2018-02-09 PROCEDURE — 87070 CULTURE OTHR SPECIMN AEROBIC: CPT

## 2018-02-09 PROCEDURE — 87077 CULTURE AEROBIC IDENTIFY: CPT

## 2018-02-09 PROCEDURE — 82550 ASSAY OF CK (CPK): CPT

## 2018-02-09 PROCEDURE — 71046 X-RAY EXAM CHEST 2 VIEWS: CPT

## 2018-02-09 PROCEDURE — 85730 THROMBOPLASTIN TIME PARTIAL: CPT

## 2018-02-09 PROCEDURE — 36415 COLL VENOUS BLD VENIPUNCTURE: CPT

## 2018-02-09 PROCEDURE — 96375 TX/PRO/DX INJ NEW DRUG ADDON: CPT

## 2018-02-09 PROCEDURE — 87205 SMEAR GRAM STAIN: CPT

## 2018-02-09 PROCEDURE — 84484 ASSAY OF TROPONIN QUANT: CPT

## 2018-02-09 PROCEDURE — 94760 N-INVAS EAR/PLS OXIMETRY 1: CPT

## 2018-02-09 PROCEDURE — 93005 ELECTROCARDIOGRAM TRACING: CPT

## 2018-02-09 PROCEDURE — 80053 COMPREHEN METABOLIC PANEL: CPT

## 2018-02-09 PROCEDURE — 96374 THER/PROPH/DIAG INJ IV PUSH: CPT

## 2018-02-09 RX ADMIN — TAMSULOSIN HYDROCHLORIDE SCH MG: 0.4 CAPSULE ORAL at 21:18

## 2018-02-09 RX ADMIN — METHYLPREDNISOLONE SODIUM SUCCINATE SCH MG: 125 INJECTION, POWDER, FOR SOLUTION INTRAMUSCULAR; INTRAVENOUS at 23:14

## 2018-02-09 NOTE — XR
EXAMINATION TYPE: XR chest 2V

 

DATE OF EXAM: 2/9/2018

 

COMPARISON: CT chest 1/11/2016, chest x-ray 11/30/2017, 1/15/2018, 1/19/2018

 

INDICATION: Difficulty breathing

 

TECHNIQUE:  Frontal and lateral views of the chest are obtained.

 

FINDINGS:  

The heart size is normal.  

The pulmonary vasculature is normal.

The lateral projection there is a small nodular density in the retrosternal space is not clearly iden
tified in the frontal projection. Follow-up is recommended. This is not clearly identified on the CT 
of 1/11/2016.  Nipple shadows are present bilaterally. Hyperinflation is present compatible with COPD
. There is hyperinflation flattened diaphragms. Increased retrosternal airspace is present.

 

IMPRESSION:  

1. COPD.

2. Small nodularity may be in the retrosternal space on the lateral projection. Consider follow-up CT
.

## 2018-02-09 NOTE — ED
SOB HPI





- General


Chief Complaint: Shortness of Breath


Stated Complaint: Difficulty Breathing/Hypotension


Time Seen by Provider: 18 16:51


Source: patient, family


Mode of arrival: wheelchair


Limitations: no limitations





- History of Present Illness


Initial Comments: 


82 years old male has a history of severe COPD he been short winded for the 

last couple days he was recently hospitalized for the COPD exacerbation and his 

shortness of breath has gotten worse over the last 24 hours he denies any chest 

pain he denies any chest pain with deep breaths denies any fever has a large 

purulent sputum has been coughing denies any fever has some chills.  His wife 

said he had episode of low blood pressure at home his blood pressure was 89 

systolic couple of hours prior to coming to the ER though it bounced back on 

arrival to the review of system is unremarkable otherwise no headaches no neck 

pain he does have a shortness of breath no chest pain no abdominal pain no 

frequency urgency dysuria no symptoms of TIA or CVA








- Related Data


 Home Medications











 Medication  Instructions  Recorded  Confirmed


 


Albuterol Sulfate [Proair Hfa] 2 puff INHALATION RT-Q6H PRN 16


 


Simvastatin [Zocor] 40 mg PO DAILY 16


 


Ipratropium-Albuterol Nebulize 3 ml INHALATION RT-QID 16





[Duoneb 0.5 mg-3 mg/3 ml Soln]   


 


Fluticasone/Vilanterol [Breo 1 puff INHALATION RT-DAILY 17





Ellipta 200-25 Mcg INH]   


 


predniSONE 5 mg PO DAILY 17


 


Tamsulosin HCl [Flomax] 0.4 mg PO BID 17


 


Furosemide [Lasix] 20 mg PO DAILY 18


 


Lisinopril-Hctz 20-12.5 mg 1 tab PO DAILY 18





[Zestoretic 20-12.5]   


 


Potassium Chloride ER [K-Dur 20] 20 meq PO DAILY 18


 


Umeclidinium Bromide [Incruse 1 puff INHALATION RT-DAILY 18





Ellipta]   











 Allergies











Allergy/AdvReac Type Severity Reaction Status Date / Time


 


No Known Allergies Allergy   Verified 18 17:42














Review of Systems


ROS Statement: 


Those systems with pertinent positive or pertinent negative responses have been 

documented in the HPI.





ROS Other: All systems not noted in ROS Statement are negative.





Past Medical History


Past Medical History: Cancer, Chest Pain / Angina, COPD, Deep Vein Thrombosis (

DVT), Hyperlipidemia, Hypertension, Osteoarthritis (OA), Pneumonia


Additional Past Medical History / Comment(s): unstable angina pectoris. COPD, 

purulent tracheobronchitis, chronic bronchitis.   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 

OA, back pain, pneumonia , home O2 at 2L/NC.prn, bladder cancer


History of Any Multi-Drug Resistant Organisms: None Reported


Past Surgical History: Bladder Surgery, Tonsillectomy


Additional Past Surgical History / Comment(s): vasectomy


Past Anesthesia/Blood Transfusion Reactions: No Reported Reaction


Past Psychological History: No Psychological Hx Reported


Smoking Status: Former smoker


Past Alcohol Use History: None Reported


Past Drug Use History: None Reported





- Past Family History


  ** Father


Family Medical History: Cancer


Additional Family Medical History / Comment(s): cancer. client reported father 

had scoliosis.





  ** Mother


Additional Family Medical History / Comment(s): mother . unknown history





General Exam





- General Exam Comments


Initial Comments: 


General:  The patient is awake and alert, in no distress, and does not appear 

acutely ill. 


Skin:  Skin is warm and dry and no rashes or lesions are noted. 


Eye:  Pupils are equal, round and reactive to light, extra-ocular movements are 

intact; there is normal conjunctiva bilaterally.  


Ears, nose, mouth and throat:  There are moist mucous membranes and no oral 

lesions. 


Neck:  The neck is supple, there is no tenderness  or JVD.  


Cardiovascular:  There is a regular rate and rhythm. No murmur, rub or gallop 

is appreciated.


Respiratory: To auscultation bilateral, exam is consistent with a severe COPD


Gastrointestinal:  Soft, non-distended, non-tender abdomen without masses or 

organomegaly noted. There is no rebound or guarding present. Bowel sounds are 

unremarkable. 


Back:  There is no tenderness to palpation in the midline. There is no obvious 

deformity.


Musculoskeletal:  Normal ROM, no tenderness, There is no pedal edema. There is 

no calf tenderness or swelling. No cords were appreciated.  


Neurological:  CN II-XII intact, Cranial nerves III through XII are intact. 

There are no obvious motor or sensory deficits. Coordination appears grossly 

intact. Speech is normal.


Psychiatric:  Cooperative, appropriate mood & affect, normal judgment.  








Limitations: no limitations





Course


 Vital Signs











  18





  16:42 17:28 17:29


 


Temperature 97.9 F  


 


Pulse Rate 98 88 83


 


Respiratory 24  16





Rate   


 


Blood Pressure 141/63  115/56


 


O2 Sat by Pulse 98  99





Oximetry   














  18





  17:48 18:06


 


Temperature  


 


Pulse Rate 103 H 97


 


Respiratory  18





Rate  


 


Blood Pressure  119/60


 


O2 Sat by Pulse  98





Oximetry  








EKG is a sinus rhythm with a premature atrial complexes ventricular rate is 88 

UT interval is 152 QRS duration is 94 QT/QTc is 352/425 review of this EKG 

reveals T-wave inversion in aVL no ST elevation or ST depression noticed that 

his platelets





Patient is reassessed her labs are reviewed at 1815, his CBC is normal 

potassium is slightly elevated 5.7) is 1.3 chest x-rays unremarkable he did 

show some other is a question of a nodule on a lateral view only.  His vitals 

are good but he doesn't feel that he has side.  His baseline he had taken he is 

requesting admission.  His EKG was unremarkable his troponin is unremarkable 

seems like it is acute exacerbation of COPD and will go ahead and admit him 

under Dr. Nam Whatley's service and he will be seeing Dr. Leonardo who has been 

look in after his pulmonary problems





Medical Decision Making





- Lab Data


Result diagrams: 


 18 17:23





 18 17:23


 Lab Results











  18 Range/Units





  17:23 17:23 17:23 


 


WBC   9.0   (3.8-10.6)  k/uL


 


RBC   3.86 L   (4.30-5.90)  m/uL


 


Hgb   10.4 L   (13.0-17.5)  gm/dL


 


Hct   34.7 L   (39.0-53.0)  %


 


MCV   90.0   (80.0-100.0)  fL


 


MCH   27.0   (25.0-35.0)  pg


 


MCHC   30.0 L   (31.0-37.0)  g/dL


 


RDW   16.0 H   (11.5-15.5)  %


 


Plt Count   380   (150-450)  k/uL


 


Neutrophils %   89   %


 


Lymphocytes %   4   %


 


Monocytes %   6   %


 


Eosinophils %   0   %


 


Basophils %   0   %


 


Neutrophils #   8.0 H   (1.3-7.7)  k/uL


 


Lymphocytes #   0.4 L   (1.0-4.8)  k/uL


 


Monocytes #   0.5   (0-1.0)  k/uL


 


Eosinophils #   0.0   (0-0.7)  k/uL


 


Basophils #   0.0   (0-0.2)  k/uL


 


Hypochromasia   Slight   


 


PT     (9.0-12.0)  sec


 


INR     (<1.2)  


 


APTT     (22.0-30.0)  sec


 


Sodium    142  (137-145)  mmol/L


 


Potassium    5.7 H  (3.5-5.1)  mmol/L


 


Chloride    105  ()  mmol/L


 


Carbon Dioxide    30  (22-30)  mmol/L


 


Anion Gap    7  mmol/L


 


BUN    50 H  (9-20)  mg/dL


 


Creatinine    1.29 H  (0.66-1.25)  mg/dL


 


Est GFR (MDRD) Af Amer    >60  (>60 ml/min/1.73 sqM)  


 


Est GFR (MDRD) Non-Af    53  (>60 ml/min/1.73 sqM)  


 


Glucose    108 H  (74-99)  mg/dL


 


Calcium    9.3  (8.4-10.2)  mg/dL


 


Total Bilirubin    0.3  (0.2-1.3)  mg/dL


 


AST    21  (17-59)  U/L


 


ALT    33  (21-72)  U/L


 


Alkaline Phosphatase    69  ()  U/L


 


Total Creatine Kinase  56    ()  U/L


 


CK-MB (CK-2)  5.3 H*    (0.0-2.4)  ng/mL


 


CK-MB (CK-2) Rel Index  9.5    


 


Troponin I  0.013    (0.000-0.034)  ng/mL


 


Total Protein    6.5  (6.3-8.2)  g/dL


 


Albumin    3.6  (3.5-5.0)  g/dL














  18 Range/Units





  17:23 


 


WBC   (3.8-10.6)  k/uL


 


RBC   (4.30-5.90)  m/uL


 


Hgb   (13.0-17.5)  gm/dL


 


Hct   (39.0-53.0)  %


 


MCV   (80.0-100.0)  fL


 


MCH   (25.0-35.0)  pg


 


MCHC   (31.0-37.0)  g/dL


 


RDW   (11.5-15.5)  %


 


Plt Count   (150-450)  k/uL


 


Neutrophils %   %


 


Lymphocytes %   %


 


Monocytes %   %


 


Eosinophils %   %


 


Basophils %   %


 


Neutrophils #   (1.3-7.7)  k/uL


 


Lymphocytes #   (1.0-4.8)  k/uL


 


Monocytes #   (0-1.0)  k/uL


 


Eosinophils #   (0-0.7)  k/uL


 


Basophils #   (0-0.2)  k/uL


 


Hypochromasia   


 


PT  9.6  (9.0-12.0)  sec


 


INR  1.0  (<1.2)  


 


APTT  21.1 L  (22.0-30.0)  sec


 


Sodium   (137-145)  mmol/L


 


Potassium   (3.5-5.1)  mmol/L


 


Chloride   ()  mmol/L


 


Carbon Dioxide   (22-30)  mmol/L


 


Anion Gap   mmol/L


 


BUN   (9-20)  mg/dL


 


Creatinine   (0.66-1.25)  mg/dL


 


Est GFR (MDRD) Af Amer   (>60 ml/min/1.73 sqM)  


 


Est GFR (MDRD) Non-Af   (>60 ml/min/1.73 sqM)  


 


Glucose   (74-99)  mg/dL


 


Calcium   (8.4-10.2)  mg/dL


 


Total Bilirubin   (0.2-1.3)  mg/dL


 


AST   (17-59)  U/L


 


ALT   (21-72)  U/L


 


Alkaline Phosphatase   ()  U/L


 


Total Creatine Kinase   ()  U/L


 


CK-MB (CK-2)   (0.0-2.4)  ng/mL


 


CK-MB (CK-2) Rel Index   


 


Troponin I   (0.000-0.034)  ng/mL


 


Total Protein   (6.3-8.2)  g/dL


 


Albumin   (3.5-5.0)  g/dL














Critical Care Time


Total Critical Care Time: 30


Critical Care Time: 


Arrival respiratory rate was high was 24 with the neb treatments and IV 

steroids is no his respiratory rate is down to 18 his O2 sat is good chest x-

ray was done to rule out any pneumonia and the troponin EKG was done to rule 

out any cardiac event or ischemic heart disease although is was reviewed with 

the patient but clinically he does not feel well that's over the admit him for 

the more aggressive neb treatments and IV steroids be admitted to lamine Whatley M.D. and Dr. Terrazas being the consult








Disposition


Clinical Impression: 


 Dyspnea





Disposition: ADMITTED AS IP TO THIS HOSP


Condition: Good


Referrals: 


Nik Whatley MD [Primary Care Provider] - 1-2 days

## 2018-02-10 LAB
ANION GAP SERPL CALC-SCNC: 8 MMOL/L
BASOPHILS # BLD AUTO: 0 K/UL (ref 0–0.2)
BASOPHILS NFR BLD AUTO: 0 %
BUN SERPL-SCNC: 43 MG/DL (ref 9–20)
CALCIUM SPEC-MCNC: 8.9 MG/DL (ref 8.4–10.2)
CHLORIDE SERPL-SCNC: 105 MMOL/L (ref 98–107)
CO2 SERPL-SCNC: 31 MMOL/L (ref 22–30)
EOSINOPHIL # BLD AUTO: 0 K/UL (ref 0–0.7)
EOSINOPHIL NFR BLD AUTO: 0 %
ERYTHROCYTE [DISTWIDTH] IN BLOOD BY AUTOMATED COUNT: 3.53 M/UL (ref 4.3–5.9)
ERYTHROCYTE [DISTWIDTH] IN BLOOD: 16 % (ref 11.5–15.5)
GLUCOSE BLD-MCNC: 117 MG/DL (ref 75–99)
GLUCOSE BLD-MCNC: 129 MG/DL (ref 75–99)
GLUCOSE BLD-MCNC: 179 MG/DL (ref 75–99)
GLUCOSE SERPL-MCNC: 120 MG/DL (ref 74–99)
HBA1C MFR BLD: 6.2 % (ref 4–6)
HCT VFR BLD AUTO: 32.6 % (ref 39–53)
HGB BLD-MCNC: 9.9 GM/DL (ref 13–17.5)
LYMPHOCYTES # SPEC AUTO: 0.2 K/UL (ref 1–4.8)
LYMPHOCYTES NFR SPEC AUTO: 2 %
MCH RBC QN AUTO: 28.1 PG (ref 25–35)
MCHC RBC AUTO-ENTMCNC: 30.4 G/DL (ref 31–37)
MCV RBC AUTO: 92.4 FL (ref 80–100)
MONOCYTES # BLD AUTO: 0.3 K/UL (ref 0–1)
MONOCYTES NFR BLD AUTO: 4 %
NEUTROPHILS # BLD AUTO: 7.3 K/UL (ref 1.3–7.7)
NEUTROPHILS NFR BLD AUTO: 94 %
PLATELET # BLD AUTO: 365 K/UL (ref 150–450)
POTASSIUM SERPL-SCNC: 5.5 MMOL/L (ref 3.5–5.1)
SODIUM SERPL-SCNC: 144 MMOL/L (ref 137–145)
WBC # BLD AUTO: 7.8 K/UL (ref 3.8–10.6)

## 2018-02-10 RX ADMIN — INSULIN ASPART SCH: 100 INJECTION, SOLUTION INTRAVENOUS; SUBCUTANEOUS at 18:10

## 2018-02-10 RX ADMIN — INSULIN ASPART SCH: 100 INJECTION, SOLUTION INTRAVENOUS; SUBCUTANEOUS at 13:58

## 2018-02-10 RX ADMIN — FUROSEMIDE SCH MG: 20 TABLET ORAL at 07:51

## 2018-02-10 RX ADMIN — FORMOTEROL FUMARATE DIHYDRATE SCH MCG: 20 SOLUTION RESPIRATORY (INHALATION) at 19:56

## 2018-02-10 RX ADMIN — IPRATROPIUM BROMIDE AND ALBUTEROL SULFATE SCH ML: .5; 3 SOLUTION RESPIRATORY (INHALATION) at 08:35

## 2018-02-10 RX ADMIN — BUDESONIDE SCH MG: 1 SUSPENSION RESPIRATORY (INHALATION) at 19:55

## 2018-02-10 RX ADMIN — METHYLPREDNISOLONE SODIUM SUCCINATE SCH MG: 125 INJECTION, POWDER, FOR SOLUTION INTRAMUSCULAR; INTRAVENOUS at 18:16

## 2018-02-10 RX ADMIN — METHYLPREDNISOLONE SODIUM SUCCINATE SCH MG: 125 INJECTION, POWDER, FOR SOLUTION INTRAMUSCULAR; INTRAVENOUS at 06:17

## 2018-02-10 RX ADMIN — IPRATROPIUM BROMIDE AND ALBUTEROL SULFATE SCH ML: .5; 3 SOLUTION RESPIRATORY (INHALATION) at 12:23

## 2018-02-10 RX ADMIN — IPRATROPIUM BROMIDE AND ALBUTEROL SULFATE SCH ML: .5; 3 SOLUTION RESPIRATORY (INHALATION) at 19:55

## 2018-02-10 RX ADMIN — LISINOPRIL AND HYDROCHLOROTHIAZIDE SCH EACH: 12.5; 2 TABLET ORAL at 07:51

## 2018-02-10 RX ADMIN — TAMSULOSIN HYDROCHLORIDE SCH MG: 0.4 CAPSULE ORAL at 07:51

## 2018-02-10 RX ADMIN — INSULIN ASPART SCH UNIT: 100 INJECTION, SOLUTION INTRAVENOUS; SUBCUTANEOUS at 22:23

## 2018-02-10 RX ADMIN — ATORVASTATIN CALCIUM SCH MG: 20 TABLET, FILM COATED ORAL at 07:51

## 2018-02-10 RX ADMIN — IPRATROPIUM BROMIDE AND ALBUTEROL SULFATE SCH ML: .5; 3 SOLUTION RESPIRATORY (INHALATION) at 16:27

## 2018-02-10 RX ADMIN — METHYLPREDNISOLONE SODIUM SUCCINATE SCH MG: 125 INJECTION, POWDER, FOR SOLUTION INTRAMUSCULAR; INTRAVENOUS at 12:16

## 2018-02-10 RX ADMIN — LEVOFLOXACIN SCH MG: 500 TABLET, FILM COATED ORAL at 07:51

## 2018-02-10 RX ADMIN — TAMSULOSIN HYDROCHLORIDE SCH MG: 0.4 CAPSULE ORAL at 22:23

## 2018-02-10 RX ADMIN — HEPARIN SODIUM SCH UNIT: 5000 INJECTION, SOLUTION INTRAVENOUS; SUBCUTANEOUS at 22:23

## 2018-02-10 NOTE — HP
HISTORY AND PHYSICAL



I am covering for Dr. Whatley.



CHIEF COMPLAINT:

Shortness of breath.



HISTORY OF PRESENT ILLNESS:

This 82-year-old gentleman with a past medical history of multiple medical 
problems,

including history of COPD, history of DVT, hypertension, hyperlipidemia, 
history of

DJD, history of pneumonia, in the outpatient setting was followed by Dr. Whatley.  The

patient complained of shortness of breath as well hypertension.  The patient 
had a

cough and sputum for the last couple of days.  The patient was recently 
hospitalized

for COPD acute exacerbation and shortness of breath had gotten worse for the 
last 24

hours prior to admission.  The patient admitted for evaluation and treatment.  
A chest

x-ray done in the ER showed a small nodularity in  space as well as COPD.  There

is no history of any fever, rigors.  No headache, loss of consciousness, 
seizures.



PAST MEDICAL HISTORY:

History of COPD, history of chest pain, history of DVT, hypertension, 
hyperlipidemia,

DJD, history of pneumonia, unstable angina.



MEDICATIONS PRIOR TO ADMISSION:

Include.

1. Ellipta 1 puff daily.

2. ProAir HFA 2 puffs every 6 hours p.r.n.

3. Flomax 0.5 b.i.d.

4. Zocor 40 mg daily.

5. K-Dur 20 mg p.o. daily.

7. DuoNeb q.i.d.

8. Lasix 20 mg p.o. daily.

9. Breo Ellipta 1 puff daily.

10.Prednisone 5 mg p.o. daily.



ALLERGIES:

None.



FAMILY HISTORY:

History of cancer in the family.  Father had scoliosis.



SOCIAL HISTORY:

Previous history of smoking.  No history of current smoking or alcohol intake.



REVIEW OF SYSTEMS:

ENT:  Diminished hearing, diminished vision.

CARDIOVASCULAR:  As mentioned earlier.

RESPIRATORY:  As mentioned earlier.

GI:  No nausea or vomiting.

:  No dysuria.

NERVOUS:  No numbness or weakness.

ALLERGY/IMMUNOLOGY:  No asthma or hay fever.

MUSCULOSKELETAL:  As mentioned earlier.

HEMATOLOGY/ONCOLOGY:  No history of anemia.

ENDOCRINE:  No history of diabetes, hypothyroidism.

CONSTITUTIONAL:  As mentioned earlier.

DERMATOLOGY:  Negative.

RHEUMATOLOGY:  Negative.

PSYCHIATRY:  As mentioned earlier.



PHYSICAL EXAMINATION:

Alert and oriented x3.  Pulse 89, blood pressure 130/61, respirations 18, 
temperature

97.4, pulse ox 100% on 2L.

HEENT:  Conjunctivae normal.  Oral mucosa moist.

NECK:  No jugular venous distention.  No carotid bruits.  No lymph node 
enlargement.

CARDIOVASCULAR:  S1, S2 muffled.

RESPIRATORY:  Breath sounds diminished in the bases.  A few scattered rhonchi 
and

crackles.

ABDOMEN:  Soft, nontender.  No mass palpable.

LEGS:  No edema.  No swelling.

NERVOUS SYSTEM:  Higher functions as mentioned earlier.  Moves all 4 limbs.  No 
focal

motor or sensory deficits.

LYMPHATIC:  No lymphadenopathy in neck or axillae.

SKIN:  No ulcer, rash or bleeding.



LABS:

WBC 7.8, hemoglobin 9.9.  Sodium 142, potassium 5.5, glucose 120.



ASSESSMENT:

1. Chronic obstructive pulmonary disease acute exacerbation with acute purulent

    tracheobronchitis.

small nodularity in  retro sternal space in the cxr

2. History of deep venous thrombosis.

3. Hypertension.

4. History of bladder cancer.

5. Hyperlipidemia.

6. History of degenerative joint disease.

7. Chronic hypoxic respiratory failure on oxygen 2L nasal cannula.

8. History of bladder surgery.



RECOMMENDATIONS AND DISCUSSION:

I recommend to continue current medical management and symptomatic treatment.

Otherwise, I would recommend IV steroids, intensive bronchodilators and empiric

antibiotics.  Consult Dr. Terrazas.  Otherwise, we will continue to monitor.  See 
orders

for further details.  DVT prophylaxis, incentive spirometry.  Prognosis guarded 
because

of multiple complex medical issues.  Further recommendations to follow.  
Discussed with

the patient, who understands.





MMODL / IJN: [id number] / Job#: [id number]

MINERVA

## 2018-02-10 NOTE — P.CNPUL
History of Present Illness


Consult date: 02/10/18


Reason for consult: dyspnea, cough, COPD, hypoxemia


Chief complaint: Shortness of breath


History of present illness: 





Consult dated 02/10/2018





This is an 82-year-old male with a history of very severe end-stage COPD.  The 

patient came to the emergency room with complaints of a couple days with 

increasing shortness of breath chest congestion coughing wheezing and phlegm 

production.  The patient was recently in hot in the hospital for similar 

episode maybe 2-1/2 weeks ago.  I've been seeing him in the office for a number 

of years.  His wife and daughter and granddaughter in the room with him.  The 

patient is on oxygen therapy.  I saw him in the office between this admission 

and last.  The patient's overall situation has declined any getting worse and 

worse with each week that passes.  I did have a long talk with the wife today 

about CODE STATUS and about not going on life support.  She understands.  She 

will discuss that with him.  The patient does have a history of angina pectoris 

deep venous thrombosis hyperlipidemia hypertension osteoarthritis a previous 

episode of pneumonia and history of bladder cancer.  He does use home O2 2 L 

when necessary but he should be using it more frequently.  Heavy smoker in the 

past.  Does not smoke currently.





Review of Systems





A 12 point review of systems is positive for shortness of breath chest 

tightness wheezing cough and phlegm production.





Past Medical History


Past Medical History: Cancer, Chest Pain / Angina, COPD, Deep Vein Thrombosis (

DVT), Hyperlipidemia, Hypertension, Osteoarthritis (OA), Pneumonia


Additional Past Medical History / Comment(s): unstable angina pectoris. COPD, 

purulent tracheobronchitis, chronic bronchitis.   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 

OA, back pain, pneumonia , home O2 at 2L/NC.prn, bladder cancer


History of Any Multi-Drug Resistant Organisms: None Reported


Past Surgical History: Bladder Surgery, Tonsillectomy


Additional Past Surgical History / Comment(s): vasectomy


Past Anesthesia/Blood Transfusion Reactions: No Reported Reaction


Past Psychological History: No Psychological Hx Reported


Additional Psychological History / Comment(s): Pt resides with his spouse in a 

single level home that has 4 porchs steps..pt drives.  He has a nebulizer, 02 

uses prn.  no home care services.


Smoking Status: Former smoker


Past Alcohol Use History: None Reported


Additional Past Alcohol Use History / Comment(s): Pt started smoking in  

and quit in .  He at one time was smoking 2-3 ppd.


Past Drug Use History: None Reported





- Past Family History


  ** Father


Family Medical History: Cancer


Additional Family Medical History / Comment(s): cancer. client reported father 

had scoliosis.





  ** Mother


Additional Family Medical History / Comment(s): mother . unknown history





Medications and Allergies


 Home Medications











 Medication  Instructions  Recorded  Confirmed  Type


 


Albuterol Sulfate [Proair Hfa] 2 puff INHALATION RT-Q6H PRN 16 

History


 


Simvastatin [Zocor] 40 mg PO DAILY 16 History


 


Ipratropium-Albuterol Nebulize 3 ml INHALATION RT-QID 16 History





[Duoneb 0.5 mg-3 mg/3 ml Soln]    


 


Fluticasone/Vilanterol [Breo 1 puff INHALATION RT-DAILY 17 

History





Ellipta 200-25 Mcg INH]    


 


predniSONE 5 mg PO DAILY 17 History


 


Tamsulosin HCl [Flomax] 0.4 mg PO BID 17 History


 


Furosemide [Lasix] 20 mg PO DAILY 18 History


 


Lisinopril-Hctz 20-12.5 mg 1 tab PO DAILY 18 History





[Zestoretic 20-12.5]    


 


Potassium Chloride ER [K-Dur 20] 20 meq PO DAILY 18 History


 


Umeclidinium Bromide [Incruse 1 puff INHALATION RT-DAILY 18 

History





Ellipta]    











 Allergies











Allergy/AdvReac Type Severity Reaction Status Date / Time


 


No Known Allergies Allergy   Verified 18 17:42














Physical Exam


Osteopathic Statement: *.  No significant issues noted on an osteopathic 

structural exam other than those noted in the History and Physical/Consult.


Vitals: 


 Vital Signs











  Temp Pulse Pulse Resp BP BP Pulse Ox


 


 02/10/18 12:32   102 H     


 


 02/10/18 12:25   96     


 


 02/10/18 08:51   100     


 


 02/10/18 08:39   104 H      95


 


 02/10/18 08:00    104 H  18   


 


 02/10/18 07:00  97.7 F   104 H  18   165/69  91 L


 


 18 19:54   95   20  133/58   98


 


 18 19:30  97.9 F   97  16   121/64 


 


 18 19:04  97.8 F  99   22  114/60   94 L


 


 18 18:06   97   18  119/60   98


 


 18 17:48   103 H     


 


 18 17:29   83   16  115/56   99


 


 18 17:28   88     


 


 18 16:42  97.9 F  98   24  141/63   98








 Intake and Output











 02/09/18 02/10/18 02/10/18





 22:59 06:59 14:59


 


Intake Total  550 360


 


Balance  550 360


 


Intake:   


 


  Intake, IV Titration  550 





  Amount   


 


    Sodium Chloride 0.9% 1,  550 





    000 ml @ 75 mls/hr IV .   





    [id number] Rx#:[id number]   


 


  Oral   360


 


Other:   


 


  # Voids  1 3


 


  Weight 60.9 kg  














No acute distress, oriented 3.  Nasal O2 in place.





HEENT examination is grossly unremarkable.  Mucous membranes are moist.  No 

oral lesions.





Neck supple.  Full range of motion.  No adenopathy thyromegaly or neck vein 

distention.





Cardiovascular examination reveals regular rhythm rate.  S1-S2 normal.  No S3 

or S4.  No discernible murmur noted.





Lungs reveal coarse bilateral breath sounds.  The patient has expiratory 

wheezes and rhonchi.  No crackles.  Breath sounds are diminished.  There is 

prolongation on forced maneuver.  The patient's upper airway sound like there 

is lots of mucus and secretions which seemed to slosh back-and-forth.





Abdomen soft bowel sounds are heard.  No masses or tenderness.





Extremities are intact.  No cyanosis clubbing or edema.





Skin is without rash or lesion.





Neurologic examination is brief but nonfocal.





Results





- Laboratory Findings


CBC and BMP: 


 02/10/18 11:20





 02/10/18 11:20


PT/INR, D-dimer











PT  9.6 sec (9.0-12.0)   18  17:23    


 


INR  1.0  (<1.2)   18  17:23    








Abnormal lab findings: 


 Abnormal Labs











  18





  17:23 17:23 17:23


 


RBC   3.86 L 


 


Hgb   10.4 L 


 


Hct   34.7 L 


 


MCHC   30.0 L 


 


RDW   16.0 H 


 


Neutrophils #   8.0 H 


 


Lymphocytes #   0.4 L 


 


APTT   


 


Potassium    5.7 H


 


Carbon Dioxide   


 


BUN    50 H


 


Creatinine    1.29 H


 


Glucose    108 H


 


POC Glucose (mg/dL)   


 


CK-MB (CK-2)  5.3 H*  














  02/09/18 02/10/18 02/10/18





  17:23 11:20 11:20


 


RBC   3.53 L 


 


Hgb   9.9 L 


 


Hct   32.6 L 


 


MCHC   30.4 L 


 


RDW   16.0 H 


 


Neutrophils #   


 


Lymphocytes #   0.2 L 


 


APTT  21.1 L  


 


Potassium    5.5 H


 


Carbon Dioxide    31 H


 


BUN    43 H


 


Creatinine   


 


Glucose    120 H


 


POC Glucose (mg/dL)   


 


CK-MB (CK-2)   














  02/10/18





  11:50


 


RBC 


 


Hgb 


 


Hct 


 


MCHC 


 


RDW 


 


Neutrophils # 


 


Lymphocytes # 


 


APTT 


 


Potassium 


 


Carbon Dioxide 


 


BUN 


 


Creatinine 


 


Glucose 


 


POC Glucose (mg/dL)  129 H


 


CK-MB (CK-2) 














- Diagnostic Findings


Chest x-ray: image reviewed (X-rays labs and medications are all reviewed.)





Assessment and Plan


Assessment: 





COPD exacerbation, complicated by purulent tracheobronchitis 





History of bladder cancer





Chronic hypoxemia





Angina pectoris





History of deep venous thrombosis





History of hyperlipidemia





History of hypertension





History of DJD





Previous episode of pneumonia





Chronic hypoxemia particularly at nighttime and with exertion


Plan: 





Plan dated 02/10/2018





The patient was interviewed and examined.  I know the patient wants to see him 

in the office for a long period of time.  I believe his family doctor is Dr. Whatley.  The patient's medications are reviewed.  X-rays and labs are reviewed.

  I did have a talk with the wife and the granddaughter about his declining 

situation.  The patient should be a no code and should not go on life support 

should come to that.  The wife will give it some thought.  The patient's lung 

disease is quite severe.  His overall situation last 6 months a significantly 

declined.  We'll continue to follow.  Prognosis is guarded.


Time with Patient: Greater than 30

## 2018-02-11 LAB
ANION GAP SERPL CALC-SCNC: 8 MMOL/L
BASOPHILS # BLD AUTO: 0 K/UL (ref 0–0.2)
BASOPHILS NFR BLD AUTO: 0 %
BUN SERPL-SCNC: 41 MG/DL (ref 9–20)
CALCIUM SPEC-MCNC: 9 MG/DL (ref 8.4–10.2)
CHLORIDE SERPL-SCNC: 103 MMOL/L (ref 98–107)
CO2 SERPL-SCNC: 30 MMOL/L (ref 22–30)
EOSINOPHIL # BLD AUTO: 0 K/UL (ref 0–0.7)
EOSINOPHIL NFR BLD AUTO: 0 %
ERYTHROCYTE [DISTWIDTH] IN BLOOD BY AUTOMATED COUNT: 3.56 M/UL (ref 4.3–5.9)
ERYTHROCYTE [DISTWIDTH] IN BLOOD: 15.9 % (ref 11.5–15.5)
GLUCOSE BLD-MCNC: 104 MG/DL (ref 75–99)
GLUCOSE BLD-MCNC: 114 MG/DL (ref 75–99)
GLUCOSE BLD-MCNC: 151 MG/DL (ref 75–99)
GLUCOSE BLD-MCNC: 198 MG/DL (ref 75–99)
GLUCOSE SERPL-MCNC: 106 MG/DL (ref 74–99)
HCT VFR BLD AUTO: 32.4 % (ref 39–53)
HGB BLD-MCNC: 9.8 GM/DL (ref 13–17.5)
LYMPHOCYTES # SPEC AUTO: 0.5 K/UL (ref 1–4.8)
LYMPHOCYTES NFR SPEC AUTO: 5 %
MCH RBC QN AUTO: 27.6 PG (ref 25–35)
MCHC RBC AUTO-ENTMCNC: 30.4 G/DL (ref 31–37)
MCV RBC AUTO: 90.8 FL (ref 80–100)
MONOCYTES # BLD AUTO: 0.5 K/UL (ref 0–1)
MONOCYTES NFR BLD AUTO: 6 %
NEUTROPHILS # BLD AUTO: 8.4 K/UL (ref 1.3–7.7)
NEUTROPHILS NFR BLD AUTO: 89 %
PLATELET # BLD AUTO: 384 K/UL (ref 150–450)
POTASSIUM SERPL-SCNC: 4.9 MMOL/L (ref 3.5–5.1)
SODIUM SERPL-SCNC: 141 MMOL/L (ref 137–145)
WBC # BLD AUTO: 9.4 K/UL (ref 3.8–10.6)

## 2018-02-11 RX ADMIN — METHYLPREDNISOLONE SODIUM SUCCINATE SCH MG: 125 INJECTION, POWDER, FOR SOLUTION INTRAMUSCULAR; INTRAVENOUS at 00:17

## 2018-02-11 RX ADMIN — LISINOPRIL AND HYDROCHLOROTHIAZIDE SCH EACH: 12.5; 2 TABLET ORAL at 07:29

## 2018-02-11 RX ADMIN — INSULIN ASPART SCH: 100 INJECTION, SOLUTION INTRAVENOUS; SUBCUTANEOUS at 07:30

## 2018-02-11 RX ADMIN — LEVOFLOXACIN SCH MG: 500 TABLET, FILM COATED ORAL at 07:29

## 2018-02-11 RX ADMIN — METHYLPREDNISOLONE SODIUM SUCCINATE SCH MG: 125 INJECTION, POWDER, FOR SOLUTION INTRAMUSCULAR; INTRAVENOUS at 17:51

## 2018-02-11 RX ADMIN — TAMSULOSIN HYDROCHLORIDE SCH MG: 0.4 CAPSULE ORAL at 22:10

## 2018-02-11 RX ADMIN — FORMOTEROL FUMARATE DIHYDRATE SCH MCG: 20 SOLUTION RESPIRATORY (INHALATION) at 21:13

## 2018-02-11 RX ADMIN — INSULIN ASPART SCH UNIT: 100 INJECTION, SOLUTION INTRAVENOUS; SUBCUTANEOUS at 22:10

## 2018-02-11 RX ADMIN — ATORVASTATIN CALCIUM SCH MG: 20 TABLET, FILM COATED ORAL at 07:29

## 2018-02-11 RX ADMIN — IPRATROPIUM BROMIDE AND ALBUTEROL SULFATE SCH ML: .5; 3 SOLUTION RESPIRATORY (INHALATION) at 17:02

## 2018-02-11 RX ADMIN — TAMSULOSIN HYDROCHLORIDE SCH MG: 0.4 CAPSULE ORAL at 07:29

## 2018-02-11 RX ADMIN — INSULIN ASPART SCH: 100 INJECTION, SOLUTION INTRAVENOUS; SUBCUTANEOUS at 11:40

## 2018-02-11 RX ADMIN — METHYLPREDNISOLONE SODIUM SUCCINATE SCH MG: 125 INJECTION, POWDER, FOR SOLUTION INTRAMUSCULAR; INTRAVENOUS at 06:04

## 2018-02-11 RX ADMIN — FUROSEMIDE SCH MG: 20 TABLET ORAL at 07:29

## 2018-02-11 RX ADMIN — HEPARIN SODIUM SCH UNIT: 5000 INJECTION, SOLUTION INTRAVENOUS; SUBCUTANEOUS at 22:10

## 2018-02-11 RX ADMIN — BUDESONIDE SCH MG: 1 SUSPENSION RESPIRATORY (INHALATION) at 07:41

## 2018-02-11 RX ADMIN — METHYLPREDNISOLONE SODIUM SUCCINATE SCH MG: 125 INJECTION, POWDER, FOR SOLUTION INTRAMUSCULAR; INTRAVENOUS at 23:50

## 2018-02-11 RX ADMIN — FORMOTEROL FUMARATE DIHYDRATE SCH MCG: 20 SOLUTION RESPIRATORY (INHALATION) at 07:41

## 2018-02-11 RX ADMIN — BUDESONIDE SCH MG: 1 SUSPENSION RESPIRATORY (INHALATION) at 21:13

## 2018-02-11 RX ADMIN — IPRATROPIUM BROMIDE AND ALBUTEROL SULFATE SCH ML: .5; 3 SOLUTION RESPIRATORY (INHALATION) at 07:41

## 2018-02-11 RX ADMIN — HEPARIN SODIUM SCH UNIT: 5000 INJECTION, SOLUTION INTRAVENOUS; SUBCUTANEOUS at 07:30

## 2018-02-11 RX ADMIN — INSULIN ASPART SCH: 100 INJECTION, SOLUTION INTRAVENOUS; SUBCUTANEOUS at 17:50

## 2018-02-11 RX ADMIN — IPRATROPIUM BROMIDE AND ALBUTEROL SULFATE SCH ML: .5; 3 SOLUTION RESPIRATORY (INHALATION) at 11:16

## 2018-02-11 RX ADMIN — IPRATROPIUM BROMIDE AND ALBUTEROL SULFATE SCH ML: .5; 3 SOLUTION RESPIRATORY (INHALATION) at 21:13

## 2018-02-11 RX ADMIN — METHYLPREDNISOLONE SODIUM SUCCINATE SCH MG: 125 INJECTION, POWDER, FOR SOLUTION INTRAMUSCULAR; INTRAVENOUS at 12:59

## 2018-02-11 NOTE — P.PN
Subjective


Progress Note Date: 02/11/18


Principal diagnosis: 





COPD exacerbation





Progress note dated 02/11/2018





This is an 82-year-old male well-known to me.  He has a history of severe end-

stage oxygen-dependent COPD.  He came to the emergency room complaining of 

increasing shortness of breath for a couple days prior to admission.  In 

addition, he was coughing and wheezing producing some phlegm.  No fever or 

chills.  The patient was recently in the hospital with similar episode of COPD 

exacerbation.  Yesterday, after evaluating him, he had a long talk with his 

wife.  We did talk about CODE STATUS.  I don't think Mr. Barragan will be a good 

candidate for intubation mechanical ventilation given the severity of his 

chronic lung disease.  He is express to me in the past that he did not want to 

be on life support.  I did talk to his wife and granddaughter they're giving 

some consideration.  No decision has been made as yet.  The patient does have 

history of chest pain deep venous thrombosis hyperlipidemia hypertension DJD 

and a previous episode of pneumonia as well as bladder cancer.





Objective





- Vital Signs


Vital signs: 


 Vital Signs











Temp  97.1 F L  02/11/18 07:00


 


Pulse  82   02/11/18 08:06


 


Resp  18   02/11/18 08:00


 


BP  125/67   02/11/18 07:00


 


Pulse Ox  99   02/11/18 07:41








 Intake & Output











 02/10/18 02/11/18 02/11/18





 18:59 06:59 18:59


 


Intake Total 360 1180 


 


Balance 360 1180 


 


Weight 60.9 kg  


 


Intake:   


 


  Oral 360 1180 


 


Other:   


 


  # Voids 3 2 














- Exam





No acute distress, oriented 3.  Currently on nasal oxygen.





HEENT examination is grossly unremarkable.  Mucous membranes are moist.  No 

oral lesions.





Neck supple.  Full range of motion.  No adenopathy thyromegaly or neck vein 

distention.





Cardiovascular examination reveals regular rhythm rate.  S1-S2 normal.  No S3 

or S4.  No discernible murmur noted.  Heart sounds are distant.





Lungs reveal bilateral coarse expiratory rhonchi.  No distinct wheezes are 

noted.  Breath sounds are diminished.  Adventitious lung sounds are more 

prominent on forced maneuver.  There is prolongation on forced maneuver..





Abdomen soft bowel sounds are heard.  No masses or tenderness.





Extremities are intact.  No cyanosis clubbing or edema.





Skin is without rash or lesion.





Neurologic examination is brief but nonfocal.





- Labs


CBC & Chem 7: 


 02/11/18 06:40





 02/11/18 06:40


Labs: 


 Abnormal Lab Results - Last 24 Hours (Table)











  02/10/18 02/10/18 02/10/18 Range/Units





  11:20 11:20 11:50 


 


RBC  3.53 L    (4.30-5.90)  m/uL


 


Hgb  9.9 L    (13.0-17.5)  gm/dL


 


Hct  32.6 L    (39.0-53.0)  %


 


MCHC  30.4 L    (31.0-37.0)  g/dL


 


RDW  16.0 H    (11.5-15.5)  %


 


Neutrophils #     (1.3-7.7)  k/uL


 


Lymphocytes #  0.2 L    (1.0-4.8)  k/uL


 


Potassium   5.5 H   (3.5-5.1)  mmol/L


 


Carbon Dioxide   31 H   (22-30)  mmol/L


 


BUN   43 H   (9-20)  mg/dL


 


Glucose   120 H   (74-99)  mg/dL


 


POC Glucose (mg/dL)    129 H  (75-99)  mg/dL














  02/10/18 02/10/18 02/11/18 Range/Units





  16:55 21:02 06:40 


 


RBC    3.56 L  (4.30-5.90)  m/uL


 


Hgb    9.8 L  (13.0-17.5)  gm/dL


 


Hct    32.4 L  (39.0-53.0)  %


 


MCHC    30.4 L  (31.0-37.0)  g/dL


 


RDW    15.9 H  (11.5-15.5)  %


 


Neutrophils #    8.4 H  (1.3-7.7)  k/uL


 


Lymphocytes #    0.5 L  (1.0-4.8)  k/uL


 


Potassium     (3.5-5.1)  mmol/L


 


Carbon Dioxide     (22-30)  mmol/L


 


BUN     (9-20)  mg/dL


 


Glucose     (74-99)  mg/dL


 


POC Glucose (mg/dL)  117 H  179 H   (75-99)  mg/dL














  02/11/18 02/11/18 Range/Units





  06:40 07:02 


 


RBC    (4.30-5.90)  m/uL


 


Hgb    (13.0-17.5)  gm/dL


 


Hct    (39.0-53.0)  %


 


MCHC    (31.0-37.0)  g/dL


 


RDW    (11.5-15.5)  %


 


Neutrophils #    (1.3-7.7)  k/uL


 


Lymphocytes #    (1.0-4.8)  k/uL


 


Potassium    (3.5-5.1)  mmol/L


 


Carbon Dioxide    (22-30)  mmol/L


 


BUN  41 H   (9-20)  mg/dL


 


Glucose  106 H   (74-99)  mg/dL


 


POC Glucose (mg/dL)   104 H  (75-99)  mg/dL








 Microbiology - Last 24 Hours (Table)











 02/10/18 14:00 Gram Stain - Preliminary





 Sputum 














Assessment and Plan


Assessment: 





COPD exacerbation, complicated by purulent tracheobronchitis 





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





History of bladder cancer





Chronic hypoxemia





Angina pectoris





History of deep venous thrombosis





History of hyperlipidemia





History of hypertension





History of DJD





Previous episode of pneumonia





Chronic hypoxemia particularly at nighttime and with exertion, patient 

noncompliant with oxygen therapy.


Plan: 





Plan dated 02/10/2018





The patient was interviewed and examined.  I know the patient wants to see him 

in the office for a long period of time.  I believe his family doctor is Dr. Whatley.  The patient's medications are reviewed.  X-rays and labs are reviewed.

  I did have a talk with the wife and the granddaughter about his declining 

situation.  The patient should be a no code and should not go on life support 

should come to that.  The wife will give it some thought.  The patient's lung 

disease is quite severe.  His overall situation last 6 months a significantly 

declined.  We'll continue to follow.  Prognosis is guarded.





Plan dated 02/11/2018.





The patient is situation is a bit better today.  A bit less short of breath.  

Still very congested and wet sounding.  I did have a conversation with the wife 

and granddaughter yesterday about CODE STATUS.  They're giving some 

consideration.  I don't believe Mr. Barragan will be a good candidate to go on 

life support.  Labs x-rays a medications are all reviewed.  Medications were 

adjusted yesterday.  He is on the usual COPD cocktail.  She'll recommendations 

and suggestions are forthcoming.


Time with Patient: Less than 30

## 2018-02-11 NOTE — PN
PROGRESS NOTE



DATE OF SERVICE:

02/11/2018



I am covering for Dr. Whatley.



This 82-year-old gentleman who was admitted with COPD acute exacerbation, also 
had

acute purulent tracheobronchitis. No chest pain.  No palpitations.  No fever.



EXAM:

Alert and oriented. Pulse 93, blood pressure 129/64, respiration 18, 
temperature 97.8,

pulse ox 98% 2 L.

HEENT:  Conjunctivae normal.

NECK: No jugular venous distention.

CARDIOVASCULAR: S1, S2 muffled.

RESPIRATORY: Breath sounds diminished at the bases. A few scattered rhonchi and

crackles. Expiratory wheezing.

ABDOMEN: Soft, nontender.

LEGS: No edema.

NERVOUS SYSTEM: No focal deficits.



LABS:

WBC 9.2, hemoglobin 9.8, other labs are noted.



ASSESSMENT:

1. Chronic obstructive pulmonary disease exacerbation with acute purulent

    tracheobronchitis.

2. Small nodule in the right in the chest x-ray.

3. History of deep vein thrombosis.

4. Hypertension.

5. History of bladder cancer.

6. Hyperlipidemia.

7. History of degenerative joint disease.

8. History of chronic hypoxic respiratory failure on home O2 nasal cannula.

9. History of bladder surgery.



RECOMMENDATIONS AND DISCUSSION:

I recommend to continue current management and symptomatic treatment. Pulmonary 
is

following the patient closely.  Continue the bronchodilators. Continue the 
antibiotics.

The patient is on p.o. Levaquin.  The patient is on IV steroids also.  Monitor 
blood

sugars closely.  Guarded prognosis because of multiple complex medical issues.  
Further

recommendations to follow.





MMCARL / CHRISTINAN: [id number] / Job#: [id number]

MTDD

## 2018-02-11 NOTE — P.DS
Providers


Date of admission: 


01/15/18 16:12





Attending physician: 


iNk Whatley





Consults: 





 





01/16/18 07:17


Consult Physician Routine 


   Consulting Provider: Dameon Terrazas


   Consult Reason/Comments: COPD exacerbation


   Do you want consulting provider notified?: Yes





01/17/18 14:52


Consult Physician Urgent 


   Consulting Provider: Nazario Mcnair


   Consult Reason/Comments: elevated potassium, BUN and Cr.


   Do you want consulting provider notified?: Yes











Primary care physician: 


Nik Whatley








- Discharge Diagnosis(es)


(1) Hypertension


Status: Acute   





(2) Hyperlipidemia


Status: Acute   





(3) COPD with acute exacerbation


Status: Acute   





(4) Acute hyperkalemia


Status: Acute   


Hospital Course: 





This is discharge summary an 82-year-old white maleWho was essentially admitted 

for recurrent exacerbation of COPD.The patient was stabilized appropriately and 

on oral steroids prior to discharge.  The patient is somewhat guarded secondary 

to the multiple comorbidities that he has an he is a longtime smoker and has 

had multiple episodes of COPD exacerbation over the last several years.





Plan - Discharge Summary


Discharge Rx Participant: Yes


New Discharge Prescriptions: 


Continue


   Albuterol Sulfate [Proair Hfa] 2 puff INHALATION RT-Q6H PRN


     PRN Reason: Shortness Of Breath


   Simvastatin [Zocor] 40 mg PO DAILY


   Ipratropium-Albuterol Nebulize [Duoneb 0.5 mg-3 mg/3 ml Soln] 3 ml 

INHALATION RT-QID


   predniSONE 5 mg PO DAILY


   Fluticasone/Vilanterol [Breo Ellipta 200-25 Mcg INH] 1 puff INHALATION RT-

DAILY


   Tamsulosin HCl [Flomax] 0.4 mg PO BID





No Action


   Potassium Chloride ER [K-Dur 20] 20 meq PO DAILY


   Lisinopril-Hctz 20-12.5 mg [Zestoretic 20-12.5] 1 tab PO DAILY


   Furosemide [Lasix] 20 mg PO DAILY


   Umeclidinium Bromide [Incruse Ellipta] 1 puff INHALATION RT-DAILY


Discharge Medication List





Albuterol Sulfate [Proair Hfa] 2 puff INHALATION RT-Q6H PRN 01/03/16 [History]


Simvastatin [Zocor] 40 mg PO DAILY 01/03/16 [History]


Ipratropium-Albuterol Nebulize [Duoneb 0.5 mg-3 mg/3 ml Soln] 3 ml INHALATION RT

-QID 01/04/16 [History]


Fluticasone/Vilanterol [Breo Ellipta 200-25 Mcg INH] 1 puff INHALATION RT-DAILY 

03/31/17 [History]


predniSONE 5 mg PO DAILY 03/31/17 [History]


Tamsulosin HCl [Flomax] 0.4 mg PO BID 11/30/17 [History]


Furosemide [Lasix] 20 mg PO DAILY 02/09/18 [History]


Lisinopril-Hctz 20-12.5 mg [Zestoretic 20-12.5] 1 tab PO DAILY 02/09/18 [History

]


Potassium Chloride ER [K-Dur 20] 20 meq PO DAILY 02/09/18 [History]


Umeclidinium Bromide [Incruse Ellipta] 1 puff INHALATION RT-DAILY 02/09/18 [

History]








Follow up Appointment(s)/Referral(s): 


Nik Whatley MD [Primary Care Provider] - 01/23/18 11:20 am (You have an 

appointment tomorrow,  Tuesday January 23, 2018, at 1120 am)


Dameon Terrazas DO [Doctor of Osteopathic Medicine] - 01/29/18 1:15 pm (You have 

an appointment on Monday, January 29, 2018 at 115 pm)


Patient Instructions/Handouts:  COPD (Chronic Obstructive Pulmonary Disease) (

GEN), Chronic Bronchitis (DC), How Your Lungs Work (DC), Pulmonary 

Rehabilitation (DC), Energy Conservation Techniques (DC), Dyspnea Scale and 

Exercise (DC), Flexible Bronchoscopy (DC)


Discharge Disposition: HOME SELF-CARE

## 2018-02-12 LAB
ANION GAP SERPL CALC-SCNC: 9 MMOL/L
BASOPHILS # BLD AUTO: 0 K/UL (ref 0–0.2)
BASOPHILS NFR BLD AUTO: 0 %
BUN SERPL-SCNC: 56 MG/DL (ref 9–20)
CALCIUM SPEC-MCNC: 9.1 MG/DL (ref 8.4–10.2)
CHLORIDE SERPL-SCNC: 100 MMOL/L (ref 98–107)
CO2 SERPL-SCNC: 32 MMOL/L (ref 22–30)
EOSINOPHIL # BLD AUTO: 0 K/UL (ref 0–0.7)
EOSINOPHIL NFR BLD AUTO: 0 %
ERYTHROCYTE [DISTWIDTH] IN BLOOD BY AUTOMATED COUNT: 3.69 M/UL (ref 4.3–5.9)
ERYTHROCYTE [DISTWIDTH] IN BLOOD: 16.3 % (ref 11.5–15.5)
GLUCOSE BLD-MCNC: 119 MG/DL (ref 75–99)
GLUCOSE BLD-MCNC: 125 MG/DL (ref 75–99)
GLUCOSE BLD-MCNC: 154 MG/DL (ref 75–99)
GLUCOSE BLD-MCNC: 177 MG/DL (ref 75–99)
GLUCOSE SERPL-MCNC: 125 MG/DL (ref 74–99)
HCT VFR BLD AUTO: 34.4 % (ref 39–53)
HGB BLD-MCNC: 10 GM/DL (ref 13–17.5)
LYMPHOCYTES # SPEC AUTO: 0.2 K/UL (ref 1–4.8)
LYMPHOCYTES NFR SPEC AUTO: 2 %
MCH RBC QN AUTO: 26.9 PG (ref 25–35)
MCHC RBC AUTO-ENTMCNC: 29 G/DL (ref 31–37)
MCV RBC AUTO: 93 FL (ref 80–100)
MONOCYTES # BLD AUTO: 0.2 K/UL (ref 0–1)
MONOCYTES NFR BLD AUTO: 2 %
NEUTROPHILS # BLD AUTO: 7.5 K/UL (ref 1.3–7.7)
NEUTROPHILS NFR BLD AUTO: 95 %
PLATELET # BLD AUTO: 367 K/UL (ref 150–450)
POTASSIUM SERPL-SCNC: 5 MMOL/L (ref 3.5–5.1)
SODIUM SERPL-SCNC: 141 MMOL/L (ref 137–145)
WBC # BLD AUTO: 7.9 K/UL (ref 3.8–10.6)

## 2018-02-12 RX ADMIN — TAMSULOSIN HYDROCHLORIDE SCH MG: 0.4 CAPSULE ORAL at 21:37

## 2018-02-12 RX ADMIN — INSULIN ASPART SCH UNIT: 100 INJECTION, SOLUTION INTRAVENOUS; SUBCUTANEOUS at 17:46

## 2018-02-12 RX ADMIN — INSULIN ASPART SCH UNIT: 100 INJECTION, SOLUTION INTRAVENOUS; SUBCUTANEOUS at 21:37

## 2018-02-12 RX ADMIN — IPRATROPIUM BROMIDE AND ALBUTEROL SULFATE SCH ML: .5; 3 SOLUTION RESPIRATORY (INHALATION) at 20:01

## 2018-02-12 RX ADMIN — IPRATROPIUM BROMIDE AND ALBUTEROL SULFATE SCH ML: .5; 3 SOLUTION RESPIRATORY (INHALATION) at 08:29

## 2018-02-12 RX ADMIN — FUROSEMIDE SCH MG: 20 TABLET ORAL at 08:39

## 2018-02-12 RX ADMIN — LISINOPRIL AND HYDROCHLOROTHIAZIDE SCH EACH: 12.5; 2 TABLET ORAL at 08:39

## 2018-02-12 RX ADMIN — INSULIN ASPART SCH: 100 INJECTION, SOLUTION INTRAVENOUS; SUBCUTANEOUS at 08:37

## 2018-02-12 RX ADMIN — BUDESONIDE SCH MG: 1 SUSPENSION RESPIRATORY (INHALATION) at 20:00

## 2018-02-12 RX ADMIN — METHYLPREDNISOLONE SODIUM SUCCINATE SCH MG: 125 INJECTION, POWDER, FOR SOLUTION INTRAMUSCULAR; INTRAVENOUS at 06:20

## 2018-02-12 RX ADMIN — METHYLPREDNISOLONE SODIUM SUCCINATE SCH MG: 125 INJECTION, POWDER, FOR SOLUTION INTRAMUSCULAR; INTRAVENOUS at 12:48

## 2018-02-12 RX ADMIN — IPRATROPIUM BROMIDE AND ALBUTEROL SULFATE SCH ML: .5; 3 SOLUTION RESPIRATORY (INHALATION) at 14:49

## 2018-02-12 RX ADMIN — FORMOTEROL FUMARATE DIHYDRATE SCH MCG: 20 SOLUTION RESPIRATORY (INHALATION) at 20:00

## 2018-02-12 RX ADMIN — IPRATROPIUM BROMIDE AND ALBUTEROL SULFATE SCH ML: .5; 3 SOLUTION RESPIRATORY (INHALATION) at 11:42

## 2018-02-12 RX ADMIN — TAMSULOSIN HYDROCHLORIDE SCH MG: 0.4 CAPSULE ORAL at 08:39

## 2018-02-12 RX ADMIN — BUDESONIDE SCH MG: 1 SUSPENSION RESPIRATORY (INHALATION) at 08:28

## 2018-02-12 RX ADMIN — LEVOFLOXACIN SCH MG: 500 TABLET, FILM COATED ORAL at 08:39

## 2018-02-12 RX ADMIN — HEPARIN SODIUM SCH UNIT: 5000 INJECTION, SOLUTION INTRAVENOUS; SUBCUTANEOUS at 08:39

## 2018-02-12 RX ADMIN — FORMOTEROL FUMARATE DIHYDRATE SCH MCG: 20 SOLUTION RESPIRATORY (INHALATION) at 08:29

## 2018-02-12 RX ADMIN — HEPARIN SODIUM SCH UNIT: 5000 INJECTION, SOLUTION INTRAVENOUS; SUBCUTANEOUS at 21:37

## 2018-02-12 RX ADMIN — ATORVASTATIN CALCIUM SCH MG: 20 TABLET, FILM COATED ORAL at 08:39

## 2018-02-12 RX ADMIN — INSULIN ASPART SCH: 100 INJECTION, SOLUTION INTRAVENOUS; SUBCUTANEOUS at 12:44

## 2018-02-12 RX ADMIN — METHYLPREDNISOLONE SODIUM SUCCINATE SCH MG: 125 INJECTION, POWDER, FOR SOLUTION INTRAMUSCULAR; INTRAVENOUS at 17:46

## 2018-02-12 NOTE — P.PN
Subjective


Principal diagnosis: 





Exacerbation of COPD.





This is a continue pressure 82-year-old male essentially readmitted for 

exacerbation of COPD.  He states significant dyspnea on exertion and is 

anticipating moving more oxygen dependency.  No significant nausea, vomiting or 

diarrhea is noted.  The patient saw me in the office and I did add Incruse to 

see if this would help him.





Objective





- Vital Signs


Vital signs: 


 Vital Signs











Temp  97.7 F   02/12/18 07:00


 


Pulse  95   02/12/18 07:00


 


Resp  18   02/12/18 07:00


 


BP  168/70   02/12/18 07:00


 


Pulse Ox  98   02/12/18 07:00








 Intake & Output











 02/11/18 02/12/18 02/12/18





 18:59 06:59 18:59


 


Intake Total 200 590 


 


Balance 200 590 


 


Intake:   


 


  Oral 200 590 


 


Other:   


 


  Voiding Method  Toilet 


 


  # Voids 3 1 














- Constitutional


General appearance: Present: average body habitus





- EENT


Eyes: Absent: abnormal pupil





- Respiratory


Respiratory: bilateral: CTA





- Cardiovascular


Rhythm: regular


Heart sounds: normal: S1, S2





- Gastrointestinal


General gastrointestinal: Present: soft.  Absent: tenderness





- Psychiatric


Psychiatric: Present: A&O x's 3





- Labs


CBC & Chem 7: 


 02/12/18 07:02





 02/12/18 07:02


Labs: 


 Abnormal Lab Results - Last 24 Hours (Table)











  02/10/18 02/11/18 02/11/18 Range/Units





  11:20 11:11 17:04 


 


RBC     (4.30-5.90)  m/uL


 


Hgb     (13.0-17.5)  gm/dL


 


Hct     (39.0-53.0)  %


 


MCHC     (31.0-37.0)  g/dL


 


RDW     (11.5-15.5)  %


 


Lymphocytes #     (1.0-4.8)  k/uL


 


Carbon Dioxide     (22-30)  mmol/L


 


BUN     (9-20)  mg/dL


 


Glucose     (74-99)  mg/dL


 


POC Glucose (mg/dL)   114 H  151 H  (75-99)  mg/dL


 


Hemoglobin A1c  6.2 H    (4.0-6.0)  %














  02/11/18 02/12/18 02/12/18 Range/Units





  20:31 07:02 07:02 


 


RBC   3.69 L   (4.30-5.90)  m/uL


 


Hgb   10.0 L   (13.0-17.5)  gm/dL


 


Hct   34.4 L   (39.0-53.0)  %


 


MCHC   29.0 L   (31.0-37.0)  g/dL


 


RDW   16.3 H   (11.5-15.5)  %


 


Lymphocytes #   0.2 L   (1.0-4.8)  k/uL


 


Carbon Dioxide    32 H  (22-30)  mmol/L


 


BUN    56 H  (9-20)  mg/dL


 


Glucose    125 H  (74-99)  mg/dL


 


POC Glucose (mg/dL)  198 H    (75-99)  mg/dL


 


Hemoglobin A1c     (4.0-6.0)  %














  02/12/18 Range/Units





  07:09 


 


RBC   (4.30-5.90)  m/uL


 


Hgb   (13.0-17.5)  gm/dL


 


Hct   (39.0-53.0)  %


 


MCHC   (31.0-37.0)  g/dL


 


RDW   (11.5-15.5)  %


 


Lymphocytes #   (1.0-4.8)  k/uL


 


Carbon Dioxide   (22-30)  mmol/L


 


BUN   (9-20)  mg/dL


 


Glucose   (74-99)  mg/dL


 


POC Glucose (mg/dL)  125 H  (75-99)  mg/dL


 


Hemoglobin A1c   (4.0-6.0)  %








 Microbiology - Last 24 Hours (Table)











 02/10/18 14:00 Gram Stain - Preliminary





 Sputum 














Assessment and Plan


(1) Dyspnea


Current Visit: Yes   Status: Acute   Code(s): R06.00 - DYSPNEA, UNSPECIFIED   

SNOMED Code(s): 747512038


   





(2) Acute exacerbation of chronic obstructive airways disease


Current Visit: No   Status: Acute   Code(s): J44.1 - CHRONIC OBSTRUCTIVE 

PULMONARY DISEASE W (ACUTE) EXACERBATION   SNOMED Code(s): 044405425


   





(3) Hyperlipidemia


Current Visit: No   Status: Acute   Code(s): E78.5 - HYPERLIPIDEMIA, 

UNSPECIFIED   SNOMED Code(s): 24291062


   





(4) Hypertension


Current Visit: No   Status: Acute   Code(s): I10 - ESSENTIAL (PRIMARY) 

HYPERTENSION   SNOMED Code(s): 03344452


   





(5) Hypoxia


Current Visit: No   Status: Acute   Code(s): R09.02 - HYPOXEMIA   SNOMED Code(s)

: 942537936


   


Plan: 





Continue current regimen.





Check CBC and CMP in a.m.





Prognosis is guarded secondary to his multiple comorbidities.





Discharge planning for possible home O2 if not done already.





See orders otherwise.


Time with Patient: Less than 30

## 2018-02-12 NOTE — P.PN
Subjective


Progress Note Date: 02/12/18


Principal diagnosis: 





COPD exacerbation





Progress note dated 02/11/2018





This is an 82-year-old male well-known to me.  He has a history of severe end-

stage oxygen-dependent COPD.  He came to the emergency room complaining of 

increasing shortness of breath for a couple days prior to admission.  In 

addition, he was coughing and wheezing producing some phlegm.  No fever or 

chills.  The patient was recently in the hospital with similar episode of COPD 

exacerbation.  Yesterday, after evaluating him, he had a long talk with his 

wife.  We did talk about CODE STATUS.  I don't think Mr. Barragan will be a good 

candidate for intubation mechanical ventilation given the severity of his 

chronic lung disease.  He is express to me in the past that he did not want to 

be on life support.  I did talk to his wife and granddaughter they're giving 

some consideration.  No decision has been made as yet.  The patient does have 

history of chest pain deep venous thrombosis hyperlipidemia hypertension DJD 

and a previous episode of pneumonia as well as bladder cancer.





Progress note dated 02/12/2018





This is an 82-year-old male well-known to me.  He is a history of severe end-

stage oxygen and steroid dependent COPD.  He was recently in the hospital for a 

COPD exacerbation discharged home.  He was home for a couple weeks and came 

back into the similar episode.  I did have a long talk with his wife and 

granddaughter yesterday.  They understand that the patient is quite ill.  The 

patient's complaints include chest tightness wheezing cough chest congestion a 

very wet congested cough and phlegm production.  No fever or chills.  No chest 

pain or chest discomfort.  He is getting maximal medical therapy.  In addition 

to severe COPD, he has a history of chest pain, deep venous thrombosis, 

hyperlipidemia, hypertension, DJD, and previous episodes of pneumonia, as well 

as bladder cancer history.





Objective





- Vital Signs


Vital signs: 


 Vital Signs











Temp  97.7 F   02/12/18 07:00


 


Pulse  92   02/12/18 11:55


 


Resp  18   02/12/18 07:00


 


BP  168/70   02/12/18 07:00


 


Pulse Ox  98   02/12/18 08:31








 Intake & Output











 02/11/18 02/12/18 02/12/18





 18:59 06:59 18:59


 


Intake Total 200 590 


 


Balance 200 590 


 


Intake:   


 


  Oral 200 590 


 


Other:   


 


  Voiding Method  Toilet 


 


  # Voids 3 1 














- Exam





No acute distress, oriented 3.  Currently on nasal oxygen.





HEENT examination is grossly unremarkable.  Mucous membranes are moist.  No 

oral lesions.





Neck supple.  Full range of motion.  No adenopathy thyromegaly or neck vein 

distention.





Cardiovascular examination reveals regular rhythm rate.  S1-S2 normal.  No S3 

or S4.  No discernible murmur noted.  Heart sounds are distant.





Lungs reveal bilateral coarse expiratory rhonchi.  No distinct wheezes are 

noted.  Breath sounds are diminished.  Adventitious lung sounds are more 

prominent on forced maneuver.  There is prolongation on forced maneuver..





Abdomen soft bowel sounds are heard.  No masses or tenderness.





Extremities are intact.  No cyanosis clubbing or edema.





Skin is without rash or lesion.





Neurologic examination is brief but nonfocal.





- Labs


CBC & Chem 7: 


 02/12/18 07:02





 02/12/18 07:02


Labs: 


 Abnormal Lab Results - Last 24 Hours (Table)











  02/10/18 02/11/18 02/11/18 Range/Units





  11:20 17:04 20:31 


 


RBC     (4.30-5.90)  m/uL


 


Hgb     (13.0-17.5)  gm/dL


 


Hct     (39.0-53.0)  %


 


MCHC     (31.0-37.0)  g/dL


 


RDW     (11.5-15.5)  %


 


Lymphocytes #     (1.0-4.8)  k/uL


 


Carbon Dioxide     (22-30)  mmol/L


 


BUN     (9-20)  mg/dL


 


Glucose     (74-99)  mg/dL


 


POC Glucose (mg/dL)   151 H  198 H  (75-99)  mg/dL


 


Hemoglobin A1c  6.2 H    (4.0-6.0)  %














  02/12/18 02/12/18 02/12/18 Range/Units





  07:02 07:02 07:09 


 


RBC  3.69 L    (4.30-5.90)  m/uL


 


Hgb  10.0 L    (13.0-17.5)  gm/dL


 


Hct  34.4 L    (39.0-53.0)  %


 


MCHC  29.0 L    (31.0-37.0)  g/dL


 


RDW  16.3 H    (11.5-15.5)  %


 


Lymphocytes #  0.2 L    (1.0-4.8)  k/uL


 


Carbon Dioxide   32 H   (22-30)  mmol/L


 


BUN   56 H   (9-20)  mg/dL


 


Glucose   125 H   (74-99)  mg/dL


 


POC Glucose (mg/dL)    125 H  (75-99)  mg/dL


 


Hemoglobin A1c     (4.0-6.0)  %














  02/12/18 Range/Units





  12:01 


 


RBC   (4.30-5.90)  m/uL


 


Hgb   (13.0-17.5)  gm/dL


 


Hct   (39.0-53.0)  %


 


MCHC   (31.0-37.0)  g/dL


 


RDW   (11.5-15.5)  %


 


Lymphocytes #   (1.0-4.8)  k/uL


 


Carbon Dioxide   (22-30)  mmol/L


 


BUN   (9-20)  mg/dL


 


Glucose   (74-99)  mg/dL


 


POC Glucose (mg/dL)  119 H  (75-99)  mg/dL


 


Hemoglobin A1c   (4.0-6.0)  %








 Microbiology - Last 24 Hours (Table)











 02/10/18 14:00 Gram Stain - Preliminary





 Sputum Sputum Culture - Preliminary





    Gram Neg Bacilli





    Candida albicans














Assessment and Plan


Assessment: 





COPD exacerbation, complicated by purulent tracheobronchitis 





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





History of bladder cancer





Chronic hypoxemia





Angina pectoris





History of deep venous thrombosis





History of hyperlipidemia





History of hypertension





History of DJD





Previous episode of pneumonia





Chronic hypoxemia particularly at nighttime and with exertion, patient 

noncompliant with oxygen therapy.


Plan: 





Plan dated 02/10/2018





The patient was interviewed and examined.  I know the patient wants to see him 

in the office for a long period of time.  I believe his family doctor is Dr. Whatley.  The patient's medications are reviewed.  X-rays and labs are reviewed.

  I did have a talk with the wife and the granddaughter about his declining 

situation.  The patient should be a no code and should not go on life support 

should come to that.  The wife will give it some thought.  The patient's lung 

disease is quite severe.  His overall situation last 6 months a significantly 

declined.  We'll continue to follow.  Prognosis is guarded.





Plan dated 02/11/2018.





The patient is situation is a bit better today.  A bit less short of breath.  

Still very congested and wet sounding.  I did have a conversation with the wife 

and granddaughter yesterday about CODE STATUS.  They're giving some 

consideration.  I don't believe Mr. Barragan will be a good candidate to go on 

life support.  Labs x-rays a medications are all reviewed.  Medications were 

adjusted yesterday.  He is on the usual COPD cocktail.  She'll recommendations 

and suggestions are forthcoming.





Plan dated 02/12/2018





The patient seemed be doing a bit better.  Not a lot better.  Still very 

congested wet congested cough.  Still very short of breath particularly with 

any exertion.  Lots of wheezing and tightness in the chest.  No fever or 

chills.  No palpitations.  No coughing up of blood.  Is coughing up yellow 

phlegm.  No nausea vomiting or diarrhea.  We'll continue to follow.  Prognosis 

is guarded.  No additional recommendations are made at this time.


Time with Patient: Less than 30

## 2018-02-13 LAB
ALBUMIN SERPL-MCNC: 3.8 G/DL (ref 3.5–5)
ALP SERPL-CCNC: 78 U/L (ref 38–126)
ALT SERPL-CCNC: 23 U/L (ref 21–72)
ANION GAP SERPL CALC-SCNC: 12 MMOL/L
AST SERPL-CCNC: 44 U/L (ref 17–59)
BASOPHILS # BLD AUTO: 0 K/UL (ref 0–0.2)
BASOPHILS NFR BLD AUTO: 0 %
BUN SERPL-SCNC: 69 MG/DL (ref 9–20)
CALCIUM SPEC-MCNC: 9.5 MG/DL (ref 8.4–10.2)
CHLORIDE SERPL-SCNC: 100 MMOL/L (ref 98–107)
CO2 SERPL-SCNC: 29 MMOL/L (ref 22–30)
EOSINOPHIL # BLD AUTO: 0.1 K/UL (ref 0–0.7)
EOSINOPHIL NFR BLD AUTO: 1 %
ERYTHROCYTE [DISTWIDTH] IN BLOOD BY AUTOMATED COUNT: 4.02 M/UL (ref 4.3–5.9)
ERYTHROCYTE [DISTWIDTH] IN BLOOD: 16.4 % (ref 11.5–15.5)
GLUCOSE BLD-MCNC: 128 MG/DL (ref 75–99)
GLUCOSE BLD-MCNC: 145 MG/DL (ref 75–99)
GLUCOSE BLD-MCNC: 248 MG/DL (ref 75–99)
GLUCOSE BLD-MCNC: 98 MG/DL (ref 75–99)
GLUCOSE SERPL-MCNC: 118 MG/DL (ref 74–99)
HCT VFR BLD AUTO: 36.6 % (ref 39–53)
HGB BLD-MCNC: 11.3 GM/DL (ref 13–17.5)
LYMPHOCYTES # SPEC AUTO: 0.2 K/UL (ref 1–4.8)
LYMPHOCYTES NFR SPEC AUTO: 3 %
MCH RBC QN AUTO: 28.2 PG (ref 25–35)
MCHC RBC AUTO-ENTMCNC: 30.9 G/DL (ref 31–37)
MCV RBC AUTO: 91.2 FL (ref 80–100)
MONOCYTES # BLD AUTO: 0.4 K/UL (ref 0–1)
MONOCYTES NFR BLD AUTO: 5 %
NEUTROPHILS # BLD AUTO: 8.5 K/UL (ref 1.3–7.7)
NEUTROPHILS NFR BLD AUTO: 92 %
PLATELET # BLD AUTO: 290 K/UL (ref 150–450)
POTASSIUM SERPL-SCNC: 4.9 MMOL/L (ref 3.5–5.1)
PROT SERPL-MCNC: 6.7 G/DL (ref 6.3–8.2)
SODIUM SERPL-SCNC: 141 MMOL/L (ref 137–145)
WBC # BLD AUTO: 9.2 K/UL (ref 3.8–10.6)

## 2018-02-13 RX ADMIN — ATORVASTATIN CALCIUM SCH MG: 20 TABLET, FILM COATED ORAL at 10:10

## 2018-02-13 RX ADMIN — HEPARIN SODIUM SCH UNIT: 5000 INJECTION, SOLUTION INTRAVENOUS; SUBCUTANEOUS at 10:10

## 2018-02-13 RX ADMIN — FORMOTEROL FUMARATE DIHYDRATE SCH MCG: 20 SOLUTION RESPIRATORY (INHALATION) at 19:39

## 2018-02-13 RX ADMIN — FUROSEMIDE SCH MG: 20 TABLET ORAL at 10:10

## 2018-02-13 RX ADMIN — BUDESONIDE SCH MG: 1 SUSPENSION RESPIRATORY (INHALATION) at 08:25

## 2018-02-13 RX ADMIN — FORMOTEROL FUMARATE DIHYDRATE SCH MCG: 20 SOLUTION RESPIRATORY (INHALATION) at 08:25

## 2018-02-13 RX ADMIN — IPRATROPIUM BROMIDE AND ALBUTEROL SULFATE SCH ML: .5; 3 SOLUTION RESPIRATORY (INHALATION) at 12:22

## 2018-02-13 RX ADMIN — INSULIN ASPART SCH: 100 INJECTION, SOLUTION INTRAVENOUS; SUBCUTANEOUS at 12:17

## 2018-02-13 RX ADMIN — METHYLPREDNISOLONE SODIUM SUCCINATE SCH MG: 125 INJECTION, POWDER, FOR SOLUTION INTRAMUSCULAR; INTRAVENOUS at 13:22

## 2018-02-13 RX ADMIN — HEPARIN SODIUM SCH UNIT: 5000 INJECTION, SOLUTION INTRAVENOUS; SUBCUTANEOUS at 20:47

## 2018-02-13 RX ADMIN — IPRATROPIUM BROMIDE AND ALBUTEROL SULFATE SCH ML: .5; 3 SOLUTION RESPIRATORY (INHALATION) at 15:40

## 2018-02-13 RX ADMIN — METHYLPREDNISOLONE SODIUM SUCCINATE SCH MG: 125 INJECTION, POWDER, FOR SOLUTION INTRAMUSCULAR; INTRAVENOUS at 00:05

## 2018-02-13 RX ADMIN — TAMSULOSIN HYDROCHLORIDE SCH MG: 0.4 CAPSULE ORAL at 20:48

## 2018-02-13 RX ADMIN — METHYLPREDNISOLONE SODIUM SUCCINATE SCH MG: 125 INJECTION, POWDER, FOR SOLUTION INTRAMUSCULAR; INTRAVENOUS at 06:10

## 2018-02-13 RX ADMIN — LISINOPRIL AND HYDROCHLOROTHIAZIDE SCH EACH: 12.5; 2 TABLET ORAL at 10:08

## 2018-02-13 RX ADMIN — LEVOFLOXACIN SCH MG: 250 TABLET, FILM COATED ORAL at 10:09

## 2018-02-13 RX ADMIN — INSULIN ASPART SCH UNIT: 100 INJECTION, SOLUTION INTRAVENOUS; SUBCUTANEOUS at 20:47

## 2018-02-13 RX ADMIN — TAMSULOSIN HYDROCHLORIDE SCH MG: 0.4 CAPSULE ORAL at 10:09

## 2018-02-13 RX ADMIN — BUDESONIDE SCH MG: 1 SUSPENSION RESPIRATORY (INHALATION) at 19:39

## 2018-02-13 RX ADMIN — METHYLPREDNISOLONE SODIUM SUCCINATE SCH MG: 125 INJECTION, POWDER, FOR SOLUTION INTRAMUSCULAR; INTRAVENOUS at 17:48

## 2018-02-13 RX ADMIN — INSULIN ASPART SCH: 100 INJECTION, SOLUTION INTRAVENOUS; SUBCUTANEOUS at 08:26

## 2018-02-13 RX ADMIN — METHYLPREDNISOLONE SODIUM SUCCINATE SCH MG: 125 INJECTION, POWDER, FOR SOLUTION INTRAMUSCULAR; INTRAVENOUS at 23:48

## 2018-02-13 RX ADMIN — IPRATROPIUM BROMIDE AND ALBUTEROL SULFATE SCH ML: .5; 3 SOLUTION RESPIRATORY (INHALATION) at 08:25

## 2018-02-13 RX ADMIN — INSULIN ASPART SCH UNIT: 100 INJECTION, SOLUTION INTRAVENOUS; SUBCUTANEOUS at 17:48

## 2018-02-13 RX ADMIN — IPRATROPIUM BROMIDE AND ALBUTEROL SULFATE SCH ML: .5; 3 SOLUTION RESPIRATORY (INHALATION) at 19:40

## 2018-02-13 NOTE — P.PN
Subjective


Progress Note Date: 02/13/18


Principal diagnosis: 





COPD exacerbation





Progress note dated 02/11/2018





This is an 82-year-old male well-known to me.  He has a history of severe end-

stage oxygen-dependent COPD.  He came to the emergency room complaining of 

increasing shortness of breath for a couple days prior to admission.  In 

addition, he was coughing and wheezing producing some phlegm.  No fever or 

chills.  The patient was recently in the hospital with similar episode of COPD 

exacerbation.  Yesterday, after evaluating him, he had a long talk with his 

wife.  We did talk about CODE STATUS.  I don't think Mr. Barragan will be a good 

candidate for intubation mechanical ventilation given the severity of his 

chronic lung disease.  He is express to me in the past that he did not want to 

be on life support.  I did talk to his wife and granddaughter they're giving 

some consideration.  No decision has been made as yet.  The patient does have 

history of chest pain deep venous thrombosis hyperlipidemia hypertension DJD 

and a previous episode of pneumonia as well as bladder cancer.





Progress note dated 02/12/2018





This is an 82-year-old male well-known to me.  He is a history of severe end-

stage oxygen and steroid dependent COPD.  He was recently in the hospital for a 

COPD exacerbation discharged home.  He was home for a couple weeks and came 

back into the similar episode.  I did have a long talk with his wife and 

granddaughter yesterday.  They understand that the patient is quite ill.  The 

patient's complaints include chest tightness wheezing cough chest congestion a 

very wet congested cough and phlegm production.  No fever or chills.  No chest 

pain or chest discomfort.  He is getting maximal medical therapy.  In addition 

to severe COPD, he has a history of chest pain, deep venous thrombosis, 

hyperlipidemia, hypertension, DJD, and previous episodes of pneumonia, as well 

as bladder cancer history.





Progress note dated 12/13/2018





82-year-old male well-known to me.  He has a history of severe end-stage oxygen 

and steroid dependent COPD.  Feeling a bit better today.  His primary doctor is 

Dr. Whatley.  Apparently Dr. Whatley told him he would go home on a couple of 

days.  I think the patient at baseline he could go home sooner.  In addition to 

severe COPD, he has a history of chest pain DVT hyperlipidemia hypertension DJD 

and previous episodes of pneumonia as well as bladder cancer.  A couple days ago

, had a very long conversation with the patient's wife and granddaughter.  I 

did express to them the fact that he has end-stage COPD and I believe that his 

clinical course with continued to decline.  He's had 2 admissions here in the 

last month or so for the same episode.  It is not much more to do with this 

patient and I did talk about CODE STATUS.  He would not be a good candidate my 

opinion to go on the mechanical ventilator.





Objective





- Vital Signs


Vital signs: 


 Vital Signs











Temp  97.4 F L  02/13/18 07:00


 


Pulse  106 H  02/13/18 10:47


 


Resp  18   02/13/18 10:47


 


BP  130/69   02/13/18 07:00


 


Pulse Ox  100   02/13/18 07:00








 Intake & Output











 02/12/18 02/13/18 02/13/18





 18:59 06:59 18:59


 


Intake Total 235 120 480


 


Balance 235 120 480


 


Weight  60.9 kg 


 


Intake:   


 


  Oral 235 120 480


 


Other:   


 


  Voiding Method  Toilet Toilet


 


  # Voids  1 














- Exam





No acute distress, oriented 3.  Currently on nasal oxygen.





HEENT examination is grossly unremarkable.  Mucous membranes are moist.  No 

oral lesions.





Neck supple.  Full range of motion.  No adenopathy thyromegaly or neck vein 

distention.





Cardiovascular examination reveals regular rhythm rate.  S1-S2 normal.  No S3 

or S4.  No discernible murmur noted.  Heart sounds are distant.





Lungs reveal bilateral coarse expiratory rhonchi.  No distinct wheezes are 

noted.  Breath sounds are diminished.  Adventitious lung sounds are more 

prominent on forced maneuver.  There is prolongation on forced maneuver.  

Breath sounds today are may be minimally improved overall.  I believe he is at 

baseline though in terms of how he sounds.





Abdomen soft bowel sounds are heard.  No masses or tenderness.





Extremities are intact.  No cyanosis clubbing or edema.





Skin is without rash or lesion.





Neurologic examination is brief but nonfocal.





- Labs


CBC & Chem 7: 


 02/13/18 08:09





 02/13/18 08:09


Labs: 


 Abnormal Lab Results - Last 24 Hours (Table)











  02/12/18 02/12/18 02/12/18 Range/Units





  12:01 17:14 21:00 


 


RBC     (4.30-5.90)  m/uL


 


Hgb     (13.0-17.5)  gm/dL


 


Hct     (39.0-53.0)  %


 


MCHC     (31.0-37.0)  g/dL


 


RDW     (11.5-15.5)  %


 


Neutrophils #     (1.3-7.7)  k/uL


 


Lymphocytes #     (1.0-4.8)  k/uL


 


BUN     (9-20)  mg/dL


 


Glucose     (74-99)  mg/dL


 


POC Glucose (mg/dL)  119 H  154 H  177 H  (75-99)  mg/dL














  02/13/18 02/13/18 02/13/18 Range/Units





  08:09 08:09 08:09 


 


RBC  4.02 L    (4.30-5.90)  m/uL


 


Hgb  11.3 L    (13.0-17.5)  gm/dL


 


Hct  36.6 L    (39.0-53.0)  %


 


MCHC  30.9 L    (31.0-37.0)  g/dL


 


RDW  16.4 H    (11.5-15.5)  %


 


Neutrophils #  8.5 H    (1.3-7.7)  k/uL


 


Lymphocytes #  0.2 L    (1.0-4.8)  k/uL


 


BUN   69 H   (9-20)  mg/dL


 


Glucose   118 H   (74-99)  mg/dL


 


POC Glucose (mg/dL)    128 H  (75-99)  mg/dL








 Microbiology - Last 24 Hours (Table)











 02/10/18 14:00 Gram Stain - Final





 Sputum Sputum Culture - Final





    Stenotrophomonas maltophilia





    Candida albicans














Assessment and Plan


Assessment: 





COPD exacerbation, complicated by purulent tracheobronchitis 





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





History of bladder cancer





Chronic hypoxemia





Angina pectoris





History of deep venous thrombosis





History of hyperlipidemia





History of hypertension





History of DJD





Previous episode of pneumonia





Chronic hypoxemia particularly at nighttime and with exertion, patient 

noncompliant with oxygen therapy.


Plan: 





Plan dated 02/10/2018





The patient was interviewed and examined.  I know the patient wants to see him 

in the office for a long period of time.  I believe his family doctor is Dr. Whatley.  The patient's medications are reviewed.  X-rays and labs are reviewed.

  I did have a talk with the wife and the granddaughter about his declining 

situation.  The patient should be a no code and should not go on life support 

should come to that.  The wife will give it some thought.  The patient's lung 

disease is quite severe.  His overall situation last 6 months a significantly 

declined.  We'll continue to follow.  Prognosis is guarded.





Plan dated 02/11/2018.





The patient is situation is a bit better today.  A bit less short of breath.  

Still very congested and wet sounding.  I did have a conversation with the wife 

and granddaughter yesterday about CODE STATUS.  They're giving some 

consideration.  I don't believe Mr. Barragan will be a good candidate to go on 

life support.  Labs x-rays a medications are all reviewed.  Medications were 

adjusted yesterday.  He is on the usual COPD cocktail.  She'll recommendations 

and suggestions are forthcoming.





Plan dated 02/12/2018





The patient seemed be doing a bit better.  Not a lot better.  Still very 

congested wet congested cough.  Still very short of breath particularly with 

any exertion.  Lots of wheezing and tightness in the chest.  No fever or 

chills.  No palpitations.  No coughing up of blood.  Is coughing up yellow 

phlegm.  No nausea vomiting or diarrhea.  We'll continue to follow.  Prognosis 

is guarded.  No additional recommendations are made at this time.





Plan dated 02/13/2018





The patient seemed be doing a bit better.  He feels better.  Still very 

congested and wet.  The patient apparently was seen by his primary doctor today 

who states that he could probably go home in a couple of days.  I believe Mr. Barragan's at baseline he should go home in the next 24-48 hours.  We'll leave 

that decision up to the primary though.  I did have a conversation with him 

about end-of-life issues and also with his wife and granddaughter about end-of-

life issues.  He would not be a good candidate my opinion for mechanical 

ventilation.  Additional recommendations and suggestions are forthcoming.


Time with Patient: Less than 30

## 2018-02-13 NOTE — P.PN
Subjective


Principal diagnosis: 





Exacerbation of COPD.





This is a continue Avastin on 8-year-old white male [facility] admitted for 

exacerbation of COPD.  He has almost end-stage element.  States he is 

clinically improved but still has the difficulty with dyspnea on exertion.  No 

significant nausea, vomiting or diarrhea.





Objective





- Vital Signs


Vital signs: 


 Vital Signs











Temp  97 F L  02/12/18 23:00


 


Pulse  101 H  02/12/18 23:00


 


Resp  16   02/13/18 00:00


 


BP  116/63   02/12/18 23:00


 


Pulse Ox  100   02/12/18 23:00








 Intake & Output











 02/12/18 02/13/18 02/13/18





 18:59 06:59 18:59


 


Intake Total 235 120 


 


Balance 235 120 


 


Weight  60.9 kg 


 


Intake:   


 


  Oral 235 120 


 


Other:   


 


  Voiding Method  Toilet 


 


  # Voids  1 














- Constitutional


General appearance: Present: average body habitus





- EENT


Eyes: Absent: abnormal pupil





- Respiratory


Respiratory: bilateral: diminished





- Cardiovascular


Rhythm: regular


Heart sounds: normal: S1, S2





- Gastrointestinal


General gastrointestinal: Present: soft.  Absent: tenderness





- Integumentary


Integumentary: Absent: cyanotic





- Neurologic


Neurologic: Present: CNII-XII intact





- Psychiatric


Psychiatric: Present: A&O x's 3, intact judgment & insight





- Labs


CBC & Chem 7: 


 02/12/18 07:02





 02/12/18 07:02


Labs: 


 Abnormal Lab Results - Last 24 Hours (Table)











  02/12/18 02/12/18 02/12/18 Range/Units





  12:01 17:14 21:00 


 


POC Glucose (mg/dL)  119 H  154 H  177 H  (75-99)  mg/dL








 Microbiology - Last 24 Hours (Table)











 02/10/18 14:00 Gram Stain - Preliminary





 Sputum Sputum Culture - Preliminary





    Gram Neg Bacilli





    Candida albicans














Assessment and Plan


(1) Dyspnea


Current Visit: Yes   Status: Acute   Code(s): R06.00 - DYSPNEA, UNSPECIFIED   

SNOMED Code(s): 479510699


   





(2) Acute exacerbation of chronic obstructive airways disease


Current Visit: No   Status: Acute   Code(s): J44.1 - CHRONIC OBSTRUCTIVE 

PULMONARY DISEASE W (ACUTE) EXACERBATION   SNOMED Code(s): 764080808


   





(3) Hyperlipidemia


Current Visit: No   Status: Acute   Code(s): E78.5 - HYPERLIPIDEMIA, 

UNSPECIFIED   SNOMED Code(s): 98105929


   





(4) Hypertension


Current Visit: No   Status: Acute   Code(s): I10 - ESSENTIAL (PRIMARY) 

HYPERTENSION   SNOMED Code(s): 91044178


   





(5) Hypoxia


Current Visit: No   Status: Acute   Code(s): R09.02 - HYPOXEMIA   SNOMED Code(s)

: [id number]


   


Plan: 





We will go and continue current regimen of medication.





Appreciate pulmonology input.  New.  Check CBC and CMP in a.m.





See orders otherwise.


Time with Patient: Less than 30

## 2018-02-14 LAB
ANION GAP SERPL CALC-SCNC: 9 MMOL/L
BASOPHILS # BLD AUTO: 0 K/UL (ref 0–0.2)
BASOPHILS NFR BLD AUTO: 0 %
BUN SERPL-SCNC: 73 MG/DL (ref 9–20)
CALCIUM SPEC-MCNC: 9 MG/DL (ref 8.4–10.2)
CHLORIDE SERPL-SCNC: 101 MMOL/L (ref 98–107)
CO2 SERPL-SCNC: 28 MMOL/L (ref 22–30)
EOSINOPHIL # BLD AUTO: 0 K/UL (ref 0–0.7)
EOSINOPHIL NFR BLD AUTO: 0 %
ERYTHROCYTE [DISTWIDTH] IN BLOOD BY AUTOMATED COUNT: 3.65 M/UL (ref 4.3–5.9)
ERYTHROCYTE [DISTWIDTH] IN BLOOD: 16.1 % (ref 11.5–15.5)
GLUCOSE BLD-MCNC: 125 MG/DL (ref 75–99)
GLUCOSE BLD-MCNC: 131 MG/DL (ref 75–99)
GLUCOSE BLD-MCNC: 136 MG/DL (ref 75–99)
GLUCOSE BLD-MCNC: 96 MG/DL (ref 75–99)
GLUCOSE SERPL-MCNC: 126 MG/DL (ref 74–99)
HCT VFR BLD AUTO: 33.6 % (ref 39–53)
HGB BLD-MCNC: 10 GM/DL (ref 13–17.5)
LYMPHOCYTES # SPEC AUTO: 0.2 K/UL (ref 1–4.8)
LYMPHOCYTES NFR SPEC AUTO: 2 %
MCH RBC QN AUTO: 27.5 PG (ref 25–35)
MCHC RBC AUTO-ENTMCNC: 29.8 G/DL (ref 31–37)
MCV RBC AUTO: 92 FL (ref 80–100)
MONOCYTES # BLD AUTO: 0.2 K/UL (ref 0–1)
MONOCYTES NFR BLD AUTO: 3 %
NEUTROPHILS # BLD AUTO: 7.2 K/UL (ref 1.3–7.7)
NEUTROPHILS NFR BLD AUTO: 94 %
PLATELET # BLD AUTO: 383 K/UL (ref 150–450)
POTASSIUM SERPL-SCNC: 5.1 MMOL/L (ref 3.5–5.1)
SODIUM SERPL-SCNC: 138 MMOL/L (ref 137–145)
WBC # BLD AUTO: 7.6 K/UL (ref 3.8–10.6)

## 2018-02-14 RX ADMIN — FORMOTEROL FUMARATE DIHYDRATE SCH: 20 SOLUTION RESPIRATORY (INHALATION) at 09:00

## 2018-02-14 RX ADMIN — INSULIN ASPART SCH: 100 INJECTION, SOLUTION INTRAVENOUS; SUBCUTANEOUS at 12:01

## 2018-02-14 RX ADMIN — INSULIN ASPART SCH UNIT: 100 INJECTION, SOLUTION INTRAVENOUS; SUBCUTANEOUS at 08:48

## 2018-02-14 RX ADMIN — FORMOTEROL FUMARATE DIHYDRATE SCH: 20 SOLUTION RESPIRATORY (INHALATION) at 19:18

## 2018-02-14 RX ADMIN — BUDESONIDE SCH MG: 1 SUSPENSION RESPIRATORY (INHALATION) at 09:00

## 2018-02-14 RX ADMIN — INSULIN ASPART SCH UNIT: 100 INJECTION, SOLUTION INTRAVENOUS; SUBCUTANEOUS at 17:33

## 2018-02-14 RX ADMIN — IPRATROPIUM BROMIDE AND ALBUTEROL SULFATE SCH ML: .5; 3 SOLUTION RESPIRATORY (INHALATION) at 12:25

## 2018-02-14 RX ADMIN — HEPARIN SODIUM SCH UNIT: 5000 INJECTION, SOLUTION INTRAVENOUS; SUBCUTANEOUS at 21:05

## 2018-02-14 RX ADMIN — LEVOFLOXACIN SCH MG: 250 TABLET, FILM COATED ORAL at 08:47

## 2018-02-14 RX ADMIN — IPRATROPIUM BROMIDE AND ALBUTEROL SULFATE SCH ML: .5; 3 SOLUTION RESPIRATORY (INHALATION) at 09:00

## 2018-02-14 RX ADMIN — TAMSULOSIN HYDROCHLORIDE SCH MG: 0.4 CAPSULE ORAL at 08:47

## 2018-02-14 RX ADMIN — METHYLPREDNISOLONE SODIUM SUCCINATE SCH MG: 125 INJECTION, POWDER, FOR SOLUTION INTRAMUSCULAR; INTRAVENOUS at 12:39

## 2018-02-14 RX ADMIN — INSULIN ASPART SCH: 100 INJECTION, SOLUTION INTRAVENOUS; SUBCUTANEOUS at 20:48

## 2018-02-14 RX ADMIN — FUROSEMIDE SCH MG: 20 TABLET ORAL at 08:47

## 2018-02-14 RX ADMIN — METHYLPREDNISOLONE SODIUM SUCCINATE SCH MG: 125 INJECTION, POWDER, FOR SOLUTION INTRAMUSCULAR; INTRAVENOUS at 06:27

## 2018-02-14 RX ADMIN — HEPARIN SODIUM SCH UNIT: 5000 INJECTION, SOLUTION INTRAVENOUS; SUBCUTANEOUS at 08:47

## 2018-02-14 RX ADMIN — IPRATROPIUM BROMIDE AND ALBUTEROL SULFATE SCH ML: .5; 3 SOLUTION RESPIRATORY (INHALATION) at 15:56

## 2018-02-14 RX ADMIN — ATORVASTATIN CALCIUM SCH MG: 20 TABLET, FILM COATED ORAL at 08:46

## 2018-02-14 RX ADMIN — METHYLPREDNISOLONE SODIUM SUCCINATE SCH MG: 125 INJECTION, POWDER, FOR SOLUTION INTRAMUSCULAR; INTRAVENOUS at 17:33

## 2018-02-14 RX ADMIN — IPRATROPIUM BROMIDE AND ALBUTEROL SULFATE SCH ML: .5; 3 SOLUTION RESPIRATORY (INHALATION) at 19:18

## 2018-02-14 RX ADMIN — LISINOPRIL AND HYDROCHLOROTHIAZIDE SCH EACH: 12.5; 2 TABLET ORAL at 08:46

## 2018-02-14 RX ADMIN — BUDESONIDE SCH: 1 SUSPENSION RESPIRATORY (INHALATION) at 19:18

## 2018-02-14 RX ADMIN — TAMSULOSIN HYDROCHLORIDE SCH MG: 0.4 CAPSULE ORAL at 21:05

## 2018-02-14 NOTE — P.PN
Subjective


Progress Note Date: 02/14/18


Principal diagnosis: 


Acute COPD exacerbation





Progress note dated 02/11/2018





This is an 82-year-old male well-known to me.  He has a history of severe end-

stage oxygen-dependent COPD.  He came to the emergency room complaining of 

increasing shortness of breath for a couple days prior to admission.  In 

addition, he was coughing and wheezing producing some phlegm.  No fever or 

chills.  The patient was recently in the hospital with similar episode of COPD 

exacerbation.  Yesterday, after evaluating him, he had a long talk with his 

wife.  We did talk about CODE STATUS.  I don't think Mr. Barragan will be a good 

candidate for intubation mechanical ventilation given the severity of his 

chronic lung disease.  He is express to me in the past that he did not want to 

be on life support.  I did talk to his wife and granddaughter they're giving 

some consideration.  No decision has been made as yet.  The patient does have 

history of chest pain deep venous thrombosis hyperlipidemia hypertension DJD 

and a previous episode of pneumonia as well as bladder cancer.





Progress note dated 02/12/2018





This is an 82-year-old male well-known to me.  He is a history of severe end-

stage oxygen and steroid dependent COPD.  He was recently in the hospital for a 

COPD exacerbation discharged home.  He was home for a couple weeks and came 

back into the similar episode.  I did have a long talk with his wife and 

granddaughter yesterday.  They understand that the patient is quite ill.  The 

patient's complaints include chest tightness wheezing cough chest congestion a 

very wet congested cough and phlegm production.  No fever or chills.  No chest 

pain or chest discomfort.  He is getting maximal medical therapy.  In addition 

to severe COPD, he has a history of chest pain, deep venous thrombosis, 

hyperlipidemia, hypertension, DJD, and previous episodes of pneumonia, as well 

as bladder cancer history.





Progress note dated 12/13/2018





82-year-old male well-known to me.  He has a history of severe end-stage oxygen 

and steroid dependent COPD.  Feeling a bit better today.  His primary doctor is 

Dr. Whatley.  Apparently Dr. Whatley told him he would go home on a couple of 

days.  I think the patient at baseline he could go home sooner.  In addition to 

severe COPD, he has a history of chest pain DVT hyperlipidemia hypertension DJD 

and previous episodes of pneumonia as well as bladder cancer.  A couple days ago

, had a very long conversation with the patient's wife and granddaughter.  I 

did express to them the fact that he has end-stage COPD and I believe that his 

clinical course with continued to decline.  He's had 2 admissions here in the 

last month or so for the same episode.  It is not much more to do with this 

patient and I did talk about CODE STATUS.  He would not be a good candidate my 

opinion to go on the mechanical ventilator.





On 02/14/2018 patient seen in follow-up.  He is been up ambulating, he was up 

to take a shower and shave.  Still gets short of breath with exertion, but 

recovers with rest.  Overall feeling better, still coughing, but bringing up 

less phlegm.  Sputum culture was positive for Stenotrophomonas maltophilia, 

with sensitivity to Levaquin.  Patient is currently on Levaquin.  His vital 

signs remain stable, he is on 2 L per nasal cannula with O2 sat at 99%.  Lung 

sounds are positive for expiratory wheezes, bilaterally, with some scattered 

rhonchi.  He continues on DuoNeb, Pulmicort, Perforomist, oral Lasix, Levaquin, 

and IV Solu-Medrol 60 mg every 6 hours.











Objective





- Vital Signs


Vital signs: 


 Vital Signs











Temp  97.6 F   02/14/18 07:38


 


Pulse  100   02/14/18 12:39


 


Resp  16   02/14/18 09:28


 


BP  134/63   02/14/18 07:38


 


Pulse Ox  99   02/14/18 07:38








 Intake & Output











 02/13/18 02/14/18 02/14/18





 18:59 06:59 18:59


 


Intake Total 720 940 240


 


Output Total 175  


 


Balance 545 940 240


 


Weight   60.9 kg


 


Intake:   


 


  Oral 720 940 240


 


Output:   


 


  Urine 175  


 


Other:   


 


  Voiding Method Toilet Toilet Toilet


 


  # Voids  1 














- Exam


No acute distress, oriented 3.  Currently on nasal oxygen.





HEENT examination is grossly unremarkable.  Mucous membranes are moist.  No 

oral lesions.





Neck supple.  Full range of motion.  No adenopathy thyromegaly or neck vein 

distention.





Cardiovascular examination reveals regular rhythm rate.  S1-S2 normal.  No S3 

or S4.  No discernible murmur noted.  Heart sounds are distant.





Lungs reveal bilateral coarse expiratory rhonchi.  No distinct wheezes are 

noted.  Breath sounds are diminished.  Adventitious lung sounds are more 

prominent on forced maneuver.  There is prolongation on forced maneuver.  

Breath sounds today are may be minimally improved overall.  I believe he is at 

baseline though in terms of how he sounds.





Abdomen soft bowel sounds are heard.  No masses or tenderness.





Extremities are intact.  No cyanosis clubbing or edema.





Skin is without rash or lesion.





Neurologic examination is brief but nonfocal.








- Labs


CBC & Chem 7: 


 02/14/18 07:41





 02/14/18 07:41


Labs: 


 Abnormal Lab Results - Last 24 Hours (Table)











  02/13/18 02/13/18 02/14/18 Range/Units





  17:29 20:08 07:20 


 


RBC     (4.30-5.90)  m/uL


 


Hgb     (13.0-17.5)  gm/dL


 


Hct     (39.0-53.0)  %


 


MCHC     (31.0-37.0)  g/dL


 


RDW     (11.5-15.5)  %


 


Lymphocytes #     (1.0-4.8)  k/uL


 


BUN     (9-20)  mg/dL


 


Glucose     (74-99)  mg/dL


 


POC Glucose (mg/dL)  145 H  248 H  131 H  (75-99)  mg/dL














  02/14/18 02/14/18 Range/Units





  07:41 07:41 


 


RBC  3.65 L   (4.30-5.90)  m/uL


 


Hgb  10.0 L   (13.0-17.5)  gm/dL


 


Hct  33.6 L   (39.0-53.0)  %


 


MCHC  29.8 L   (31.0-37.0)  g/dL


 


RDW  16.1 H   (11.5-15.5)  %


 


Lymphocytes #  0.2 L   (1.0-4.8)  k/uL


 


BUN   73 H  (9-20)  mg/dL


 


Glucose   126 H  (74-99)  mg/dL


 


POC Glucose (mg/dL)    (75-99)  mg/dL








 Microbiology - Last 24 Hours (Table)











 02/10/18 14:00 Gram Stain - Final





 Sputum Sputum Culture - Final





    Stenotrophomonas maltophilia





    Candida albicans














Assessment and Plan


Plan: 


Assessment:





COPD exacerbation, complicated by purulent tracheobronchitis 





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





History of bladder cancer





Chronic hypoxemia





Angina pectoris





History of deep venous thrombosis





History of hyperlipidemia





History of hypertension





History of DJD





Previous episode of pneumonia





Chronic hypoxemia particularly at nighttime and with exertion, patient 

noncompliant with oxygen therapy.





Plan:





Patient continues to improve, has been able to get up and take a shower and 

shave, and ambulate.  Tolerated it well, although does get dyspnea with exertion

, however he covers with rest.  Lung sounds are still positive for diffuse 

wheezing, still has productive cough, but less chest congestion and less phlegm 

production noted.  Sputum cultures were positive for Stenotrophomonas 

maltophilia with sensitivity to Levaquin.  Continue with Levaquin, nebulized 

treatments, Perforomist, Pulmicort, and IV steroids.  Anticipate discharge in 

next 24-48 hours.





I performed a history & physical examination of the patient and discussed their 

management with my nurse practitioner, Mayda Álvarez.  I reviewed the nurse 

practitioner's note and agree with the documented findings and plan of care.  

Lung sounds are positive for diffuse wheezes, and scattered rhonchi.  The 

findings and the impression was discussed with the patient.  I attest to the 

documentation by the nurse practitioner. 


Time with Patient: Less than 30

## 2018-02-15 LAB
ANION GAP SERPL CALC-SCNC: 8 MMOL/L
BASOPHILS # BLD AUTO: 0 K/UL (ref 0–0.2)
BASOPHILS NFR BLD AUTO: 0 %
BUN SERPL-SCNC: 72 MG/DL (ref 9–20)
CALCIUM SPEC-MCNC: 9.4 MG/DL (ref 8.4–10.2)
CHLORIDE SERPL-SCNC: 99 MMOL/L (ref 98–107)
CO2 SERPL-SCNC: 31 MMOL/L (ref 22–30)
EOSINOPHIL # BLD AUTO: 0 K/UL (ref 0–0.7)
EOSINOPHIL NFR BLD AUTO: 1 %
ERYTHROCYTE [DISTWIDTH] IN BLOOD BY AUTOMATED COUNT: 3.69 M/UL (ref 4.3–5.9)
ERYTHROCYTE [DISTWIDTH] IN BLOOD: 16.1 % (ref 11.5–15.5)
GLUCOSE BLD-MCNC: 153 MG/DL (ref 75–99)
GLUCOSE BLD-MCNC: 161 MG/DL (ref 75–99)
GLUCOSE BLD-MCNC: 178 MG/DL (ref 75–99)
GLUCOSE BLD-MCNC: 184 MG/DL (ref 75–99)
GLUCOSE SERPL-MCNC: 148 MG/DL (ref 74–99)
HCT VFR BLD AUTO: 33.1 % (ref 39–53)
HGB BLD-MCNC: 10.2 GM/DL (ref 13–17.5)
LYMPHOCYTES # SPEC AUTO: 0.1 K/UL (ref 1–4.8)
LYMPHOCYTES NFR SPEC AUTO: 2 %
MCH RBC QN AUTO: 27.8 PG (ref 25–35)
MCHC RBC AUTO-ENTMCNC: 30.9 G/DL (ref 31–37)
MCV RBC AUTO: 89.8 FL (ref 80–100)
MONOCYTES # BLD AUTO: 0.3 K/UL (ref 0–1)
MONOCYTES NFR BLD AUTO: 3 %
NEUTROPHILS # BLD AUTO: 8.3 K/UL (ref 1.3–7.7)
NEUTROPHILS NFR BLD AUTO: 94 %
PLATELET # BLD AUTO: 358 K/UL (ref 150–450)
POTASSIUM SERPL-SCNC: 4.5 MMOL/L (ref 3.5–5.1)
SODIUM SERPL-SCNC: 138 MMOL/L (ref 137–145)
WBC # BLD AUTO: 8.8 K/UL (ref 3.8–10.6)

## 2018-02-15 RX ADMIN — IPRATROPIUM BROMIDE AND ALBUTEROL SULFATE SCH ML: .5; 3 SOLUTION RESPIRATORY (INHALATION) at 21:44

## 2018-02-15 RX ADMIN — FORMOTEROL FUMARATE DIHYDRATE SCH: 20 SOLUTION RESPIRATORY (INHALATION) at 09:03

## 2018-02-15 RX ADMIN — IPRATROPIUM BROMIDE AND ALBUTEROL SULFATE SCH ML: .5; 3 SOLUTION RESPIRATORY (INHALATION) at 16:40

## 2018-02-15 RX ADMIN — ATORVASTATIN CALCIUM SCH MG: 20 TABLET, FILM COATED ORAL at 08:12

## 2018-02-15 RX ADMIN — HEPARIN SODIUM SCH UNIT: 5000 INJECTION, SOLUTION INTRAVENOUS; SUBCUTANEOUS at 22:01

## 2018-02-15 RX ADMIN — METHYLPREDNISOLONE SODIUM SUCCINATE SCH MG: 125 INJECTION, POWDER, FOR SOLUTION INTRAMUSCULAR; INTRAVENOUS at 17:47

## 2018-02-15 RX ADMIN — INSULIN ASPART SCH UNIT: 100 INJECTION, SOLUTION INTRAVENOUS; SUBCUTANEOUS at 08:13

## 2018-02-15 RX ADMIN — TAMSULOSIN HYDROCHLORIDE SCH MG: 0.4 CAPSULE ORAL at 08:13

## 2018-02-15 RX ADMIN — LEVOFLOXACIN SCH MG: 250 TABLET, FILM COATED ORAL at 08:13

## 2018-02-15 RX ADMIN — INSULIN ASPART SCH UNIT: 100 INJECTION, SOLUTION INTRAVENOUS; SUBCUTANEOUS at 22:01

## 2018-02-15 RX ADMIN — INSULIN ASPART SCH UNIT: 100 INJECTION, SOLUTION INTRAVENOUS; SUBCUTANEOUS at 13:01

## 2018-02-15 RX ADMIN — METHYLPREDNISOLONE SODIUM SUCCINATE SCH MG: 125 INJECTION, POWDER, FOR SOLUTION INTRAMUSCULAR; INTRAVENOUS at 06:17

## 2018-02-15 RX ADMIN — METHYLPREDNISOLONE SODIUM SUCCINATE SCH MG: 125 INJECTION, POWDER, FOR SOLUTION INTRAMUSCULAR; INTRAVENOUS at 13:02

## 2018-02-15 RX ADMIN — IPRATROPIUM BROMIDE AND ALBUTEROL SULFATE SCH ML: .5; 3 SOLUTION RESPIRATORY (INHALATION) at 12:19

## 2018-02-15 RX ADMIN — HEPARIN SODIUM SCH UNIT: 5000 INJECTION, SOLUTION INTRAVENOUS; SUBCUTANEOUS at 08:18

## 2018-02-15 RX ADMIN — LISINOPRIL AND HYDROCHLOROTHIAZIDE SCH EACH: 12.5; 2 TABLET ORAL at 08:13

## 2018-02-15 RX ADMIN — INSULIN ASPART SCH UNIT: 100 INJECTION, SOLUTION INTRAVENOUS; SUBCUTANEOUS at 17:46

## 2018-02-15 RX ADMIN — IPRATROPIUM BROMIDE AND ALBUTEROL SULFATE SCH ML: .5; 3 SOLUTION RESPIRATORY (INHALATION) at 09:02

## 2018-02-15 RX ADMIN — FUROSEMIDE SCH MG: 20 TABLET ORAL at 08:12

## 2018-02-15 RX ADMIN — BUDESONIDE SCH: 1 SUSPENSION RESPIRATORY (INHALATION) at 09:03

## 2018-02-15 RX ADMIN — BUDESONIDE SCH: 1 SUSPENSION RESPIRATORY (INHALATION) at 21:45

## 2018-02-15 RX ADMIN — METHYLPREDNISOLONE SODIUM SUCCINATE SCH MG: 125 INJECTION, POWDER, FOR SOLUTION INTRAMUSCULAR; INTRAVENOUS at 23:38

## 2018-02-15 RX ADMIN — METHYLPREDNISOLONE SODIUM SUCCINATE SCH MG: 125 INJECTION, POWDER, FOR SOLUTION INTRAMUSCULAR; INTRAVENOUS at 01:00

## 2018-02-15 RX ADMIN — FORMOTEROL FUMARATE DIHYDRATE SCH: 20 SOLUTION RESPIRATORY (INHALATION) at 21:45

## 2018-02-15 RX ADMIN — TAMSULOSIN HYDROCHLORIDE SCH MG: 0.4 CAPSULE ORAL at 22:01

## 2018-02-15 NOTE — P.PN
Subjective


Progress Note Date: 02/15/18


Principal diagnosis: 


Acute COPD exacerbation





Progress note dated 02/11/2018





This is an 82-year-old male well-known to me.  He has a history of severe end-

stage oxygen-dependent COPD.  He came to the emergency room complaining of 

increasing shortness of breath for a couple days prior to admission.  In 

addition, he was coughing and wheezing producing some phlegm.  No fever or 

chills.  The patient was recently in the hospital with similar episode of COPD 

exacerbation.  Yesterday, after evaluating him, he had a long talk with his 

wife.  We did talk about CODE STATUS.  I don't think Mr. Barragan will be a good 

candidate for intubation mechanical ventilation given the severity of his 

chronic lung disease.  He is express to me in the past that he did not want to 

be on life support.  I did talk to his wife and granddaughter they're giving 

some consideration.  No decision has been made as yet.  The patient does have 

history of chest pain deep venous thrombosis hyperlipidemia hypertension DJD 

and a previous episode of pneumonia as well as bladder cancer.





Progress note dated 02/12/2018





This is an 82-year-old male well-known to me.  He is a history of severe end-

stage oxygen and steroid dependent COPD.  He was recently in the hospital for a 

COPD exacerbation discharged home.  He was home for a couple weeks and came 

back into the similar episode.  I did have a long talk with his wife and 

granddaughter yesterday.  They understand that the patient is quite ill.  The 

patient's complaints include chest tightness wheezing cough chest congestion a 

very wet congested cough and phlegm production.  No fever or chills.  No chest 

pain or chest discomfort.  He is getting maximal medical therapy.  In addition 

to severe COPD, he has a history of chest pain, deep venous thrombosis, 

hyperlipidemia, hypertension, DJD, and previous episodes of pneumonia, as well 

as bladder cancer history.





Progress note dated 12/13/2018





82-year-old male well-known to me.  He has a history of severe end-stage oxygen 

and steroid dependent COPD.  Feeling a bit better today.  His primary doctor is 

Dr. Whatley.  Apparently Dr. Whatley told him he would go home on a couple of 

days.  I think the patient at baseline he could go home sooner.  In addition to 

severe COPD, he has a history of chest pain DVT hyperlipidemia hypertension DJD 

and previous episodes of pneumonia as well as bladder cancer.  A couple days ago

, had a very long conversation with the patient's wife and granddaughter.  I 

did express to them the fact that he has end-stage COPD and I believe that his 

clinical course with continued to decline.  He's had 2 admissions here in the 

last month or so for the same episode.  It is not much more to do with this 

patient and I did talk about CODE STATUS.  He would not be a good candidate my 

opinion to go on the mechanical ventilator.





On 02/14/2018 patient seen in follow-up.  He is been up ambulating, he was up 

to take a shower and shave.  Still gets short of breath with exertion, but 

recovers with rest.  Overall feeling better, still coughing, but bringing up 

less phlegm.  Sputum culture was positive for Stenotrophomonas maltophilia, 

with sensitivity to Levaquin.  Patient is currently on Levaquin.  His vital 

signs remain stable, he is on 2 L per nasal cannula with O2 sat at 99%.  Lung 

sounds are positive for expiratory wheezes, bilaterally, with some scattered 

rhonchi.  He continues on DuoNeb, Pulmicort, Perforomist, oral Lasix, Levaquin, 

and IV Solu-Medrol 60 mg every 6 hours.





On 02/15/2018 patient seen in follow-up area and he continues to improve.  He 

denies any acute distress, although does become dyspneic with any exertion.  He 

has been ambulating within the room, and on portable oxygen in the hallway, 

tolerates activity fairly well.  On 2 L per nasal cannula his O2 sat at night 99

%.  He is afebrile, vital signs are stable.  Lung sounds are positive for some 

scattered wheezes, but this has improved from yesterday's exam.  No rhonchi or 

rales noted.  Patient has occasional productive cough with yellow sputum.  

Sputum culture was positive for Stenotrophomonas maltophilia with sensitivity 

to Levaquin, which the patient currently on.  Continue current medical 

treatment.











Objective





- Vital Signs


Vital signs: 


 Vital Signs











Temp  98.2 F   02/15/18 07:00


 


Pulse  70   02/15/18 09:58


 


Resp  18   02/15/18 09:58


 


BP  142/64   02/15/18 07:00


 


Pulse Ox  99   02/15/18 07:00








 Intake & Output











 02/14/18 02/15/18 02/15/18





 18:59 06:59 18:59


 


Intake Total 240 500 


 


Balance 240 500 


 


Weight 60.9 kg  


 


Intake:   


 


  Oral 240 500 


 


Other:   


 


  Voiding Method Toilet  Toilet


 


  # Voids 1 1 














- Exam


No acute distress, oriented 3.  Currently on nasal oxygen.





HEENT examination is grossly unremarkable.  Mucous membranes are moist.  No 

oral lesions.





Neck supple.  Full range of motion.  No adenopathy thyromegaly or neck vein 

distention.





Cardiovascular examination reveals regular rhythm rate.  S1-S2 normal.  No S3 

or S4.  No discernible murmur noted.  Heart sounds are distant.





Lungs reveal a positive for scattered wheezes, no rhonchi or rales noted.  

There is good air entry bilaterally.  Overall there has been improvement in 

terms of wheezing and rhonchi





Abdomen soft bowel sounds are heard.  No masses or tenderness.





Extremities are intact.  No cyanosis clubbing or edema.





Skin is without rash or lesion.





Neurologic examination is brief but nonfocal.








- Labs


CBC & Chem 7: 


 02/15/18 07:35





 02/15/18 07:35


Labs: 


 Abnormal Lab Results - Last 24 Hours (Table)











  02/14/18 02/14/18 02/15/18 Range/Units





  16:41 20:21 07:35 


 


RBC    3.69 L  (4.30-5.90)  m/uL


 


Hgb    10.2 L  (13.0-17.5)  gm/dL


 


Hct    33.1 L  (39.0-53.0)  %


 


MCHC    30.9 L  (31.0-37.0)  g/dL


 


RDW    16.1 H  (11.5-15.5)  %


 


Neutrophils #    8.3 H  (1.3-7.7)  k/uL


 


Lymphocytes #    0.1 L  (1.0-4.8)  k/uL


 


Carbon Dioxide     (22-30)  mmol/L


 


BUN     (9-20)  mg/dL


 


Glucose     (74-99)  mg/dL


 


POC Glucose (mg/dL)  136 H  125 H   (75-99)  mg/dL














  02/15/18 02/15/18 02/15/18 Range/Units





  07:35 07:40 11:32 


 


RBC     (4.30-5.90)  m/uL


 


Hgb     (13.0-17.5)  gm/dL


 


Hct     (39.0-53.0)  %


 


MCHC     (31.0-37.0)  g/dL


 


RDW     (11.5-15.5)  %


 


Neutrophils #     (1.3-7.7)  k/uL


 


Lymphocytes #     (1.0-4.8)  k/uL


 


Carbon Dioxide  31 H    (22-30)  mmol/L


 


BUN  72 H    (9-20)  mg/dL


 


Glucose  148 H    (74-99)  mg/dL


 


POC Glucose (mg/dL)   161 H  178 H  (75-99)  mg/dL














Assessment and Plan


Plan: 


Assessment:





COPD exacerbation, complicated by purulent tracheobronchitis 





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





History of bladder cancer





Chronic hypoxemia





Angina pectoris





History of deep venous thrombosis





History of hyperlipidemia





History of hypertension





History of DJD





Previous episode of pneumonia





Chronic hypoxemia particularly at nighttime and with exertion, patient 

noncompliant with oxygen therapy.





Plan:





Patient continues to improve, has been ambulating within the room, tolerating 

it fairly well, although does become dyspneic but recovers with rest.  Lung 

sounds are positive for some expiratory wheezes, no rhonchi or rales.  

Continues with productive cough, with yellow sputum.  Vitals remained stable, 

patient is afebrile, sputum culture was positive for Stenotrophomonas 

maltophilia with sensitivity to Levaquin.  Patient is on Levaquin right now





I performed a history & physical examination of the patient and discussed their 

management with my nurse practitioner, Mayda Álvarez.  I reviewed the nurse 

practitioner's note and agree with the documented findings and plan of care.  

Lung sounds are positive for diffuse wheezes.  The findings and the impression 

was discussed with the patient.  I attest to the documentation by the nurse 

practitioner. 


Time with Patient: Less than 30

## 2018-02-16 LAB
GLUCOSE BLD-MCNC: 126 MG/DL (ref 75–99)
GLUCOSE BLD-MCNC: 136 MG/DL (ref 75–99)
GLUCOSE BLD-MCNC: 159 MG/DL (ref 75–99)
GLUCOSE BLD-MCNC: 182 MG/DL (ref 75–99)

## 2018-02-16 RX ADMIN — FORMOTEROL FUMARATE DIHYDRATE SCH: 20 SOLUTION RESPIRATORY (INHALATION) at 19:53

## 2018-02-16 RX ADMIN — INSULIN ASPART SCH: 100 INJECTION, SOLUTION INTRAVENOUS; SUBCUTANEOUS at 17:53

## 2018-02-16 RX ADMIN — LISINOPRIL AND HYDROCHLOROTHIAZIDE SCH EACH: 12.5; 2 TABLET ORAL at 07:53

## 2018-02-16 RX ADMIN — IPRATROPIUM BROMIDE AND ALBUTEROL SULFATE SCH ML: .5; 3 SOLUTION RESPIRATORY (INHALATION) at 07:19

## 2018-02-16 RX ADMIN — BUDESONIDE SCH: 1 SUSPENSION RESPIRATORY (INHALATION) at 19:53

## 2018-02-16 RX ADMIN — HEPARIN SODIUM SCH UNIT: 5000 INJECTION, SOLUTION INTRAVENOUS; SUBCUTANEOUS at 21:10

## 2018-02-16 RX ADMIN — ATORVASTATIN CALCIUM SCH MG: 20 TABLET, FILM COATED ORAL at 07:52

## 2018-02-16 RX ADMIN — HEPARIN SODIUM SCH UNIT: 5000 INJECTION, SOLUTION INTRAVENOUS; SUBCUTANEOUS at 07:52

## 2018-02-16 RX ADMIN — BUDESONIDE SCH: 1 SUSPENSION RESPIRATORY (INHALATION) at 07:19

## 2018-02-16 RX ADMIN — IPRATROPIUM BROMIDE AND ALBUTEROL SULFATE SCH ML: .5; 3 SOLUTION RESPIRATORY (INHALATION) at 19:53

## 2018-02-16 RX ADMIN — INSULIN ASPART SCH UNIT: 100 INJECTION, SOLUTION INTRAVENOUS; SUBCUTANEOUS at 12:41

## 2018-02-16 RX ADMIN — METHYLPREDNISOLONE SODIUM SUCCINATE SCH MG: 125 INJECTION, POWDER, FOR SOLUTION INTRAMUSCULAR; INTRAVENOUS at 05:28

## 2018-02-16 RX ADMIN — INSULIN ASPART SCH UNIT: 100 INJECTION, SOLUTION INTRAVENOUS; SUBCUTANEOUS at 07:53

## 2018-02-16 RX ADMIN — TAMSULOSIN HYDROCHLORIDE SCH MG: 0.4 CAPSULE ORAL at 07:52

## 2018-02-16 RX ADMIN — TAMSULOSIN HYDROCHLORIDE SCH MG: 0.4 CAPSULE ORAL at 21:11

## 2018-02-16 RX ADMIN — INSULIN ASPART SCH: 100 INJECTION, SOLUTION INTRAVENOUS; SUBCUTANEOUS at 20:57

## 2018-02-16 RX ADMIN — FUROSEMIDE SCH MG: 20 TABLET ORAL at 07:52

## 2018-02-16 RX ADMIN — IPRATROPIUM BROMIDE AND ALBUTEROL SULFATE SCH ML: .5; 3 SOLUTION RESPIRATORY (INHALATION) at 11:15

## 2018-02-16 RX ADMIN — FORMOTEROL FUMARATE DIHYDRATE SCH: 20 SOLUTION RESPIRATORY (INHALATION) at 07:19

## 2018-02-16 RX ADMIN — LEVOFLOXACIN SCH MG: 250 TABLET, FILM COATED ORAL at 07:53

## 2018-02-16 RX ADMIN — IPRATROPIUM BROMIDE AND ALBUTEROL SULFATE SCH ML: .5; 3 SOLUTION RESPIRATORY (INHALATION) at 15:32

## 2018-02-16 NOTE — P.PN
Subjective


Progress Note Date: 02/16/18


Principal diagnosis: 


Acute on chronic hypoxic respiratory failure secondary to COPD exacerbation.








Progress note dated 02/11/2018





This is an 82-year-old male well-known to me.  He has a history of severe end-

stage oxygen-dependent COPD.  He came to the emergency room complaining of 

increasing shortness of breath for a couple days prior to admission.  In 

addition, he was coughing and wheezing producing some phlegm.  No fever or 

chills.  The patient was recently in the hospital with similar episode of COPD 

exacerbation.  Yesterday, after evaluating him, he had a long talk with his 

wife.  We did talk about CODE STATUS.  I don't think Mr. Barragan will be a good 

candidate for intubation mechanical ventilation given the severity of his 

chronic lung disease.  He is express to me in the past that he did not want to 

be on life support.  I did talk to his wife and granddaughter they're giving 

some consideration.  No decision has been made as yet.  The patient does have 

history of chest pain deep venous thrombosis hyperlipidemia hypertension DJD 

and a previous episode of pneumonia as well as bladder cancer.





Progress note dated 02/12/2018





This is an 82-year-old male well-known to me.  He is a history of severe end-

stage oxygen and steroid dependent COPD.  He was recently in the hospital for a 

COPD exacerbation discharged home.  He was home for a couple weeks and came 

back into the similar episode.  I did have a long talk with his wife and 

granddaughter yesterday.  They understand that the patient is quite ill.  The 

patient's complaints include chest tightness wheezing cough chest congestion a 

very wet congested cough and phlegm production.  No fever or chills.  No chest 

pain or chest discomfort.  He is getting maximal medical therapy.  In addition 

to severe COPD, he has a history of chest pain, deep venous thrombosis, 

hyperlipidemia, hypertension, DJD, and previous episodes of pneumonia, as well 

as bladder cancer history.





Progress note dated 12/13/2018





82-year-old male well-known to me.  He has a history of severe end-stage oxygen 

and steroid dependent COPD.  Feeling a bit better today.  His primary doctor is 

Dr. Whatley.  Apparently Dr. Whatley told him he would go home on a couple of 

days.  I think the patient at baseline he could go home sooner.  In addition to 

severe COPD, he has a history of chest pain DVT hyperlipidemia hypertension DJD 

and previous episodes of pneumonia as well as bladder cancer.  A couple days ago

, had a very long conversation with the patient's wife and granddaughter.  I 

did express to them the fact that he has end-stage COPD and I believe that his 

clinical course with continued to decline.  He's had 2 admissions here in the 

last month or so for the same episode.  It is not much more to do with this 

patient and I did talk about CODE STATUS.  He would not be a good candidate my 

opinion to go on the mechanical ventilator.





On 02/14/2018 patient seen in follow-up.  He is been up ambulating, he was up 

to take a shower and shave.  Still gets short of breath with exertion, but 

recovers with rest.  Overall feeling better, still coughing, but bringing up 

less phlegm.  Sputum culture was positive for Stenotrophomonas maltophilia, 

with sensitivity to Levaquin.  Patient is currently on Levaquin.  His vital 

signs remain stable, he is on 2 L per nasal cannula with O2 sat at 99%.  Lung 

sounds are positive for expiratory wheezes, bilaterally, with some scattered 

rhonchi.  He continues on DuoNeb, Pulmicort, Perforomist, oral Lasix, Levaquin, 

and IV Solu-Medrol 60 mg every 6 hours.





On 02/15/2018 patient seen in follow-up area and he continues to improve.  He 

denies any acute distress, although does become dyspneic with any exertion.  He 

has been ambulating within the room, and on portable oxygen in the hallway, 

tolerates activity fairly well.  On 2 L per nasal cannula his O2 sat at night 99

%.  He is afebrile, vital signs are stable.  Lung sounds are positive for some 

scattered wheezes, but this has improved from yesterday's exam.  No rhonchi or 

rales noted.  Patient has occasional productive cough with yellow sputum.  

Sputum culture was positive for Stenotrophomonas maltophilia with sensitivity 

to Levaquin, which the patient currently on.  Continue current medical 

treatment.





Reevaluated today on 2/16/2018, patient is feeling better, breathing easier, 

less cough and less wheezing less shortness of breath.  On physical examination 

continues to have some wheezing, but significantly improved compared to 

yesterday.  Patient will be cleared for discharge home today, must remain on 

Levaquin and on prednisone burst and taper over the next 2-3 weeks.











Objective





- Vital Signs


Vital signs: 


 Vital Signs











Temp  97.1 F L  02/16/18 07:00


 


Pulse  92   02/16/18 11:31


 


Resp  17   02/16/18 09:46


 


BP  143/59   02/16/18 07:00


 


Pulse Ox  100   02/16/18 07:00








 Intake & Output











 02/15/18 02/16/18 02/16/18





 18:59 06:59 18:59


 


Intake Total 240  


 


Balance 240  


 


Intake:   


 


  Oral 240  


 


Other:   


 


  Voiding Method Toilet Toilet Toilet


 


  # Voids 3 1 














- Exam


Physical exam revealed an 82-year-old white male in no distress, very pleasant





HEENT examination is grossly unremarkable.  Mucous membranes are moist.  No 

oral lesions.





Neck supple.  Full range of motion.  No adenopathy thyromegaly or neck vein 

distention.





Cardiovascular examination reveals regular rhythm rate.  S1-S2 normal.  No S3 

or S4.  No discernible murmur noted.  Heart sounds are distant.





Lungs : Revealed diminished breath sounds at the bases, wheezing bilaterally 

more so on forced expiratory maneuver.





Abdomen soft bowel sounds are heard.  No masses or tenderness.





Extremities are intact.  No cyanosis clubbing or edema.





Skin is without rash or lesion.





Neurologic examination no gross focal neurologic deficit.





- Labs


CBC & Chem 7: 


 02/15/18 07:35





 02/15/18 07:35


Labs: 


 Abnormal Lab Results - Last 24 Hours (Table)











  02/15/18 02/15/18 02/16/18 Range/Units





  17:09 20:46 07:23 


 


POC Glucose (mg/dL)  153 H  184 H  159 H  (75-99)  mg/dL














  02/16/18 Range/Units





  11:58 


 


POC Glucose (mg/dL)  182 H  (75-99)  mg/dL














Assessment and Plan


Assessment: 


COPD exacerbation, complicated by purulent tracheobronchitis 





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





History of bladder cancer





Chronic hypoxemia





Angina pectoris





History of deep venous thrombosis





History of hyperlipidemia





History of hypertension





History of DJD





Previous episode of pneumonia





Acute on chronic hypoxic respiratory failure secondary to COPD exacerbation.  





Plan: Patient can be switched to oral prednisone S3 0 mg daily, continue 

Levaquin, continue bronchodilators, clear for discharge home today, follow up 

with Dr. Palmer and on outpatient basis.








Time with Patient: Less than 30

## 2018-02-17 LAB
GLUCOSE BLD-MCNC: 125 MG/DL (ref 75–99)
GLUCOSE BLD-MCNC: 151 MG/DL (ref 75–99)
GLUCOSE BLD-MCNC: 197 MG/DL (ref 75–99)
GLUCOSE BLD-MCNC: 84 MG/DL (ref 75–99)

## 2018-02-17 RX ADMIN — FORMOTEROL FUMARATE DIHYDRATE SCH: 20 SOLUTION RESPIRATORY (INHALATION) at 19:08

## 2018-02-17 RX ADMIN — INSULIN ASPART SCH: 100 INJECTION, SOLUTION INTRAVENOUS; SUBCUTANEOUS at 07:10

## 2018-02-17 RX ADMIN — FUROSEMIDE SCH MG: 20 TABLET ORAL at 13:37

## 2018-02-17 RX ADMIN — BUDESONIDE SCH: 1 SUSPENSION RESPIRATORY (INHALATION) at 19:08

## 2018-02-17 RX ADMIN — TAMSULOSIN HYDROCHLORIDE SCH MG: 0.4 CAPSULE ORAL at 20:49

## 2018-02-17 RX ADMIN — FORMOTEROL FUMARATE DIHYDRATE SCH: 20 SOLUTION RESPIRATORY (INHALATION) at 08:24

## 2018-02-17 RX ADMIN — IPRATROPIUM BROMIDE AND ALBUTEROL SULFATE SCH ML: .5; 3 SOLUTION RESPIRATORY (INHALATION) at 19:09

## 2018-02-17 RX ADMIN — BUDESONIDE SCH: 1 SUSPENSION RESPIRATORY (INHALATION) at 08:23

## 2018-02-17 RX ADMIN — HEPARIN SODIUM SCH UNIT: 5000 INJECTION, SOLUTION INTRAVENOUS; SUBCUTANEOUS at 13:36

## 2018-02-17 RX ADMIN — INSULIN ASPART SCH UNIT: 100 INJECTION, SOLUTION INTRAVENOUS; SUBCUTANEOUS at 18:20

## 2018-02-17 RX ADMIN — IPRATROPIUM BROMIDE AND ALBUTEROL SULFATE SCH ML: .5; 3 SOLUTION RESPIRATORY (INHALATION) at 15:40

## 2018-02-17 RX ADMIN — ATORVASTATIN CALCIUM SCH MG: 20 TABLET, FILM COATED ORAL at 13:37

## 2018-02-17 RX ADMIN — LEVOFLOXACIN SCH MG: 250 TABLET, FILM COATED ORAL at 13:37

## 2018-02-17 RX ADMIN — LISINOPRIL AND HYDROCHLOROTHIAZIDE SCH: 12.5; 2 TABLET ORAL at 12:09

## 2018-02-17 RX ADMIN — INSULIN ASPART SCH UNIT: 100 INJECTION, SOLUTION INTRAVENOUS; SUBCUTANEOUS at 20:49

## 2018-02-17 RX ADMIN — HEPARIN SODIUM SCH UNIT: 5000 INJECTION, SOLUTION INTRAVENOUS; SUBCUTANEOUS at 20:49

## 2018-02-17 RX ADMIN — INSULIN ASPART SCH: 100 INJECTION, SOLUTION INTRAVENOUS; SUBCUTANEOUS at 12:33

## 2018-02-17 RX ADMIN — IPRATROPIUM BROMIDE AND ALBUTEROL SULFATE SCH ML: .5; 3 SOLUTION RESPIRATORY (INHALATION) at 12:12

## 2018-02-17 RX ADMIN — TAMSULOSIN HYDROCHLORIDE SCH MG: 0.4 CAPSULE ORAL at 13:36

## 2018-02-17 RX ADMIN — IPRATROPIUM BROMIDE AND ALBUTEROL SULFATE SCH ML: .5; 3 SOLUTION RESPIRATORY (INHALATION) at 08:23

## 2018-02-17 NOTE — P.PN
Subjective


Progress Note Date: 02/17/18


Principal diagnosis: 


Acute on chronic hypoxic respiratory failure secondary to COPD exacerbation.








Progress note dated 02/11/2018





This is an 82-year-old male well-known to me.  He has a history of severe end-

stage oxygen-dependent COPD.  He came to the emergency room complaining of 

increasing shortness of breath for a couple days prior to admission.  In 

addition, he was coughing and wheezing producing some phlegm.  No fever or 

chills.  The patient was recently in the hospital with similar episode of COPD 

exacerbation.  Yesterday, after evaluating him, he had a long talk with his 

wife.  We did talk about CODE STATUS.  I don't think Mr. Barragan will be a good 

candidate for intubation mechanical ventilation given the severity of his 

chronic lung disease.  He is express to me in the past that he did not want to 

be on life support.  I did talk to his wife and granddaughter they're giving 

some consideration.  No decision has been made as yet.  The patient does have 

history of chest pain deep venous thrombosis hyperlipidemia hypertension DJD 

and a previous episode of pneumonia as well as bladder cancer.





Progress note dated 02/12/2018





This is an 82-year-old male well-known to me.  He is a history of severe end-

stage oxygen and steroid dependent COPD.  He was recently in the hospital for a 

COPD exacerbation discharged home.  He was home for a couple weeks and came 

back into the similar episode.  I did have a long talk with his wife and 

granddaughter yesterday.  They understand that the patient is quite ill.  The 

patient's complaints include chest tightness wheezing cough chest congestion a 

very wet congested cough and phlegm production.  No fever or chills.  No chest 

pain or chest discomfort.  He is getting maximal medical therapy.  In addition 

to severe COPD, he has a history of chest pain, deep venous thrombosis, 

hyperlipidemia, hypertension, DJD, and previous episodes of pneumonia, as well 

as bladder cancer history.





Progress note dated 12/13/2018





82-year-old male well-known to me.  He has a history of severe end-stage oxygen 

and steroid dependent COPD.  Feeling a bit better today.  His primary doctor is 

Dr. Whatley.  Apparently Dr. Whatley told him he would go home on a couple of 

days.  I think the patient at baseline he could go home sooner.  In addition to 

severe COPD, he has a history of chest pain DVT hyperlipidemia hypertension DJD 

and previous episodes of pneumonia as well as bladder cancer.  A couple days ago

, had a very long conversation with the patient's wife and granddaughter.  I 

did express to them the fact that he has end-stage COPD and I believe that his 

clinical course with continued to decline.  He's had 2 admissions here in the 

last month or so for the same episode.  It is not much more to do with this 

patient and I did talk about CODE STATUS.  He would not be a good candidate my 

opinion to go on the mechanical ventilator.





On 02/14/2018 patient seen in follow-up.  He is been up ambulating, he was up 

to take a shower and shave.  Still gets short of breath with exertion, but 

recovers with rest.  Overall feeling better, still coughing, but bringing up 

less phlegm.  Sputum culture was positive for Stenotrophomonas maltophilia, 

with sensitivity to Levaquin.  Patient is currently on Levaquin.  His vital 

signs remain stable, he is on 2 L per nasal cannula with O2 sat at 99%.  Lung 

sounds are positive for expiratory wheezes, bilaterally, with some scattered 

rhonchi.  He continues on DuoNeb, Pulmicort, Perforomist, oral Lasix, Levaquin, 

and IV Solu-Medrol 60 mg every 6 hours.





On 02/15/2018 patient seen in follow-up area and he continues to improve.  He 

denies any acute distress, although does become dyspneic with any exertion.  He 

has been ambulating within the room, and on portable oxygen in the hallway, 

tolerates activity fairly well.  On 2 L per nasal cannula his O2 sat at night 99

%.  He is afebrile, vital signs are stable.  Lung sounds are positive for some 

scattered wheezes, but this has improved from yesterday's exam.  No rhonchi or 

rales noted.  Patient has occasional productive cough with yellow sputum.  

Sputum culture was positive for Stenotrophomonas maltophilia with sensitivity 

to Levaquin, which the patient currently on.  Continue current medical 

treatment.





Reevaluated today on 2/16/2018, patient is feeling better, breathing easier, 

less cough and less wheezing less shortness of breath.  On physical examination 

continues to have some wheezing, but significantly improved compared to 

yesterday.  Patient will be cleared for discharge home today, must remain on 

Levaquin and on prednisone burst and taper over the next 2-3 weeks.





Reevaluated today on 2/17/2018, patient continues to do well, I have cleared 

him for discharge, yesterday and I will clear him again today.  Patient was 

switched to oral Levaquin and prednisone.











Objective





- Vital Signs


Vital signs: 


 Vital Signs











Temp  98 F   02/17/18 07:00


 


Pulse  110 H  02/17/18 12:22


 


Resp  18   02/17/18 12:22


 


BP  91/48   02/17/18 07:00


 


Pulse Ox  98   02/17/18 08:24








 Intake & Output











 02/16/18 02/17/18 02/17/18





 18:59 06:59 18:59


 


Other:   


 


  Voiding Method Toilet Toilet 


 


  # Voids 2 1 














- Exam


Physical exam revealed an 82-year-old white male in no distress, very pleasant





HEENT examination is grossly unremarkable.  Mucous membranes are moist.  No 

oral lesions.





Neck supple.  Full range of motion.  No adenopathy thyromegaly or neck vein 

distention.





Cardiovascular examination reveals regular rhythm rate.  S1-S2 normal.  No S3 

or S4.  No discernible murmur noted.  Heart sounds are distant.





Lungs : Revealed diminished breath sounds at the bases, wheezing bilaterally 

more so on forced expiratory maneuver.





Abdomen soft bowel sounds are heard.  No masses or tenderness.





Extremities are intact.  No cyanosis clubbing or edema.





Skin is without rash or lesion.





Neurologic examination no gross focal neurologic deficit.





- Labs


CBC & Chem 7: 


 02/15/18 07:35





 02/15/18 07:35


Labs: 


 Abnormal Lab Results - Last 24 Hours (Table)











  02/16/18 02/16/18 02/17/18 Range/Units





  17:37 20:23 10:59 


 


POC Glucose (mg/dL)  136 H  126 H  125 H  (75-99)  mg/dL














Assessment and Plan


Assessment: 


COPD exacerbation, complicated by purulent tracheobronchitis 





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





History of bladder cancer





Chronic hypoxemia





Angina pectoris





History of deep venous thrombosis





History of hyperlipidemia





History of hypertension





History of DJD





Previous episode of pneumonia





Acute on chronic hypoxic respiratory failure secondary to COPD exacerbation.  





Plan: Continue present meds, clear for discharge planning today.


Time with Patient: Less than 30

## 2018-02-17 NOTE — PN
PROGRESS NOTE



DATE OF SERVICE:

02/17/2018





I am covering for Dr. Whatley.



This 82-year-old gentleman was admitted with COPD acute exacerbation is being closely

monitored.  Patient still has shortness of breath.  The patient is on p.o. steroids at

this time and as well as bronchodilators.  Dr. Al is following the patient closely.

There is no history of fever, rigors.  No headache, loss of consciousness, seizures at

this time.



PHYSICAL EXAM:

Patient is alert, oriented x3.  Pulse is 100, blood pressure 120/70, respiration 20,

temperature 98 degrees, pulse ox 100% on room air. HEENT:  Conjunctivae normal.

NECK: No jugular venous distention.

CARDIOVASCULAR: S1, S2.

RESPIRATORY: Breath sounds diminished in the bases. Bilateral scattered rhonchi and

crackles. Expiratory wheezing also present.

ABDOMEN:  Soft, nontender.

LEGS: No edema. No swelling.

NERVOUS SYSTEM: No focal deficits.



LABS:

WBC 8.1, hemoglobin 10.8. Accu-Cheks are noted.



ASSESSMENT:

1. Chronic obstructive pulmonary disease acute exacerbation with acute purulent

    tracheobronchitis.

2. Small nodule on the right in the chest x-ray.

3. History of deep vein thrombosis.

4. Hypertension.

5. History of bladder cancer.

6. Hyperlipidemia.

7. History of degenerative joint disease.

8. History of chronic hypoxic respiratory failure on home O2 nasal cannula.

9. History of bladder surgery.



RECOMMENDATIONS AND DISCUSSION:

I recommend to continue current management and symptomatic treatment. Otherwise at this

time,  closely follow with Dr. Al.  Continue with steroids, continue the

bronchodilators.  Continue with the rest of the medications.  Prognosis guarded because

of multiple complex medical issues. Further recommendations to follow.





MMODL / IJN: [id number] / Job#: [id number]

## 2018-02-18 VITALS — HEART RATE: 74 BPM

## 2018-02-18 VITALS — TEMPERATURE: 98.4 F | SYSTOLIC BLOOD PRESSURE: 125 MMHG | DIASTOLIC BLOOD PRESSURE: 79 MMHG

## 2018-02-18 VITALS — RESPIRATION RATE: 16 BRPM

## 2018-02-18 LAB
GLUCOSE BLD-MCNC: 124 MG/DL (ref 75–99)
GLUCOSE BLD-MCNC: 87 MG/DL (ref 75–99)

## 2018-02-18 RX ADMIN — IPRATROPIUM BROMIDE AND ALBUTEROL SULFATE SCH ML: .5; 3 SOLUTION RESPIRATORY (INHALATION) at 11:06

## 2018-02-18 RX ADMIN — IPRATROPIUM BROMIDE AND ALBUTEROL SULFATE SCH ML: .5; 3 SOLUTION RESPIRATORY (INHALATION) at 07:12

## 2018-02-18 RX ADMIN — INSULIN ASPART SCH: 100 INJECTION, SOLUTION INTRAVENOUS; SUBCUTANEOUS at 13:05

## 2018-02-18 RX ADMIN — IPRATROPIUM BROMIDE AND ALBUTEROL SULFATE SCH ML: .5; 3 SOLUTION RESPIRATORY (INHALATION) at 15:16

## 2018-02-18 RX ADMIN — INSULIN ASPART SCH: 100 INJECTION, SOLUTION INTRAVENOUS; SUBCUTANEOUS at 07:12

## 2018-02-18 RX ADMIN — FUROSEMIDE SCH MG: 20 TABLET ORAL at 10:19

## 2018-02-18 RX ADMIN — ATORVASTATIN CALCIUM SCH MG: 20 TABLET, FILM COATED ORAL at 10:19

## 2018-02-18 RX ADMIN — TAMSULOSIN HYDROCHLORIDE SCH MG: 0.4 CAPSULE ORAL at 10:19

## 2018-02-18 RX ADMIN — LISINOPRIL AND HYDROCHLOROTHIAZIDE SCH EACH: 12.5; 2 TABLET ORAL at 10:19

## 2018-02-18 RX ADMIN — HEPARIN SODIUM SCH UNIT: 5000 INJECTION, SOLUTION INTRAVENOUS; SUBCUTANEOUS at 10:19

## 2018-02-18 RX ADMIN — FORMOTEROL FUMARATE DIHYDRATE SCH: 20 SOLUTION RESPIRATORY (INHALATION) at 07:14

## 2018-02-18 RX ADMIN — BUDESONIDE SCH: 1 SUSPENSION RESPIRATORY (INHALATION) at 07:14

## 2018-02-18 NOTE — P.PN
Subjective


Progress Note Date: 02/18/18


Principal diagnosis: 


Acute on chronic hypoxic respiratory failure secondary to COPD exacerbation.








Progress note dated 02/11/2018





This is an 82-year-old male well-known to me.  He has a history of severe end-

stage oxygen-dependent COPD.  He came to the emergency room complaining of 

increasing shortness of breath for a couple days prior to admission.  In 

addition, he was coughing and wheezing producing some phlegm.  No fever or 

chills.  The patient was recently in the hospital with similar episode of COPD 

exacerbation.  Yesterday, after evaluating him, he had a long talk with his 

wife.  We did talk about CODE STATUS.  I don't think Mr. Barragan will be a good 

candidate for intubation mechanical ventilation given the severity of his 

chronic lung disease.  He is express to me in the past that he did not want to 

be on life support.  I did talk to his wife and granddaughter they're giving 

some consideration.  No decision has been made as yet.  The patient does have 

history of chest pain deep venous thrombosis hyperlipidemia hypertension DJD 

and a previous episode of pneumonia as well as bladder cancer.





Progress note dated 02/12/2018





This is an 82-year-old male well-known to me.  He is a history of severe end-

stage oxygen and steroid dependent COPD.  He was recently in the hospital for a 

COPD exacerbation discharged home.  He was home for a couple weeks and came 

back into the similar episode.  I did have a long talk with his wife and 

granddaughter yesterday.  They understand that the patient is quite ill.  The 

patient's complaints include chest tightness wheezing cough chest congestion a 

very wet congested cough and phlegm production.  No fever or chills.  No chest 

pain or chest discomfort.  He is getting maximal medical therapy.  In addition 

to severe COPD, he has a history of chest pain, deep venous thrombosis, 

hyperlipidemia, hypertension, DJD, and previous episodes of pneumonia, as well 

as bladder cancer history.





Progress note dated 12/13/2018





82-year-old male well-known to me.  He has a history of severe end-stage oxygen 

and steroid dependent COPD.  Feeling a bit better today.  His primary doctor is 

Dr. Whatley.  Apparently Dr. Whatley told him he would go home on a couple of 

days.  I think the patient at baseline he could go home sooner.  In addition to 

severe COPD, he has a history of chest pain DVT hyperlipidemia hypertension DJD 

and previous episodes of pneumonia as well as bladder cancer.  A couple days ago

, had a very long conversation with the patient's wife and granddaughter.  I 

did express to them the fact that he has end-stage COPD and I believe that his 

clinical course with continued to decline.  He's had 2 admissions here in the 

last month or so for the same episode.  It is not much more to do with this 

patient and I did talk about CODE STATUS.  He would not be a good candidate my 

opinion to go on the mechanical ventilator.





On 02/14/2018 patient seen in follow-up.  He is been up ambulating, he was up 

to take a shower and shave.  Still gets short of breath with exertion, but 

recovers with rest.  Overall feeling better, still coughing, but bringing up 

less phlegm.  Sputum culture was positive for Stenotrophomonas maltophilia, 

with sensitivity to Levaquin.  Patient is currently on Levaquin.  His vital 

signs remain stable, he is on 2 L per nasal cannula with O2 sat at 99%.  Lung 

sounds are positive for expiratory wheezes, bilaterally, with some scattered 

rhonchi.  He continues on DuoNeb, Pulmicort, Perforomist, oral Lasix, Levaquin, 

and IV Solu-Medrol 60 mg every 6 hours.





On 02/15/2018 patient seen in follow-up area and he continues to improve.  He 

denies any acute distress, although does become dyspneic with any exertion.  He 

has been ambulating within the room, and on portable oxygen in the hallway, 

tolerates activity fairly well.  On 2 L per nasal cannula his O2 sat at night 99

%.  He is afebrile, vital signs are stable.  Lung sounds are positive for some 

scattered wheezes, but this has improved from yesterday's exam.  No rhonchi or 

rales noted.  Patient has occasional productive cough with yellow sputum.  

Sputum culture was positive for Stenotrophomonas maltophilia with sensitivity 

to Levaquin, which the patient currently on.  Continue current medical 

treatment.





Reevaluated today on 2/16/2018, patient is feeling better, breathing easier, 

less cough and less wheezing less shortness of breath.  On physical examination 

continues to have some wheezing, but significantly improved compared to 

yesterday.  Patient will be cleared for discharge home today, must remain on 

Levaquin and on prednisone burst and taper over the next 2-3 weeks.





Reevaluated today on 2/17/2018, patient continues to do well, I have cleared 

him for discharge, yesterday and I will clear him again today.  Patient was 

switched to oral Levaquin and prednisone.





Reevaluated today on 2/18/2018, patient continues to do well, and I have made 

recommendations that the patient was cleared for discharge home today.  I even 

discussed his condition with Dr. Frank on the case.  He is covering Dr. Whatley.











Objective





- Vital Signs


Vital signs: 


 Vital Signs











Temp  98.4 F   02/18/18 07:00


 


Pulse  74   02/18/18 15:29


 


Resp  16   02/18/18 15:29


 


BP  125/79   02/18/18 07:00


 


Pulse Ox  97   02/18/18 12:43








 Intake & Output











 02/17/18 02/18/18 02/18/18





 18:59 06:59 18:59


 


Intake Total 400 700 0


 


Balance 400 700 0


 


Intake:   


 


  Oral 400 700 0


 


Other:   


 


  Voiding Method Toilet Toilet Toilet


 


  # Voids 2 1 3


 


  # Bowel Movements 1  1














- Exam


Physical exam revealed an 82-year-old white male in no distress, very pleasant





HEENT examination is grossly unremarkable.  Mucous membranes are moist.  No 

oral lesions.





Neck supple.  Full range of motion.  No adenopathy thyromegaly or neck vein 

distention.





Cardiovascular examination reveals regular rhythm rate.  S1-S2 normal.  No S3 

or S4.  No discernible murmur noted.  Heart sounds are distant.





Lungs : Revealed diminished breath sounds at the bases, minimal wheezing 

bilaterally more so on forced expiratory maneuver.





Abdomen soft bowel sounds are heard.  No masses or tenderness.





Extremities are intact.  No cyanosis clubbing or edema.





Skin is without rash or lesion.





Neurologic examination no gross focal neurologic deficit.





- Labs


CBC & Chem 7: 


 02/15/18 07:35





 02/15/18 07:35


Labs: 


 Abnormal Lab Results - Last 24 Hours (Table)











  02/17/18 02/17/18 02/18/18 Range/Units





  17:17 20:02 11:26 


 


POC Glucose (mg/dL)  151 H  197 H  124 H  (75-99)  mg/dL














Assessment and Plan


Assessment: 


COPD exacerbation, complicated by purulent tracheobronchitis 





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





History of bladder cancer





Chronic hypoxemia





Angina pectoris





History of deep venous thrombosis





History of hyperlipidemia





History of hypertension





History of DJD





Previous episode of pneumonia





Acute on chronic hypoxic respiratory failure secondary to COPD exacerbation.  





Plan: Continue present meds, clear for discharge again today , discussed with 

Dr. Frank who is covering for Dr. Whatley.


Time with Patient: Less than 30

## 2018-02-18 NOTE — DS
DISCHARGE SUMMARY



FINAL DIAGNOSES:

1. Chronic obstructive pulmonary disease  acute exacerbation with acute purulent

    tracheobronchitis.

2. Small nodule in the right side in the chest x-ray to be followed up in the

    outpatient.

3. History of deep vein thrombosis.

4. Hypertension.

5. History of bladder cancer.

6. Hyperlipidemia.

7. History of degenerative joint disease.

8. History of chronic hypoxic respiratory failure on home O2  nasal cannula.

9. History of bladder surgery.



DISCHARGE DISPOSITION:

The patient is being discharged in stable condition with guarded prognosis.



HISTORY OF PRESENT ILLNESS:

This 82-year-old gentleman with past medical history of multiple medical problems being

followed by  Dr. Whatley in the outpatient setting was admitted with COPD exacerbation.

Patient treated with bronchodilators and steroids.  Patient improved significantly.

Dr. Terrazas saw the patient during the hospitalization.



PHYSICAL EXAMINATION:

On exam, vital signs are stable.  Cardiovascular:  S1, S2 muffled. Respiratory: A  few

scattered rhonchi.



The patient is being discharged in stable condition with guarded prognosis with the

following advice and medications:

1. Diet is cardiac diet.

2. Activity limited until followup.

3. Follow up with Dr. Whatley's in 1 to 2 days.

4. Follow up with Dr. Terrazas as advised.



MEDICATIONS:

1. ProAir HFA 2 puffs q.i.d. and p.r.n.

2. Breo Ellipta as before.

3. Lasix 20 mg p.o. daily.

4. Albuterol Atrovent updrafts q.i.d.

5. Lisinopril hydrochlorothiazide 20/12.5 mg 1 p.o. daily.

6. K-Dur 20 mEq p.o. daily.

7. Prednisone taper 40 mg p.o. for 3 days, 30 for 3 days, 20 for 3 days, 10 for 3 days

    and then 5 mg p.o. daily maintenance.

8. Zocor 40 mg p.o. daily.

9. Flomax 0.4 b.i.d.

10.Incruse Ellipta.





MMODL / IJN: [id number] / Job#: [id number]

## 2018-03-06 ENCOUNTER — HOSPITAL ENCOUNTER (INPATIENT)
Dept: HOSPITAL 47 - EC | Age: 83
LOS: 8 days | Discharge: HOME HEALTH | DRG: 682 | End: 2018-03-14
Attending: FAMILY MEDICINE | Admitting: FAMILY MEDICINE
Payer: MEDICARE

## 2018-03-06 VITALS — BODY MASS INDEX: 18.9 KG/M2

## 2018-03-06 DIAGNOSIS — E86.0: ICD-10-CM

## 2018-03-06 DIAGNOSIS — Z87.01: ICD-10-CM

## 2018-03-06 DIAGNOSIS — J44.1: ICD-10-CM

## 2018-03-06 DIAGNOSIS — E87.3: ICD-10-CM

## 2018-03-06 DIAGNOSIS — Z87.891: ICD-10-CM

## 2018-03-06 DIAGNOSIS — Z99.81: ICD-10-CM

## 2018-03-06 DIAGNOSIS — N17.9: Primary | ICD-10-CM

## 2018-03-06 DIAGNOSIS — N40.0: ICD-10-CM

## 2018-03-06 DIAGNOSIS — H91.10: ICD-10-CM

## 2018-03-06 DIAGNOSIS — E78.5: ICD-10-CM

## 2018-03-06 DIAGNOSIS — F32.9: ICD-10-CM

## 2018-03-06 DIAGNOSIS — D63.8: ICD-10-CM

## 2018-03-06 DIAGNOSIS — K85.90: ICD-10-CM

## 2018-03-06 DIAGNOSIS — Z80.1: ICD-10-CM

## 2018-03-06 DIAGNOSIS — E87.0: ICD-10-CM

## 2018-03-06 DIAGNOSIS — E87.5: ICD-10-CM

## 2018-03-06 DIAGNOSIS — Z85.51: ICD-10-CM

## 2018-03-06 DIAGNOSIS — J98.01: ICD-10-CM

## 2018-03-06 DIAGNOSIS — Z86.718: ICD-10-CM

## 2018-03-06 DIAGNOSIS — Z79.52: ICD-10-CM

## 2018-03-06 DIAGNOSIS — Z79.899: ICD-10-CM

## 2018-03-06 DIAGNOSIS — I10: ICD-10-CM

## 2018-03-06 DIAGNOSIS — J96.11: ICD-10-CM

## 2018-03-06 LAB
ALBUMIN SERPL-MCNC: 3.4 G/DL (ref 3.5–5)
ALP SERPL-CCNC: 82 U/L (ref 38–126)
ALT SERPL-CCNC: 38 U/L (ref 21–72)
ANION GAP SERPL CALC-SCNC: 10 MMOL/L
APTT BLD: 21.8 SEC (ref 22–30)
AST SERPL-CCNC: 21 U/L (ref 17–59)
BASOPHILS # BLD AUTO: 0 K/UL (ref 0–0.2)
BASOPHILS NFR BLD AUTO: 0 %
BUN SERPL-SCNC: 94 MG/DL (ref 9–20)
CALCIUM SPEC-MCNC: 8.9 MG/DL (ref 8.4–10.2)
CHLORIDE SERPL-SCNC: 103 MMOL/L (ref 98–107)
CO2 SERPL-SCNC: 26 MMOL/L (ref 22–30)
EOSINOPHIL # BLD AUTO: 0.1 K/UL (ref 0–0.7)
EOSINOPHIL NFR BLD AUTO: 1 %
ERYTHROCYTE [DISTWIDTH] IN BLOOD BY AUTOMATED COUNT: 3.73 M/UL (ref 4.3–5.9)
ERYTHROCYTE [DISTWIDTH] IN BLOOD: 16.6 % (ref 11.5–15.5)
GLUCOSE SERPL-MCNC: 85 MG/DL (ref 74–99)
HCT VFR BLD AUTO: 31.8 % (ref 39–53)
HGB BLD-MCNC: 10.5 GM/DL (ref 13–17.5)
INR PPP: 0.9 (ref ?–1.2)
LYMPHOCYTES # SPEC AUTO: 0.7 K/UL (ref 1–4.8)
LYMPHOCYTES NFR SPEC AUTO: 7 %
MAGNESIUM SPEC-SCNC: 2.9 MG/DL (ref 1.6–2.3)
MCH RBC QN AUTO: 28.2 PG (ref 25–35)
MCHC RBC AUTO-ENTMCNC: 33.1 G/DL (ref 31–37)
MCV RBC AUTO: 85.1 FL (ref 80–100)
MONOCYTES # BLD AUTO: 0.4 K/UL (ref 0–1)
MONOCYTES NFR BLD AUTO: 4 %
NEUTROPHILS # BLD AUTO: 7.8 K/UL (ref 1.3–7.7)
NEUTROPHILS NFR BLD AUTO: 86 %
PLATELET # BLD AUTO: 420 K/UL (ref 150–450)
POTASSIUM SERPL-SCNC: 6.5 MMOL/L (ref 3.5–5.1)
PROT SERPL-MCNC: 6.5 G/DL (ref 6.3–8.2)
PT BLD: 9.5 SEC (ref 9–12)
SODIUM SERPL-SCNC: 139 MMOL/L (ref 137–145)
WBC # BLD AUTO: 9 K/UL (ref 3.8–10.6)

## 2018-03-06 PROCEDURE — 87070 CULTURE OTHR SPECIMN AEROBIC: CPT

## 2018-03-06 PROCEDURE — 99285 EMERGENCY DEPT VISIT HI MDM: CPT

## 2018-03-06 PROCEDURE — 80053 COMPREHEN METABOLIC PANEL: CPT

## 2018-03-06 PROCEDURE — 81003 URINALYSIS AUTO W/O SCOPE: CPT

## 2018-03-06 PROCEDURE — 94667 MNPJ CHEST WALL 1ST: CPT

## 2018-03-06 PROCEDURE — 85730 THROMBOPLASTIN TIME PARTIAL: CPT

## 2018-03-06 PROCEDURE — 96374 THER/PROPH/DIAG INJ IV PUSH: CPT

## 2018-03-06 PROCEDURE — 83690 ASSAY OF LIPASE: CPT

## 2018-03-06 PROCEDURE — 85610 PROTHROMBIN TIME: CPT

## 2018-03-06 PROCEDURE — 85025 COMPLETE CBC W/AUTO DIFF WBC: CPT

## 2018-03-06 PROCEDURE — 87205 SMEAR GRAM STAIN: CPT

## 2018-03-06 PROCEDURE — 36415 COLL VENOUS BLD VENIPUNCTURE: CPT

## 2018-03-06 PROCEDURE — 83735 ASSAY OF MAGNESIUM: CPT

## 2018-03-06 PROCEDURE — 85379 FIBRIN DEGRADATION QUANT: CPT

## 2018-03-06 PROCEDURE — 82150 ASSAY OF AMYLASE: CPT

## 2018-03-06 PROCEDURE — 96365 THER/PROPH/DIAG IV INF INIT: CPT

## 2018-03-06 PROCEDURE — 83880 ASSAY OF NATRIURETIC PEPTIDE: CPT

## 2018-03-06 PROCEDURE — 94760 N-INVAS EAR/PLS OXIMETRY 1: CPT

## 2018-03-06 PROCEDURE — 83540 ASSAY OF IRON: CPT

## 2018-03-06 PROCEDURE — 84132 ASSAY OF SERUM POTASSIUM: CPT

## 2018-03-06 PROCEDURE — 71045 X-RAY EXAM CHEST 1 VIEW: CPT

## 2018-03-06 PROCEDURE — 76770 US EXAM ABDO BACK WALL COMP: CPT

## 2018-03-06 PROCEDURE — 83550 IRON BINDING TEST: CPT

## 2018-03-06 PROCEDURE — 94640 AIRWAY INHALATION TREATMENT: CPT

## 2018-03-06 PROCEDURE — 80048 BASIC METABOLIC PNL TOTAL CA: CPT

## 2018-03-06 PROCEDURE — 93970 EXTREMITY STUDY: CPT

## 2018-03-06 PROCEDURE — 85027 COMPLETE CBC AUTOMATED: CPT

## 2018-03-06 PROCEDURE — 84484 ASSAY OF TROPONIN QUANT: CPT

## 2018-03-06 PROCEDURE — 93005 ELECTROCARDIOGRAM TRACING: CPT

## 2018-03-06 RX ADMIN — IPRATROPIUM BROMIDE AND ALBUTEROL SULFATE SCH ML: .5; 3 SOLUTION RESPIRATORY (INHALATION) at 20:10

## 2018-03-06 RX ADMIN — FAMOTIDINE SCH MG: 20 TABLET, FILM COATED ORAL at 21:07

## 2018-03-06 RX ADMIN — CEFAZOLIN SCH MLS/HR: 330 INJECTION, POWDER, FOR SOLUTION INTRAMUSCULAR; INTRAVENOUS at 21:40

## 2018-03-06 RX ADMIN — IPRATROPIUM BROMIDE AND ALBUTEROL SULFATE SCH ML: .5; 3 SOLUTION RESPIRATORY (INHALATION) at 15:01

## 2018-03-06 RX ADMIN — TAMSULOSIN HYDROCHLORIDE SCH MG: 0.4 CAPSULE ORAL at 21:07

## 2018-03-06 NOTE — XR
EXAMINATION TYPE: XR chest 1V portable

 

DATE OF EXAM: 3/6/2018

 

Comparison: 2/9/2018

 

Clinical History: 82-year-old male with Pain

 

Findings:

 The cardiomediastinal silhouette, aorta, and pulmonary vasculature are within normal limits.  Hyperi
nflation with relative lung lucencies and minimal peribronchial cuffing. Stringy atelectasis in the l
ower lungs. No consolidation or pleural effusion.

 

 

Impression:

COPD. No acute cardiopulmonary process.

## 2018-03-06 NOTE — ED
General Adult HPI





- General


Chief complaint: Abdominal Pain


Stated complaint: poss stemi


Time Seen by Provider: 18 12:20


Source: patient, EMS


Mode of arrival: EMS


Limitations: no limitations





- History of Present Illness


Initial comments: 





Patient complains of nausea and vomiting, some epigastric discomfort as well as 

an episode of chest pain yesterday.  He did have an episode of chest pain this 

morning as well.  He currently has no chest pain or pressure.  He has some 

shortness of breath, but no lightheadedness or dizziness.  He has no neck pain 

or stiffness.  He has taken no medication for the symptoms.  He was not doing 

anything when he began to feel this way.  There are no specific exacerbating or 

relieving factors.  He denies any pain or swelling the legs.  He denies any 

recent travel.  He has not been around anybody sick recently.  He is not eating 

or drinking anything unusual.





- Related Data


 Home Medications











 Medication  Instructions  Recorded  Confirmed


 


Albuterol Sulfate [Proair Hfa] 2 puff INHALATION RT-Q6H PRN 16


 


Simvastatin [Zocor] 40 mg PO DAILY 16


 


Ipratropium-Albuterol Nebulize 3 ml INHALATION RT-QID 16





[Duoneb 0.5 mg-3 mg/3 ml Soln]   


 


Fluticasone/Vilanterol [Breo 1 puff INHALATION RT-DAILY 17





Ellipta 200-25 Mcg INH]   


 


predniSONE 5 mg PO DAILY 17


 


Tamsulosin HCl [Flomax] 0.4 mg PO BID 17


 


Furosemide [Lasix] 20 mg PO DAILY 18


 


Lisinopril-Hctz 20-12.5 mg 1 tab PO DAILY 18





[Zestoretic 20-12.5]   


 


Potassium Chloride ER [K-Dur 20] 20 meq PO DAILY 18











 Allergies











Allergy/AdvReac Type Severity Reaction Status Date / Time


 


No Known Allergies Allergy   Verified 18 12:24














Review of Systems


ROS Statement: 


Those systems with pertinent positive or pertinent negative responses have been 

documented in the HPI.





ROS Other: All systems not noted in ROS Statement are negative.





Past Medical History


Past Medical History: Cancer, Chest Pain / Angina, COPD, Deep Vein Thrombosis (

DVT), Hyperlipidemia, Hypertension, Osteoarthritis (OA), Pneumonia


Additional Past Medical History / Comment(s): unstable angina pectoris. COPD, 

purulent tracheobronchitis, chronic bronchitis.   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 

OA, back pain, pneumonia , home O2 at 2L/NC.prn, bladder cancer


History of Any Multi-Drug Resistant Organisms: None Reported


Past Surgical History: Bladder Surgery, Tonsillectomy


Additional Past Surgical History / Comment(s): vasectomy


Past Anesthesia/Blood Transfusion Reactions: No Reported Reaction


Past Psychological History: No Psychological Hx Reported


Smoking Status: Former smoker


Past Alcohol Use History: None Reported


Past Drug Use History: None Reported





- Past Family History


  ** Father


Family Medical History: Cancer


Additional Family Medical History / Comment(s): cancer. client reported father 

had scoliosis.





  ** Mother


Additional Family Medical History / Comment(s): mother . unknown history





General Exam


Limitations: no limitations


General appearance: alert, in no apparent distress


Head exam: Present: atraumatic, normocephalic, normal inspection


Eye exam: Present: normal appearance, PERRL, EOMI.  Absent: scleral icterus, 

conjunctival injection, periorbital swelling


ENT exam: Present: normal exam, mucous membranes moist


Neck exam: Present: normal inspection.  Absent: tenderness, meningismus, 

lymphadenopathy


Respiratory exam: Present: normal lung sounds bilaterally.  Absent: respiratory 

distress, wheezes, rales, rhonchi, stridor


Cardiovascular Exam: Present: regular rate, normal rhythm, normal heart sounds.

  Absent: systolic murmur, diastolic murmur, rubs, gallop, clicks


GI/Abdominal exam: Present: soft, normal bowel sounds.  Absent: distended, 

tenderness, guarding, rebound, rigid


Extremities exam: Present: normal inspection, full ROM, normal capillary 

refill.  Absent: tenderness, pedal edema, joint swelling, calf tenderness


Back exam: Present: normal inspection


Neurological exam: Present: alert, oriented X3, CN II-XII intact


Psychiatric exam: Present: normal affect, normal mood


Skin exam: Present: warm, dry, intact, normal color.  Absent: rash





Course





 Vital Signs











  18





  12:17 12:44 13:38


 


Temperature 97.6 F  


 


Pulse Rate 79 95 93


 


Respiratory 18 17 17





Rate   


 


Blood Pressure 120/57 120/57 


 


O2 Sat by Pulse 94 L 94 L 93 L





Oximetry   














EKG Findings





- EKG Comments:


EKG Findings:: Twelve-lead EKG interpreted by me as showing ventricular rate 

122 bpm, normal AR interval and QRS complexes, no ST elevation or depression, 

interpreted by me as sinus tachycardia.  I compared this to prior EKG there are 

no acute changes.  I obtained a bedside consult by cardiology, they do not 

believe the patient's EKG represents anything other than sinus tachycardia.





Medical Decision Making





- Medical Decision Making





Patient complains of nausea, not feeling well.  BUN/creatinine are acutely 

elevated.  I gave him oral Kayexalate, an IV calcium.  Patient will be admitted 

to the hospital.





- Lab Data


Result diagrams: 


 18 12:20





 18 12:20





 Lab Results











  18 Range/Units





  12:20 12:20 12:20 


 


WBC  9.0    (3.8-10.6)  k/uL


 


RBC  3.73 L    (4.30-5.90)  m/uL


 


Hgb  10.5 L    (13.0-17.5)  gm/dL


 


Hct  31.8 L    (39.0-53.0)  %


 


MCV  85.1    (80.0-100.0)  fL


 


MCH  28.2    (25.0-35.0)  pg


 


MCHC  33.1    (31.0-37.0)  g/dL


 


RDW  16.6 H    (11.5-15.5)  %


 


Plt Count  420    (150-450)  k/uL


 


Neutrophils %  86    %


 


Lymphocytes %  7    %


 


Monocytes %  4    %


 


Eosinophils %  1    %


 


Basophils %  0    %


 


Neutrophils #  7.8 H    (1.3-7.7)  k/uL


 


Lymphocytes #  0.7 L    (1.0-4.8)  k/uL


 


Monocytes #  0.4    (0-1.0)  k/uL


 


Eosinophils #  0.1    (0-0.7)  k/uL


 


Basophils #  0.0    (0-0.2)  k/uL


 


Anisocytosis  Slight    


 


PT     (9.0-12.0)  sec


 


INR     (<1.2)  


 


APTT     (22.0-30.0)  sec


 


Sodium   139   (137-145)  mmol/L


 


Potassium   6.5 H*   (3.5-5.1)  mmol/L


 


Chloride   103   ()  mmol/L


 


Carbon Dioxide   26   (22-30)  mmol/L


 


Anion Gap   10   mmol/L


 


BUN   94 H*   (9-20)  mg/dL


 


Creatinine   3.50 H   (0.66-1.25)  mg/dL


 


Est GFR (CKD-EPI)AfAm   18   (>60 ml/min/1.73 sqM)  


 


Est GFR (CKD-EPI)NonAf   15   (>60 ml/min/1.73 sqM)  


 


Glucose   85   (74-99)  mg/dL


 


Calcium   8.9   (8.4-10.2)  mg/dL


 


Magnesium   2.9 H   (1.6-2.3)  mg/dL


 


Total Bilirubin   0.3   (0.2-1.3)  mg/dL


 


AST   21   (17-59)  U/L


 


ALT   38   (21-72)  U/L


 


Alkaline Phosphatase   82   ()  U/L


 


Troponin I     (0.000-0.034)  ng/mL


 


NT-Pro-B Natriuret Pep    123  pg/mL


 


Total Protein   6.5   (6.3-8.2)  g/dL


 


Albumin   3.4 L   (3.5-5.0)  g/dL














  18 Range/Units





  12:20 12:20 


 


WBC    (3.8-10.6)  k/uL


 


RBC    (4.30-5.90)  m/uL


 


Hgb    (13.0-17.5)  gm/dL


 


Hct    (39.0-53.0)  %


 


MCV    (80.0-100.0)  fL


 


MCH    (25.0-35.0)  pg


 


MCHC    (31.0-37.0)  g/dL


 


RDW    (11.5-15.5)  %


 


Plt Count    (150-450)  k/uL


 


Neutrophils %    %


 


Lymphocytes %    %


 


Monocytes %    %


 


Eosinophils %    %


 


Basophils %    %


 


Neutrophils #    (1.3-7.7)  k/uL


 


Lymphocytes #    (1.0-4.8)  k/uL


 


Monocytes #    (0-1.0)  k/uL


 


Eosinophils #    (0-0.7)  k/uL


 


Basophils #    (0-0.2)  k/uL


 


Anisocytosis    


 


PT  9.5   (9.0-12.0)  sec


 


INR  0.9   (<1.2)  


 


APTT  21.8 L   (22.0-30.0)  sec


 


Sodium    (137-145)  mmol/L


 


Potassium    (3.5-5.1)  mmol/L


 


Chloride    ()  mmol/L


 


Carbon Dioxide    (22-30)  mmol/L


 


Anion Gap    mmol/L


 


BUN    (9-20)  mg/dL


 


Creatinine    (0.66-1.25)  mg/dL


 


Est GFR (CKD-EPI)AfAm    (>60 ml/min/1.73 sqM)  


 


Est GFR (CKD-EPI)NonAf    (>60 ml/min/1.73 sqM)  


 


Glucose    (74-99)  mg/dL


 


Calcium    (8.4-10.2)  mg/dL


 


Magnesium    (1.6-2.3)  mg/dL


 


Total Bilirubin    (0.2-1.3)  mg/dL


 


AST    (17-59)  U/L


 


ALT    (21-72)  U/L


 


Alkaline Phosphatase    ()  U/L


 


Troponin I   0.028  (0.000-0.034)  ng/mL


 


NT-Pro-B Natriuret Pep    pg/mL


 


Total Protein    (6.3-8.2)  g/dL


 


Albumin    (3.5-5.0)  g/dL














Disposition


Clinical Impression: 


 Kidney failure





Disposition: ADMITTED AS IP TO THIS HOSP


Condition: Serious


Referrals: 


Nik Whatley MD [Primary Care Provider] - 1-2 days


Time of Disposition: 13:59

## 2018-03-06 NOTE — P.HPIM
History of Present Illness


H&P Date: 18


Chief Complaint: Generalized weakness.





This is a history and physical an 82-year-old white male who is struggling with 

exacerbation of COPD but states he's been having difficulty with generalized 

weakness and malaise.  Workup in the emergency room showed acute renal injury.  

The patient is now admitted for appropriate IV hydration.  He states from a 

COPD perspective, he is breathing appropriately.  No overt chest pain no loss 

of consciousness.  No numbness or tingling.  Visual loss or headache stated.  

The patient this does not describe any type of voiding difficulty.





Review of Systems


Constitutional: Denies chills, Denies fever


Eyes: denies blurred vision, denies pain


Ears, nose, mouth and throat: Denies headache, Denies sore throat


Cardiovascular: Denies chest pain, Denies shortness of breath


Respiratory: Denies cough


Gastrointestinal: Denies abdominal pain, Denies diarrhea, Denies nausea, Denies 

vomiting


Musculoskeletal: Denies myalgias


Integumentary: Denies pruritus, Denies rash


Neurological: Denies numbness, Denies weakness





Past Medical History


Past Medical History: Cancer, Chest Pain / Angina, COPD, Deep Vein Thrombosis (

DVT), Hyperlipidemia, Hypertension, Osteoarthritis (OA), Pneumonia, Prostate 

Disorder


Additional Past Medical History / Comment(s): Pt had recent admission to [facility] 

on 18 with exacerbation COPD and small nodule R lung to be followed up.   

    Other hx:  Bladder cancer with surgery, purulent tracheobronchitis, home O2 

2L/NC prn, dvt R leg, DJD, back pain, BPH.


History of Any Multi-Drug Resistant Organisms: None Reported


Past Surgical History: Bladder Surgery, Tonsillectomy


Additional Past Surgical History / Comment(s): Bladder cancer with surgery, 

vasectomy


Past Anesthesia/Blood Transfusion Reactions: No Reported Reaction


Smoking Status: Former smoker





- Past Family History


  ** Father


Family Medical History: Cancer


Additional Family Medical History / Comment(s): Father had scoliosis.  Father 

 of  lung cancer





  ** Mother


History Unknown: Yes


Additional Family Medical History / Comment(s): Mother . Unknown history





Medications and Allergies


 Home Medications











 Medication  Instructions  Recorded  Confirmed  Type


 


Albuterol Sulfate [Proair Hfa] 2 puff INHALATION RT-Q6H PRN 16 

History


 


Simvastatin [Zocor] 40 mg PO DAILY 16 History


 


Ipratropium-Albuterol Nebulize 3 ml INHALATION RT-QID 16 History





[Duoneb 0.5 mg-3 mg/3 ml Soln]    


 


Fluticasone/Vilanterol [Breo 1 puff INHALATION RT-DAILY 17 

History





Ellipta 200-25 Mcg INH]    


 


predniSONE 5 mg PO DAILY 17 History


 


Tamsulosin HCl [Flomax] 0.4 mg PO BID 17 History


 


Furosemide [Lasix] 20 mg PO DAILY 18 History


 


Lisinopril-Hctz 20-12.5 mg 1 tab PO DAILY 18 History





[Zestoretic 20-12.5]    


 


Potassium Chloride ER [K-Dur 20] 20 meq PO DAILY 18 History











 Allergies











Allergy/AdvReac Type Severity Reaction Status Date / Time


 


No Known Allergies Allergy   Verified 18 12:24














Physical Exam


Vitals: 


 Vital Signs











  Temp Pulse Resp BP Pulse Ox


 


 18 15:20   97  17  119/49  97


 


 18 15:03      95


 


 18 15:01   100  18  


 


 18 13:38   93  17   93 L


 


 18 12:44   95  17  120/57  94 L


 


 18 12:17  97.6 F  79  18  120/57  94 L








 Intake and Output











 18





 06:59 14:59 22:59


 


Other:   


 


  Weight  62.596 kg 








 Patient Weight











 18





 06:59


 


Weight 62.596 kg














- Constitutional


General appearance: average body habitus





- EENT


Eyes: no abnormal pupil





- Neck


Neck: no lymphadenopathy





- Respiratory


Respiratory: bilateral: CTA





- Cardiovascular


Rhythm: regular


Heart sounds: normal: S1, S2





- Gastrointestinal


General gastrointestinal: soft, no tenderness





- Integumentary


Integumentary: no rash





- Psychiatric


Psychiatric: A&O x's 3, appropriate affect





Results


CBC & Chem 7: 


 18 12:20





 18 12:20


Labs: 


 Abnormal Lab Results - Last 24 Hours (Table)











  18 Range/Units





  12:20 12:20 12:20 


 


RBC  3.73 L    (4.30-5.90)  m/uL


 


Hgb  10.5 L    (13.0-17.5)  gm/dL


 


Hct  31.8 L    (39.0-53.0)  %


 


RDW  16.6 H    (11.5-15.5)  %


 


Neutrophils #  7.8 H    (1.3-7.7)  k/uL


 


Lymphocytes #  0.7 L    (1.0-4.8)  k/uL


 


APTT    21.8 L  (22.0-30.0)  sec


 


Potassium   6.5 H*   (3.5-5.1)  mmol/L


 


BUN   94 H*   (9-20)  mg/dL


 


Creatinine   3.50 H   (0.66-1.25)  mg/dL


 


Magnesium   2.9 H   (1.6-2.3)  mg/dL


 


Albumin   3.4 L   (3.5-5.0)  g/dL














Thrombosis Risk Factor Assmnt





- Choose All That Apply


Any of the Below Risk Factors Present?: Yes


Each Factor Represents 1 point: Abnormal pulmonary function (COPD), Serious 

lung disease incl. pneumonia (< 1month)


Other Risk Factors: Yes


Each Risk Factor Represents 2 Points: Malignancy


Each Risk Factor Represents 3 Points: Age 75 years or older, History of DVT/PE


Other congenital or acquired thrombophilia - If yes, enter type in comment: No


Thrombosis Risk Factor Assessment Total Risk Factor Score: 10


Thrombosis Risk Factor Assessment Level: High Risk





Assessment and Plan


(1) Kidney failure


Current Visit: Yes   Status: Acute   Code(s): N19 - UNSPECIFIED KIDNEY FAILURE 

  SNOMED Code(s): 97056005


   





(2) Acute hyperkalemia


Current Visit: No   Status: Acute   Code(s): E87.5 - HYPERKALEMIA   SNOMED Code(

s): 2500086


   





(3) Hyperlipidemia


Current Visit: No   Status: Acute   Code(s): E78.5 - HYPERLIPIDEMIA, 

UNSPECIFIED   SNOMED Code(s): 26893230


   





(4) Hypertension


Current Visit: No   Status: Acute   Code(s): I10 - ESSENTIAL (PRIMARY) 

HYPERTENSION   SNOMED Code(s): 20200759


   





(5) Hypoxia


Current Visit: No   Status: Acute   Code(s): R09.02 - HYPOXEMIA   SNOMED Code(s)

: 591071193


   


Plan: 





IV hydration.





Treat appropriately for hyperkalemia.





Check CMP in a.m.





We'll go ahead and consult nephrology if necessary.





Otherwise, the patient is a full code at this time.


Time with Patient: Greater than 30

## 2018-03-07 LAB
ALBUMIN SERPL-MCNC: 2.9 G/DL (ref 3.5–5)
ALP SERPL-CCNC: 68 U/L (ref 38–126)
ALT SERPL-CCNC: 34 U/L (ref 21–72)
AMYLASE SERPL-CCNC: 145 U/L (ref 30–110)
ANION GAP SERPL CALC-SCNC: 9 MMOL/L
AST SERPL-CCNC: 22 U/L (ref 17–59)
BUN SERPL-SCNC: 86 MG/DL (ref 9–20)
CALCIUM SPEC-MCNC: 8.5 MG/DL (ref 8.4–10.2)
CHLORIDE SERPL-SCNC: 107 MMOL/L (ref 98–107)
CO2 SERPL-SCNC: 28 MMOL/L (ref 22–30)
GLUCOSE BLD-MCNC: 172 MG/DL (ref 75–99)
GLUCOSE SERPL-MCNC: 83 MG/DL (ref 74–99)
LIPASE SERPL-CCNC: 634 U/L (ref 23–300)
PH UR: 6 [PH] (ref 5–8)
POTASSIUM SERPL-SCNC: 6.1 MMOL/L (ref 3.5–5.1)
PROT SERPL-MCNC: 5.6 G/DL (ref 6.3–8.2)
SODIUM SERPL-SCNC: 144 MMOL/L (ref 137–145)
SP GR UR: 1.01 (ref 1–1.03)
UROBILINOGEN UR QL STRIP: <2 MG/DL (ref ?–2)

## 2018-03-07 RX ADMIN — IPRATROPIUM BROMIDE AND ALBUTEROL SULFATE SCH ML: .5; 3 SOLUTION RESPIRATORY (INHALATION) at 12:02

## 2018-03-07 RX ADMIN — BUDESONIDE AND FORMOTEROL FUMARATE DIHYDRATE SCH PUFF: 160; 4.5 AEROSOL RESPIRATORY (INHALATION) at 08:43

## 2018-03-07 RX ADMIN — CEFAZOLIN SCH MLS/HR: 330 INJECTION, POWDER, FOR SOLUTION INTRAMUSCULAR; INTRAVENOUS at 20:10

## 2018-03-07 RX ADMIN — TAMSULOSIN HYDROCHLORIDE SCH MG: 0.4 CAPSULE ORAL at 20:10

## 2018-03-07 RX ADMIN — BUDESONIDE AND FORMOTEROL FUMARATE DIHYDRATE SCH PUFF: 160; 4.5 AEROSOL RESPIRATORY (INHALATION) at 20:46

## 2018-03-07 RX ADMIN — IPRATROPIUM BROMIDE AND ALBUTEROL SULFATE SCH ML: .5; 3 SOLUTION RESPIRATORY (INHALATION) at 20:46

## 2018-03-07 RX ADMIN — FAMOTIDINE SCH MG: 20 TABLET, FILM COATED ORAL at 09:09

## 2018-03-07 RX ADMIN — IPRATROPIUM BROMIDE AND ALBUTEROL SULFATE SCH ML: .5; 3 SOLUTION RESPIRATORY (INHALATION) at 16:37

## 2018-03-07 RX ADMIN — CEFAZOLIN SCH: 330 INJECTION, POWDER, FOR SOLUTION INTRAMUSCULAR; INTRAVENOUS at 23:52

## 2018-03-07 RX ADMIN — ATORVASTATIN CALCIUM SCH MG: 20 TABLET, FILM COATED ORAL at 09:08

## 2018-03-07 RX ADMIN — INSULIN ASPART SCH UNIT: 100 INJECTION, SOLUTION INTRAVENOUS; SUBCUTANEOUS at 21:31

## 2018-03-07 RX ADMIN — IPRATROPIUM BROMIDE AND ALBUTEROL SULFATE SCH ML: .5; 3 SOLUTION RESPIRATORY (INHALATION) at 08:43

## 2018-03-07 RX ADMIN — TAMSULOSIN HYDROCHLORIDE SCH MG: 0.4 CAPSULE ORAL at 09:08

## 2018-03-07 RX ADMIN — METHYLPREDNISOLONE SODIUM SUCCINATE SCH MG: 40 INJECTION, POWDER, FOR SOLUTION INTRAMUSCULAR; INTRAVENOUS at 23:46

## 2018-03-07 RX ADMIN — METHYLPREDNISOLONE SODIUM SUCCINATE SCH MG: 40 INJECTION, POWDER, FOR SOLUTION INTRAMUSCULAR; INTRAVENOUS at 17:22

## 2018-03-07 NOTE — US
EXAMINATION TYPE: US kidneys/renal and bladder

 

DATE OF EXAM: 3/7/2018

 

COMPARISON: NONE

 

CLINICAL HISTORY: RF. Abnormal labs

 

EXAM MEASUREMENTS:

 

Right Kidney:  9.8 x 4.2 x 3.9 cm

Left Kidney: 9.7 x 4.2 x 4.9 cm

 

**Limited exam due to patient unable to turn on side

 

Right Kidney: No hydronephrosis or masses seen  

Left Kidney: No hydronephrosis or masses seen  

Bladder: wnl, distended

**Bilateral Jets not seen

 

IMPRESSION:

1. Unremarkable renal ultrasound.

2. Some limitation due to patient inability to cooperate with the exam

## 2018-03-07 NOTE — P.CNPUL
History of Present Illness


Consult date: 18


Reason for consult: dyspnea, chest pain, COPD


History of present illness: 





82-year-old male patient with advanced oxygen-dependent COPD with chronic 

hypoxic and steroid dependent respiratory failure and the patient has an FEV1 

of 33% of predicted.  The patient has been having multiple hospitalizations for 

pulmonary complications.  During a hospitalization back in January the patient 

was found to have stenotrophomonas in the bronchioloalveolar lavage that was 

identified at a time of bronchoscopy.  The patient was treated with Bactrim 

back then.  During the course of treatment the patient developed an acute 

kidney injury that was drug-induced and ultimately he was treated and 

recovered.  Nevertheless he continued to be short of breath and he had a very 

limited overall performance and functional status due to shortness of breath.  

He has constant cough and congestion his chest.  He also has history of DVT of 

the lower extremity.  He was treated with anticoagulation for quite some time 

and he was subsequently taken off anticoagulation due to hematuria the patient 

is also known to have bladder cancer.  He does not recall any status where he 

had pulmonary embolism.  





The patient came into the hospital because of increased shortness of breath, 

pain across the left chest which was somewhat pleuritic in nature.  No 

hemoptysis.  He has congested and he has constant cough and bronchospasm 

wheezing related to his advanced COPD.  No calf pain or tenderness pain no 

swelling lower extremities.  He is having some lower abdominal pain in 

addition.  He has stooled.  No evidence of any GI bleeding.  No nausea or 

vomiting.  No diarrhea.  No dysuria exam [place].  No hematuria.  Urinalysis has 

not been done.  The patient's oral intake has been overall low and the patient 

was found to have an acute kidney injury on top of his chronic renal failure.  

His creatinine is up to 3.5 at the time of admission with a BUN of 94.  

Potassium level was also at 6.1.  The patient was treated for his hyperkalemia 

and repeat level is at 6.1.  Note that the patient was given Kayexalate 45 g 

yesterday and 30 g today.  The patient is also on IV fluids at 150 mL of normal 

saline.  No leukocytosis.  





Review of Systems








All systems: negative


Constitutional: Denies chills, Denies fever


Eyes: denies blurred vision, denies pain


Ears, nose, mouth and throat: Denies headache, Denies sore throat


Cardiovascular: Nonspecific pain across the chest and the patient's pain is 

somewhat pleuritic in nature.  He has chronic dyspnea.  No palpitations per no 

swelling in lower extremities.  no tenderness.


Respiratory: Denies cough and chronic dyspnea and cough and wheezing


Gastrointestinal: Patient is having abdominal pain, Denies diarrhea, Denies 

nausea, Denies vomiting


Musculoskeletal: Denies myalgias


Integumentary: Denies pruritus, Denies rash


Neurological: Denies numbness, Denies weakness


Psychiatric: Denies anxiety, Denies depression


Endocrine: Denies fatigue, Denies weight change





Past Medical History


Past Medical History: Cancer, Chest Pain / Angina, COPD, Deep Vein Thrombosis (

DVT), Hyperlipidemia, Hypertension, Osteoarthritis (OA), Pneumonia, Prostate 

Disorder


Additional Past Medical History / Comment(s): .Severe COPD with an FEV1 of 33% 

of predicted, chronic hypoxic respiratory failure, recurrent hospitalization 

for COPD exacerbation, stenotrophomonas tracheal bronchitis, bladder cancer 

with previous surgery, degenerative arthritis, right lower extremities DVT, BPH

, chronic back pain, hypertension, hyperlipidemia


History of Any Multi-Drug Resistant Organisms: None Reported


Past Surgical History: Bladder Surgery, Tonsillectomy


Additional Past Surgical History / Comment(s): Bladder cancer with surgery, 

vasectomy


Past Anesthesia/Blood Transfusion Reactions: No Reported Reaction


Smoking Status: Former smoker





- Past Family History


  ** Father


Family Medical History: Cancer


Additional Family Medical History / Comment(s): Father had scoliosis.  Father 

 of  lung cancer





  ** Mother


History Unknown: Yes


Additional Family Medical History / Comment(s): Mother . Unknown history





Medications and Allergies


 Home Medications











 Medication  Instructions  Recorded  Confirmed  Type


 


Albuterol Sulfate [Proair Hfa] 2 puff INHALATION RT-Q6H PRN 16 

History


 


Simvastatin [Zocor] 40 mg PO DAILY 16 History


 


Ipratropium-Albuterol Nebulize 3 ml INHALATION RT-QID 16 History





[Duoneb 0.5 mg-3 mg/3 ml Soln]    


 


Fluticasone/Vilanterol [Breo 1 puff INHALATION RT-DAILY 17 

History





Ellipta 200-25 Mcg INH]    


 


predniSONE 5 mg PO DAILY 17 History


 


Tamsulosin HCl [Flomax] 0.4 mg PO BID 17 History


 


Furosemide [Lasix] 20 mg PO DAILY 18 History


 


Lisinopril-Hctz 20-12.5 mg 1 tab PO DAILY 18 History





[Zestoretic 20-12.5]    


 


Potassium Chloride ER [K-Dur 20] 20 meq PO DAILY 18 History











 Allergies











Allergy/AdvReac Type Severity Reaction Status Date / Time


 


No Known Allergies Allergy   Verified 18 12:24














Physical Exam


Vitals: 


 Vital Signs











  Temp Pulse Pulse Resp BP BP Pulse Ox


 


 18 16:00  96.8 F L   113 H  20   103/74  93 L


 


 18 12:16   90     


 


 18 12:12   92     


 


 18 12:02   90     


 


 18 11:51  96.9 F L   91  20   109/54  95


 


 18 08:53   94     


 


 18 08:43   92     


 


 18 08:00  96.8 F L   103 H  20   96/50  99


 


 18 03:15  97.2 F L   91  20   94/46  91 L


 


 18 23:25  96.9 F L   96  20   109/47  91 L


 


 18 21:00  96.7 F L   91  20   105/50  96


 


 18 20:21   96   18   


 


 18 20:10   94   18   


 


 18 17:30  97.3 F L  99  95  24  112/52  88/51  95


 


 18 16:38   89   17  119/55   96








 Intake and Output











 18





 06:59 14:59 22:59


 


Intake Total 975 480 


 


Output Total  750 275


 


Balance 975 -270 -275


 


Intake:   


 


  Intake, IV Titration 675  





  Amount   


 


    Sodium Chloride 0.9% 1, 525  





    000 ml @ 75 mls/hr IV .   





    [id number] [facility] Rx#:[id number]   


 


    Sodium Chloride 0.9% 1, 150  





    000 ml In Empty Bag 1 bag   





    @ 150 ML/KG/HR 9389.4   





    mls/hr IV .Q7M ONE Rx#:   





    [id number]   


 


  Oral 300 480 


 


Output:   


 


  Urine  750 275


 


Other:   


 


  Voiding Method Urinal  


 


  Weight 58.3 kg 58.3 kg 








 Patient Weight











 18





 06:59


 


Weight 58.3 kg

















GENERAL EXAM: Alert, pleasant, thin, 82-year-old white male, mildly short of 

breath at rest, but fairly comfortable


HEAD: Normocephalic/atraumatic.


EYES: Normal reaction of pupils, equal size.  Conjunctiva pink, sclera white.


NOSE: Clear with pink turbinates.


THROAT: No erythema or exudates.


NECK: No masses, no JVD, no thyroid enlargement, no adenopathy.


CHEST: No chest wall deformity.  Symmetrical expansion. 


LUNGS: Equal air entry with scattered rhonchi, and wheezing throughout the lung 

fields.


CVS: Regular rate and rhythm, normal S1 and S2, no gallops, no murmurs, no rubs


ABDOMEN: Soft, nontender.  No hepatosplenomegaly, normal bowel sounds, no 

guarding or rigidity.


EXTREMITIES: No clubbing, no edema, no cyanosis, 2+ pulses and upper and lower 

extremities.


MUSCULOSKELETAL: Muscle strength and tone normal.


SPINE: No scoliosis or deformity


SKIN: No rashes


CENTRAL NERVOUS SYSTEM: Alert and oriented -3.  No focal deficits, tone is 

normal in all 4 extremities.


PSYCHIATRIC: Alert and oriented -3.  Appropriate affect.  Intact judgment and 

insight. 








Results





- Laboratory Findings


CBC and BMP: 


 18 12:20





 18 06:16


PT/INR, D-dimer











PT  9.5 sec (9.0-12.0)   18  12:20    


 


INR  0.9  (<1.2)   18  12:20    


 


D-Dimer  12.19 mg/L FEU (<0.60)  H  18  14:46    








Abnormal lab findings: 


 Abnormal Labs











  18





  12:20 12:20 12:20


 


RBC  3.73 L  


 


Hgb  10.5 L  


 


Hct  31.8 L  


 


RDW  16.6 H  


 


Neutrophils #  7.8 H  


 


Lymphocytes #  0.7 L  


 


APTT    21.8 L


 


D-Dimer   


 


Potassium   6.5 H* 


 


BUN   94 H* 


 


Creatinine   3.50 H 


 


Magnesium   2.9 H 


 


Total Protein   


 


Albumin   3.4 L 


 


Amylase   


 


Lipase   














  18





  20:01 06:16 14:46


 


RBC   


 


Hgb   


 


Hct   


 


RDW   


 


Neutrophils #   


 


Lymphocytes #   


 


APTT   


 


D-Dimer    12.19 H


 


Potassium  6.1 H  6.1 H 


 


BUN   86 H* 


 


Creatinine   3.04 H 


 


Magnesium   


 


Total Protein   5.6 L 


 


Albumin   2.9 L 


 


Amylase   


 


Lipase   














  18





  14:46


 


RBC 


 


Hgb 


 


Hct 


 


RDW 


 


Neutrophils # 


 


Lymphocytes # 


 


APTT 


 


D-Dimer 


 


Potassium 


 


BUN 


 


Creatinine 


 


Magnesium 


 


Total Protein 


 


Albumin 


 


Amylase  145 H


 


Lipase  634 H














Assessment and Plan


Assessment: 








Assessment





-  Acute COPD exacerbation completed by tracheobronchitis, and the patient is a 

clear chest x-ray and there is no evidence of any acute pulmonary infiltration.

  Atelectatic change in lung bases are seen.





- Advanced steroid dependent COPD, with a baseline FEV1 of 33% of predicted





-  Acute kidney injury, likely secondary to intravascular volume depletion and 

dehydration and the creatinine is up to 3.5





-  Hyperkalemia, possibly related to acute kidney injury .  Patient is also on 

potassium supplements and lisinopril which is probably contributing to the 

acute kidney injury.





- History of nicotine dependence





- hyperlipidemia





- Hypertension





- Neoplasm of bladder





- History of deep venous thrombosis, history of currently on no anticoagulants





-Stenotrophomonas l tracheobronchitis treated  in 2018





-Abdominal pain, under investigation





Plan





Will check a baseline d-dimer.  We will check the Doppler of the lower 

extremity.  We'll hold off anticoagulation for now knowing that my overall 

suspicion for PE is low.  However I would like to obtain a CT angios the chest 

once the patient's creatinine is normalized with fluid resuscitation.  We'll 

check amylase.  We'll check lipase.  We'll check ultrasound abdomen.  Treatment 

of acute COPD exacerbation with a routine bronchodilators and steroids.  We'll 

continue to follow.

## 2018-03-07 NOTE — P.PN
Subjective


Progress Note Date: 03/07/18


Principal diagnosis: 





Acute renal failure.





This is a continue process on 8-year-old white male essentially admitted for 

acute renal failure.  Intermittent hyperkalemia is noted.  He was been given 

Kayexalate and now his potassium is 6.1.  We will go ahead and give him another 

dose of Kayexalate but DC his lisinopril/hydrochlorothiazide at this point.  

The patient otherwise states no new breathing issues.  He has an underlying 

history of severe COPD.





Objective





- Vital Signs


Vital signs: 


 Vital Signs











Temp  97.2 F L  03/07/18 03:15


 


Pulse  91   03/07/18 03:15


 


Resp  20   03/07/18 03:15


 


BP  94/46   03/07/18 03:15


 


Pulse Ox  91 L  03/07/18 03:15








 Intake & Output











 03/06/18 03/07/18 03/07/18





 18:59 06:59 18:59


 


Intake Total  975 


 


Output Total  350 


 


Balance  625 


 


Weight 62.596 kg 58.3 kg 


 


Intake:   


 


  Intake, IV Titration  675 





  Amount   


 


    Sodium Chloride 0.9% 1,  525 





    000 ml @ 75 mls/hr IV .   





    [id number] [facility] Rx#:[id number]   


 


    Sodium Chloride 0.9% 1,  150 





    000 ml In Empty Bag 1 bag   





    @ 150 ML/KG/HR 9389.4   





    mls/hr IV .Q7M ONE Rx#:   





    [id number]   


 


  Oral  300 


 


Output:   


 


  Urine  350 


 


Other:   


 


  Voiding Method Urinal Urinal 














- Constitutional


General appearance: Present: thin





- EENT


Eyes: Absent: abnormal pupil


ENT: Present: hard of hearing





- Respiratory


Respiratory: bilateral: rhonchi





- Cardiovascular


Rhythm: regular


Heart sounds: normal: S1, S2


Abnormal Heart Sounds: Absent: S3 Gallop





- Gastrointestinal


General gastrointestinal: Present: soft.  Absent: tenderness





- Musculoskeletal


Musculoskeletal: Present: generalized weakness





- Psychiatric


Psychiatric: Present: A&O x's 3





- Labs


CBC & Chem 7: 


 03/06/18 12:20





 03/07/18 06:16


Labs: 


 Abnormal Lab Results - Last 24 Hours (Table)











  03/06/18 03/06/18 03/06/18 Range/Units





  12:20 12:20 12:20 


 


RBC  3.73 L    (4.30-5.90)  m/uL


 


Hgb  10.5 L    (13.0-17.5)  gm/dL


 


Hct  31.8 L    (39.0-53.0)  %


 


RDW  16.6 H    (11.5-15.5)  %


 


Neutrophils #  7.8 H    (1.3-7.7)  k/uL


 


Lymphocytes #  0.7 L    (1.0-4.8)  k/uL


 


APTT    21.8 L  (22.0-30.0)  sec


 


Potassium   6.5 H*   (3.5-5.1)  mmol/L


 


BUN   94 H*   (9-20)  mg/dL


 


Creatinine   3.50 H   (0.66-1.25)  mg/dL


 


Magnesium   2.9 H   (1.6-2.3)  mg/dL


 


Total Protein     (6.3-8.2)  g/dL


 


Albumin   3.4 L   (3.5-5.0)  g/dL














  03/06/18 03/07/18 Range/Units





  20:01 06:16 


 


RBC    (4.30-5.90)  m/uL


 


Hgb    (13.0-17.5)  gm/dL


 


Hct    (39.0-53.0)  %


 


RDW    (11.5-15.5)  %


 


Neutrophils #    (1.3-7.7)  k/uL


 


Lymphocytes #    (1.0-4.8)  k/uL


 


APTT    (22.0-30.0)  sec


 


Potassium  6.1 H  6.1 H  (3.5-5.1)  mmol/L


 


BUN   86 H*  (9-20)  mg/dL


 


Creatinine   3.04 H  (0.66-1.25)  mg/dL


 


Magnesium    (1.6-2.3)  mg/dL


 


Total Protein   5.6 L  (6.3-8.2)  g/dL


 


Albumin   2.9 L  (3.5-5.0)  g/dL














Assessment and Plan


(1) Kidney failure


Current Visit: Yes   Status: Acute   Code(s): N19 - UNSPECIFIED KIDNEY FAILURE 

  SNOMED Code(s): 92348277


   





(2) Acute hyperkalemia


Current Visit: No   Status: Acute   Code(s): E87.5 - HYPERKALEMIA   SNOMED Code(

s): 0460452


   





(3) Hyperlipidemia


Current Visit: No   Status: Acute   Code(s): E78.5 - HYPERLIPIDEMIA, 

UNSPECIFIED   SNOMED Code(s): 77672615


   





(4) Hypertension


Current Visit: No   Status: Acute   Code(s): I10 - ESSENTIAL (PRIMARY) 

HYPERTENSION   SNOMED Code(s): 89411632


   





(5) Hypoxia


Current Visit: No   Status: Acute   Code(s): R09.02 - HYPOXEMIA   SNOMED Code(s)

: 326466769


   


Plan: 





We'll go ahead and await renal evaluation by nephrology.





We'll mild is also consulted.





Otherwise, check CMP in a.m.





Kayexalate given and ACE inhibitor with hydrochlorothiazide is continued at 

this time.





Watch blood pressure closely.





Prognosis is guarded secondary to his multiple comorbidities.

## 2018-03-07 NOTE — US
EXAMINATION TYPE: US venous doppler duplex LE BI

 

DATE OF EXAM: 3/7/2018 2:21 PM

 

COMPARISON:

 

CLINICAL HISTORY: rule out DVT. Hx of right leg DVT in popliteal vein-- May 2017.  Not on blood thinn
ers.  No surgeries.  No swelling.  No pain.  

 

SIDE PERFORMED: Bilateral   

 

TECHNIQUE:  The lower extremity deep venous system is examined utilizing real time linear array sonog
radha with graded compression, doppler sonography and color-flow sonography.

 

VESSELS IMAGED:

External Iliac Vein (EIV)

Common Femoral Vein

Deep Femoral Vein

Greater Saphenous Vein *

Femoral Vein

Popliteal Vein

Small Saphenous Vein *

Proximal Calf Veins

(* superficial vessels)

 

 

 

Right Leg:  Negative for DVT

 

Left Leg:  Negative for DVT

 

 

 

IMPRESSION:

1. Lower extremities negative for deep venous thrombosis by ultrasound.

## 2018-03-07 NOTE — CONS
CONSULTATION



REASON FOR CONSULTATION:

Reason for consult, renal failure.



DATE OF CONSULTATION:

03/7/2018



HISTORY OF PRESENT STAY:

Patient is an 82-year-old male who was admitted to the hospital yesterday with

complaints of increased weakness and shortness of breath.  He was noted to have a

creatinine of 3.5 mg/dL and a potassium of 6.5.



REVIEW OF LABS:

Shows previous creatinine 1.0 on 02/15/2018.  His potassium is now down to 6.1 and

creatinine is decreased to 3.0.  Patient is maintained on IV fluids.  He was on ACE

inhibitors, which are now discontinued.  Patient did receive Kayexalate.  His blood

pressure has been on the lower side with systolic in the 80s on initial admission.



PAST MEDICAL HISTORY:

Hypertension, BPH, COPD, history of DVT, hyperlipidemia, osteoarthritis, history of

bladder cancer, status post surgery.



PAST SURGICAL HISTORY:

Tonsillectomy, bladder surgery, vasectomy.



SOCIAL HISTORY:

Positive for patient being a former smoker.  No history of drug abuse or alcohol abuse.



MEDICATIONS:

At home prior to admission included Zocor, Flomax, Lasix, potassium, Zestoretic,

albuterol.



ALLERGIES:

None.



REVIEW OF SYSTEMS:

As per HPI.  Other systems negative.



PHYSICAL EXAMINATION:

Patient is comfortable, awake.  He is not in any acute distress.

Blood pressure is 109/54, heart rate 90 per minute.  He is afebrile.

Examination of the heart, S1, S2.  Examination of the lungs, decreased breath sounds at

the bases.  Abdomen is soft, nontender.  Examination of lower extremities shows no

significant edema.  CNS exam is grossly intact. Patient is moving all four extremities.



LABS:

Show sodium 144, potassium 6.1, BUN 86, serum creatinine 3.0.  UA is not available.

Chest x-ray shows no acute pulmonary findings.



ASSESSMENT:

1. Acute kidney injury, appears to be prerenal associated with hypotension,.

    hypoperfusion and some intravascular volume depletion.  Continue with IV fluids.

    Agree with holding off on the ACE inhibitors and diuretics for now.  There are no

    nephrotoxic agents on board.  Continue to monitor urine output.  Renal function is

    improving.

2. Hyperkalemia associated with acute kidney injury and use of ACE inhibitors, status

    post Kayexalate, currently improved.  Continue with the low-dose Lasix, which will

    help with the hyperkalemia.  We will repeat another set of labs tomorrow morning.

    Patient should be maintained on low-potassium diet.

3. Chronic obstructive pulmonary disease, currently stable.

4. Anemia, rule out iron-deficiency.

5. Dyslipidemia.

6. History of bladder cancer, status post surgery.

7. Benign prostatic hypertrophy, maintained on Flomax.



PLAN:

Continue IV fluids.  Hold off on potassium and ACE inhibitors and diuretics.  Continue

with the low-dose Lasix.  Maintain patient on low-potassium diet.  Check ultrasound of

the kidneys and repeat labs in a.m.



Thank you for this consultation.  Will continue to follow the patient with you during

his hospitalization.





MMODL / IJN: [id number] / Job#: [id number]

## 2018-03-08 LAB
ALBUMIN SERPL-MCNC: 2.8 G/DL (ref 3.5–5)
ALP SERPL-CCNC: 55 U/L (ref 38–126)
ALT SERPL-CCNC: 35 U/L (ref 21–72)
ANION GAP SERPL CALC-SCNC: 7 MMOL/L
AST SERPL-CCNC: 25 U/L (ref 17–59)
BUN SERPL-SCNC: 66 MG/DL (ref 9–20)
CALCIUM SPEC-MCNC: 8.1 MG/DL (ref 8.4–10.2)
CHLORIDE SERPL-SCNC: 110 MMOL/L (ref 98–107)
CO2 SERPL-SCNC: 28 MMOL/L (ref 22–30)
GLUCOSE BLD-MCNC: 153 MG/DL (ref 75–99)
GLUCOSE BLD-MCNC: 165 MG/DL (ref 75–99)
GLUCOSE BLD-MCNC: 186 MG/DL (ref 75–99)
GLUCOSE BLD-MCNC: 198 MG/DL (ref 75–99)
GLUCOSE SERPL-MCNC: 147 MG/DL (ref 74–99)
POTASSIUM SERPL-SCNC: 5 MMOL/L (ref 3.5–5.1)
PROT SERPL-MCNC: 5.6 G/DL (ref 6.3–8.2)
SODIUM SERPL-SCNC: 145 MMOL/L (ref 137–145)

## 2018-03-08 RX ADMIN — IPRATROPIUM BROMIDE AND ALBUTEROL SULFATE SCH ML: .5; 3 SOLUTION RESPIRATORY (INHALATION) at 12:30

## 2018-03-08 RX ADMIN — TAMSULOSIN HYDROCHLORIDE SCH MG: 0.4 CAPSULE ORAL at 08:01

## 2018-03-08 RX ADMIN — FAMOTIDINE SCH MG: 20 TABLET, FILM COATED ORAL at 08:01

## 2018-03-08 RX ADMIN — BUDESONIDE AND FORMOTEROL FUMARATE DIHYDRATE SCH PUFF: 160; 4.5 AEROSOL RESPIRATORY (INHALATION) at 08:10

## 2018-03-08 RX ADMIN — METHYLPREDNISOLONE SODIUM SUCCINATE SCH MG: 40 INJECTION, POWDER, FOR SOLUTION INTRAMUSCULAR; INTRAVENOUS at 05:21

## 2018-03-08 RX ADMIN — INSULIN ASPART SCH UNIT: 100 INJECTION, SOLUTION INTRAVENOUS; SUBCUTANEOUS at 06:18

## 2018-03-08 RX ADMIN — IPRATROPIUM BROMIDE AND ALBUTEROL SULFATE SCH ML: .5; 3 SOLUTION RESPIRATORY (INHALATION) at 19:18

## 2018-03-08 RX ADMIN — TAMSULOSIN HYDROCHLORIDE SCH MG: 0.4 CAPSULE ORAL at 21:58

## 2018-03-08 RX ADMIN — CEFAZOLIN SCH MLS/HR: 330 INJECTION, POWDER, FOR SOLUTION INTRAMUSCULAR; INTRAVENOUS at 11:47

## 2018-03-08 RX ADMIN — METHYLPREDNISOLONE SODIUM SUCCINATE SCH MG: 40 INJECTION, POWDER, FOR SOLUTION INTRAMUSCULAR; INTRAVENOUS at 12:39

## 2018-03-08 RX ADMIN — INSULIN ASPART SCH UNIT: 100 INJECTION, SOLUTION INTRAVENOUS; SUBCUTANEOUS at 21:24

## 2018-03-08 RX ADMIN — IPRATROPIUM BROMIDE AND ALBUTEROL SULFATE SCH ML: .5; 3 SOLUTION RESPIRATORY (INHALATION) at 08:10

## 2018-03-08 RX ADMIN — INSULIN ASPART SCH UNIT: 100 INJECTION, SOLUTION INTRAVENOUS; SUBCUTANEOUS at 12:38

## 2018-03-08 RX ADMIN — ATORVASTATIN CALCIUM SCH MG: 20 TABLET, FILM COATED ORAL at 08:01

## 2018-03-08 RX ADMIN — INSULIN ASPART SCH UNIT: 100 INJECTION, SOLUTION INTRAVENOUS; SUBCUTANEOUS at 18:07

## 2018-03-08 RX ADMIN — IPRATROPIUM BROMIDE AND ALBUTEROL SULFATE SCH ML: .5; 3 SOLUTION RESPIRATORY (INHALATION) at 16:09

## 2018-03-08 RX ADMIN — BUDESONIDE AND FORMOTEROL FUMARATE DIHYDRATE SCH PUFF: 160; 4.5 AEROSOL RESPIRATORY (INHALATION) at 19:18

## 2018-03-08 RX ADMIN — METHYLPREDNISOLONE SODIUM SUCCINATE SCH MG: 40 INJECTION, POWDER, FOR SOLUTION INTRAMUSCULAR; INTRAVENOUS at 18:07

## 2018-03-08 RX ADMIN — MAGNESIUM HYDROXIDE PRN MG: 2400 SUSPENSION ORAL at 21:24

## 2018-03-08 NOTE — P.PN
Subjective


Principal diagnosis: 





Continuing care





At this is a continue pressure 82-year-old white male essentially admitted for 

acute renal failure.  Elevation in amylase lipase is noted.  Poor appetite is 

noted.  Appreciate consultants input.  Nephrology and pulmonary have been 

consult secondary to his underlying history of COPD.  The patient has 

presbycusis otherwise noted.





Objective





- Vital Signs


Vital signs: 


 Vital Signs











Temp  97.1 F L  03/08/18 03:30


 


Pulse  92   03/08/18 03:30


 


Resp  18   03/08/18 03:30


 


BP  123/58   03/08/18 03:30


 


Pulse Ox  96   03/08/18 03:30








 Intake & Output











 03/07/18 03/08/18 03/08/18





 18:59 06:59 18:59


 


Intake Total 720 525 


 


Output Total 1300  


 


Balance -580 525 


 


Weight 58.3 kg 59.8 kg 


 


Intake:   


 


  Intake, IV Titration  525 





  Amount   


 


    Sodium Chloride 0.9% 1,  525 





    000 ml @ 75 mls/hr IV .   





    [id number] [facility] Rx#:[id number]   


 


  Oral 720  


 


Output:   


 


  Urine 1300  


 


Other:   


 


  Voiding Method  Urinal 


 


  # Voids  1 














- Constitutional


General appearance: Present: thin





- EENT


Eyes: Absent: abnormal pupil





- Respiratory


Respiratory: bilateral: wheezing





- Cardiovascular


Rhythm: regular


Heart sounds: normal: S1, S2


Abnormal Heart Sounds: Absent: S3 Gallop





- Gastrointestinal


General gastrointestinal: Present: soft.  Absent: tenderness





- Neurologic


Neurologic: Present: CNII-XII intact





- Psychiatric


Psychiatric: Present: A&O x's 3





- Labs


CBC & Chem 7: 


 03/06/18 12:20





 03/08/18 05:56


Labs: 


 Abnormal Lab Results - Last 24 Hours (Table)











  03/07/18 03/07/18 03/07/18 Range/Units





  06:16 14:46 14:46 


 


D-Dimer   12.19 H   (<0.60)  mg/L FEU


 


Potassium  6.1 H    (3.5-5.1)  mmol/L


 


Chloride     ()  mmol/L


 


BUN  86 H*    (9-20)  mg/dL


 


Creatinine  3.04 H    (0.66-1.25)  mg/dL


 


Glucose     (74-99)  mg/dL


 


POC Glucose (mg/dL)     (75-99)  mg/dL


 


Calcium     (8.4-10.2)  mg/dL


 


Total Protein  5.6 L    (6.3-8.2)  g/dL


 


Albumin  2.9 L    (3.5-5.0)  g/dL


 


Amylase    145 H  ()  U/L


 


Lipase    634 H  ()  U/L














  03/07/18 03/08/18 03/08/18 Range/Units





  21:12 05:54 05:56 


 


D-Dimer     (<0.60)  mg/L FEU


 


Potassium     (3.5-5.1)  mmol/L


 


Chloride    110 H  ()  mmol/L


 


BUN    66 H  (9-20)  mg/dL


 


Creatinine    1.44 H  (0.66-1.25)  mg/dL


 


Glucose    147 H  (74-99)  mg/dL


 


POC Glucose (mg/dL)  172 H  165 H   (75-99)  mg/dL


 


Calcium    8.1 L  (8.4-10.2)  mg/dL


 


Total Protein    5.6 L  (6.3-8.2)  g/dL


 


Albumin    2.8 L  (3.5-5.0)  g/dL


 


Amylase     ()  U/L


 


Lipase     ()  U/L














Assessment and Plan


(1) Kidney failure


Current Visit: Yes   Status: Acute   Code(s): N19 - UNSPECIFIED KIDNEY FAILURE 

  SNOMED Code(s): 41587711


   





(2) Acute hyperkalemia


Current Visit: No   Status: Acute   Code(s): E87.5 - HYPERKALEMIA   SNOMED Code(

s): 7552109


   





(3) Hyperlipidemia


Current Visit: No   Status: Acute   Code(s): E78.5 - HYPERLIPIDEMIA, 

UNSPECIFIED   SNOMED Code(s): 67675873


   





(4) Hypertension


Current Visit: No   Status: Acute   Code(s): I10 - ESSENTIAL (PRIMARY) 

HYPERTENSION   SNOMED Code(s): 21284751


   





(5) Hypoxia


Current Visit: No   Status: Acute   Code(s): R09.02 - HYPOXEMIA   SNOMED Code(s)

: 264919785


   


Plan: 





We'll transfer to general medical floor.





The patient seems to be stabilizing today.





Continue breathing treatments and dietary modifications to increase weight





Check CBC and CMP and amylase and lipase in the a.m.





Anticipate discharge in the next 24-48 hours.





Dr. Frank's group will be covering starting tomorrow.


Time with Patient: Less than 30

## 2018-03-08 NOTE — PN
PROGRESS NOTE



Patient is seen for followup for acute kidney injury.  His renal function has been

improving.  Serum creatinine is down from 3.5 to 1.4 mg/dL.  Currently, patient is

maintained on IV fluids at 75 mL an hour.  He has been voiding well.



EXAMINATION:

Blood pressure is 126/73, heart rate 99 per minute.  He is afebrile.  Examination of

the heart: S1, S2.  Examination lungs: Bilateral breath sounds are heard.  Abdomen is

soft, nontender.  Examination of lower extremities shows no edema.  CNS exam is grossly

intact.



LAB:

Show sodium 145, potassium 5.0, BUN is 16, creatinine 1.4.



ASSESSMENT:

1. Acute kidney injury, prerenal, currently improving.  The patient is maintained on

    IV fluids which we will continue for now.

2. Chronic obstructive pulmonary disease with multiple exacerbations with recent acute

    exacerbation associated with tracheobronchitis.

3. Hyperkalemia associated with acute kidney injury is currently improved.  Patient

    has received Kayexalate.



PLAN:

Continue IV fluids.  Continue with the Flomax.  Repeat labs in a.m.





MMODL / IJN: [id number] / Job#: [id number]

## 2018-03-08 NOTE — P.PN
Subjective


Progress Note Date: 03/08/18











82-year-old male patient with advanced oxygen-dependent COPD with chronic 

hypoxic and steroid dependent respiratory failure and the patient has an FEV1 

of 33% of predicted.  The patient has been having multiple hospitalizations for 

pulmonary complications.  During a hospitalization back in January the patient 

was found to have stenotrophomonas in the bronchioloalveolar lavage that was 

identified at a time of bronchoscopy.  The patient was treated with Bactrim 

back then.  During the course of treatment the patient developed an acute 

kidney injury that was drug-induced and ultimately he was treated and 

recovered.  Nevertheless he continued to be short of breath and he had a very 

limited overall performance and functional status due to shortness of breath.  

He has constant cough and congestion his chest.  He also has history of DVT of 

the lower extremity.  He was treated with anticoagulation for quite some time 

and he was subsequently taken off anticoagulation due to hematuria the patient 

is also known to have bladder cancer.  He does not recall any status where he 

had pulmonary embolism.  





The patient came into the hospital because of increased shortness of breath, 

pain across the left chest which was somewhat pleuritic in nature.  No 

hemoptysis.  He has congested and he has constant cough and bronchospasm 

wheezing related to his advanced COPD.  No calf pain or tenderness pain no 

swelling lower extremities.  He is having some lower abdominal pain in 

addition.  He has stooled.  No evidence of any GI bleeding.  No nausea or 

vomiting.  No diarrhea.  No dysuria exam [place].  No hematuria.  Urinalysis has 

not been done.  The patient's oral intake has been overall low and the patient 

was found to have an acute kidney injury on top of his chronic renal failure.  

His creatinine is up to 3.5 at the time of admission with a BUN of 94.  

Potassium level was also at 6.1.  The patient was treated for his hyperkalemia 

and repeat level is at 6.1.  Note that the patient was given Kayexalate 45 g 

yesterday and 30 g today.  The patient is also on IV fluids at 150 mL of normal 

saline.  No leukocytosis.  








On 03/08/2089 seeing this patient for a follow-up.  The patient was seen 

yesterday in consultation.  He has advanced and and stage COPD.  The patient 

also has had multiple exacerbation of COPD and previous stenotrophomonas 

infection of the lung.  The patient is currently being treated for an acute 

kidney injury as the patient presented with acute kidney failure secondary to 

dehydration.  Renal function is improving as the patient is being hydrated with 

IV fluids.  Meanwhile, he was having some gastrointestinal complaints and the 

lipase level was slightly elevated and ultrasound the abdomen was done that 

came back nonspecific and there was no evidence of any pancreatitis.  Although 

this is suspected based on the elevated lipase level.  Also, the patient had a 

Doppler of the lower extremity that showed no evidence of any DVT.  We have not 

committed to long-term anticoagulation as long as the patient has no Dopplers 

and clinically is doing better on his less short of breath and the pleurisy has 

subsided on today's evaluation.  Urinalysis came back negative.  He is 

afebrile.  He is extremely debilitated secondary to COPD.  Pulse ox is around 97

% on 2 L of oxygen nasal cannula.  Slightly tachycardic.





Objective





- Vital Signs


Vital signs: 


 Vital Signs











Temp  97.0 F L  03/08/18 14:59


 


Pulse  99   03/08/18 14:59


 


Resp  16   03/08/18 14:59


 


BP  126/73   03/08/18 14:59


 


Pulse Ox  97   03/08/18 14:59








 Intake & Output











 03/07/18 03/08/18 03/08/18





 18:59 06:59 18:59


 


Intake Total 720 525 840


 


Output Total 1300  400


 


Balance -580 525 440


 


Weight 58.3 kg 59.8 kg 


 


Intake:   


 


  IV   600


 


    Sodium Chloride 0.9% 1,   600





    000 ml @ 75 mls/hr IV .   





    [id number] [facility] Rx#:[id number]   


 


  Intake, IV Titration  525 





  Amount   


 


    Sodium Chloride 0.9% 1,  525 





    000 ml @ 75 mls/hr IV .   





    [id number] [facility] Rx#:[id number]   


 


  Oral 720  240


 


Output:   


 


  Urine 1300  400


 


Other:   


 


  Voiding Method  Urinal Urinal


 


  # Voids  1 1














- Exam








GENERAL EXAM: Alert, pleasant, thin, 82-year-old white male, mildly short of 

breath at rest, but fairly comfortable


HEAD: Normocephalic/atraumatic.


EYES: Normal reaction of pupils, equal size.  Conjunctiva pink, sclera white.


NOSE: Clear with pink turbinates.


THROAT: No erythema or exudates.


NECK: No masses, no JVD, no thyroid enlargement, no adenopathy.


CHEST: No chest wall deformity.  Symmetrical expansion. 


LUNGS: Equal air entry with scattered rhonchi, and wheezing throughout the lung 

fields.


CVS: Regular rate and rhythm, normal S1 and S2, no gallops, no murmurs, no rubs


ABDOMEN: Soft, nontender.  No hepatosplenomegaly, normal bowel sounds, no 

guarding or rigidity.


EXTREMITIES: No clubbing, no edema, no cyanosis, 2+ pulses and upper and lower 

extremities.


MUSCULOSKELETAL: Muscle strength and tone normal.


SPINE: No scoliosis or deformity


SKIN: No rashes


CENTRAL NERVOUS SYSTEM: Alert and oriented -3.  No focal deficits, tone is 

normal in all 4 extremities.


PSYCHIATRIC: Alert and oriented -3.  Appropriate affect.  Intact judgment and 

insight. 





- Labs


CBC & Chem 7: 


 03/06/18 12:20





 03/08/18 05:56


Labs: 


 Abnormal Lab Results - Last 24 Hours (Table)











  03/07/18 03/07/18 03/07/18 Range/Units





  14:46 14:46 21:12 


 


D-Dimer  12.19 H    (<0.60)  mg/L FEU


 


Chloride     ()  mmol/L


 


BUN     (9-20)  mg/dL


 


Creatinine     (0.66-1.25)  mg/dL


 


Glucose     (74-99)  mg/dL


 


POC Glucose (mg/dL)    172 H  (75-99)  mg/dL


 


Calcium     (8.4-10.2)  mg/dL


 


Total Protein     (6.3-8.2)  g/dL


 


Albumin     (3.5-5.0)  g/dL


 


Amylase   145 H   ()  U/L


 


Lipase   634 H   ()  U/L














  03/08/18 03/08/18 03/08/18 Range/Units





  05:54 05:56 12:34 


 


D-Dimer     (<0.60)  mg/L FEU


 


Chloride   110 H   ()  mmol/L


 


BUN   66 H   (9-20)  mg/dL


 


Creatinine   1.44 H   (0.66-1.25)  mg/dL


 


Glucose   147 H   (74-99)  mg/dL


 


POC Glucose (mg/dL)  165 H   153 H  (75-99)  mg/dL


 


Calcium   8.1 L   (8.4-10.2)  mg/dL


 


Total Protein   5.6 L   (6.3-8.2)  g/dL


 


Albumin   2.8 L   (3.5-5.0)  g/dL


 


Amylase     ()  U/L


 


Lipase     ()  U/L














Assessment and Plan


Plan: 








Assessment





-  Acute COPD exacerbation completed by tracheobronchitis, and the patient is a 

clear chest x-ray and there is no evidence of any acute pulmonary infiltration.

  Atelectatic change in lung bases are seen.  Clinically the patient is 

improving and the patient is less short of breath compared to yesterday.  

Pleurisy is completely subsided on today's evaluation.  Doppler of the lower 

extremities negative.





- Advanced steroid dependent COPD, with a baseline FEV1 of 33% of predicted





-  Acute kidney injury, likely secondary to intravascular volume depletion and 

dehydration and the creatinine is up to 3.5, and the patient was resuscitated 

IV fluids and there has been improvement in the patient's Renal function and 

creatinine is down to 1.6





-  Hyperkalemia, possibly related to acute kidney injury .  Patient is also on 

potassium supplements and lisinopril which is probably contributing to the 

acute kidney injury.  Patient's potassium level improved since down to 5.0.





- History of nicotine dependence





- hyperlipidemia





- Hypertension





- Neoplasm of bladder





- History of deep venous thrombosis, history of currently on no anticoagulants





-Stenotrophomonas l tracheobronchitis treated  in 2018





-Abdominal pain, under investigation.  Patient may have an underlying mild 

component of pancreatitis.  THE ABDOMEN WAS NEGATIVE





PLAN





CONTINUE IV FLUIDS.  MONITOR RENAL FUNCTION.  CONTINUE BRONCHODILATORS AND 

STEROIDS.  TRY TO COLLECT A SPUTUM SAMPLE IF POSSIBLE.  ADVANCE DIET AS 

TOLERATED.  WE'LL CONTINUE TO FOLLOW.

## 2018-03-09 LAB
ALBUMIN SERPL-MCNC: 2.9 G/DL (ref 3.5–5)
ALP SERPL-CCNC: 68 U/L (ref 38–126)
ALT SERPL-CCNC: 33 U/L (ref 21–72)
AMYLASE SERPL-CCNC: 110 U/L (ref 30–110)
ANION GAP SERPL CALC-SCNC: 8 MMOL/L
AST SERPL-CCNC: 24 U/L (ref 17–59)
BUN SERPL-SCNC: 45 MG/DL (ref 9–20)
CALCIUM SPEC-MCNC: 7.9 MG/DL (ref 8.4–10.2)
CHLORIDE SERPL-SCNC: 109 MMOL/L (ref 98–107)
CO2 SERPL-SCNC: 31 MMOL/L (ref 22–30)
ERYTHROCYTE [DISTWIDTH] IN BLOOD BY AUTOMATED COUNT: 3.26 M/UL (ref 4.3–5.9)
ERYTHROCYTE [DISTWIDTH] IN BLOOD: 16.8 % (ref 11.5–15.5)
GLUCOSE BLD-MCNC: 138 MG/DL (ref 75–99)
GLUCOSE BLD-MCNC: 141 MG/DL (ref 75–99)
GLUCOSE BLD-MCNC: 149 MG/DL (ref 75–99)
GLUCOSE BLD-MCNC: 153 MG/DL (ref 75–99)
GLUCOSE BLD-MCNC: 197 MG/DL (ref 75–99)
GLUCOSE SERPL-MCNC: 149 MG/DL (ref 74–99)
HCT VFR BLD AUTO: 28.3 % (ref 39–53)
HGB BLD-MCNC: 9.2 GM/DL (ref 13–17.5)
LIPASE SERPL-CCNC: 389 U/L (ref 23–300)
MCH RBC QN AUTO: 28.2 PG (ref 25–35)
MCHC RBC AUTO-ENTMCNC: 32.5 G/DL (ref 31–37)
MCV RBC AUTO: 87 FL (ref 80–100)
PLATELET # BLD AUTO: 455 K/UL (ref 150–450)
POTASSIUM SERPL-SCNC: 4.1 MMOL/L (ref 3.5–5.1)
PROT SERPL-MCNC: 5.6 G/DL (ref 6.3–8.2)
SODIUM SERPL-SCNC: 148 MMOL/L (ref 137–145)
WBC # BLD AUTO: 8.2 K/UL (ref 3.8–10.6)

## 2018-03-09 RX ADMIN — ATORVASTATIN CALCIUM SCH MG: 20 TABLET, FILM COATED ORAL at 08:35

## 2018-03-09 RX ADMIN — IPRATROPIUM BROMIDE AND ALBUTEROL SULFATE SCH ML: .5; 3 SOLUTION RESPIRATORY (INHALATION) at 10:49

## 2018-03-09 RX ADMIN — TAMSULOSIN HYDROCHLORIDE SCH MG: 0.4 CAPSULE ORAL at 21:32

## 2018-03-09 RX ADMIN — BUDESONIDE AND FORMOTEROL FUMARATE DIHYDRATE SCH PUFF: 160; 4.5 AEROSOL RESPIRATORY (INHALATION) at 19:11

## 2018-03-09 RX ADMIN — IPRATROPIUM BROMIDE AND ALBUTEROL SULFATE SCH ML: .5; 3 SOLUTION RESPIRATORY (INHALATION) at 19:11

## 2018-03-09 RX ADMIN — INSULIN ASPART SCH UNIT: 100 INJECTION, SOLUTION INTRAVENOUS; SUBCUTANEOUS at 18:24

## 2018-03-09 RX ADMIN — INSULIN ASPART SCH UNIT: 100 INJECTION, SOLUTION INTRAVENOUS; SUBCUTANEOUS at 08:35

## 2018-03-09 RX ADMIN — CITALOPRAM HYDROBROMIDE SCH MG: 20 TABLET ORAL at 16:10

## 2018-03-09 RX ADMIN — METHYLPREDNISOLONE SODIUM SUCCINATE SCH MG: 40 INJECTION, POWDER, FOR SOLUTION INTRAMUSCULAR; INTRAVENOUS at 06:13

## 2018-03-09 RX ADMIN — INSULIN ASPART SCH UNIT: 100 INJECTION, SOLUTION INTRAVENOUS; SUBCUTANEOUS at 21:45

## 2018-03-09 RX ADMIN — TAMSULOSIN HYDROCHLORIDE SCH MG: 0.4 CAPSULE ORAL at 08:35

## 2018-03-09 RX ADMIN — METHYLPREDNISOLONE SODIUM SUCCINATE SCH MG: 40 INJECTION, POWDER, FOR SOLUTION INTRAMUSCULAR; INTRAVENOUS at 12:38

## 2018-03-09 RX ADMIN — METHYLPREDNISOLONE SODIUM SUCCINATE SCH MG: 40 INJECTION, POWDER, FOR SOLUTION INTRAMUSCULAR; INTRAVENOUS at 00:04

## 2018-03-09 RX ADMIN — IPRATROPIUM BROMIDE AND ALBUTEROL SULFATE SCH ML: .5; 3 SOLUTION RESPIRATORY (INHALATION) at 07:10

## 2018-03-09 RX ADMIN — CEFAZOLIN SCH MLS/HR: 330 INJECTION, POWDER, FOR SOLUTION INTRAMUSCULAR; INTRAVENOUS at 06:14

## 2018-03-09 RX ADMIN — BUDESONIDE AND FORMOTEROL FUMARATE DIHYDRATE SCH PUFF: 160; 4.5 AEROSOL RESPIRATORY (INHALATION) at 07:10

## 2018-03-09 RX ADMIN — SODIUM BICARBONATE SCH MLS/HR: 84 INJECTION, SOLUTION INTRAVENOUS at 12:38

## 2018-03-09 RX ADMIN — FAMOTIDINE SCH MG: 20 TABLET, FILM COATED ORAL at 08:35

## 2018-03-09 RX ADMIN — INSULIN ASPART SCH UNIT: 100 INJECTION, SOLUTION INTRAVENOUS; SUBCUTANEOUS at 12:38

## 2018-03-09 RX ADMIN — IPRATROPIUM BROMIDE AND ALBUTEROL SULFATE SCH ML: .5; 3 SOLUTION RESPIRATORY (INHALATION) at 14:55

## 2018-03-09 RX ADMIN — METHYLPREDNISOLONE SODIUM SUCCINATE SCH MG: 40 INJECTION, POWDER, FOR SOLUTION INTRAMUSCULAR; INTRAVENOUS at 18:24

## 2018-03-09 NOTE — P.PN
Subjective


Progress Note Date: 03/09/18


Principal diagnosis: 


Acute COPD exacerbation, tracheobronchitis





82-year-old male patient with advanced oxygen-dependent COPD with chronic 

hypoxic and steroid dependent respiratory failure and the patient has an FEV1 

of 33% of predicted.  The patient has been having multiple hospitalizations for 

pulmonary complications.  During a hospitalization back in January the patient 

was found to have stenotrophomonas in the bronchioloalveolar lavage that was 

identified at a time of bronchoscopy.  The patient was treated with Bactrim 

back then.  During the course of treatment the patient developed an acute 

kidney injury that was drug-induced and ultimately he was treated and 

recovered.  Nevertheless he continued to be short of breath and he had a very 

limited overall performance and functional status due to shortness of breath.  

He has constant cough and congestion his chest.  He also has history of DVT of 

the lower extremity.  He was treated with anticoagulation for quite some time 

and he was subsequently taken off anticoagulation due to hematuria the patient 

is also known to have bladder cancer.  He does not recall any status where he 

had pulmonary embolism.  





The patient came into the hospital because of increased shortness of breath, 

pain across the left chest which was somewhat pleuritic in nature.  No 

hemoptysis.  He has congested and he has constant cough and bronchospasm 

wheezing related to his advanced COPD.  No calf pain or tenderness pain no 

swelling lower extremities.  He is having some lower abdominal pain in 

addition.  He has stooled.  No evidence of any GI bleeding.  No nausea or 

vomiting.  No diarrhea.  No dysuria exam [place].  No hematuria.  Urinalysis has 

not been done.  The patient's oral intake has been overall low and the patient 

was found to have an acute kidney injury on top of his chronic renal failure.  

His creatinine is up to 3.5 at the time of admission with a BUN of 94.  

Potassium level was also at 6.1.  The patient was treated for his hyperkalemia 

and repeat level is at 6.1.  Note that the patient was given Kayexalate 45 g 

yesterday and 30 g today.  The patient is also on IV fluids at 150 mL of normal 

saline.  No leukocytosis.  








On 03/08/2089 seeing this patient for a follow-up.  The patient was seen 

yesterday in consultation.  He has advanced and and stage COPD.  The patient 

also has had multiple exacerbation of COPD and previous stenotrophomonas 

infection of the lung.  The patient is currently being treated for an acute 

kidney injury as the patient presented with acute kidney failure secondary to 

dehydration.  Renal function is improving as the patient is being hydrated with 

IV fluids.  Meanwhile, he was having some gastrointestinal complaints and the 

lipase level was slightly elevated and ultrasound the abdomen was done that 

came back nonspecific and there was no evidence of any pancreatitis.  Although 

this is suspected based on the elevated lipase level.  Also, the patient had a 

Doppler of the lower extremity that showed no evidence of any DVT.  We have not 

committed to long-term anticoagulation as long as the patient has no Dopplers 

and clinically is doing better on his less short of breath and the pleurisy has 

subsided on today's evaluation.  Urinalysis came back negative.  He is 

afebrile.  He is extremely debilitated secondary to COPD.  Pulse ox is around 97

% on 2 L of oxygen nasal cannula.  Slightly tachycardic.





On 03/09/2018 patient seen in follow-up on medical surgical floor.  He reports 

some improvement in his breathing.  Lung sounds are diminished, with expiratory 

wheezing, patient still has congested nonproductive cough.  Not able to 

expectorate any sputum.  His appetite is improving.  He still is seeming IV 

hydration in the form of 0.45 normal saline at a rate of 50 ML per hour.  On 

today's lab work CBC is within normal limits at 8.2, hemoglobin is 9.2, serum 

sodium is 148, there has been improvement in his renal profile, BUN is down to 

45 from 66 and creatinine is at 1.04 down from 1.44.  Patient's amylase is now 

within normal limits, and lipase is trending down, down to 389 from 634.  

Patient is on 2 L per nasal cannula with O2 sat 96%.  He is afebrile, slightly 

tachycardic with a rate of 106 BPM.  Patient continues on combination of 

nebulized treatments, Symbicort, IV steroids. 








Objective





- Vital Signs


Vital signs: 


 Vital Signs











Temp  97.0 F L  03/09/18 15:00


 


Pulse  101 H  03/09/18 15:07


 


Resp  18   03/09/18 16:00


 


BP  150/71   03/09/18 15:00


 


Pulse Ox  96   03/09/18 15:00








 Intake & Output











 03/08/18 03/09/18 03/09/18





 18:59 06:59 18:59


 


Intake Total 840 1200 


 


Output Total 600  400


 


Balance 240 1200 -400


 


Intake:   


 


   600 


 


    Sodium Chloride 0.9% 1, 600 600 





    000 ml @ 75 mls/hr IV .   





    [id number] [facility] Rx#:[id number]   


 


  Intake, IV Titration  600 





  Amount   


 


    Sodium Chloride 0.9% 1,  600 





    000 ml @ 75 mls/hr IV .   





    [id number] [facility] Rx#:[id number]   


 


  Oral 240  


 


Output:   


 


  Urine 600  400


 


Other:   


 


  Voiding Method Urinal  Urinal


 


  # Voids 1 3 














- Exam


GENERAL EXAM: Alert, pleasant, thin, 82-year-old white male, mildly short of 

breath at rest, but fairly comfortable


HEAD: Normocephalic/atraumatic.


EYES: Normal reaction of pupils, equal size.  Conjunctiva pink, sclera white.


NOSE: Clear with pink turbinates.


THROAT: No erythema or exudates.


NECK: No masses, no JVD, no thyroid enlargement, no adenopathy.


CHEST: No chest wall deformity.  Symmetrical expansion. 


LUNGS: Equal air entry with scattered rhonchi, and wheezing throughout the lung 

fields.  Diminished air entry noted bilaterally


CVS: Regular rate and rhythm, normal S1 and S2, no gallops, no murmurs, no rubs


ABDOMEN: Soft, nontender.  No hepatosplenomegaly, normal bowel sounds, no 

guarding or rigidity.


EXTREMITIES: No clubbing, no edema, no cyanosis, 2+ pulses and upper and lower 

extremities.


MUSCULOSKELETAL: Muscle strength and tone normal.


SPINE: No scoliosis or deformity


SKIN: No rashes


CENTRAL NERVOUS SYSTEM: Alert and oriented -3.  No focal deficits, tone is 

normal in all 4 extremities.


PSYCHIATRIC: Alert and oriented -3.  Appropriate affect.  Intact judgment and 

insight. 








- Labs


CBC & Chem 7: 


 03/09/18 08:35





 03/09/18 08:35


Labs: 


 Abnormal Lab Results - Last 24 Hours (Table)











  03/08/18 03/09/18 03/09/18 Range/Units





  21:02 02:01 07:34 


 


RBC     (4.30-5.90)  m/uL


 


Hgb     (13.0-17.5)  gm/dL


 


Hct     (39.0-53.0)  %


 


RDW     (11.5-15.5)  %


 


Plt Count     (150-450)  k/uL


 


Sodium     (137-145)  mmol/L


 


Chloride     ()  mmol/L


 


Carbon Dioxide     (22-30)  mmol/L


 


BUN     (9-20)  mg/dL


 


Glucose     (74-99)  mg/dL


 


POC Glucose (mg/dL)  186 H  197 H  149 H  (75-99)  mg/dL


 


Calcium     (8.4-10.2)  mg/dL


 


Total Bilirubin     (0.2-1.3)  mg/dL


 


Total Protein     (6.3-8.2)  g/dL


 


Albumin     (3.5-5.0)  g/dL


 


Lipase     ()  U/L














  03/09/18 03/09/18 03/09/18 Range/Units





  08:35 08:35 12:23 


 


RBC   3.26 L   (4.30-5.90)  m/uL


 


Hgb   9.2 L   (13.0-17.5)  gm/dL


 


Hct   28.3 L   (39.0-53.0)  %


 


RDW   16.8 H   (11.5-15.5)  %


 


Plt Count   455 H   (150-450)  k/uL


 


Sodium  148 H    (137-145)  mmol/L


 


Chloride  109 H    ()  mmol/L


 


Carbon Dioxide  31 H    (22-30)  mmol/L


 


BUN  45 H    (9-20)  mg/dL


 


Glucose  149 H    (74-99)  mg/dL


 


POC Glucose (mg/dL)    138 H  (75-99)  mg/dL


 


Calcium  7.9 L    (8.4-10.2)  mg/dL


 


Total Bilirubin  0.1 L    (0.2-1.3)  mg/dL


 


Total Protein  5.6 L    (6.3-8.2)  g/dL


 


Albumin  2.9 L    (3.5-5.0)  g/dL


 


Lipase  389 H    ()  U/L














  03/09/18 Range/Units





  17:10 


 


RBC   (4.30-5.90)  m/uL


 


Hgb   (13.0-17.5)  gm/dL


 


Hct   (39.0-53.0)  %


 


RDW   (11.5-15.5)  %


 


Plt Count   (150-450)  k/uL


 


Sodium   (137-145)  mmol/L


 


Chloride   ()  mmol/L


 


Carbon Dioxide   (22-30)  mmol/L


 


BUN   (9-20)  mg/dL


 


Glucose   (74-99)  mg/dL


 


POC Glucose (mg/dL)  141 H  (75-99)  mg/dL


 


Calcium   (8.4-10.2)  mg/dL


 


Total Bilirubin   (0.2-1.3)  mg/dL


 


Total Protein   (6.3-8.2)  g/dL


 


Albumin   (3.5-5.0)  g/dL


 


Lipase   ()  U/L








 Microbiology - Last 24 Hours (Table)











 03/08/18 08:22 Gram Stain - Preliminary





 Sputum Sputum Culture - Preliminary














Assessment and Plan


Plan: 


Assessment:





-  Acute COPD exacerbation completed by tracheobronchitis, and the patient is a 

clear chest x-ray and there is no evidence of any acute pulmonary infiltration.

  Atelectatic change in lung bases are seen.  Clinically the patient is 

improving and the patient is less short of breath compared to yesterday.  

Pleurisy is completely subsided on today's evaluation.  Doppler of the lower 

extremities negative.





- Advanced steroid dependent COPD, with a baseline FEV1 of 33% of predicted





-  Acute kidney injury, likely secondary to intravascular volume depletion and 

dehydration and the creatinine is up to 3.5, and the patient was resuscitated 

IV fluids and there has been improvement in the patient's Renal function and 

creatinine is down to 1.04





-  Hyperkalemia, possibly related to acute kidney injury .  Patient is also on 

potassium supplements and lisinopril which is probably contributing to the 

acute kidney injury.  Patient's potassium level improved since down to 4.1





- Hypernatremia, serum sodium is 148, probably related to decreased free water 

intake





- History of nicotine dependence





- hyperlipidemia





- Hypertension





- Neoplasm of bladder





- History of deep venous thrombosis, history of currently on no anticoagulants





-Stenotrophomonas l tracheobronchitis treated  in 2018





-Abdominal pain, under investigation.  Patient may have an underlying mild 

component of pancreatitis.  THE ABDOMEN WAS NEGATIVE





PLAN





Decrease the IV steroids down to 40 mg every 6 hours.  Continue with nebulized 

treatments, and Symbicort.  Patient is improving, we'll give the patient 

flutter valve to help with expectoration sputum.  His wife states is decreased 

oral intake is related to his underlying depression.  We will initiate Celexa 

at 20 mg daily.  Continue with current plan of care, continue IV hydration, 

oral intake.





I performed a history & physical examination of the patient and discussed their 

management with my nurse practitioner, Mayda Álvarez.  I reviewed the nurse 

practitioner's note and agree with the documented findings and plan of care.  

Lung sounds are positive for diminished air entry bilaterally, with expiratory 

wheezing and scattered rhonchi.  The findings and the impression was discussed 

with the patient.  I attest to the documentation by the nurse practitioner. 





Time with Patient: Less than 30

## 2018-03-09 NOTE — CDI
Last Revision, December 2017





Documentation Clarification Form



Date: 3/9/18

From: Brenda Lopez RN, CCDS

Phone: [phone]

MRN: [id number]

Admit Date: 3/6/2018 1:59:00 PM

Patient Name: Linwood Barragan

Visit Number: [id number]

Discharge Date:



ATTENTION: The Clinical Documentation Specialists (CDI) and [facility] Coding Staff 
appreciate your assistance in clarifying documentation. Please respond to the 
clarification below the line at the bottom and electronically sign. The CDI & 
[facility] Coding staff will review the response and follow-up if needed. Please note: 
Queries are made part of the Legal Health Record. If you have any questions, 
please contact the author of this message via ITS.



Dr. Lurdes Patel



History/Risk Factors: COPD, Bladder Cancer, BPH, 



Clinical Indicators: Admit with complaints of increased weakness and shortness 
of breath. Noted to have creatinine of 3.5 mg/dL  and potassium of 6.5



On admission: BUN 94, Cr 3.50, GFR 15

3/9/18  BUN 45 CR 1.04 GFR: 67

Patients Baseline: CR 1.0 (on 2/15/18)



Treatment:

IV Fluids

Monitor urine output

Hold ACE inhibitors and Diuretics 



In order to capture the severity of condition, please clarify if the condition 
signifies:



CKD Stage 1 (GFR > 90)

CKD Stage 2 (GFR 60-89)

CKD Stage 3 (GFR 30-59)

CKD Stage 4 (GFR 15-29)

CKD Stage 5 (GFR <15)

ESRD

Other, please specify

Unable to determine





Please continue to document in your progress notes and discharge summary in 
order to capture severity of illness and risk of mortality. Include clinical 
findings that support your diagnosis.

________________________________________________________________________________
_________________________

MTDD

## 2018-03-09 NOTE — PN
PROGRESS NOTE



Patient is seen for followup for acute kidney injury.  His renal function has improved

significantly.  It was mainly prerenal with creatinine now at 1.04 down from 3.5 mg/dL.

Patient has severe underlying COPD.  Overall he states he is feeling better.



On examination, blood pressure is 166/73, heart rate 92 per minute.  He is afebrile.

EXAMINATION OF THE HEART: S1, S2.

EXAMINATION OF LUNGS: Bilateral breath sounds are heard. Wheezing bilaterally.

ABDOMEN:  Soft, non-tender.

Examination of lower extremities shows no evidence of edema.

CNS exam is grossly intact.



Labs show sodium 148, potassium 4.1, chloride 109, BUN 45, serum creatinine 1.04,

hemoglobin 9.2 g/dL.



ASSESSMENT:

1. Acute kidney injury, prerenal, currently improved with IV fluids.

2. Hypernatremia.  Will change IV fluids to half-normal saline.

3. Severe chronic obstructive pulmonary disease with some degree of metabolic

    alkalosis.

4. Anemia of chronic disease.  Rule out iron deficiency.



PLAN:

Continue IV fluids, decrease rate, and check iron studies.  Okay to proceed with CT

with IV contrast for the chest if needed from pulmonary standpoint.





MMODL / IJN: [id number] / Job#: [id number]

## 2018-03-10 LAB
ANION GAP SERPL CALC-SCNC: 8 MMOL/L
BUN SERPL-SCNC: 41 MG/DL (ref 9–20)
CALCIUM SPEC-MCNC: 7.8 MG/DL (ref 8.4–10.2)
CHLORIDE SERPL-SCNC: 107 MMOL/L (ref 98–107)
CO2 SERPL-SCNC: 27 MMOL/L (ref 22–30)
GLUCOSE BLD-MCNC: 110 MG/DL (ref 75–99)
GLUCOSE BLD-MCNC: 122 MG/DL (ref 75–99)
GLUCOSE BLD-MCNC: 131 MG/DL (ref 75–99)
GLUCOSE BLD-MCNC: 137 MG/DL (ref 75–99)
GLUCOSE SERPL-MCNC: 143 MG/DL (ref 74–99)
POTASSIUM SERPL-SCNC: 4.5 MMOL/L (ref 3.5–5.1)
SODIUM SERPL-SCNC: 142 MMOL/L (ref 137–145)

## 2018-03-10 RX ADMIN — METHYLPREDNISOLONE SODIUM SUCCINATE SCH MG: 40 INJECTION, POWDER, FOR SOLUTION INTRAMUSCULAR; INTRAVENOUS at 23:16

## 2018-03-10 RX ADMIN — METHYLPREDNISOLONE SODIUM SUCCINATE SCH MG: 40 INJECTION, POWDER, FOR SOLUTION INTRAMUSCULAR; INTRAVENOUS at 00:36

## 2018-03-10 RX ADMIN — INSULIN ASPART SCH: 100 INJECTION, SOLUTION INTRAVENOUS; SUBCUTANEOUS at 13:37

## 2018-03-10 RX ADMIN — IPRATROPIUM BROMIDE AND ALBUTEROL SULFATE SCH ML: .5; 3 SOLUTION RESPIRATORY (INHALATION) at 20:53

## 2018-03-10 RX ADMIN — ATORVASTATIN CALCIUM SCH MG: 20 TABLET, FILM COATED ORAL at 08:43

## 2018-03-10 RX ADMIN — IPRATROPIUM BROMIDE AND ALBUTEROL SULFATE SCH ML: .5; 3 SOLUTION RESPIRATORY (INHALATION) at 08:32

## 2018-03-10 RX ADMIN — CITALOPRAM HYDROBROMIDE SCH MG: 20 TABLET ORAL at 08:43

## 2018-03-10 RX ADMIN — METHYLPREDNISOLONE SODIUM SUCCINATE SCH MG: 40 INJECTION, POWDER, FOR SOLUTION INTRAMUSCULAR; INTRAVENOUS at 05:49

## 2018-03-10 RX ADMIN — INSULIN ASPART SCH UNIT: 100 INJECTION, SOLUTION INTRAVENOUS; SUBCUTANEOUS at 17:49

## 2018-03-10 RX ADMIN — FAMOTIDINE SCH MG: 20 TABLET, FILM COATED ORAL at 08:43

## 2018-03-10 RX ADMIN — BUDESONIDE AND FORMOTEROL FUMARATE DIHYDRATE SCH PUFF: 160; 4.5 AEROSOL RESPIRATORY (INHALATION) at 20:51

## 2018-03-10 RX ADMIN — INSULIN ASPART SCH: 100 INJECTION, SOLUTION INTRAVENOUS; SUBCUTANEOUS at 08:43

## 2018-03-10 RX ADMIN — TAMSULOSIN HYDROCHLORIDE SCH MG: 0.4 CAPSULE ORAL at 21:26

## 2018-03-10 RX ADMIN — IPRATROPIUM BROMIDE AND ALBUTEROL SULFATE SCH ML: .5; 3 SOLUTION RESPIRATORY (INHALATION) at 12:07

## 2018-03-10 RX ADMIN — SODIUM BICARBONATE SCH MLS/HR: 84 INJECTION, SOLUTION INTRAVENOUS at 05:59

## 2018-03-10 RX ADMIN — METHYLPREDNISOLONE SODIUM SUCCINATE SCH MG: 40 INJECTION, POWDER, FOR SOLUTION INTRAMUSCULAR; INTRAVENOUS at 17:49

## 2018-03-10 RX ADMIN — IPRATROPIUM BROMIDE AND ALBUTEROL SULFATE SCH ML: .5; 3 SOLUTION RESPIRATORY (INHALATION) at 16:34

## 2018-03-10 RX ADMIN — INSULIN ASPART SCH UNIT: 100 INJECTION, SOLUTION INTRAVENOUS; SUBCUTANEOUS at 21:38

## 2018-03-10 RX ADMIN — METHYLPREDNISOLONE SODIUM SUCCINATE SCH MG: 40 INJECTION, POWDER, FOR SOLUTION INTRAMUSCULAR; INTRAVENOUS at 13:38

## 2018-03-10 RX ADMIN — TAMSULOSIN HYDROCHLORIDE SCH MG: 0.4 CAPSULE ORAL at 08:43

## 2018-03-10 RX ADMIN — SALINE NASAL SPRAY PRN SPRAY: 1.5 SOLUTION NASAL at 17:46

## 2018-03-10 RX ADMIN — BUDESONIDE AND FORMOTEROL FUMARATE DIHYDRATE SCH PUFF: 160; 4.5 AEROSOL RESPIRATORY (INHALATION) at 08:32

## 2018-03-10 NOTE — P.PN
Subjective


Progress Note Date: 03/10/18


Seen and examined for the follow-up of acute kidney injury.  Doing better 

tolerating oral diet.








Objective





- Vital Signs


Vital signs: 


 Vital Signs











Temp  97.0 F L  03/10/18 07:00


 


Pulse  108 H  03/10/18 12:07


 


Resp  18   03/10/18 08:00


 


BP  159/82   03/10/18 07:00


 


Pulse Ox  96   03/10/18 07:00








 Intake & Output











 03/09/18 03/10/18 03/10/18





 18:59 06:59 18:59


 


Intake Total  600 


 


Output Total 400 400 


 


Balance -400 200 


 


Intake:   


 


  Oral  600 


 


Output:   


 


  Urine 400 400 


 


Other:   


 


  Voiding Method Urinal  Urinal


 


  # Voids 1 1 














- Exam


Lying comfortable no acute distress


S1-S2 heard


Lungs Clear


No edema








- Labs


CBC & Chem 7: 


 03/09/18 08:35





 03/09/18 08:35


Labs: 


 Abnormal Lab Results - Last 24 Hours (Table)











  03/09/18 03/09/18 03/09/18 Range/Units





  08:35 12:23 17:10 


 


POC Glucose (mg/dL)   138 H  141 H  (75-99)  mg/dL


 


Iron  60 L    ()  ug/dL














  03/09/18 03/10/18 Range/Units





  20:24 07:04 


 


POC Glucose (mg/dL)  153 H  122 H  (75-99)  mg/dL


 


Iron    ()  ug/dL








 Microbiology - Last 24 Hours (Table)











 03/08/18 08:22 Gram Stain - Final





 Sputum Sputum Culture - Final














Assessment and Plan


Assessment: 


Impression:


#1 acute kidney injury secondary to prerenal process resolved


#2 hypernatremia secondary to IV fluids


#3 COPD


#4 anemia of chronic disease





Recommendations:


#1. Stop IV fluids.


#2 avoid nephrotoxic agents and hypotensive episodes


#3 stable from nephrology point of view to be discharged.

## 2018-03-10 NOTE — P.PN
Subjective


Progress Note Date: 03/10/18


Principal diagnosis: 





Acute exacerbation of chronic obstructive pulmonary disease complicated by 

tracheobronchitis.





82-year-old male patient with advanced oxygen-dependent COPD with chronic 

hypoxic and steroid dependent respiratory failure and the patient has an FEV1 

of 33% of predicted.  The patient has been having multiple hospitalizations for 

pulmonary complications.  During a hospitalization back in January the patient 

was found to have stenotrophomonas in the bronchioloalveolar lavage that was 

identified at a time of bronchoscopy.  The patient was treated with Bactrim 

back then.  During the course of treatment the patient developed an acute 

kidney injury that was drug-induced and ultimately he was treated and 

recovered.  Nevertheless he continued to be short of breath and he had a very 

limited overall performance and functional status due to shortness of breath.  

He has constant cough and congestion his chest.  He also has history of DVT of 

the lower extremity.  He was treated with anticoagulation for quite some time 

and he was subsequently taken off anticoagulation due to hematuria the patient 

is also known to have bladder cancer.  He does not recall any status where he 

had pulmonary embolism.  





The patient came into the hospital because of increased shortness of breath, 

pain across the left chest which was somewhat pleuritic in nature.  No 

hemoptysis.  He has congested and he has constant cough and bronchospasm 

wheezing related to his advanced COPD.  No calf pain or tenderness pain no 

swelling lower extremities.  He is having some lower abdominal pain in 

addition.  He has stooled.  No evidence of any GI bleeding.  No nausea or 

vomiting.  No diarrhea.  No dysuria exam [place].  No hematuria.  Urinalysis has 

not been done.  The patient's oral intake has been overall low and the patient 

was found to have an acute kidney injury on top of his chronic renal failure.  

His creatinine is up to 3.5 at the time of admission with a BUN of 94.  

Potassium level was also at 6.1.  The patient was treated for his hyperkalemia 

and repeat level is at 6.1.  Note that the patient was given Kayexalate 45 g 

yesterday and 30 g today.  The patient is also on IV fluids at 150 mL of normal 

saline.  No leukocytosis.  








On 03/08/2089 seeing this patient for a follow-up.  The patient was seen 

yesterday in consultation.  He has advanced and and stage COPD.  The patient 

also has had multiple exacerbation of COPD and previous stenotrophomonas 

infection of the lung.  The patient is currently being treated for an acute 

kidney injury as the patient presented with acute kidney failure secondary to 

dehydration.  Renal function is improving as the patient is being hydrated with 

IV fluids.  Meanwhile, he was having some gastrointestinal complaints and the 

lipase level was slightly elevated and ultrasound the abdomen was done that 

came back nonspecific and there was no evidence of any pancreatitis.  Although 

this is suspected based on the elevated lipase level.  Also, the patient had a 

Doppler of the lower extremity that showed no evidence of any DVT.  We have not 

committed to long-term anticoagulation as long as the patient has no Dopplers 

and clinically is doing better on his less short of breath and the pleurisy has 

subsided on today's evaluation.  Urinalysis came back negative.  He is 

afebrile.  He is extremely debilitated secondary to COPD.  Pulse ox is around 97

% on 2 L of oxygen nasal cannula.  Slightly tachycardic.





On 03/09/2018 patient seen in follow-up on medical surgical floor.  He reports 

some improvement in his breathing.  Lung sounds are diminished, with expiratory 

wheezing, patient still has congested nonproductive cough.  Not able to 

expectorate any sputum.  His appetite is improving.  He still is seeming IV 

hydration in the form of 0.45 normal saline at a rate of 50 ML per hour.  On 

today's lab work CBC is within normal limits at 8.2, hemoglobin is 9.2, serum 

sodium is 148, there has been improvement in his renal profile, BUN is down to 

45 from 66 and creatinine is at 1.04 down from 1.44.  Patient's amylase is now 

within normal limits, and lipase is trending down, down to 389 from 634.  

Patient is on 2 L per nasal cannula with O2 sat 96%.  He is afebrile, slightly 

tachycardic with a rate of 106 BPM.  Patient continues on combination of 

nebulized treatments, Symbicort, IV steroids. 





The patient is seen again today 03/10/2018 in follow-up in the regular medical 

floor.  He is doing better today as compared to yesterday.  Still not quite 

back to his baseline.  He continues to utilize the flutter valve and is 

maintaining good O2 saturations in the mid 90s on 2 L/m per nasal cannula.  He 

is afebrile.  Sputum culture reveals no growth.  142, potassium 4.5, creatinine 

1.00.  He is maintained on DuoNeb's, Symbicort, as Solu-Medrol.





Objective





- Vital Signs


Vital signs: 


 Vital Signs











Temp  98.3 F   03/10/18 14:56


 


Pulse  98   03/10/18 14:56


 


Resp  20   03/10/18 14:56


 


BP  145/65   03/10/18 14:56


 


Pulse Ox  95   03/10/18 14:56








 Intake & Output











 03/09/18 03/10/18 03/10/18





 18:59 06:59 18:59


 


Intake Total  600 


 


Output Total 400 400 


 


Balance -400 200 


 


Weight   59.8 kg


 


Intake:   


 


  Oral  600 


 


Output:   


 


  Urine 400 400 


 


Other:   


 


  Voiding Method Urinal  Urinal


 


  # Voids 1 1 2














- Exam





GENERAL EXAM: Alert, fairly comfortable in no acute distress


HEAD: Normocephalic/atraumatic.


EYES: Normal reaction of pupils, equal size.  Conjunctiva pink, sclera white.


NOSE: Clear with pink turbinates.


THROAT: No erythema or exudates.


NECK: No masses, no JVD, no thyroid enlargement, no adenopathy.


CHEST: No chest wall deformity.  Symmetrical expansion. 


LUNGS: Equal air entry with scattered rhonchi, and wheezing throughout the lung 

fields.  Diminished air entry noted bilaterally


CVS: Regular rate and rhythm, normal S1 and S2, no gallops, no murmurs, no rubs


ABDOMEN: Soft, nontender.  No hepatosplenomegaly, normal bowel sounds, no 

guarding or rigidity.


EXTREMITIES: No clubbing, no edema, no cyanosis, 2+ pulses and upper and lower 

extremities.


MUSCULOSKELETAL: Muscle strength and tone normal.


SPINE: No scoliosis or deformity


SKIN: No rashes


CENTRAL NERVOUS SYSTEM: Alert and oriented -3.  No focal deficits, tone is 

normal in all 4 extremities.


PSYCHIATRIC: Alert and oriented -3.  Appropriate affect.  Intact judgment and 

insight. 





- Labs


CBC & Chem 7: 


 03/09/18 08:35





 03/10/18 11:50


Labs: 


 Abnormal Lab Results - Last 24 Hours (Table)











  03/09/18 03/09/18 03/09/18 Range/Units





  08:35 17:10 20:24 


 


BUN     (9-20)  mg/dL


 


Glucose     (74-99)  mg/dL


 


POC Glucose (mg/dL)   141 H  153 H  (75-99)  mg/dL


 


Calcium     (8.4-10.2)  mg/dL


 


Iron  60 L    ()  ug/dL














  03/10/18 03/10/18 03/10/18 Range/Units





  07:04 11:50 12:21 


 


BUN   41 H   (9-20)  mg/dL


 


Glucose   143 H   (74-99)  mg/dL


 


POC Glucose (mg/dL)  122 H   110 H  (75-99)  mg/dL


 


Calcium   7.8 L   (8.4-10.2)  mg/dL


 


Iron     ()  ug/dL








 Microbiology - Last 24 Hours (Table)











 03/08/18 08:22 Gram Stain - Final





 Sputum Sputum Culture - Final














Assessment and Plan


Assessment: 





Assessment:





-  Acute COPD exacerbation completed by tracheobronchitis, and the patient is a 

clear chest x-ray and there is no evidence of any acute pulmonary infiltration.

  Atelectatic change in lung bases are seen.  Clinically the patient is 

improving and the patient is less short of breath compared to yesterday.  

Pleurisy is completely subsided on today's evaluation.  Doppler of the lower 

extremities negative.





- Advanced steroid dependent COPD, with a baseline FEV1 of 33% of predicted





-  Acute kidney injury, likely secondary to intravascular volume depletion and 

dehydration and the creatinine is up to 3.5, and the patient was resuscitated 

IV fluids and there has been improvement in the patient's Renal function and 

creatinine is down to 1.04





-  Hyperkalemia, possibly related to acute kidney injury .  Patient is also on 

potassium supplements and lisinopril which is probably contributing to the 

acute kidney injury.  Patient's potassium level improved since down to 4.1





- Hypernatremia, serum sodium is 148, probably related to decreased free water 

intake





- History of nicotine dependence





- hyperlipidemia





- Hypertension





- Neoplasm of bladder





- History of deep venous thrombosis, history of currently on no anticoagulants





-Stenotrophomonas l tracheobronchitis treated  in 2018





-Abdominal pain, under investigation.  Patient may have an underlying mild 

component of pancreatitis.  





Plan:





The patient was seen and evaluated by Dr. Daniels.  He is currently stable 

from the pulmonary standpoint.  We'll continue with his current treatment plan.

  We'll increase his activity as tolerated.  We'll continue to follow.  

Probable discharge in the a.m.





I, the cosigning physician, performed a history & physical examination of the 

patient.  Lungs sounds with bilateral end expiratory wheeze..  Maintaining good 

O2 saturations in the 90s on 2 L/m per nasal cannula. I discussed the 

assessment and plan of care with my nurse practitioner, Khalida Rodriguez. I attest to 

the above note as dictated by her.

## 2018-03-11 LAB
GLUCOSE BLD-MCNC: 116 MG/DL (ref 75–99)
GLUCOSE BLD-MCNC: 121 MG/DL (ref 75–99)
GLUCOSE BLD-MCNC: 130 MG/DL (ref 75–99)
GLUCOSE BLD-MCNC: 211 MG/DL (ref 75–99)

## 2018-03-11 RX ADMIN — IPRATROPIUM BROMIDE AND ALBUTEROL SULFATE SCH ML: .5; 3 SOLUTION RESPIRATORY (INHALATION) at 07:11

## 2018-03-11 RX ADMIN — METHYLPREDNISOLONE SODIUM SUCCINATE SCH MG: 40 INJECTION, POWDER, FOR SOLUTION INTRAMUSCULAR; INTRAVENOUS at 23:58

## 2018-03-11 RX ADMIN — BUDESONIDE AND FORMOTEROL FUMARATE DIHYDRATE SCH PUFF: 160; 4.5 AEROSOL RESPIRATORY (INHALATION) at 19:13

## 2018-03-11 RX ADMIN — IPRATROPIUM BROMIDE AND ALBUTEROL SULFATE SCH ML: .5; 3 SOLUTION RESPIRATORY (INHALATION) at 19:13

## 2018-03-11 RX ADMIN — IPRATROPIUM BROMIDE AND ALBUTEROL SULFATE SCH ML: .5; 3 SOLUTION RESPIRATORY (INHALATION) at 11:07

## 2018-03-11 RX ADMIN — METHYLPREDNISOLONE SODIUM SUCCINATE SCH MG: 40 INJECTION, POWDER, FOR SOLUTION INTRAMUSCULAR; INTRAVENOUS at 05:35

## 2018-03-11 RX ADMIN — CITALOPRAM HYDROBROMIDE SCH MG: 20 TABLET ORAL at 08:41

## 2018-03-11 RX ADMIN — INSULIN ASPART SCH: 100 INJECTION, SOLUTION INTRAVENOUS; SUBCUTANEOUS at 12:26

## 2018-03-11 RX ADMIN — IPRATROPIUM BROMIDE AND ALBUTEROL SULFATE SCH ML: .5; 3 SOLUTION RESPIRATORY (INHALATION) at 16:22

## 2018-03-11 RX ADMIN — BUDESONIDE AND FORMOTEROL FUMARATE DIHYDRATE SCH PUFF: 160; 4.5 AEROSOL RESPIRATORY (INHALATION) at 07:11

## 2018-03-11 RX ADMIN — FAMOTIDINE SCH MG: 20 TABLET, FILM COATED ORAL at 08:41

## 2018-03-11 RX ADMIN — INSULIN ASPART SCH: 100 INJECTION, SOLUTION INTRAVENOUS; SUBCUTANEOUS at 17:48

## 2018-03-11 RX ADMIN — INSULIN ASPART SCH UNIT: 100 INJECTION, SOLUTION INTRAVENOUS; SUBCUTANEOUS at 21:23

## 2018-03-11 RX ADMIN — ATORVASTATIN CALCIUM SCH MG: 20 TABLET, FILM COATED ORAL at 08:41

## 2018-03-11 RX ADMIN — TAMSULOSIN HYDROCHLORIDE SCH MG: 0.4 CAPSULE ORAL at 08:41

## 2018-03-11 RX ADMIN — TAMSULOSIN HYDROCHLORIDE SCH MG: 0.4 CAPSULE ORAL at 21:23

## 2018-03-11 RX ADMIN — SALINE NASAL SPRAY PRN SPRAY: 1.5 SOLUTION NASAL at 08:40

## 2018-03-11 RX ADMIN — METHYLPREDNISOLONE SODIUM SUCCINATE SCH MG: 40 INJECTION, POWDER, FOR SOLUTION INTRAMUSCULAR; INTRAVENOUS at 18:08

## 2018-03-11 RX ADMIN — INSULIN ASPART SCH: 100 INJECTION, SOLUTION INTRAVENOUS; SUBCUTANEOUS at 08:40

## 2018-03-11 RX ADMIN — METHYLPREDNISOLONE SODIUM SUCCINATE SCH MG: 40 INJECTION, POWDER, FOR SOLUTION INTRAMUSCULAR; INTRAVENOUS at 13:10

## 2018-03-11 NOTE — P.PN
Subjective


Progress Note Date: 03/10/18


Principal diagnosis: 





Acute kidney injury





Patient admitted with acute kidney injury and COPD exacerbation





On 03/09/2018


Patient says that his breathing is improving.,  Complaints of cough with 

nonproductive sputum.


Patient was found to have sodium level of 148.  IV fluids have been changed to 

half normal saline at 50 mL per hour


Amylase and lipase trending down.  Patient is saturating well on nasal cannula.

  Continued on IV steroids and breathing treatments.


Pulmonary and nephrology is following.





03/10/2018


Patient's breathing status is slowly improving.  Sodium level 142 today.  IV 

fluids have been discontinued.  Otherwise continued on breathing treatments and 

IV steroids.  No other acute overnight issues.  Patient has been afebrile.  No 

chest pain no abdominal pain no nausea vomiting or diarrhea.








All other review of systems negative except the above


Current medications reviewed.





Objective





- Vital Signs


Vital signs: 


 Vital Signs











Temp  98.3 F   03/10/18 14:56


 


Pulse  96   03/10/18 21:08


 


Resp  20   03/10/18 16:00


 


BP  145/65   03/10/18 14:56


 


Pulse Ox  96   03/10/18 20:53








 Intake & Output











 03/10/18 03/10/18 03/11/18





 06:59 18:59 07:59


 


Intake Total 600 720 


 


Output Total 400  


 


Balance 200 720 


 


Weight  59.8 kg 


 


Intake:   


 


  Oral 600 720 


 


Output:   


 


  Urine 400  


 


Other:   


 


  Voiding Method  Urinal 


 


  # Voids 1 2 














- Exam





PHYSICAL EXAMINATION: 


Patient is lying in the bed comfortably, no acute distress, awake alert and 

oriented.. Mild cognitive impairment. 


HEENT: Normocephalic. Neck is supple. Pupils reactive. Nostrils clear. Oral 

cavity is moist. Ears reveal no drainage. 


Neck reveals no JVD, carotid bruits, or thyromegaly. 


CHEST EXAMINATION: Trachea is central. Symmetrical expansion.  Bilateral 

diffuse rhonchi and wheezing positive.


CARDIAC: Normal S1, S2 with no gallops. No murmurs 


ABDOMEN: Soft. Bowel sounds normal. No organomegaly. No abdominal bruits. 


Extremities: reveal no edema.  No clubbing or cyanosis


Neurologically awake, alert, oriented x3 with well-coordinated movements.  No 

focal deficits noted


Skin: No rash or skin lesions. 


Psychiatric: Cooperative.  Nonsuicidal


Musculoskeletal: No joint swelling or deformity.  Normal range of motion.








- Labs


CBC & Chem 7: 


 03/09/18 08:35





 03/10/18 11:50


Labs: 


 Abnormal Lab Results - Last 24 Hours (Table)











  03/09/18 03/10/18 03/10/18 Range/Units





  08:35 07:04 11:50 


 


BUN    41 H  (9-20)  mg/dL


 


Glucose    143 H  (74-99)  mg/dL


 


POC Glucose (mg/dL)   122 H   (75-99)  mg/dL


 


Calcium    7.8 L  (8.4-10.2)  mg/dL


 


Iron  60 L    ()  ug/dL














  03/10/18 03/10/18 03/10/18 Range/Units





  12:21 16:44 21:23 


 


BUN     (9-20)  mg/dL


 


Glucose     (74-99)  mg/dL


 


POC Glucose (mg/dL)  110 H  131 H  137 H  (75-99)  mg/dL


 


Calcium     (8.4-10.2)  mg/dL


 


Iron     ()  ug/dL








 Microbiology - Last 24 Hours (Table)











 03/08/18 08:22 Gram Stain - Final





 Sputum Sputum Culture - Final














Assessment and Plan


Assessment: 





Acute COPD exacerbation due to tracheobronchitis


Acute kidney injury likely due to dehydration.  Creatinine peaked at 3.5.  

Currently 1.0


Hypernatremia due to poor oral intake.  resolved


Abdominal pain.  Possible acute pancreatitis with slightly elevated lipase and 

amylase.  Improved now


Advanced COPD


Hypernatremia resolved due to acute kidney injury


Hyperlipidemia


Hypertension


History of nicotine dependence


Depression


DVT prophylaxis





Patient will be continued on  IV steroids and breathing treatments.  DC'd IV 

fluids.  Encourage oral intake.  Otherwise continue the current management and 

follow up closely.  Further recommendations based on the clinical course.





Time with Patient: Greater than 30

## 2018-03-11 NOTE — P.PN
Subjective


Progress Note Date: 03/11/18











Acute exacerbation of chronic obstructive pulmonary disease complicated by 

tracheobronchitis.





82-year-old male patient with advanced oxygen-dependent COPD with chronic 

hypoxic and steroid dependent respiratory failure and the patient has an FEV1 

of 33% of predicted.  The patient has been having multiple hospitalizations for 

pulmonary complications.  During a hospitalization back in January the patient 

was found to have stenotrophomonas in the bronchioloalveolar lavage that was 

identified at a time of bronchoscopy.  The patient was treated with Bactrim 

back then.  During the course of treatment the patient developed an acute 

kidney injury that was drug-induced and ultimately he was treated and 

recovered.  Nevertheless he continued to be short of breath and he had a very 

limited overall performance and functional status due to shortness of breath.  

He has constant cough and congestion his chest.  He also has history of DVT of 

the lower extremity.  He was treated with anticoagulation for quite some time 

and he was subsequently taken off anticoagulation due to hematuria the patient 

is also known to have bladder cancer.  He does not recall any status where he 

had pulmonary embolism.  





The patient came into the hospital because of increased shortness of breath, 

pain across the left chest which was somewhat pleuritic in nature.  No 

hemoptysis.  He has congested and he has constant cough and bronchospasm 

wheezing related to his advanced COPD.  No calf pain or tenderness pain no 

swelling lower extremities.  He is having some lower abdominal pain in 

addition.  He has stooled.  No evidence of any GI bleeding.  No nausea or 

vomiting.  No diarrhea.  No dysuria exam [place].  No hematuria.  Urinalysis has 

not been done.  The patient's oral intake has been overall low and the patient 

was found to have an acute kidney injury on top of his chronic renal failure.  

His creatinine is up to 3.5 at the time of admission with a BUN of 94.  

Potassium level was also at 6.1.  The patient was treated for his hyperkalemia 

and repeat level is at 6.1.  Note that the patient was given Kayexalate 45 g 

yesterday and 30 g today.  The patient is also on IV fluids at 150 mL of normal 

saline.  No leukocytosis.  








On 03/08/2089 seeing this patient for a follow-up.  The patient was seen 

yesterday in consultation.  He has advanced and and stage COPD.  The patient 

also has had multiple exacerbation of COPD and previous stenotrophomonas 

infection of the lung.  The patient is currently being treated for an acute 

kidney injury as the patient presented with acute kidney failure secondary to 

dehydration.  Renal function is improving as the patient is being hydrated with 

IV fluids.  Meanwhile, he was having some gastrointestinal complaints and the 

lipase level was slightly elevated and ultrasound the abdomen was done that 

came back nonspecific and there was no evidence of any pancreatitis.  Although 

this is suspected based on the elevated lipase level.  Also, the patient had a 

Doppler of the lower extremity that showed no evidence of any DVT.  We have not 

committed to long-term anticoagulation as long as the patient has no Dopplers 

and clinically is doing better on his less short of breath and the pleurisy has 

subsided on today's evaluation.  Urinalysis came back negative.  He is 

afebrile.  He is extremely debilitated secondary to COPD.  Pulse ox is around 97

% on 2 L of oxygen nasal cannula.  Slightly tachycardic.





On 03/09/2018 patient seen in follow-up on medical surgical floor.  He reports 

some improvement in his breathing.  Lung sounds are diminished, with expiratory 

wheezing, patient still has congested nonproductive cough.  Not able to 

expectorate any sputum.  His appetite is improving.  He still is seeming IV 

hydration in the form of 0.45 normal saline at a rate of 50 ML per hour.  On 

today's lab work CBC is within normal limits at 8.2, hemoglobin is 9.2, serum 

sodium is 148, there has been improvement in his renal profile, BUN is down to 

45 from 66 and creatinine is at 1.04 down from 1.44.  Patient's amylase is now 

within normal limits, and lipase is trending down, down to 389 from 634.  

Patient is on 2 L per nasal cannula with O2 sat 96%.  He is afebrile, slightly 

tachycardic with a rate of 106 BPM.  Patient continues on combination of 

nebulized treatments, Symbicort, IV steroids. 





The patient is seen again today 03/10/2018 in follow-up in the regular medical 

floor.  He is doing better today as compared to yesterday.  Still not quite 

back to his baseline.  He continues to utilize the flutter valve and is 

maintaining good O2 saturations in the mid 90s on 2 L/m per nasal cannula.  He 

is afebrile.  Sputum culture reveals no growth.  142, potassium 4.5, creatinine 

1.00.  He is maintained on DuoNeb's, Symbicort, as Solu-Medrol.








On 03/11/2018, patient is doing well and the patient has no specific 

complaints.  He is using the flutter valve which is facilitating respiratory 

secretions and pulmonary toileting.  The patient is not having any worsening 

shortness of breath.  His renal function is normalized and the creatinine is 

down to 1.  No electrode disturbances.  No significant abdominal pain.  No 

nausea or vomiting.  No diarrhea.  He was feeling down and depressed and the 

patient was started on 20 mg of Celexa yesterday and I told him that this will 

take some time to improve his overall condition and symptoms of depression.  

Meanwhile, the patient remains on DuoNeb neb treatments around the clock and he 

is on IV Solu Medrol and he will be tapered to prednisone burst taper as of 

tomorrow.  Discharge planning is in progress for now.





Objective





- Vital Signs


Vital signs: 


 Vital Signs











Temp  98.8 F   03/11/18 15:00


 


Pulse  108 H  03/11/18 15:46


 


Resp  22   03/11/18 15:46


 


BP  119/85   03/11/18 15:00


 


Pulse Ox  97   03/11/18 15:00








 Intake & Output











 03/10/18 03/11/18 03/11/18





 17:59 06:59 18:59


 


Intake Total   520


 


Output Total   


 


Balance   520


 


Weight   


 


Intake:   


 


  Oral   520


 


Output:   


 


  Urine   


 


Other:   


 


  Voiding Method   Urinal


 


  # Voids   3


 


  # Bowel Movements   














- Exam








GENERAL EXAM: Alert, pleasant, thin, 82-year-old white male, mildly short of 

breath at rest, but fairly comfortable


HEAD: Normocephalic/atraumatic.


EYES: Normal reaction of pupils, equal size.  Conjunctiva pink, sclera white.


NOSE: Clear with pink turbinates.


THROAT: No erythema or exudates.


NECK: No masses, no JVD, no thyroid enlargement, no adenopathy.


CHEST: No chest wall deformity.  Symmetrical expansion. 


LUNGS: Equal air entry with scattered rhonchi, and wheezing throughout the lung 

fields.


CVS: Regular rate and rhythm, normal S1 and S2, no gallops, no murmurs, no rubs


ABDOMEN: Soft, nontender.  No hepatosplenomegaly, normal bowel sounds, no 

guarding or rigidity.


EXTREMITIES: No clubbing, no edema, no cyanosis, 2+ pulses and upper and lower 

extremities.


MUSCULOSKELETAL: Muscle strength and tone normal.


SPINE: No scoliosis or deformity


SKIN: No rashes


CENTRAL NERVOUS SYSTEM: Alert and oriented -3.  No focal deficits, tone is 

normal in all 4 extremities.


PSYCHIATRIC: Alert and oriented -3.  Appropriate affect.  Intact judgment and 

insight. 





- Labs


CBC & Chem 7: 


 03/09/18 08:35





 03/10/18 11:50


Labs: 


 Abnormal Lab Results - Last 24 Hours (Table)











  03/10/18 03/10/18 03/11/18 Range/Units





  16:44 21:23 07:46 


 


POC Glucose (mg/dL)  131 H  137 H  116 H  (75-99)  mg/dL














  03/11/18 Range/Units





  11:57 


 


POC Glucose (mg/dL)  130 H  (75-99)  mg/dL








 Microbiology - Last 24 Hours (Table)











 03/08/18 08:22 Gram Stain - Final





 Sputum Sputum Culture - Final














Assessment and Plan


Plan: 











Assessment:





-  Acute COPD exacerbation completed by tracheobronchitis, and the patient is a 

clear chest x-ray and there is no evidence of any acute pulmonary infiltration.

  Atelectatic change in lung bases are seen.  Clinically the patient is 

improving and the patient is less short of breath compared to yesterday.  

Pleurisy is completely subsided on today's evaluation.  Doppler of the lower 

extremities negative.  No clinical evidence of pulmonary embolism and based on 

that this diagnosis was not pursued any further.





- Advanced steroid dependent COPD, with a baseline FEV1 of 33% of predicted





-  Acute kidney injury, likely secondary to intravascular volume depletion and 

dehydration and the creatinine is up to 3.5, and the patient was resuscitated 

IV fluids and there has been improvement in the patient's Renal function and 

creatinine is down to 1.0








-  Hyperkalemia, possibly related to acute kidney injury .  Patient is also on 

potassium supplements and lisinopril which is probably contributing to the 

acute kidney injury.  Patient's potassium level normalized





- Hypernatremia, recovered





- History of nicotine dependence





- hyperlipidemia





- Hypertension





- Neoplasm of bladder





- History of deep venous thrombosis, history of currently on no anticoagulants





-Stenotrophomonas l tracheobronchitis treated  in 2018





-Abdominal pain, under investigation.  Patient may have an underlying mild 

component of pancreatitis.  Abdominal pain is subsided





Plan





Continue current treatment and taper the patient a prednisone in a.m.  Continue 

bronchodilators.  Continue flutter valve.  Kept on IV fluids to KVO.  Encourage 

oral intake.  We'll continue to follow.

## 2018-03-11 NOTE — P.PN
Subjective


Progress Note Date: 03/09/18


Principal diagnosis: 





Acute kidney injury





Patient admitted with acute kidney injury and COPD exacerbation





On 03/09/2018


Patient says that his breathing is improving.,  Complaints of cough with 

nonproductive sputum.


Patient was found to have sodium level of 148.  IV fluids have been changed to 

half normal saline at 50 mL per hour


Amylase and lipase trending down.  Patient is saturating well on nasal cannula.

  Continued on IV steroids and breathing treatments.


Pulmonary and nephrology is following.








All other review of systems negative except the above


Current medications reviewed.





Objective





- Vital Signs


Vital signs: 


 Vital Signs











Temp  97.0 F L  03/09/18 15:00


 


Pulse  101 H  03/09/18 19:22


 


Resp  18   03/09/18 19:22


 


BP  150/71   03/09/18 15:00


 


Pulse Ox  96   03/09/18 15:00








 Intake & Output











 03/09/18 03/09/18 03/10/18





 06:59 18:59 06:59


 


Intake Total 1200  


 


Output Total  400 


 


Balance 1200 -400 


 


Intake:   


 


    


 


    Sodium Chloride 0.9% 1, 600  





    000 ml @ 75 mls/hr IV .   





    [id number] [facility] Rx#:[id number]   


 


  Intake, IV Titration 600  





  Amount   


 


    Sodium Chloride 0.9% 1, 600  





    000 ml @ 75 mls/hr IV .   





    [id number] [facility] Rx#:[id number]   


 


Output:   


 


  Urine  400 


 


Other:   


 


  Voiding Method  Urinal 


 


  # Voids 3 1 














- Exam





PHYSICAL EXAMINATION: 


Patient is lying in the bed comfortably, no acute distress, awake alert and 

oriented.. Mild cognitive impairment. 


HEENT: Normocephalic. Neck is supple. Pupils reactive. Nostrils clear. Oral 

cavity is moist. Ears reveal no drainage. 


Neck reveals no JVD, carotid bruits, or thyromegaly. 


CHEST EXAMINATION: Trachea is central. Symmetrical expansion.  Bilateral 

diffuse rhonchi and wheezing positive.


CARDIAC: Normal S1, S2 with no gallops. No murmurs 


ABDOMEN: Soft. Bowel sounds normal. No organomegaly. No abdominal bruits. 


Extremities: reveal no edema.  No clubbing or cyanosis


Neurologically awake, alert, oriented x3 with well-coordinated movements.  No 

focal deficits noted


Skin: No rash or skin lesions. 


Psychiatric: Cooperative.  Nonsuicidal


Musculoskeletal: No joint swelling or deformity.  Normal range of motion.








- Labs


CBC & Chem 7: 


 03/09/18 08:35





 03/10/18 11:50


Labs: 


 Abnormal Lab Results - Last 24 Hours (Table)











  03/08/18 03/09/18 03/09/18 Range/Units





  21:02 02:01 07:34 


 


RBC     (4.30-5.90)  m/uL


 


Hgb     (13.0-17.5)  gm/dL


 


Hct     (39.0-53.0)  %


 


RDW     (11.5-15.5)  %


 


Plt Count     (150-450)  k/uL


 


Sodium     (137-145)  mmol/L


 


Chloride     ()  mmol/L


 


Carbon Dioxide     (22-30)  mmol/L


 


BUN     (9-20)  mg/dL


 


Glucose     (74-99)  mg/dL


 


POC Glucose (mg/dL)  186 H  197 H  149 H  (75-99)  mg/dL


 


Calcium     (8.4-10.2)  mg/dL


 


Total Bilirubin     (0.2-1.3)  mg/dL


 


Total Protein     (6.3-8.2)  g/dL


 


Albumin     (3.5-5.0)  g/dL


 


Lipase     ()  U/L














  03/09/18 03/09/18 03/09/18 Range/Units





  08:35 08:35 12:23 


 


RBC   3.26 L   (4.30-5.90)  m/uL


 


Hgb   9.2 L   (13.0-17.5)  gm/dL


 


Hct   28.3 L   (39.0-53.0)  %


 


RDW   16.8 H   (11.5-15.5)  %


 


Plt Count   455 H   (150-450)  k/uL


 


Sodium  148 H    (137-145)  mmol/L


 


Chloride  109 H    ()  mmol/L


 


Carbon Dioxide  31 H    (22-30)  mmol/L


 


BUN  45 H    (9-20)  mg/dL


 


Glucose  149 H    (74-99)  mg/dL


 


POC Glucose (mg/dL)    138 H  (75-99)  mg/dL


 


Calcium  7.9 L    (8.4-10.2)  mg/dL


 


Total Bilirubin  0.1 L    (0.2-1.3)  mg/dL


 


Total Protein  5.6 L    (6.3-8.2)  g/dL


 


Albumin  2.9 L    (3.5-5.0)  g/dL


 


Lipase  389 H    ()  U/L














  03/09/18 03/09/18 Range/Units





  17:10 20:24 


 


RBC    (4.30-5.90)  m/uL


 


Hgb    (13.0-17.5)  gm/dL


 


Hct    (39.0-53.0)  %


 


RDW    (11.5-15.5)  %


 


Plt Count    (150-450)  k/uL


 


Sodium    (137-145)  mmol/L


 


Chloride    ()  mmol/L


 


Carbon Dioxide    (22-30)  mmol/L


 


BUN    (9-20)  mg/dL


 


Glucose    (74-99)  mg/dL


 


POC Glucose (mg/dL)  141 H  153 H  (75-99)  mg/dL


 


Calcium    (8.4-10.2)  mg/dL


 


Total Bilirubin    (0.2-1.3)  mg/dL


 


Total Protein    (6.3-8.2)  g/dL


 


Albumin    (3.5-5.0)  g/dL


 


Lipase    ()  U/L








 Microbiology - Last 24 Hours (Table)











 03/08/18 08:22 Gram Stain - Preliminary





 Sputum Sputum Culture - Preliminary














Assessment and Plan


Assessment: 





Acute COPD exacerbation due to tracheobronchitis


Acute kidney injury likely due to dehydration.  Creatinine peaked at 3.5.  

Currently 1.0


Hypernatremia sodium level CXLVIII.  IV fluids changed to half-normal saline at 

50 mL


Abdominal pain.  Possible pancreatitis with slightly elevated lipase and 

amylase.  Improved now


Advanced COPD


Hypernatremia resolved due to acute kidney injury


Hyperlipidemia


Hypertension


History of nicotine dependence


Depression


DVT prophylaxis





Patient will be continued on gentle hydration and IV steroids and breathing 

treatments.  Encourage oral intake.  Otherwise continue the current management 

and follow up closely.  Further recommendations based on the clinical course.





Time with Patient: Greater than 30

## 2018-03-12 LAB
ANION GAP SERPL CALC-SCNC: 9 MMOL/L
BASOPHILS # BLD AUTO: 0 K/UL (ref 0–0.2)
BASOPHILS NFR BLD AUTO: 0 %
BUN SERPL-SCNC: 35 MG/DL (ref 9–20)
CALCIUM SPEC-MCNC: 7.9 MG/DL (ref 8.4–10.2)
CHLORIDE SERPL-SCNC: 104 MMOL/L (ref 98–107)
CO2 SERPL-SCNC: 27 MMOL/L (ref 22–30)
EOSINOPHIL # BLD AUTO: 0 K/UL (ref 0–0.7)
EOSINOPHIL NFR BLD AUTO: 0 %
ERYTHROCYTE [DISTWIDTH] IN BLOOD BY AUTOMATED COUNT: 3.54 M/UL (ref 4.3–5.9)
ERYTHROCYTE [DISTWIDTH] IN BLOOD: 17.4 % (ref 11.5–15.5)
GLUCOSE BLD-MCNC: 107 MG/DL (ref 75–99)
GLUCOSE BLD-MCNC: 122 MG/DL (ref 75–99)
GLUCOSE BLD-MCNC: 184 MG/DL (ref 75–99)
GLUCOSE BLD-MCNC: 186 MG/DL (ref 75–99)
GLUCOSE SERPL-MCNC: 95 MG/DL (ref 74–99)
HCT VFR BLD AUTO: 30.6 % (ref 39–53)
HGB BLD-MCNC: 9.6 GM/DL (ref 13–17.5)
LYMPHOCYTES # SPEC AUTO: 0.3 K/UL (ref 1–4.8)
LYMPHOCYTES NFR SPEC AUTO: 3 %
MCH RBC QN AUTO: 27.1 PG (ref 25–35)
MCHC RBC AUTO-ENTMCNC: 31.4 G/DL (ref 31–37)
MCV RBC AUTO: 86.3 FL (ref 80–100)
MONOCYTES # BLD AUTO: 0.5 K/UL (ref 0–1)
MONOCYTES NFR BLD AUTO: 5 %
NEUTROPHILS # BLD AUTO: 9.1 K/UL (ref 1.3–7.7)
NEUTROPHILS NFR BLD AUTO: 91 %
PLATELET # BLD AUTO: 444 K/UL (ref 150–450)
POTASSIUM SERPL-SCNC: 4.6 MMOL/L (ref 3.5–5.1)
SODIUM SERPL-SCNC: 140 MMOL/L (ref 137–145)
WBC # BLD AUTO: 10 K/UL (ref 3.8–10.6)

## 2018-03-12 RX ADMIN — CITALOPRAM HYDROBROMIDE SCH MG: 20 TABLET ORAL at 09:47

## 2018-03-12 RX ADMIN — METHYLPREDNISOLONE SODIUM SUCCINATE SCH MG: 40 INJECTION, POWDER, FOR SOLUTION INTRAMUSCULAR; INTRAVENOUS at 21:42

## 2018-03-12 RX ADMIN — IPRATROPIUM BROMIDE AND ALBUTEROL SULFATE SCH ML: .5; 3 SOLUTION RESPIRATORY (INHALATION) at 20:48

## 2018-03-12 RX ADMIN — INSULIN ASPART SCH UNIT: 100 INJECTION, SOLUTION INTRAVENOUS; SUBCUTANEOUS at 18:04

## 2018-03-12 RX ADMIN — BUDESONIDE AND FORMOTEROL FUMARATE DIHYDRATE SCH PUFF: 160; 4.5 AEROSOL RESPIRATORY (INHALATION) at 08:13

## 2018-03-12 RX ADMIN — IPRATROPIUM BROMIDE AND ALBUTEROL SULFATE SCH ML: .5; 3 SOLUTION RESPIRATORY (INHALATION) at 11:55

## 2018-03-12 RX ADMIN — METHYLPREDNISOLONE SODIUM SUCCINATE SCH MG: 40 INJECTION, POWDER, FOR SOLUTION INTRAMUSCULAR; INTRAVENOUS at 12:43

## 2018-03-12 RX ADMIN — INSULIN ASPART SCH UNIT: 100 INJECTION, SOLUTION INTRAVENOUS; SUBCUTANEOUS at 21:43

## 2018-03-12 RX ADMIN — ATORVASTATIN CALCIUM SCH MG: 20 TABLET, FILM COATED ORAL at 09:47

## 2018-03-12 RX ADMIN — BUDESONIDE AND FORMOTEROL FUMARATE DIHYDRATE SCH PUFF: 160; 4.5 AEROSOL RESPIRATORY (INHALATION) at 20:47

## 2018-03-12 RX ADMIN — METHYLPREDNISOLONE SODIUM SUCCINATE SCH MG: 40 INJECTION, POWDER, FOR SOLUTION INTRAMUSCULAR; INTRAVENOUS at 05:27

## 2018-03-12 RX ADMIN — FAMOTIDINE SCH MG: 20 TABLET, FILM COATED ORAL at 09:47

## 2018-03-12 RX ADMIN — IPRATROPIUM BROMIDE AND ALBUTEROL SULFATE SCH ML: .5; 3 SOLUTION RESPIRATORY (INHALATION) at 15:39

## 2018-03-12 RX ADMIN — TAMSULOSIN HYDROCHLORIDE SCH MG: 0.4 CAPSULE ORAL at 21:43

## 2018-03-12 RX ADMIN — INSULIN ASPART SCH: 100 INJECTION, SOLUTION INTRAVENOUS; SUBCUTANEOUS at 12:30

## 2018-03-12 RX ADMIN — TAMSULOSIN HYDROCHLORIDE SCH MG: 0.4 CAPSULE ORAL at 09:47

## 2018-03-12 RX ADMIN — IPRATROPIUM BROMIDE AND ALBUTEROL SULFATE SCH ML: .5; 3 SOLUTION RESPIRATORY (INHALATION) at 08:13

## 2018-03-12 RX ADMIN — INSULIN ASPART SCH: 100 INJECTION, SOLUTION INTRAVENOUS; SUBCUTANEOUS at 09:47

## 2018-03-12 NOTE — P.PN
Subjective


Progress Note Date: 03/12/18


Principal diagnosis: 


Acute COPD exacerbation, tracheobronchitis





82-year-old male patient with advanced oxygen-dependent COPD with chronic 

hypoxic and steroid dependent respiratory failure and the patient has an FEV1 

of 33% of predicted.  The patient has been having multiple hospitalizations for 

pulmonary complications.  During a hospitalization back in January the patient 

was found to have stenotrophomonas in the bronchioloalveolar lavage that was 

identified at a time of bronchoscopy.  The patient was treated with Bactrim 

back then.  During the course of treatment the patient developed an acute 

kidney injury that was drug-induced and ultimately he was treated and 

recovered.  Nevertheless he continued to be short of breath and he had a very 

limited overall performance and functional status due to shortness of breath.  

He has constant cough and congestion his chest.  He also has history of DVT of 

the lower extremity.  He was treated with anticoagulation for quite some time 

and he was subsequently taken off anticoagulation due to hematuria the patient 

is also known to have bladder cancer.  He does not recall any status where he 

had pulmonary embolism.  





The patient came into the hospital because of increased shortness of breath, 

pain across the left chest which was somewhat pleuritic in nature.  No 

hemoptysis.  He has congested and he has constant cough and bronchospasm 

wheezing related to his advanced COPD.  No calf pain or tenderness pain no 

swelling lower extremities.  He is having some lower abdominal pain in 

addition.  He has stooled.  No evidence of any GI bleeding.  No nausea or 

vomiting.  No diarrhea.  No dysuria exam [place].  No hematuria.  Urinalysis has 

not been done.  The patient's oral intake has been overall low and the patient 

was found to have an acute kidney injury on top of his chronic renal failure.  

His creatinine is up to 3.5 at the time of admission with a BUN of 94.  

Potassium level was also at 6.1.  The patient was treated for his hyperkalemia 

and repeat level is at 6.1.  Note that the patient was given Kayexalate 45 g 

yesterday and 30 g today.  The patient is also on IV fluids at 150 mL of normal 

saline.  No leukocytosis.  








On 03/08/2089 seeing this patient for a follow-up.  The patient was seen 

yesterday in consultation.  He has advanced and and stage COPD.  The patient 

also has had multiple exacerbation of COPD and previous stenotrophomonas 

infection of the lung.  The patient is currently being treated for an acute 

kidney injury as the patient presented with acute kidney failure secondary to 

dehydration.  Renal function is improving as the patient is being hydrated with 

IV fluids.  Meanwhile, he was having some gastrointestinal complaints and the 

lipase level was slightly elevated and ultrasound the abdomen was done that 

came back nonspecific and there was no evidence of any pancreatitis.  Although 

this is suspected based on the elevated lipase level.  Also, the patient had a 

Doppler of the lower extremity that showed no evidence of any DVT.  We have not 

committed to long-term anticoagulation as long as the patient has no Dopplers 

and clinically is doing better on his less short of breath and the pleurisy has 

subsided on today's evaluation.  Urinalysis came back negative.  He is 

afebrile.  He is extremely debilitated secondary to COPD.  Pulse ox is around 97

% on 2 L of oxygen nasal cannula.  Slightly tachycardic.





On 03/09/2018 patient seen in follow-up on medical surgical floor.  He reports 

some improvement in his breathing.  Lung sounds are diminished, with expiratory 

wheezing, patient still has congested nonproductive cough.  Not able to 

expectorate any sputum.  His appetite is improving.  He still is seeming IV 

hydration in the form of 0.45 normal saline at a rate of 50 ML per hour.  On 

today's lab work CBC is within normal limits at 8.2, hemoglobin is 9.2, serum 

sodium is 148, there has been improvement in his renal profile, BUN is down to 

45 from 66 and creatinine is at 1.04 down from 1.44.  Patient's amylase is now 

within normal limits, and lipase is trending down, down to 389 from 634.  

Patient is on 2 L per nasal cannula with O2 sat 96%.  He is afebrile, slightly 

tachycardic with a rate of 106 BPM.  Patient continues on combination of 

nebulized treatments, Symbicort, IV steroids. 





The patient is seen again today 03/10/2018 in follow-up in the regular medical 

floor.  He is doing better today as compared to yesterday.  Still not quite 

back to his baseline.  He continues to utilize the flutter valve and is 

maintaining good O2 saturations in the mid 90s on 2 L/m per nasal cannula.  He 

is afebrile.  Sputum culture reveals no growth.  142, potassium 4.5, creatinine 

1.00.  He is maintained on DuoNeb's, Symbicort, as Solu-Medrol.








On 03/11/2018, patient is doing well and the patient has no specific 

complaints.  He is using the flutter valve which is facilitating respiratory 

secretions and pulmonary toileting.  The patient is not having any worsening 

shortness of breath.  His renal function is normalized and the creatinine is 

down to 1.  No electrode disturbances.  No significant abdominal pain.  No 

nausea or vomiting.  No diarrhea.  He was feeling down and depressed and the 

patient was started on 20 mg of Celexa yesterday and I told him that this will 

take some time to improve his overall condition and symptoms of depression.  

Meanwhile, the patient remains on DuoNeb neb treatments around the clock and he 

is on IV Solu Medrol and he will be tapered to prednisone burst taper as of 

tomorrow.  Discharge planning is in progress for now.





On 03/12/2018 patient seen in follow-up on medical surgical floor.  States his 

breathing continues to improve, still remains congested, able to produce small 

amounts of light sputum at times, sounds are positive for scattered rhonchi, 

minimal wheezing.  Continues on 2 L per nasal cannula O2 sat at 97%.  He is 

afebrile, hemodynamically stable.  He states his appetite is improving.  He 

worsening shortness of breath, is able to ambulate to the bathroom, tolerating 

activity much better.  Sputum culture is negative for any growth.  His blood 

work showed WBC of 10.0, hemoglobin is 9.6, electrolytes are within normal 

limits, renal profile continues to improve, BUN is 35, creatinine is 0.98.  

Patient continues on IV steroids, nebulized treatments, Symbicort. 





Objective





- Vital Signs


Vital signs: 


 Vital Signs











Temp  97.5 F L  03/12/18 06:37


 


Pulse  84   03/12/18 12:11


 


Resp  16   03/12/18 08:00


 


BP  171/88   03/12/18 06:37


 


Pulse Ox  97   03/12/18 06:37








 Intake & Output











 03/11/18 03/12/18 03/12/18





 18:59 06:59 18:59


 


Intake Total 520  


 


Balance 520  


 


Intake:   


 


  Oral 520  


 


Other:   


 


  Voiding Method Urinal  


 


  # Voids 3 2 1














- Exam


GENERAL EXAM: Alert, pleasant, thin, 82-year-old white male, mildly short of 

breath at rest, but fairly comfortable


HEAD: Normocephalic/atraumatic.


EYES: Normal reaction of pupils, equal size.  Conjunctiva pink, sclera white.


NOSE: Clear with pink turbinates.


THROAT: No erythema or exudates.


NECK: No masses, no JVD, no thyroid enlargement, no adenopathy.


CHEST: No chest wall deformity.  Symmetrical expansion. 


LUNGS: Equal air entry with scattered rhonchi, and minimal wheezes 


CVS: Regular rate and rhythm, normal S1 and S2, no gallops, no murmurs, no rubs


ABDOMEN: Soft, nontender.  No hepatosplenomegaly, normal bowel sounds, no 

guarding or rigidity.


EXTREMITIES: No clubbing, no edema, no cyanosis, 2+ pulses and upper and lower 

extremities.


MUSCULOSKELETAL: Muscle strength and tone normal.


SPINE: No scoliosis or deformity


SKIN: No rashes


CENTRAL NERVOUS SYSTEM: Alert and oriented -3.  No focal deficits, tone is 

normal in all 4 extremities.


PSYCHIATRIC: Alert and oriented -3.  Appropriate affect.  Intact judgment and 

insight. 








- Labs


CBC & Chem 7: 


 03/12/18 07:59





 03/12/18 07:59


Labs: 


 Abnormal Lab Results - Last 24 Hours (Table)











  03/11/18 03/11/18 03/12/18 Range/Units





  17:26 21:02 07:25 


 


RBC     (4.30-5.90)  m/uL


 


Hgb     (13.0-17.5)  gm/dL


 


Hct     (39.0-53.0)  %


 


RDW     (11.5-15.5)  %


 


Neutrophils #     (1.3-7.7)  k/uL


 


Lymphocytes #     (1.0-4.8)  k/uL


 


BUN     (9-20)  mg/dL


 


POC Glucose (mg/dL)  121 H  211 H  107 H  (75-99)  mg/dL


 


Calcium     (8.4-10.2)  mg/dL














  03/12/18 03/12/18 03/12/18 Range/Units





  07:59 07:59 12:08 


 


RBC  3.54 L    (4.30-5.90)  m/uL


 


Hgb  9.6 L    (13.0-17.5)  gm/dL


 


Hct  30.6 L    (39.0-53.0)  %


 


RDW  17.4 H    (11.5-15.5)  %


 


Neutrophils #  9.1 H    (1.3-7.7)  k/uL


 


Lymphocytes #  0.3 L    (1.0-4.8)  k/uL


 


BUN   35 H   (9-20)  mg/dL


 


POC Glucose (mg/dL)    122 H  (75-99)  mg/dL


 


Calcium   7.9 L   (8.4-10.2)  mg/dL














Assessment and Plan


Plan: 


Assessment:





-  Acute COPD exacerbation completed by tracheobronchitis, and the patient is a 

clear chest x-ray and there is no evidence of any acute pulmonary infiltration.

  Atelectatic change in lung bases are seen.  Clinically the patient is 

improving and the patient is less short of breath compared to yesterday.  

Pleurisy is completely subsided on today's evaluation.  Doppler of the lower 

extremities negative.  No clinical evidence of pulmonary embolism and based on 

that this diagnosis was not pursued any further





- Advanced steroid dependent COPD, with a baseline FEV1 of 33% of predicted





-  Acute kidney injury, likely secondary to intravascular volume depletion and 

dehydration and the creatinine is up to 3.5, and the patient was resuscitated 

IV fluids and there has been improvement in the patient's Renal function and 

creatinine is down to 0.98





-  Hyperkalemia, possibly related to acute kidney injury .  Patient is also on 

potassium supplements and lisinopril which is probably contributing to the 

acute kidney injury.  Patient's potassium level improved since down to 4.6





- Hypernatremia, serum sodium is 148, probably related to decreased free water 

intake, resolved, today's sodium is 140





- History of nicotine dependence





- hyperlipidemia





- Hypertension





- Neoplasm of bladder





- History of deep venous thrombosis, history of currently on no anticoagulants





-Stenotrophomonas l tracheobronchitis treated  in 2018





-Abdominal pain, under investigation.  Patient may have an underlying mild 

component of pancreatitis.  THE ABDOMEN WAS NEGATIVE





PLAN





Patient is improving, we'll decrease the steroids down to 40 mg every 12 hours.

  Renal profile continues to improve, patient's oral intake is improving, 

appetite is improving.  Remains somewhat congested, at times able to bring up 

sputum.  Activity as tolerated.  Continue nebulized treatments.  Continue 

Symbicort.  Discharge planning is in progress for possible discharge in next 24-

48 hours 





I performed a history & physical examination of the patient and discussed their 

management with my nurse practitioner, Mayda Álvarez.  I reviewed the nurse 

practitioner's note and agree with the documented findings and plan of care.  

Lung sounds are positive for diminished air entry bilaterally, with minimal 

expiratory wheezing and scattered rhonchi.  The findings and the impression was 

discussed with the patient.  I attest to the documentation by the nurse 

practitioner. 





Time with Patient: Less than 30

## 2018-03-12 NOTE — CDI
Last Revision, December 2017





Documentation Clarification Form



Date:  3/7/2018

From: Brenda Lopez RN, CCDS

Phone: [phone]

MRN: [id number]

Admit Date: 3/6/2018 1:59:00 PM

Patient Name: Linwood Barragan

Visit Number: [id number]

Discharge Date:



ATTENTION: The Clinical Documentation Specialists (CDI) and [facility] Coding Staff 
appreciate your assistance in clarifying documentation. Please respond to the 
clarification below the line at the bottom and electronically sign. The CDI & 
[facility] Coding staff will review the response and follow-up if needed. Please note: 
Queries are made part of the Legal Health Record. If you have any questions, 
please contact the author of this message via ITS.



Dr. Lurdes Patel



History/Risk Factors: COPD, Bladder Cancer, BPH, 



Clinical Indicators: Admit with complaints of increased weakness and shortness 
of breath. Noted to have creatinine of 3.5 mg/dL  and potassium of 6.5



On admission: BUN 94, Cr 3,50 GFR 15

3/9/18  BUN 45 CR 1.04 GFR: 67

Patients Baseline: CR 1.0 



Treatment:

IV Fluids

Monitor urine output

Hold ACE inhibitors and Diuretics 





In order to capture the severity of condition, please clarify if the condition 
signifies:



CKD Stage 1 (GFR > 90)

CKD Stage 2 (GFR 60-89)

CKD Stage 3 (GFR 30-59)

CKD Stage 4 (GFR 15-29)

CKD Stage 5 (GFR <15)

ESRD

Other, please specify  _

Unable to determine



Please continue to document in your progress notes and discharge summary in 
order to capture severity of illness and risk of mortality. Include clinical 
findings that support your diagnosis.

___________________________________________________________________
MTDD

## 2018-03-12 NOTE — P.PN
Subjective


Principal diagnosis: 





Continuing care





This is a continue progress note an 82-year-old white male essentially admitted 

for acute kidney injury with recurrent exacerbation of COPD.  The patient 

actually states that this is the best she's felt in many weeks.  He is 

emulating to go to the bathroom without difficulty at this time.  Will continue 

current regimen of treatment.  Appreciate pulmonology input.





Objective





- Vital Signs


Vital signs: 


 Vital Signs











Temp  97.5 F L  03/12/18 06:37


 


Pulse  84   03/12/18 08:13


 


Resp  16   03/12/18 06:37


 


BP  171/88   03/12/18 06:37


 


Pulse Ox  97   03/12/18 06:37








 Intake & Output











 03/11/18 03/12/18 03/12/18





 18:59 06:59 18:59


 


Intake Total 520  


 


Balance 520  


 


Intake:   


 


  Oral 520  


 


Other:   


 


  Voiding Method Urinal  


 


  # Voids 3 2 














- Constitutional


General appearance: Absent: average body habitus





- EENT


Eyes: Absent: abnormal pupil





- Respiratory


Respiratory: bilateral: diminished, prolonged expiration





- Cardiovascular


Heart sounds: normal: S1, S2


Abnormal Heart Sounds: Absent: S3 Gallop





- Gastrointestinal


General gastrointestinal: Present: soft.  Absent: tenderness





- Psychiatric


Psychiatric: Present: A&O x's 3, appropriate affect





- Labs


CBC & Chem 7: 


 03/09/18 08:35





 03/10/18 11:50


Labs: 


 Abnormal Lab Results - Last 24 Hours (Table)











  03/11/18 03/11/18 03/11/18 Range/Units





  11:57 17:26 21:02 


 


POC Glucose (mg/dL)  130 H  121 H  211 H  (75-99)  mg/dL














  03/12/18 Range/Units





  07:25 


 


POC Glucose (mg/dL)  107 H  (75-99)  mg/dL














Assessment and Plan


(1) Kidney failure


Current Visit: Yes   Status: Acute   Code(s): N19 - UNSPECIFIED KIDNEY FAILURE 

  SNOMED Code(s): 52067274


   





(2) Acute hyperkalemia


Current Visit: No   Status: Acute   Code(s): E87.5 - HYPERKALEMIA   SNOMED Code(

s): 0112920


   





(3) Hyperlipidemia


Current Visit: No   Status: Acute   Code(s): E78.5 - HYPERLIPIDEMIA, 

UNSPECIFIED   SNOMED Code(s): 88687837


   





(4) Hypertension


Current Visit: No   Status: Acute   Code(s): I10 - ESSENTIAL (PRIMARY) 

HYPERTENSION   SNOMED Code(s): 44159829


   





(5) Hypoxia


Current Visit: No   Status: Acute   Code(s): R09.02 - HYPOXEMIA   SNOMED Code(s)

: 897310436


   


Plan: 








Although his overall prognosis is somewhat guarded secondary to the end-stage 

nature of his COPD, hopefully we can discharge soon.





Anticipate discharge in next 24-48 hours.





Check CBC and CMP in a.m.





See orders otherwise.


Time with Patient: Less than 30

## 2018-03-12 NOTE — P.PN
Subjective


Progress Note Date: 03/11/18


Principal diagnosis: 





Acute kidney injury





Patient admitted with acute kidney injury and COPD exacerbation





On 03/09/2018


Patient says that his breathing is improving.,  Complaints of cough with 

nonproductive sputum.


Patient was found to have sodium level of 148.  IV fluids have been changed to 

half normal saline at 50 mL per hour


Amylase and lipase trending down.  Patient is saturating well on nasal cannula.

  Continued on IV steroids and breathing treatments.


Pulmonary and nephrology is following.





03/10/2018


Patient's breathing status is slowly improving.  Sodium level 142 today.  IV 

fluids have been discontinued.  Otherwise continued on breathing treatments and 

IV steroids.  No other acute overnight issues.  Patient has been afebrile.  No 

chest pain no abdominal pain no nausea vomiting or diarrhea.





03/11/2018


Patient denied any new complaints today.  No chest pain.  Continue to have 

wheezing and rhonchi.  No fever no chills.  No other acute overnight issues.  

Patient is being continued on IV steroids and breathing treatments.  Pulmonary 

is following.








All other review of systems negative except the above


Current medications reviewed.





Objective





- Vital Signs


Vital signs: 


 Vital Signs











Temp  98.8 F   03/11/18 15:00


 


Pulse  76   03/11/18 19:28


 


Resp  22   03/11/18 15:46


 


BP  119/85   03/11/18 15:00


 


Pulse Ox  97   03/11/18 19:14








 Intake & Output











 03/11/18 03/11/18 03/12/18





 06:59 18:59 06:59


 


Intake Total  520 


 


Output Total   


 


Balance  520 


 


Intake:   


 


  Oral  520 


 


Output:   


 


  Urine   


 


Other:   


 


  Voiding Method  Urinal 


 


  # Voids  3 


 


  # Bowel Movements   














- Exam





PHYSICAL EXAMINATION: 


Patient is lying in the bed comfortably, no acute distress, awake alert and 

oriented.. Mild cognitive impairment. 


HEENT: Normocephalic. Neck is supple. Pupils reactive. Nostrils clear. Oral 

cavity is moist. Ears reveal no drainage. 


Neck reveals no JVD, carotid bruits, or thyromegaly. 


CHEST EXAMINATION: Trachea is central. Symmetrical expansion.  Bilateral 

diffuse rhonchi and wheezing positive.


CARDIAC: Normal S1, S2 with no gallops. No murmurs 


ABDOMEN: Soft. Bowel sounds normal. No organomegaly. No abdominal bruits. 


Extremities: reveal no edema.  No clubbing or cyanosis


Neurologically awake, alert, oriented x3 with well-coordinated movements.  No 

focal deficits noted


Skin: No rash or skin lesions. 


Psychiatric: Cooperative.  Nonsuicidal


Musculoskeletal: No joint swelling or deformity.  Normal range of motion.








- Labs


CBC & Chem 7: 


 03/09/18 08:35





 03/10/18 11:50


Labs: 


 Abnormal Lab Results - Last 24 Hours (Table)











  03/10/18 03/11/18 03/11/18 Range/Units





  21:23 07:46 11:57 


 


POC Glucose (mg/dL)  137 H  116 H  130 H  (75-99)  mg/dL














  03/11/18 03/11/18 Range/Units





  17:26 21:02 


 


POC Glucose (mg/dL)  121 H  211 H  (75-99)  mg/dL














Assessment and Plan


Assessment: 





Acute COPD exacerbation due to tracheobronchitis


Acute kidney injury likely due to dehydration.  Creatinine peaked at 3.5.  

Currently 1.0


Hypernatremia due to poor oral intake.  resolved


Abdominal pain.  Possible acute pancreatitis with slightly elevated lipase and 

amylase.  Improved now


Advanced COPD


Hypernatremia resolved due to acute kidney injury


Hyperlipidemia


Hypertension


History of nicotine dependence


Depression


DVT prophylaxis





Patient will be continued on  IV steroids and breathing treatments.  DC'd IV 

fluids.  Encourage oral intake.  Otherwise continue the current management and 

follow up closely.  Further recommendations based on the clinical course.

## 2018-03-13 LAB
ALBUMIN SERPL-MCNC: 3 G/DL (ref 3.5–5)
ALP SERPL-CCNC: 42 U/L (ref 38–126)
ALT SERPL-CCNC: 51 U/L (ref 21–72)
ANION GAP SERPL CALC-SCNC: 8 MMOL/L
AST SERPL-CCNC: 51 U/L (ref 17–59)
BUN SERPL-SCNC: 39 MG/DL (ref 9–20)
CALCIUM SPEC-MCNC: 7.7 MG/DL (ref 8.4–10.2)
CHLORIDE SERPL-SCNC: 107 MMOL/L (ref 98–107)
CO2 SERPL-SCNC: 25 MMOL/L (ref 22–30)
ERYTHROCYTE [DISTWIDTH] IN BLOOD BY AUTOMATED COUNT: 3.35 M/UL (ref 4.3–5.9)
ERYTHROCYTE [DISTWIDTH] IN BLOOD: 17.6 % (ref 11.5–15.5)
GLUCOSE BLD-MCNC: 105 MG/DL (ref 75–99)
GLUCOSE BLD-MCNC: 105 MG/DL (ref 75–99)
GLUCOSE BLD-MCNC: 111 MG/DL (ref 75–99)
GLUCOSE BLD-MCNC: 122 MG/DL (ref 75–99)
GLUCOSE SERPL-MCNC: 123 MG/DL (ref 74–99)
HCT VFR BLD AUTO: 28.5 % (ref 39–53)
HGB BLD-MCNC: 9.6 GM/DL (ref 13–17.5)
MCH RBC QN AUTO: 28.8 PG (ref 25–35)
MCHC RBC AUTO-ENTMCNC: 33.8 G/DL (ref 31–37)
MCV RBC AUTO: 85.1 FL (ref 80–100)
PLATELET # BLD AUTO: 370 K/UL (ref 150–450)
POTASSIUM SERPL-SCNC: 5.4 MMOL/L (ref 3.5–5.1)
PROT SERPL-MCNC: 5.8 G/DL (ref 6.3–8.2)
SODIUM SERPL-SCNC: 140 MMOL/L (ref 137–145)
WBC # BLD AUTO: 12.2 K/UL (ref 3.8–10.6)

## 2018-03-13 RX ADMIN — METHYLPREDNISOLONE SODIUM SUCCINATE SCH MG: 40 INJECTION, POWDER, FOR SOLUTION INTRAMUSCULAR; INTRAVENOUS at 08:22

## 2018-03-13 RX ADMIN — TAMSULOSIN HYDROCHLORIDE SCH MG: 0.4 CAPSULE ORAL at 21:56

## 2018-03-13 RX ADMIN — INSULIN ASPART SCH: 100 INJECTION, SOLUTION INTRAVENOUS; SUBCUTANEOUS at 08:21

## 2018-03-13 RX ADMIN — INSULIN ASPART SCH: 100 INJECTION, SOLUTION INTRAVENOUS; SUBCUTANEOUS at 17:43

## 2018-03-13 RX ADMIN — IPRATROPIUM BROMIDE AND ALBUTEROL SULFATE SCH ML: .5; 3 SOLUTION RESPIRATORY (INHALATION) at 20:31

## 2018-03-13 RX ADMIN — IPRATROPIUM BROMIDE AND ALBUTEROL SULFATE SCH ML: .5; 3 SOLUTION RESPIRATORY (INHALATION) at 10:57

## 2018-03-13 RX ADMIN — ATORVASTATIN CALCIUM SCH MG: 20 TABLET, FILM COATED ORAL at 08:21

## 2018-03-13 RX ADMIN — METHYLPREDNISOLONE SODIUM SUCCINATE SCH MG: 40 INJECTION, POWDER, FOR SOLUTION INTRAMUSCULAR; INTRAVENOUS at 21:56

## 2018-03-13 RX ADMIN — IPRATROPIUM BROMIDE AND ALBUTEROL SULFATE SCH ML: .5; 3 SOLUTION RESPIRATORY (INHALATION) at 06:59

## 2018-03-13 RX ADMIN — INSULIN ASPART SCH: 100 INJECTION, SOLUTION INTRAVENOUS; SUBCUTANEOUS at 21:56

## 2018-03-13 RX ADMIN — BUDESONIDE AND FORMOTEROL FUMARATE DIHYDRATE SCH PUFF: 160; 4.5 AEROSOL RESPIRATORY (INHALATION) at 20:31

## 2018-03-13 RX ADMIN — FAMOTIDINE SCH MG: 20 TABLET, FILM COATED ORAL at 08:21

## 2018-03-13 RX ADMIN — IPRATROPIUM BROMIDE AND ALBUTEROL SULFATE SCH ML: .5; 3 SOLUTION RESPIRATORY (INHALATION) at 15:35

## 2018-03-13 RX ADMIN — TAMSULOSIN HYDROCHLORIDE SCH MG: 0.4 CAPSULE ORAL at 08:21

## 2018-03-13 RX ADMIN — BUDESONIDE AND FORMOTEROL FUMARATE DIHYDRATE SCH PUFF: 160; 4.5 AEROSOL RESPIRATORY (INHALATION) at 06:59

## 2018-03-13 RX ADMIN — CITALOPRAM HYDROBROMIDE SCH MG: 20 TABLET ORAL at 08:21

## 2018-03-13 RX ADMIN — MAGNESIUM HYDROXIDE PRN MG: 2400 SUSPENSION ORAL at 16:08

## 2018-03-13 RX ADMIN — INSULIN ASPART SCH: 100 INJECTION, SOLUTION INTRAVENOUS; SUBCUTANEOUS at 12:54

## 2018-03-13 NOTE — PN
PROGRESS NOTE



Patient is seen for followup for acute kidney injury.  His renal function has

completely resolved with creatinine now down to 0.78.  He was at 3.5 mg/dL on initial

admission.  The patient has severe COPD.  He was hydrated.  His potassium is mildly

elevated at 5.4 today.  He has had good urine output.



EXAMINATION:

Blood pressure is 148/83, heart rate 90 per minute.  Patient is afebrile.

HEART:  S1, S2.

LUNGS:  Bilateral breath sounds are heard.  Abdomen is soft, nontender.  Lower

extremities show trace edema bilaterally.  CNS is grossly intact.



LABS:

Show sodium 140, potassium 5.4, chloride 107, BUN 39, serum creatinine 0.78.

Hemoglobin 9.6 g/dL.



ASSESSMENT:

1. Acute kidney injury, prerenal, currently resolved.

2. No evidence of chronic kidney disease.

3. Hyperkalemia initially associated with acute kidney injury; however, now the renal

    function has improved significantly.  Potassium is mildly elevated at 5.4.  Need to

    rule out underlying urine retention.  Blood sugar is not significantly high.

4. Severe chronic obstructive pulmonary disease, currently with exacerbation,

    currently improved.



PLAN:

Check a postvoid residual, maintain good oral intake.  Continue with the Flomax.





MMODL / IJN: [id number] / Job#: [id number]

## 2018-03-13 NOTE — P.PN
Subjective


Principal diagnosis: 





Continuing CARE/COPD.





This is a continue progress note on 8-year-old white male a centimeter for 

acute kidney injury with recurrent exacerbation of COPD.  The patient is doing 

quite well.  He seems to be ablating without difficulty.  Appetite seems to be 

nominal.  The wife and daughter are here with him who state that his mood seems 

to be improving with the start of an antidepressant.





Objective





- Vital Signs


Vital signs: 


 Vital Signs











Temp  97.7 F   03/13/18 15:00


 


Pulse  92   03/13/18 15:48


 


Resp  16   03/13/18 16:00


 


BP  148/83   03/13/18 15:00


 


Pulse Ox  97   03/13/18 15:00








 Intake & Output











 03/13/18 03/13/18 03/14/18





 06:59 18:59 06:59


 


Other:   


 


  # Voids 0 2 0


 


  # Bowel Movements   0














- Constitutional


General appearance: Present: average body habitus





- EENT


Eyes: Absent: abnormal pupil





- Respiratory


Respiratory: bilateral: diminished





- Cardiovascular


Rhythm: regular


Heart sounds: normal: S1, S2


Abnormal Heart Sounds: Absent: S3 Gallop





- Gastrointestinal


General gastrointestinal: Present: soft.  Absent: tenderness





- Neurologic


Neurologic: Present: CNII-XII intact





- Musculoskeletal


Musculoskeletal: Present: gait normal





- Labs


CBC & Chem 7: 


 03/13/18 11:10





 03/13/18 08:23


Labs: 


 Abnormal Lab Results - Last 24 Hours (Table)











  03/12/18 03/13/18 03/13/18 Range/Units





  21:12 07:06 08:23 


 


WBC     (3.8-10.6)  k/uL


 


RBC     (4.30-5.90)  m/uL


 


Hgb     (13.0-17.5)  gm/dL


 


Hct     (39.0-53.0)  %


 


RDW     (11.5-15.5)  %


 


Potassium    5.4 H  (3.5-5.1)  mmol/L


 


BUN    39 H  (9-20)  mg/dL


 


Glucose    123 H  (74-99)  mg/dL


 


POC Glucose (mg/dL)  184 H  111 H   (75-99)  mg/dL


 


Calcium    7.7 L  (8.4-10.2)  mg/dL


 


Total Protein    5.8 L  (6.3-8.2)  g/dL


 


Albumin    3.0 L  (3.5-5.0)  g/dL














  03/13/18 03/13/18 03/13/18 Range/Units





  11:10 12:28 17:22 


 


WBC  12.2 H    (3.8-10.6)  k/uL


 


RBC  3.35 L    (4.30-5.90)  m/uL


 


Hgb  9.6 L    (13.0-17.5)  gm/dL


 


Hct  28.5 L    (39.0-53.0)  %


 


RDW  17.6 H    (11.5-15.5)  %


 


Potassium     (3.5-5.1)  mmol/L


 


BUN     (9-20)  mg/dL


 


Glucose     (74-99)  mg/dL


 


POC Glucose (mg/dL)   105 H  122 H  (75-99)  mg/dL


 


Calcium     (8.4-10.2)  mg/dL


 


Total Protein     (6.3-8.2)  g/dL


 


Albumin     (3.5-5.0)  g/dL














Assessment and Plan


(1) Kidney failure


Current Visit: Yes   Status: Acute   Code(s): N19 - UNSPECIFIED KIDNEY FAILURE 

  SNOMED Code(s): 80553708


   





(2) Acute hyperkalemia


Current Visit: No   Status: Acute   Code(s): E87.5 - HYPERKALEMIA   SNOMED Code(

s): 9083754


   





(3) Hyperlipidemia


Current Visit: No   Status: Acute   Code(s): E78.5 - HYPERLIPIDEMIA, 

UNSPECIFIED   SNOMED Code(s): 14875048


   





(4) Hypertension


Current Visit: No   Status: Acute   Code(s): I10 - ESSENTIAL (PRIMARY) 

HYPERTENSION   SNOMED Code(s): 13146843


   





(5) Hypoxia


Current Visit: No   Status: Acute   Code(s): R09.02 - HYPOXEMIA   SNOMED Code(s)

: 164121896


   


Plan: 





Continue current regimen of treatment.





Anticipate discharge in next 24 hours.  New.  Prognosis is significantly 

improved.





See orders otherwise.

## 2018-03-13 NOTE — P.PN
Subjective


Progress Note Date: 03/13/18


Principal diagnosis: 


Acute COPD exacerbation, tracheobronchitis





82-year-old male patient with advanced oxygen-dependent COPD with chronic 

hypoxic and steroid dependent respiratory failure and the patient has an FEV1 

of 33% of predicted.  The patient has been having multiple hospitalizations for 

pulmonary complications.  During a hospitalization back in January the patient 

was found to have stenotrophomonas in the bronchioloalveolar lavage that was 

identified at a time of bronchoscopy.  The patient was treated with Bactrim 

back then.  During the course of treatment the patient developed an acute 

kidney injury that was drug-induced and ultimately he was treated and 

recovered.  Nevertheless he continued to be short of breath and he had a very 

limited overall performance and functional status due to shortness of breath.  

He has constant cough and congestion his chest.  He also has history of DVT of 

the lower extremity.  He was treated with anticoagulation for quite some time 

and he was subsequently taken off anticoagulation due to hematuria the patient 

is also known to have bladder cancer.  He does not recall any status where he 

had pulmonary embolism.  





The patient came into the hospital because of increased shortness of breath, 

pain across the left chest which was somewhat pleuritic in nature.  No 

hemoptysis.  He has congested and he has constant cough and bronchospasm 

wheezing related to his advanced COPD.  No calf pain or tenderness pain no 

swelling lower extremities.  He is having some lower abdominal pain in 

addition.  He has stooled.  No evidence of any GI bleeding.  No nausea or 

vomiting.  No diarrhea.  No dysuria exam [place].  No hematuria.  Urinalysis has 

not been done.  The patient's oral intake has been overall low and the patient 

was found to have an acute kidney injury on top of his chronic renal failure.  

His creatinine is up to 3.5 at the time of admission with a BUN of 94.  

Potassium level was also at 6.1.  The patient was treated for his hyperkalemia 

and repeat level is at 6.1.  Note that the patient was given Kayexalate 45 g 

yesterday and 30 g today.  The patient is also on IV fluids at 150 mL of normal 

saline.  No leukocytosis.  








On 03/08/2089 seeing this patient for a follow-up.  The patient was seen 

yesterday in consultation.  He has advanced and and stage COPD.  The patient 

also has had multiple exacerbation of COPD and previous stenotrophomonas 

infection of the lung.  The patient is currently being treated for an acute 

kidney injury as the patient presented with acute kidney failure secondary to 

dehydration.  Renal function is improving as the patient is being hydrated with 

IV fluids.  Meanwhile, he was having some gastrointestinal complaints and the 

lipase level was slightly elevated and ultrasound the abdomen was done that 

came back nonspecific and there was no evidence of any pancreatitis.  Although 

this is suspected based on the elevated lipase level.  Also, the patient had a 

Doppler of the lower extremity that showed no evidence of any DVT.  We have not 

committed to long-term anticoagulation as long as the patient has no Dopplers 

and clinically is doing better on his less short of breath and the pleurisy has 

subsided on today's evaluation.  Urinalysis came back negative.  He is 

afebrile.  He is extremely debilitated secondary to COPD.  Pulse ox is around 97

% on 2 L of oxygen nasal cannula.  Slightly tachycardic.





On 03/09/2018 patient seen in follow-up on medical surgical floor.  He reports 

some improvement in his breathing.  Lung sounds are diminished, with expiratory 

wheezing, patient still has congested nonproductive cough.  Not able to 

expectorate any sputum.  His appetite is improving.  He still is seeming IV 

hydration in the form of 0.45 normal saline at a rate of 50 ML per hour.  On 

today's lab work CBC is within normal limits at 8.2, hemoglobin is 9.2, serum 

sodium is 148, there has been improvement in his renal profile, BUN is down to 

45 from 66 and creatinine is at 1.04 down from 1.44.  Patient's amylase is now 

within normal limits, and lipase is trending down, down to 389 from 634.  

Patient is on 2 L per nasal cannula with O2 sat 96%.  He is afebrile, slightly 

tachycardic with a rate of 106 BPM.  Patient continues on combination of 

nebulized treatments, Symbicort, IV steroids. 





The patient is seen again today 03/10/2018 in follow-up in the regular medical 

floor.  He is doing better today as compared to yesterday.  Still not quite 

back to his baseline.  He continues to utilize the flutter valve and is 

maintaining good O2 saturations in the mid 90s on 2 L/m per nasal cannula.  He 

is afebrile.  Sputum culture reveals no growth.  142, potassium 4.5, creatinine 

1.00.  He is maintained on DuoNeb's, Symbicort, as Solu-Medrol.








On 03/11/2018, patient is doing well and the patient has no specific 

complaints.  He is using the flutter valve which is facilitating respiratory 

secretions and pulmonary toileting.  The patient is not having any worsening 

shortness of breath.  His renal function is normalized and the creatinine is 

down to 1.  No electrode disturbances.  No significant abdominal pain.  No 

nausea or vomiting.  No diarrhea.  He was feeling down and depressed and the 

patient was started on 20 mg of Celexa yesterday and I told him that this will 

take some time to improve his overall condition and symptoms of depression.  

Meanwhile, the patient remains on DuoNeb neb treatments around the clock and he 

is on IV Solu Medrol and he will be tapered to prednisone burst taper as of 

tomorrow.  Discharge planning is in progress for now.





On 03/12/2018 patient seen in follow-up on medical surgical floor.  States his 

breathing continues to improve, still remains congested, able to produce small 

amounts of light sputum at times, sounds are positive for scattered rhonchi, 

minimal wheezing.  Continues on 2 L per nasal cannula O2 sat at 97%.  He is 

afebrile, hemodynamically stable.  He states his appetite is improving.  He 

worsening shortness of breath, is able to ambulate to the bathroom, tolerating 

activity much better.  Sputum culture is negative for any growth.  His blood 

work showed WBC of 10.0, hemoglobin is 9.6, electrolytes are within normal 

limits, renal profile continues to improve, BUN is 35, creatinine is 0.98.  

Patient continues on IV steroids, nebulized treatments, Symbicort. 





On 03/13/2018 patient seen in follow-up on medical surgical floor.  Denies any 

acute distress, lung sounds are positive for some scattered rhonchi, with a few 

wheezes.  Renal profile continues to improve, creatinine is 0.78, BUN is 39, 

serum potassium is 5.4.  Nephrology is following.  Patient states his appetite 

is improving, tolerating oral intake.  Vital signs are stable, patient is 

afebrile.  2 L per nasal cannula his pulse ox is 98%.  Sputum culture is 

negative.  On today's lab work WBCs 12.2, hemoglobin is 9.6.  Patient has been 

ambulating within the room, tolerating activity well.  No specific complaints 

this morning. 





Objective





- Vital Signs


Vital signs: 


 Vital Signs











Temp  97.2 F L  03/13/18 07:00


 


Pulse  94   03/13/18 11:09


 


Resp  16   03/13/18 08:00


 


BP  160/87   03/13/18 07:00


 


Pulse Ox  98   03/13/18 07:01








 Intake & Output











 03/12/18 03/13/18 03/13/18





 18:59 06:59 18:59


 


Other:   


 


  # Voids 1 0 














- Exam


GENERAL EXAM: Alert, pleasant, thin, 82-year-old white male, mildly short of 

breath at rest, but fairly comfortable


HEAD: Normocephalic/atraumatic.


EYES: Normal reaction of pupils, equal size.  Conjunctiva pink, sclera white.


NOSE: Clear with pink turbinates.


THROAT: No erythema or exudates.


NECK: No masses, no JVD, no thyroid enlargement, no adenopathy.


CHEST: No chest wall deformity.  Symmetrical expansion. 


LUNGS: Equal air entry with scattered rhonchi, and minimal wheezes 


CVS: Regular rate and rhythm, normal S1 and S2, no gallops, no murmurs, no rubs


ABDOMEN: Soft, nontender.  No hepatosplenomegaly, normal bowel sounds, no 

guarding or rigidity.


EXTREMITIES: No clubbing, no edema, no cyanosis, 2+ pulses and upper and lower 

extremities.


MUSCULOSKELETAL: Muscle strength and tone normal.


SPINE: No scoliosis or deformity


SKIN: No rashes


CENTRAL NERVOUS SYSTEM: Alert and oriented -3.  No focal deficits, tone is 

normal in all 4 extremities.


PSYCHIATRIC: Alert and oriented -3.  Appropriate affect.  Intact judgment and 

insight. 








- Labs


CBC & Chem 7: 


 03/13/18 11:10





 03/13/18 08:23


Labs: 


 Abnormal Lab Results - Last 24 Hours (Table)











  03/12/18 03/12/18 03/13/18 Range/Units





  17:05 21:12 07:06 


 


WBC     (3.8-10.6)  k/uL


 


RBC     (4.30-5.90)  m/uL


 


Hgb     (13.0-17.5)  gm/dL


 


Hct     (39.0-53.0)  %


 


RDW     (11.5-15.5)  %


 


Potassium     (3.5-5.1)  mmol/L


 


BUN     (9-20)  mg/dL


 


Glucose     (74-99)  mg/dL


 


POC Glucose (mg/dL)  186 H  184 H  111 H  (75-99)  mg/dL


 


Calcium     (8.4-10.2)  mg/dL


 


Total Protein     (6.3-8.2)  g/dL


 


Albumin     (3.5-5.0)  g/dL














  03/13/18 03/13/18 03/13/18 Range/Units





  08:23 11:10 12:28 


 


WBC   12.2 H   (3.8-10.6)  k/uL


 


RBC   3.35 L   (4.30-5.90)  m/uL


 


Hgb   9.6 L   (13.0-17.5)  gm/dL


 


Hct   28.5 L   (39.0-53.0)  %


 


RDW   17.6 H   (11.5-15.5)  %


 


Potassium  5.4 H    (3.5-5.1)  mmol/L


 


BUN  39 H    (9-20)  mg/dL


 


Glucose  123 H    (74-99)  mg/dL


 


POC Glucose (mg/dL)    105 H  (75-99)  mg/dL


 


Calcium  7.7 L    (8.4-10.2)  mg/dL


 


Total Protein  5.8 L    (6.3-8.2)  g/dL


 


Albumin  3.0 L    (3.5-5.0)  g/dL














Assessment and Plan


Plan: 


Assessment:





-  Acute COPD exacerbation completed by tracheobronchitis, and the patient is a 

clear chest x-ray and there is no evidence of any acute pulmonary infiltration.

  Atelectatic change in lung bases are seen.  Clinically the patient is 

improving and the patient is less short of breath compared to yesterday.  

Pleurisy is completely subsided on today's evaluation.  Doppler of the lower 

extremities negative.  No clinical evidence of pulmonary embolism and based on 

that this diagnosis was not pursued any further





- Advanced steroid dependent COPD, with a baseline FEV1 of 33% of predicted





-  Acute kidney injury, likely secondary to intravascular volume depletion and 

dehydration and the creatinine is up to 3.5, and the patient was resuscitated 

IV fluids and there has been improvement in the patient's Renal function and 

creatinine is down to 0.98





-  Hyperkalemia, possibly related to acute kidney injury .  Patient is also on 

potassium supplements and lisinopril which is probably contributing to the 

acute kidney injury.  Patient's potassium level improved since down to 4.6





- Hypernatremia, serum sodium is 148, probably related to decreased free water 

intake, resolved, today's sodium is 140





- History of nicotine dependence





- hyperlipidemia





- Hypertension





- Neoplasm of bladder





- History of deep venous thrombosis, history of currently on no anticoagulants





-Stenotrophomonas l tracheobronchitis treated  in 2018





-Abdominal pain, under investigation.  Patient may have an underlying mild 

component of pancreatitis.  THE ABDOMEN WAS NEGATIVE





PLAN





Patient remains stable from pulmonary standpoint, renal profile is improving.  

Patient is tolerating oral intake.  Tolerating activity, vital signs are 

stable.  From pulmonary standpoint patient could be discharged home today on 

prednisone taper, and his maintenance inhalers and nebulizer treatments.  Follow

-up with Dr. Terrazas in the office in 7-10 days. 





I performed a history & physical examination of the patient and discussed their 

management with my nurse practitioner, Mayda Álvarez.  I reviewed the nurse 

practitioner's note and agree with the documented findings and plan of care.  

Lung sounds are positive for diminished air entry bilaterally, with minimal 

expiratory wheezing and scattered rhonchi.  The findings and the impression was 

discussed with the patient.  I attest to the documentation by the nurse 

practitioner. 





Time with Patient: Less than 30

## 2018-03-14 VITALS
TEMPERATURE: 97.1 F | HEART RATE: 90 BPM | DIASTOLIC BLOOD PRESSURE: 91 MMHG | RESPIRATION RATE: 16 BRPM | SYSTOLIC BLOOD PRESSURE: 163 MMHG

## 2018-03-14 LAB — GLUCOSE BLD-MCNC: 110 MG/DL (ref 75–99)

## 2018-03-14 RX ADMIN — CITALOPRAM HYDROBROMIDE SCH MG: 20 TABLET ORAL at 08:31

## 2018-03-14 RX ADMIN — METHYLPREDNISOLONE SODIUM SUCCINATE SCH MG: 40 INJECTION, POWDER, FOR SOLUTION INTRAMUSCULAR; INTRAVENOUS at 08:30

## 2018-03-14 RX ADMIN — TAMSULOSIN HYDROCHLORIDE SCH MG: 0.4 CAPSULE ORAL at 08:31

## 2018-03-14 RX ADMIN — IPRATROPIUM BROMIDE AND ALBUTEROL SULFATE SCH ML: .5; 3 SOLUTION RESPIRATORY (INHALATION) at 07:15

## 2018-03-14 RX ADMIN — BUDESONIDE AND FORMOTEROL FUMARATE DIHYDRATE SCH PUFF: 160; 4.5 AEROSOL RESPIRATORY (INHALATION) at 07:15

## 2018-03-14 RX ADMIN — FAMOTIDINE SCH MG: 20 TABLET, FILM COATED ORAL at 08:31

## 2018-03-14 RX ADMIN — INSULIN ASPART SCH: 100 INJECTION, SOLUTION INTRAVENOUS; SUBCUTANEOUS at 06:53

## 2018-03-14 RX ADMIN — ATORVASTATIN CALCIUM SCH MG: 20 TABLET, FILM COATED ORAL at 08:31

## 2018-03-14 NOTE — P.DS
Providers


Date of admission: 


03/06/18 13:59





Attending physician: 


Nik Whatley





Consults: 





 





03/06/18 16:36


Consult Physician Routine 


   Consulting Provider: Lurdes Patel


   Consult Reason/Comments: ACute renal failure


   Do you want consulting provider notified?: Yes





03/06/18 19:46


Consult Physician Routine 


   Consulting Provider: Dameon Terrazas


   Consult Reason/Comments: Known patient, COPD maintenance


   Do you want consulting provider notified?: Yes, Notify in am











Primary care physician: 


Nik Whatley








- Discharge Diagnosis(es)


(1) Kidney failure


Current Visit: Yes   Status: Acute   





(2) Acute hyperkalemia


Current Visit: No   Status: Acute   





(3) Hyperlipidemia


Current Visit: No   Status: Acute   





(4) Hypertension


Current Visit: No   Status: Acute   





(5) Hypoxia


Current Visit: No   Status: Acute   


Hospital Course: 





This is a discharge summary 82-year-old white male essentially admitted for 

acute kidney injury with COPD exacerbation.  I suspect that the patient will be 

O2 dependent with steroid dependency.  The patient was stabilized from 

pulmonology and nephrology perspective after significant medical treatment as 

seen in the chart.  The patient is now almost back to baseline.  He'll be 

discharged in stable condition to follow-up with me in one week.  Prednisone 

taper will be started and but prognosis is guarded secondary to advancing age 

and multiple comorbidities.  The patient is emulating almost to baseline and 

voiding without difficulties on discharge.


Patient Condition at Discharge: Serious





Plan - Discharge Summary


Discharge Rx Participant: No


New Discharge Prescriptions: 


New


   predniSONE 10 mg PO DAILY 16 Days #40 tab


   Citalopram Hydrobromide [CeleXA] 20 mg PO DAILY #30 tab





Continue


   Albuterol Sulfate [Proair Hfa] 2 puff INHALATION RT-Q6H PRN


     PRN Reason: Shortness Of Breath


   Simvastatin [Zocor] 40 mg PO DAILY


   Ipratropium-Albuterol Nebulize [Duoneb 0.5 mg-3 mg/3 ml Soln] 3 ml 

INHALATION RT-QID


   predniSONE 5 mg PO DAILY


   Fluticasone/Vilanterol [Breo Ellipta 200-25 Mcg INH] 1 puff INHALATION RT-

DAILY


   Tamsulosin HCl [Flomax] 0.4 mg PO BID


   Potassium Chloride ER [K-Dur 20] 20 meq PO DAILY


   Lisinopril-Hctz 20-12.5 mg [Zestoretic 20-12.5] 1 tab PO DAILY


   Furosemide [Lasix] 20 mg PO DAILY


Discharge Medication List





Albuterol Sulfate [Proair Hfa] 2 puff INHALATION RT-Q6H PRN 01/03/16 [History]


Simvastatin [Zocor] 40 mg PO DAILY 01/03/16 [History]


Ipratropium-Albuterol Nebulize [Duoneb 0.5 mg-3 mg/3 ml Soln] 3 ml INHALATION RT

-QID 01/04/16 [History]


Fluticasone/Vilanterol [Breo Ellipta 200-25 Mcg INH] 1 puff INHALATION RT-DAILY 

03/31/17 [History]


predniSONE 5 mg PO DAILY 03/31/17 [History]


Tamsulosin HCl [Flomax] 0.4 mg PO BID 11/30/17 [History]


Furosemide [Lasix] 20 mg PO DAILY 02/09/18 [History]


Lisinopril-Hctz 20-12.5 mg [Zestoretic 20-12.5] 1 tab PO DAILY 02/09/18 [History

]


Potassium Chloride ER [K-Dur 20] 20 meq PO DAILY 02/09/18 [History]


predniSONE 10 mg PO DAILY 16 Days #40 tab 03/13/18 [Rx]


Citalopram Hydrobromide [CeleXA] 20 mg PO DAILY #30 tab 03/14/18 [Rx]








Follow up Appointment(s)/Referral(s): 


Dameon Terrazas DO [Doctor of Osteopathic Medicine] - 1 Week


[facility] Visiting Nurse, [NON-STAFF] - 


Nik Whatley MD [Primary Care Provider] - 1 Week


Patient Instructions/Handouts:  Acute Kidney Injury (DC), COPD (Chronic 

Obstructive Pulmonary Disease) (DC)


Activity/Diet/Wound Care/Special Instructions: 


Low fat diet.





Activity as tolerated, change position every 2 hours while awake. Stay of 

pressure ulcer.

## 2018-03-14 NOTE — P.PN
Subjective


Progress Note Date: 03/14/18


Principal diagnosis: 


Acute COPD exacerbation, tracheobronchitis





82-year-old male patient with advanced oxygen-dependent COPD with chronic 

hypoxic and steroid dependent respiratory failure and the patient has an FEV1 

of 33% of predicted.  The patient has been having multiple hospitalizations for 

pulmonary complications.  During a hospitalization back in January the patient 

was found to have stenotrophomonas in the bronchioloalveolar lavage that was 

identified at a time of bronchoscopy.  The patient was treated with Bactrim 

back then.  During the course of treatment the patient developed an acute 

kidney injury that was drug-induced and ultimately he was treated and 

recovered.  Nevertheless he continued to be short of breath and he had a very 

limited overall performance and functional status due to shortness of breath.  

He has constant cough and congestion his chest.  He also has history of DVT of 

the lower extremity.  He was treated with anticoagulation for quite some time 

and he was subsequently taken off anticoagulation due to hematuria the patient 

is also known to have bladder cancer.  He does not recall any status where he 

had pulmonary embolism.  





The patient came into the hospital because of increased shortness of breath, 

pain across the left chest which was somewhat pleuritic in nature.  No 

hemoptysis.  He has congested and he has constant cough and bronchospasm 

wheezing related to his advanced COPD.  No calf pain or tenderness pain no 

swelling lower extremities.  He is having some lower abdominal pain in 

addition.  He has stooled.  No evidence of any GI bleeding.  No nausea or 

vomiting.  No diarrhea.  No dysuria exam [place].  No hematuria.  Urinalysis has 

not been done.  The patient's oral intake has been overall low and the patient 

was found to have an acute kidney injury on top of his chronic renal failure.  

His creatinine is up to 3.5 at the time of admission with a BUN of 94.  

Potassium level was also at 6.1.  The patient was treated for his hyperkalemia 

and repeat level is at 6.1.  Note that the patient was given Kayexalate 45 g 

yesterday and 30 g today.  The patient is also on IV fluids at 150 mL of normal 

saline.  No leukocytosis.  








On 03/08/2089 seeing this patient for a follow-up.  The patient was seen 

yesterday in consultation.  He has advanced and and stage COPD.  The patient 

also has had multiple exacerbation of COPD and previous stenotrophomonas 

infection of the lung.  The patient is currently being treated for an acute 

kidney injury as the patient presented with acute kidney failure secondary to 

dehydration.  Renal function is improving as the patient is being hydrated with 

IV fluids.  Meanwhile, he was having some gastrointestinal complaints and the 

lipase level was slightly elevated and ultrasound the abdomen was done that 

came back nonspecific and there was no evidence of any pancreatitis.  Although 

this is suspected based on the elevated lipase level.  Also, the patient had a 

Doppler of the lower extremity that showed no evidence of any DVT.  We have not 

committed to long-term anticoagulation as long as the patient has no Dopplers 

and clinically is doing better on his less short of breath and the pleurisy has 

subsided on today's evaluation.  Urinalysis came back negative.  He is 

afebrile.  He is extremely debilitated secondary to COPD.  Pulse ox is around 97

% on 2 L of oxygen nasal cannula.  Slightly tachycardic.





On 03/09/2018 patient seen in follow-up on medical surgical floor.  He reports 

some improvement in his breathing.  Lung sounds are diminished, with expiratory 

wheezing, patient still has congested nonproductive cough.  Not able to 

expectorate any sputum.  His appetite is improving.  He still is seeming IV 

hydration in the form of 0.45 normal saline at a rate of 50 ML per hour.  On 

today's lab work CBC is within normal limits at 8.2, hemoglobin is 9.2, serum 

sodium is 148, there has been improvement in his renal profile, BUN is down to 

45 from 66 and creatinine is at 1.04 down from 1.44.  Patient's amylase is now 

within normal limits, and lipase is trending down, down to 389 from 634.  

Patient is on 2 L per nasal cannula with O2 sat 96%.  He is afebrile, slightly 

tachycardic with a rate of 106 BPM.  Patient continues on combination of 

nebulized treatments, Symbicort, IV steroids. 





The patient is seen again today 03/10/2018 in follow-up in the regular medical 

floor.  He is doing better today as compared to yesterday.  Still not quite 

back to his baseline.  He continues to utilize the flutter valve and is 

maintaining good O2 saturations in the mid 90s on 2 L/m per nasal cannula.  He 

is afebrile.  Sputum culture reveals no growth.  142, potassium 4.5, creatinine 

1.00.  He is maintained on DuoNeb's, Symbicort, as Solu-Medrol.








On 03/11/2018, patient is doing well and the patient has no specific 

complaints.  He is using the flutter valve which is facilitating respiratory 

secretions and pulmonary toileting.  The patient is not having any worsening 

shortness of breath.  His renal function is normalized and the creatinine is 

down to 1.  No electrode disturbances.  No significant abdominal pain.  No 

nausea or vomiting.  No diarrhea.  He was feeling down and depressed and the 

patient was started on 20 mg of Celexa yesterday and I told him that this will 

take some time to improve his overall condition and symptoms of depression.  

Meanwhile, the patient remains on DuoNeb neb treatments around the clock and he 

is on IV Solu Medrol and he will be tapered to prednisone burst taper as of 

tomorrow.  Discharge planning is in progress for now.





On 03/12/2018 patient seen in follow-up on medical surgical floor.  States his 

breathing continues to improve, still remains congested, able to produce small 

amounts of light sputum at times, sounds are positive for scattered rhonchi, 

minimal wheezing.  Continues on 2 L per nasal cannula O2 sat at 97%.  He is 

afebrile, hemodynamically stable.  He states his appetite is improving.  He 

worsening shortness of breath, is able to ambulate to the bathroom, tolerating 

activity much better.  Sputum culture is negative for any growth.  His blood 

work showed WBC of 10.0, hemoglobin is 9.6, electrolytes are within normal 

limits, renal profile continues to improve, BUN is 35, creatinine is 0.98.  

Patient continues on IV steroids, nebulized treatments, Symbicort. 





On 03/13/2018 patient seen in follow-up on medical surgical floor.  Denies any 

acute distress, lung sounds are positive for some scattered rhonchi, with a few 

wheezes.  Renal profile continues to improve, creatinine is 0.78, BUN is 39, 

serum potassium is 5.4.  Nephrology is following.  Patient states his appetite 

is improving, tolerating oral intake.  Vital signs are stable, patient is 

afebrile.  2 L per nasal cannula his pulse ox is 98%.  Sputum culture is 

negative.  On today's lab work WBCs 12.2, hemoglobin is 9.6.  Patient has been 

ambulating within the room, tolerating activity well.  No specific complaints 

this morning. 





On 03/14/2018 patient seen in follow-up, doing very well, denies any worsening 

dyspnea.  Vital signs are stable, patient is afebrile.  He is on 2 L per nasal 

cannula with O2 sat 97%.  Lung sounds are positive for a few scattered wheezes, 

with some scattered rhonchi.  Patient is at his baseline.  He has been 

ambulating, tolerating activity well.  No acute events overnight, from 

pulmonary standpoint patient is stable for discharge home today.  Follow-up 

with Dr. Terrazas in the office in one week





Objective





- Vital Signs


Vital signs: 


 Vital Signs











Temp  97.1 F L  03/14/18 06:28


 


Pulse  90   03/14/18 07:27


 


Resp  16   03/14/18 06:28


 


BP  163/91   03/14/18 06:28


 


Pulse Ox  97   03/14/18 06:28








 Intake & Output











 03/13/18 03/14/18 03/14/18





 18:59 06:59 18:59


 


Intake Total   200


 


Output Total  833 


 


Balance  -833 200


 


Intake:   


 


  Oral   200


 


Output:   


 


  Post Void Residual  833 


 


Other:   


 


  # Voids 2 3 


 


  # Bowel Movements  0 














- Exam


GENERAL EXAM: Alert, pleasant, thin, 82-year-old white male, mildly short of 

breath at rest, but fairly comfortable


HEAD: Normocephalic/atraumatic.


EYES: Normal reaction of pupils, equal size.  Conjunctiva pink, sclera white.


NOSE: Clear with pink turbinates.


THROAT: No erythema or exudates.


NECK: No masses, no JVD, no thyroid enlargement, no adenopathy.


CHEST: No chest wall deformity.  Symmetrical expansion. 


LUNGS: Equal air entry with scattered rhonchi, and minimal wheezes 


CVS: Regular rate and rhythm, normal S1 and S2, no gallops, no murmurs, no rubs


ABDOMEN: Soft, nontender.  No hepatosplenomegaly, normal bowel sounds, no 

guarding or rigidity.


EXTREMITIES: No clubbing, no edema, no cyanosis, 2+ pulses and upper and lower 

extremities.


MUSCULOSKELETAL: Muscle strength and tone normal.


SPINE: No scoliosis or deformity


SKIN: No rashes


CENTRAL NERVOUS SYSTEM: Alert and oriented -3.  No focal deficits, tone is 

normal in all 4 extremities.


PSYCHIATRIC: Alert and oriented -3.  Appropriate affect.  Intact judgment and 

insight. 








- Labs


CBC & Chem 7: 


 03/13/18 11:10





 03/13/18 08:23


Labs: 


 Abnormal Lab Results - Last 24 Hours (Table)











  03/13/18 03/13/18 03/13/18 Range/Units





  11:10 12:28 17:22 


 


WBC  12.2 H    (3.8-10.6)  k/uL


 


RBC  3.35 L    (4.30-5.90)  m/uL


 


Hgb  9.6 L    (13.0-17.5)  gm/dL


 


Hct  28.5 L    (39.0-53.0)  %


 


RDW  17.6 H    (11.5-15.5)  %


 


POC Glucose (mg/dL)   105 H  122 H  (75-99)  mg/dL














  03/13/18 03/14/18 Range/Units





  20:38 06:47 


 


WBC    (3.8-10.6)  k/uL


 


RBC    (4.30-5.90)  m/uL


 


Hgb    (13.0-17.5)  gm/dL


 


Hct    (39.0-53.0)  %


 


RDW    (11.5-15.5)  %


 


POC Glucose (mg/dL)  105 H  110 H  (75-99)  mg/dL














Assessment and Plan


Plan: 


Assessment:





-  Acute COPD exacerbation completed by tracheobronchitis, and the patient is a 

clear chest x-ray and there is no evidence of any acute pulmonary infiltration.

  Atelectatic change in lung bases are seen.  Clinically the patient is 

improving and the patient is less short of breath compared to yesterday.  

Pleurisy is completely subsided on today's evaluation.  Doppler of the lower 

extremities negative.  No clinical evidence of pulmonary embolism and based on 

that this diagnosis was not pursued any further





- Advanced steroid dependent COPD, with a baseline FEV1 of 33% of predicted





- Acute kidney injury, likely secondary to intravascular volume depletion and 

dehydration and the creatinine is up to 3.5, and the patient was resuscitated 

IV fluids and there has been improvement in the patient's Renal function and 

creatinine is down to 0.98





-  Hyperkalemia, possibly related to acute kidney injury .  Patient is also on 

potassium supplements and lisinopril which is probably contributing to the 

acute kidney injury.  Patient's potassium level improved since down to 4.6





- Hypernatremia, serum sodium is 148, probably related to decreased free water 

intake, resolved, today's sodium is 140





- History of nicotine dependence





- hyperlipidemia





- Hypertension





- Neoplasm of bladder





- History of deep venous thrombosis, history of currently on no anticoagulants





-Stenotrophomonas l tracheobronchitis treated  in 2018





-Abdominal pain, under investigation.  Patient may have an underlying mild 

component of pancreatitis.  THE ABDOMEN WAS NEGATIVE





PLAN





No issues overnight, patient remains stable from pulmonary standpoint, renal 

profile is improving.  Patient is tolerating oral intake.  Tolerating activity, 

vital signs are stable.  From pulmonary standpoint patient could be discharged 

home today on prednisone taper, and his maintenance inhalers and nebulizer 

treatments.  Follow-up with Dr. Terrazas in the office in 7-10 days. 





I performed a history & physical examination of the patient and discussed their 

management with my nurse practitioner, Mayda Álvarez.  I reviewed the nurse 

practitioner's note and agree with the documented findings and plan of care.  

Lung sounds are positive for diminished air entry bilaterally, with minimal 

expiratory wheezing and scattered rhonchi.  The findings and the impression was 

discussed with the patient.  I attest to the documentation by the nurse 

practitioner. 





Time with Patient: Less than 30

## 2018-09-13 ENCOUNTER — HOSPITAL ENCOUNTER (OUTPATIENT)
Dept: HOSPITAL 47 - LABPAT | Age: 83
Discharge: HOME | End: 2018-09-13
Attending: SURGERY
Payer: MEDICARE

## 2018-09-13 DIAGNOSIS — K40.90: ICD-10-CM

## 2018-09-13 DIAGNOSIS — Z01.812: ICD-10-CM

## 2018-09-13 DIAGNOSIS — Z01.818: Primary | ICD-10-CM

## 2018-09-13 LAB
ALBUMIN SERPL-MCNC: 3.6 G/DL (ref 3.5–5)
ALP SERPL-CCNC: 68 U/L (ref 38–126)
ALT SERPL-CCNC: 26 U/L (ref 21–72)
ANION GAP SERPL CALC-SCNC: 7 MMOL/L
AST SERPL-CCNC: 21 U/L (ref 17–59)
BASOPHILS # BLD AUTO: 0.1 K/UL (ref 0–0.2)
BASOPHILS NFR BLD AUTO: 1 %
BUN SERPL-SCNC: 27 MG/DL (ref 9–20)
CALCIUM SPEC-MCNC: 9.1 MG/DL (ref 8.4–10.2)
CHLORIDE SERPL-SCNC: 106 MMOL/L (ref 98–107)
CO2 SERPL-SCNC: 30 MMOL/L (ref 22–30)
EOSINOPHIL # BLD AUTO: 0.5 K/UL (ref 0–0.7)
EOSINOPHIL NFR BLD AUTO: 6 %
ERYTHROCYTE [DISTWIDTH] IN BLOOD BY AUTOMATED COUNT: 3.21 M/UL (ref 4.3–5.9)
ERYTHROCYTE [DISTWIDTH] IN BLOOD: 16.7 % (ref 11.5–15.5)
GLUCOSE SERPL-MCNC: 89 MG/DL (ref 74–99)
HCT VFR BLD AUTO: 28.4 % (ref 39–53)
HGB BLD-MCNC: 8.7 GM/DL (ref 13–17.5)
LYMPHOCYTES # SPEC AUTO: 1.2 K/UL (ref 1–4.8)
LYMPHOCYTES NFR SPEC AUTO: 14 %
MCH RBC QN AUTO: 26.9 PG (ref 25–35)
MCHC RBC AUTO-ENTMCNC: 30.5 G/DL (ref 31–37)
MCV RBC AUTO: 88.4 FL (ref 80–100)
MONOCYTES # BLD AUTO: 0.6 K/UL (ref 0–1)
MONOCYTES NFR BLD AUTO: 7 %
NEUTROPHILS # BLD AUTO: 6.3 K/UL (ref 1.3–7.7)
NEUTROPHILS NFR BLD AUTO: 72 %
PLATELET # BLD AUTO: 348 K/UL (ref 150–450)
POTASSIUM SERPL-SCNC: 4.2 MMOL/L (ref 3.5–5.1)
PROT SERPL-MCNC: 6.6 G/DL (ref 6.3–8.2)
SODIUM SERPL-SCNC: 143 MMOL/L (ref 137–145)
WBC # BLD AUTO: 8.8 K/UL (ref 3.8–10.6)

## 2018-09-13 PROCEDURE — 80053 COMPREHEN METABOLIC PANEL: CPT

## 2018-09-13 PROCEDURE — 93005 ELECTROCARDIOGRAM TRACING: CPT

## 2018-09-13 PROCEDURE — 85025 COMPLETE CBC W/AUTO DIFF WBC: CPT

## 2018-09-26 ENCOUNTER — HOSPITAL ENCOUNTER (OUTPATIENT)
Dept: HOSPITAL 47 - OR | Age: 83
Discharge: HOME | End: 2018-09-26
Attending: SURGERY
Payer: MEDICARE

## 2018-09-26 VITALS — HEART RATE: 86 BPM | DIASTOLIC BLOOD PRESSURE: 67 MMHG | SYSTOLIC BLOOD PRESSURE: 144 MMHG

## 2018-09-26 VITALS — TEMPERATURE: 97.6 F

## 2018-09-26 VITALS — RESPIRATION RATE: 18 BRPM

## 2018-09-26 VITALS — BODY MASS INDEX: 19.8 KG/M2

## 2018-09-26 DIAGNOSIS — F32.9: ICD-10-CM

## 2018-09-26 DIAGNOSIS — Z79.51: ICD-10-CM

## 2018-09-26 DIAGNOSIS — Z85.51: ICD-10-CM

## 2018-09-26 DIAGNOSIS — H57.9: ICD-10-CM

## 2018-09-26 DIAGNOSIS — J96.11: ICD-10-CM

## 2018-09-26 DIAGNOSIS — J44.9: ICD-10-CM

## 2018-09-26 DIAGNOSIS — I10: ICD-10-CM

## 2018-09-26 DIAGNOSIS — Z86.718: ICD-10-CM

## 2018-09-26 DIAGNOSIS — Z87.891: ICD-10-CM

## 2018-09-26 DIAGNOSIS — Z79.899: ICD-10-CM

## 2018-09-26 DIAGNOSIS — E78.5: ICD-10-CM

## 2018-09-26 DIAGNOSIS — N28.9: ICD-10-CM

## 2018-09-26 DIAGNOSIS — H91.90: ICD-10-CM

## 2018-09-26 DIAGNOSIS — R33.9: ICD-10-CM

## 2018-09-26 DIAGNOSIS — Z79.52: ICD-10-CM

## 2018-09-26 DIAGNOSIS — N42.9: ICD-10-CM

## 2018-09-26 DIAGNOSIS — M19.90: ICD-10-CM

## 2018-09-26 DIAGNOSIS — K40.20: Primary | ICD-10-CM

## 2018-09-26 LAB
BASOPHILS # BLD AUTO: 0 K/UL (ref 0–0.2)
BASOPHILS NFR BLD AUTO: 0 %
EOSINOPHIL # BLD AUTO: 0.2 K/UL (ref 0–0.7)
EOSINOPHIL NFR BLD AUTO: 2 %
ERYTHROCYTE [DISTWIDTH] IN BLOOD BY AUTOMATED COUNT: 3.5 M/UL (ref 4.3–5.9)
ERYTHROCYTE [DISTWIDTH] IN BLOOD: 16.9 % (ref 11.5–15.5)
GLUCOSE BLD-MCNC: 111 MG/DL (ref 75–99)
HCT VFR BLD AUTO: 30.4 % (ref 39–53)
HGB BLD-MCNC: 9.4 GM/DL (ref 13–17.5)
LYMPHOCYTES # SPEC AUTO: 1.2 K/UL (ref 1–4.8)
LYMPHOCYTES NFR SPEC AUTO: 12 %
MCH RBC QN AUTO: 26.8 PG (ref 25–35)
MCHC RBC AUTO-ENTMCNC: 30.9 G/DL (ref 31–37)
MCV RBC AUTO: 86.9 FL (ref 80–100)
MONOCYTES # BLD AUTO: 0.4 K/UL (ref 0–1)
MONOCYTES NFR BLD AUTO: 4 %
NEUTROPHILS # BLD AUTO: 7.8 K/UL (ref 1.3–7.7)
NEUTROPHILS NFR BLD AUTO: 80 %
PLATELET # BLD AUTO: 422 K/UL (ref 150–450)
WBC # BLD AUTO: 9.7 K/UL (ref 3.8–10.6)

## 2018-09-26 PROCEDURE — 85025 COMPLETE CBC W/AUTO DIFF WBC: CPT

## 2018-09-26 PROCEDURE — 49650 LAP ING HERNIA REPAIR INIT: CPT

## 2018-09-26 PROCEDURE — 64488 TAP BLOCK BI INJECTION: CPT

## 2018-09-26 NOTE — P.GSHP
History of Present Illness


H&P Date: 18


Chief Complaint: Left inguinal hernia





This is an 8-year-old male has developed a left femoral hernia.  He presents 

today for laparoscopic robotic-assisted repair





Past Medical History


Past Medical History: Cancer, Chest Pain / Angina, COPD, Deep Vein Thrombosis (

DVT), Eye Disorder, Hearing Disorder / Deafness, Hyperlipidemia, Hypertension, 

Osteoarthritis (OA), Pneumonia, Prostate Disorder, Renal Disease


Additional Past Medical History / Comment(s): Severe COPD with an FEV1 of 33% 

of predicted, chronic hypoxic respiratory failure, recurrent hospitalization 

for COPD exacerbation, stenotrophomonas tracheal bronchitis - HAD PFT 18.  

Bladder Cancer w/ prev surgery.  RLE DVT.  BPH.  Chronic Back Pain.  VARICOSE 

VEINS.  O2 2L NC.  ACUTE RENAL FAILURE 3/2018 R/T DEHYDRATION/DEPRESSION, PER 

WIFE.


History of Any Multi-Drug Resistant Organisms: None Reported


Past Surgical History: Bladder Surgery, Tonsillectomy


Additional Past Surgical History / Comment(s): Bladder CA Surgery, Vasectomy.  

BRONCHIAL WASHING.   EXC CATARACTS KATH.


Past Anesthesia/Blood Transfusion Reactions: No Reported Reaction


Smoking Status: Former smoker





- Past Family History


  ** Father


Family Medical History: Cancer


Additional Family Medical History / Comment(s): Father had scoliosis.  Father 

 of  lung cancer





  ** Mother


History Unknown: Yes


Family Medical History: CVA/TIA


Additional Family Medical History / Comment(s): Mother . Unknown history





Medications and Allergies


 Home Medications











 Medication  Instructions  Recorded  Confirmed  Type


 


Albuterol Sulfate [Proair Hfa] 2 puff INHALATION RT-Q6H PRN 16 

History


 


Simvastatin [Zocor] 40 mg PO DAILY 16 History


 


Ipratropium-Albuterol Nebulize 3 ml INHALATION RT-QID 16 History





[Duoneb 0.5 mg-3 mg/3 ml Soln]    


 


Fluticasone/Vilanterol [Breo 1 puff INHALATION RT-DAILY 17 

History





Ellipta 200-25 Mcg INH]    


 


predniSONE 5 mg PO DAILY 17 History


 


Tamsulosin HCl [Flomax] 0.4 mg PO BID 17 History


 


Furosemide [Lasix] 20 mg PO DAILY 18 History


 


Citalopram Hydrobromide [CeleXA] 20 mg PO DAILY #30 tab 18 Rx


 


ALPRAZolam [Xanax] 0.5 mg PO TID PRN 18 History


 


Acetaminophen [Tylenol Extra 500 - 1,000 mg PO Q6H PRN 18 History





Strength]    


 


Docusate Sodium [Dok] 100 mg PO DAILY 18 History


 


Ibuprofen [Motrin Ib] 400 - 600 mg PO Q6H PRN 18 History


 


Lisinopril [Zestril] 10 mg PO DAILY 18 History


 


prednisoLONE ACETATE 1% OPHTH 1 drops BOTH EYES DAILY 18 History





[Pred Forte 1%]    











 Allergies











Allergy/AdvReac Type Severity Reaction Status Date / Time


 


No Known Allergies Allergy   Verified 18 15:36














Surgical - Exam





- General


well developed, no distress





- Eyes


PERRL





- ENT


normal pinna





- Neck


no masses





- Respiratory


normal expansion





- Cardiovascular


Rhythm: regular





- Abdomen


Abdomen: soft, non tender


Hernia: inguinal (Left inguinal hernia)





Assessment and Plan


Assessment: 





Left internal hernia.  We'll perform laparoscopic robotic-assisted repair.

## 2018-09-26 NOTE — P.ONQ
Anesthesiology Proc Note - PNB





- Peripheral Nerve Block Performed


  ** Bilateral Transversus Abdominis Single


Time Out Performed: Yes


Procedure Start Time: 13:51


Procedure Stop Time: 14:03


Indication: Acute Post-Operative Pain


Sedation Type: Sedate with meaningful contact maintained


Preparation: Sterile Prep


Position: Supine


Needle Size: 50mm (2")


Needle Gauge: 21


Technique: Ultrasound


Injectate: 0.5% Ropivacaine (see comment for volume) (ropi .5% 15cc)


Blood Aspirated: No


Pain Paresthesia on Injection Noted: No


Resistance on Injection: Normal


Events: Uneventful and Well Tolerated

## 2018-09-26 NOTE — P.OP
Date of Procedure: 09/26/18


Preoperative Diagnosis: 


Left inguinal hernia


Postoperative Diagnosis: 


Bilateral hernias





Urinary retention


Procedure(s) Performed: 


Laparoscopic robotic-assisted repair of bilateral hernias





Insertion of Null catheter








Anesthesia: HAIDER


Surgeon: Mainor Little


Estimated Blood Loss (ml): 5


Pathology: none sent


Condition: stable


Disposition: PACU


Description of Procedure: 


The patient was placed on the operating table in the supine position.  The 

patient received general anesthesia.  The patient's abdomen was prepped and 

draped in usual sterile fashion.  The skin was anesthetized 1% local Xylocaine 

at the incision sites.  Using an 11 blade a skin incision was made at the 

umbilicus.  The fascia was grasped with a [place] and then the peritoneal cavity 

was entered with the Veress needle.  Position of the Veress needle was 

confirmed with a positive drop test.  After adequate insufflation a 5 mm trocar 

was placed into the peritoneal cavity.


The Laparoscope was placed the peritoneal cavity.  And a robotic 8 mm trocar 

was placed in the right lateral position and then another 8 mm robotic trochars 

placed in the left lateral position.  The original 5 mm trocar was exchanged 

for a 12 mm trocar.  The patient was noted to have a massively distended 

bladder.  The Null catheter was placed.  1500 mL of urine was retrieved.





The patient was placed in reverse Trendelenburg and then the patient was docked 

to the robot.





Next the peritoneum over top of the right hernia was incised and then using 

blunt and sharp dissection and electrocautery the hernia sac was dissected free 

from the floor of the inguinal canal.  The hernia sac was completely reduced 

into the peritoneal cavity.  And then using the Pro  mesh the hernia was 

repaired.  The peritoneum was then sutured with 2-0V lock suture.  





Next the peritoneum over top of the left hernia was incised and then using 

blunt and sharp dissection and electrocautery the hernia sac was dissected free 

from the floor of the inguinal canal.  The hernia sac was completely reduced 

into the peritoneal cavity.  And then using the Pro  mesh the hernia was 

repaired.  The peritoneum was then sutured with 2-0V lock suture.  








The patient was then undocked the robot.  The needle was withdrawn from the 

peritoneal cavity.  The umbilical trocar site was closed with 0 Ethibond 

suture.  The skin was closed interrupted 3-0 Monocryl suture.  Dermabond 

dressing was applied.  Patient was sent to recovery in stable condition.

## 2018-09-28 ENCOUNTER — HOSPITAL ENCOUNTER (EMERGENCY)
Dept: HOSPITAL 47 - EC | Age: 83
LOS: 1 days | Discharge: HOME | End: 2018-09-29
Payer: MEDICARE

## 2018-09-28 DIAGNOSIS — Z86.79: ICD-10-CM

## 2018-09-28 DIAGNOSIS — E78.5: ICD-10-CM

## 2018-09-28 DIAGNOSIS — H91.90: ICD-10-CM

## 2018-09-28 DIAGNOSIS — I10: ICD-10-CM

## 2018-09-28 DIAGNOSIS — Z99.81: ICD-10-CM

## 2018-09-28 DIAGNOSIS — F41.9: ICD-10-CM

## 2018-09-28 DIAGNOSIS — M19.90: ICD-10-CM

## 2018-09-28 DIAGNOSIS — Z79.899: ICD-10-CM

## 2018-09-28 DIAGNOSIS — R33.9: ICD-10-CM

## 2018-09-28 DIAGNOSIS — Z85.51: ICD-10-CM

## 2018-09-28 DIAGNOSIS — Z87.891: ICD-10-CM

## 2018-09-28 DIAGNOSIS — Z79.52: ICD-10-CM

## 2018-09-28 DIAGNOSIS — Z80.1: ICD-10-CM

## 2018-09-28 DIAGNOSIS — E86.0: Primary | ICD-10-CM

## 2018-09-28 DIAGNOSIS — J44.9: ICD-10-CM

## 2018-09-28 DIAGNOSIS — J96.11: ICD-10-CM

## 2018-09-28 DIAGNOSIS — Z79.51: ICD-10-CM

## 2018-09-28 DIAGNOSIS — N40.1: ICD-10-CM

## 2018-09-28 DIAGNOSIS — Z98.890: ICD-10-CM

## 2018-09-28 PROCEDURE — 81001 URINALYSIS AUTO W/SCOPE: CPT

## 2018-09-28 PROCEDURE — 99283 EMERGENCY DEPT VISIT LOW MDM: CPT

## 2018-09-28 PROCEDURE — 51702 INSERT TEMP BLADDER CATH: CPT

## 2018-09-28 NOTE — ED
Male Urogenital HPI





- General


Chief complaint: Urogenital


Stated complaint: Male 


Time Seen by Provider: 18 22:59


Source: patient, family


Mode of arrival: wheelchair


Limitations: physical limitation





- History of Present Illness


Initial comments: 


Linwood is an 83-year-old gentleman who underwent hernia repair earlier this 

week.  At the time of surgery patient was noted to have some urinary retention 

preoperatively and a Null catheter was placed.  Null catheter remained in 

place until he followed up with his urologist earlier today and had his Null 

catheter removed.  Patient reports he's been unable to urinate for 

approximately 16 hours since that time.


Patient reports that he has been trying to urinate but has been unable to 

produce any urine throughout the day today.








- Related Data


 Home Medications











 Medication  Instructions  Recorded  Confirmed


 


Albuterol Sulfate [Proair Hfa] 2 puff INHALATION RT-Q6H PRN 16


 


Simvastatin [Zocor] 40 mg PO DAILY 16


 


Ipratropium-Albuterol Nebulize 3 ml INHALATION RT-QID 16





[Duoneb 0.5 mg-3 mg/3 ml Soln]   


 


Fluticasone/Vilanterol [Breo 1 puff INHALATION RT-DAILY 17





Ellipta 200-25 Mcg INH]   


 


predniSONE 5 mg PO DAILY 17


 


Tamsulosin HCl [Flomax] 0.4 mg PO BID 17


 


Furosemide [Lasix] 20 mg PO DAILY 18


 


ALPRAZolam [Xanax] 0.5 mg PO TID 18


 


Lisinopril [Zestril] 10 mg PO DAILY 18








 Previous Rx's











 Medication  Instructions  Recorded


 


Citalopram Hydrobromide [CeleXA] 20 mg PO DAILY #30 tab 18


 


HYDROcodone/APAP 7.5-325MG [Norco 1 tab PO Q4H PRN 3 Days #18 tab 18





7.5-325]  











 Allergies











Allergy/AdvReac Type Severity Reaction Status Date / Time


 


No Known Allergies Allergy   Verified 18 23:30














Review of Systems


ROS Statement: 


Those systems with pertinent positive or pertinent negative responses have been 

documented in the HPI.





ROS Other: All systems not noted in ROS Statement are negative.





Past Medical History


Past Medical History: Cancer, Chest Pain / Angina, COPD, Deep Vein Thrombosis (

DVT), Eye Disorder, Hearing Disorder / Deafness, Hyperlipidemia, Hypertension, 

Osteoarthritis (OA), Pneumonia, Prostate Disorder, Renal Disease


Additional Past Medical History / Comment(s): Severe COPD with an FEV1 of 33% 

of predicted, chronic hypoxic respiratory failure, recurrent hospitalization 

for COPD exacerbation, stenotrophomonas tracheal bronchitis - HAD PFT 18.  

Bladder Cancer w/ prev surgery.  RLE DVT.  BPH.  Chronic Back Pain.  VARICOSE 

VEINS.  O2 2L NC.  ACUTE RENAL FAILURE 3/2018 R/T DEHYDRATION/DEPRESSION, PER 

WIFE.


History of Any Multi-Drug Resistant Organisms: None Reported


Past Surgical History: Bladder Surgery, Hernia Repair, Tonsillectomy


Additional Past Surgical History / Comment(s): Bladder CA Surgery, Vasectomy.  

BRONCHIAL WASHING.   EXC CATARACTS KATH.,


Past Anesthesia/Blood Transfusion Reactions: No Reported Reaction


Past Psychological History: Anxiety, Depression


Smoking Status: Former smoker


Past Alcohol Use History: None Reported


Past Drug Use History: None Reported





- Past Family History


  ** Father


Family Medical History: Cancer


Additional Family Medical History / Comment(s): Father had scoliosis.  Father 

 of  lung cancer





  ** Mother


History Unknown: Yes


Family Medical History: CVA/TIA


Additional Family Medical History / Comment(s): Mother . Unknown history





General Exam





- General Exam Comments


Initial Comments: 


GENERAL:


Chronically ill-appearing oxygen-dependent male





HENT:


Normocephalic, Atraumatic. 





EYES:


Physical room reactive to light 





PULMONARY:


Mild wheezing


Wearing oxygen nasal cannula





CARDIOVASCULAR:


There is a regular rate and rhythm without any murmurs gallops or rubs.  





ABDOMEN:


Well-healing laparoscopic surgical incisions with no surrounding erythema or 

evidence of infection


Appropriate postoperative tenderness to palpation





SKIN:


Well-healing surgical incisions





NEUROLOGIC:


Patient is alert and oriented x3.  Cranial nerves II through XII are grossly 

intact.  Motor and sensory are also intact.  Normal speech, volume and content.

  Symmetrical smile. 





MUSCULOSKELETAL:


Normal extremities with adequate strength and full range of motion.  No lower 

extremity swelling or edema.  No calf tenderness.  





LYMPHATICS:


No significant lymphadenopathy is noted





PSYCHIATRIC:


Normal psychiatric evaluation.  





Limitations: no limitations





Limitations: physical limitation





Course


 Vital Signs











  18





  22:36 00:10


 


Temperature 99.3 F 99.8 F H


 


Pulse Rate 103 H 90


 


Respiratory 18 16





Rate  


 


Blood Pressure 124/57 148/66


 


O2 Sat by Pulse 94 L 97





Oximetry  














Medical Decision Making





- Medical Decision Making


Patient was seen and evaluated, patient is been unable to urinate for 16 hours 

since Null catheter removal.


The catheter was ordered.


Proximately 500 mL of dark urine was produced


Analysis with no evidence of UTI





Patient was discharged home with Null catheter in place.  He is familiar with 

Null catheter care.  He will contact his urologist on Monday for follow-up


Return parameters discussed patient was discharged home in stable condition.








- Lab Data


 Lab Results











  18 Range/Units





  23:14 


 


Urine Color  Yellow  


 


Urine Appearance  Clear  (Clear)  


 


Urine pH  5.5  (5.0-8.0)  


 


Ur Specific Gravity  1.015  (1.001-1.035)  


 


Urine Protein  Trace H  (Negative)  


 


Urine Glucose (UA)  Negative  (Negative)  


 


Urine Ketones  Negative  (Negative)  


 


Urine Blood  Negative  (Negative)  


 


Urine Nitrite  Negative  (Negative)  


 


Urine Bilirubin  Negative  (Negative)  


 


Urine Urobilinogen  <2.0  (<2.0)  mg/dL


 


Ur Leukocyte Esterase  Small H  (Negative)  


 


Urine RBC  2  (0-5)  /hpf


 


Urine WBC  15 H  (0-5)  /hpf


 


Ur Squamous Epith Cells  <1  (0-4)  /hpf


 


Hyaline Casts  19 H  (0-2)  /lpf














Disposition


Clinical Impression: 


 Urinary retention, Dehydration





Disposition: HOME SELF-CARE


Instructions:  *[facility]gery [facility] - Null Catheter Instructions, Null Catheter 

Placement and Care (ED)


Additional Instructions: 


Call the urology office on Monday for follow-up.  Follow up with Dr. Ortiz 

as scheduled on Tuesday.





Drink more fluids


Is patient prescribed a controlled substance at d/c from ED?: No


Referrals: 


Nik Whatley MD [Primary Care Provider] - 1-2 days

## 2018-09-29 VITALS
DIASTOLIC BLOOD PRESSURE: 66 MMHG | SYSTOLIC BLOOD PRESSURE: 148 MMHG | TEMPERATURE: 99.8 F | RESPIRATION RATE: 16 BRPM | HEART RATE: 90 BPM

## 2018-09-29 LAB
HYALINE CASTS UR QL AUTO: 19 /LPF (ref 0–2)
PH UR: 5.5 [PH] (ref 5–8)
RBC UR QL: 2 /HPF (ref 0–5)
SP GR UR: 1.01 (ref 1–1.03)
SQUAMOUS UR QL AUTO: <1 /HPF (ref 0–4)
UROBILINOGEN UR QL STRIP: <2 MG/DL (ref ?–2)
WBC #/AREA URNS HPF: 15 /HPF (ref 0–5)

## 2019-09-23 ENCOUNTER — HOSPITAL ENCOUNTER (INPATIENT)
Dept: HOSPITAL 47 - EC | Age: 84
LOS: 5 days | Discharge: HOME HEALTH | DRG: 191 | End: 2019-09-28
Attending: FAMILY MEDICINE | Admitting: FAMILY MEDICINE
Payer: MEDICARE

## 2019-09-23 VITALS — BODY MASS INDEX: 17.4 KG/M2

## 2019-09-23 DIAGNOSIS — J96.11: ICD-10-CM

## 2019-09-23 DIAGNOSIS — Z80.1: ICD-10-CM

## 2019-09-23 DIAGNOSIS — F32.9: ICD-10-CM

## 2019-09-23 DIAGNOSIS — Z86.718: ICD-10-CM

## 2019-09-23 DIAGNOSIS — Z98.41: ICD-10-CM

## 2019-09-23 DIAGNOSIS — Z99.81: ICD-10-CM

## 2019-09-23 DIAGNOSIS — Z87.891: ICD-10-CM

## 2019-09-23 DIAGNOSIS — J44.1: Primary | ICD-10-CM

## 2019-09-23 DIAGNOSIS — N40.0: ICD-10-CM

## 2019-09-23 DIAGNOSIS — Z85.51: ICD-10-CM

## 2019-09-23 DIAGNOSIS — E78.5: ICD-10-CM

## 2019-09-23 DIAGNOSIS — N17.9: ICD-10-CM

## 2019-09-23 DIAGNOSIS — F41.9: ICD-10-CM

## 2019-09-23 DIAGNOSIS — J11.1: ICD-10-CM

## 2019-09-23 DIAGNOSIS — Z98.42: ICD-10-CM

## 2019-09-23 DIAGNOSIS — H91.90: ICD-10-CM

## 2019-09-23 DIAGNOSIS — Z79.52: ICD-10-CM

## 2019-09-23 DIAGNOSIS — Z79.899: ICD-10-CM

## 2019-09-23 DIAGNOSIS — I10: ICD-10-CM

## 2019-09-23 LAB
ALBUMIN SERPL-MCNC: 3.3 G/DL (ref 3.5–5)
ALP SERPL-CCNC: 93 U/L (ref 38–126)
ALT SERPL-CCNC: 23 U/L (ref 21–72)
ANION GAP SERPL CALC-SCNC: 10 MMOL/L
AST SERPL-CCNC: 21 U/L (ref 17–59)
BASOPHILS # BLD AUTO: 0 K/UL (ref 0–0.2)
BASOPHILS NFR BLD AUTO: 0 %
BUN SERPL-SCNC: 38 MG/DL (ref 9–20)
CALCIUM SPEC-MCNC: 8.4 MG/DL (ref 8.4–10.2)
CHLORIDE SERPL-SCNC: 103 MMOL/L (ref 98–107)
CO2 SERPL-SCNC: 28 MMOL/L (ref 22–30)
EOSINOPHIL # BLD AUTO: 0.1 K/UL (ref 0–0.7)
EOSINOPHIL NFR BLD AUTO: 1 %
ERYTHROCYTE [DISTWIDTH] IN BLOOD BY AUTOMATED COUNT: 3.1 M/UL (ref 4.3–5.9)
ERYTHROCYTE [DISTWIDTH] IN BLOOD: 16.6 % (ref 11.5–15.5)
GLUCOSE SERPL-MCNC: 130 MG/DL (ref 74–99)
HCT VFR BLD AUTO: 26 % (ref 39–53)
HGB BLD-MCNC: 8.2 GM/DL (ref 13–17.5)
LYMPHOCYTES # SPEC AUTO: 0.4 K/UL (ref 1–4.8)
LYMPHOCYTES NFR SPEC AUTO: 4 %
MCH RBC QN AUTO: 26.4 PG (ref 25–35)
MCHC RBC AUTO-ENTMCNC: 31.5 G/DL (ref 31–37)
MCV RBC AUTO: 84 FL (ref 80–100)
MONOCYTES # BLD AUTO: 0.4 K/UL (ref 0–1)
MONOCYTES NFR BLD AUTO: 5 %
NEUTROPHILS # BLD AUTO: 7.5 K/UL (ref 1.3–7.7)
NEUTROPHILS NFR BLD AUTO: 89 %
PLATELET # BLD AUTO: 474 K/UL (ref 150–450)
POTASSIUM SERPL-SCNC: 4.9 MMOL/L (ref 3.5–5.1)
PROT SERPL-MCNC: 6.6 G/DL (ref 6.3–8.2)
SODIUM SERPL-SCNC: 141 MMOL/L (ref 137–145)
WBC # BLD AUTO: 8.5 K/UL (ref 3.8–10.6)

## 2019-09-23 PROCEDURE — 87070 CULTURE OTHR SPECIMN AEROBIC: CPT

## 2019-09-23 PROCEDURE — 87205 SMEAR GRAM STAIN: CPT

## 2019-09-23 PROCEDURE — 94760 N-INVAS EAR/PLS OXIMETRY 1: CPT

## 2019-09-23 PROCEDURE — 85027 COMPLETE CBC AUTOMATED: CPT

## 2019-09-23 PROCEDURE — 71046 X-RAY EXAM CHEST 2 VIEWS: CPT

## 2019-09-23 PROCEDURE — 93005 ELECTROCARDIOGRAM TRACING: CPT

## 2019-09-23 PROCEDURE — 96374 THER/PROPH/DIAG INJ IV PUSH: CPT

## 2019-09-23 PROCEDURE — 99285 EMERGENCY DEPT VISIT HI MDM: CPT

## 2019-09-23 PROCEDURE — 85025 COMPLETE CBC W/AUTO DIFF WBC: CPT

## 2019-09-23 PROCEDURE — 94640 AIRWAY INHALATION TREATMENT: CPT

## 2019-09-23 PROCEDURE — 36415 COLL VENOUS BLD VENIPUNCTURE: CPT

## 2019-09-23 PROCEDURE — 80053 COMPREHEN METABOLIC PANEL: CPT

## 2019-09-23 RX ADMIN — METHYLPREDNISOLONE SODIUM SUCCINATE SCH MG: 125 INJECTION, POWDER, FOR SOLUTION INTRAMUSCULAR; INTRAVENOUS at 23:58

## 2019-09-23 RX ADMIN — TAMSULOSIN HYDROCHLORIDE SCH MG: 0.4 CAPSULE ORAL at 21:26

## 2019-09-23 RX ADMIN — CEFDINIR SCH MG: 300 CAPSULE ORAL at 21:26

## 2019-09-23 RX ADMIN — IPRATROPIUM BROMIDE AND ALBUTEROL SULFATE SCH ML: .5; 3 SOLUTION RESPIRATORY (INHALATION) at 19:46

## 2019-09-23 RX ADMIN — METHYLPREDNISOLONE SODIUM SUCCINATE SCH: 125 INJECTION, POWDER, FOR SOLUTION INTRAMUSCULAR; INTRAVENOUS at 18:58

## 2019-09-23 RX ADMIN — IPRATROPIUM BROMIDE AND ALBUTEROL SULFATE SCH ML: .5; 3 SOLUTION RESPIRATORY (INHALATION) at 17:24

## 2019-09-23 NOTE — XR
EXAMINATION TYPE: XR chest 2V

 

DATE OF EXAM: 9/23/2019

 

COMPARISON: 3/6/2018

 

INDICATION: Difficulty breathing

 

TECHNIQUE:  Frontal and lateral views of the chest are obtained.

 

FINDINGS:  

The heart size is normal.  

The pulmonary vasculature is normal.

There is mild diffuse increased lung markings which is nonspecific. This is a change from 2018. On in
fectious etiology could be considered. Volume overload could be considered. Developing pulmonary fibr
osis should be considered.

 

IMPRESSION:  

1. Diffuse increased mild lung markings. Correlate for developing pulmonary fibrosis. Differential di
agnosis is discussed above.

## 2019-09-23 NOTE — ED
General Adult HPI





- General


Chief complaint: Shortness of Breath


Stated complaint: UBALDO


Time Seen by Provider: 19 11:26


Source: patient, RN notes reviewed


Mode of arrival: ambulatory


Limitations: no limitations, physical limitation





- History of Present Illness


Initial comments: 





Patient is a pleasant 84-year-old male presenting to the emergency Department 

with complaints of cough and difficulty in breathing.  Onset of symptoms was 

around 3 days ago.  Patient did see Dr. Whatley today and was advised come to the

emergency department.  Patient states cough has been productive with yellow 

sputum.  No fevers.  Symptoms are similar to previous COPD.





- Related Data


                                Home Medications











 Medication  Instructions  Recorded  Confirmed


 


Albuterol Sulfate [Proair Hfa] 2 puff INHALATION RT-Q6H PRN 16


 


Simvastatin [Zocor] 40 mg PO DAILY 16


 


Ipratropium-Albuterol Nebulize 3 ml INHALATION RT-QID 16





[Duoneb 0.5 mg-3 mg/3 ml Soln]   


 


Fluticasone/Vilanterol [Breo 1 puff INHALATION RT-DAILY 17





Ellipta 200-25 Mcg INH]   


 


predniSONE 5 mg PO DAILY 17


 


Tamsulosin HCl [Flomax] 0.4 mg PO BID 17


 


Furosemide [Lasix] 20 mg PO DAILY 18


 


ALPRAZolam [Xanax] 0.5 mg PO TID PRN 18


 


Lisinopril [Zestril] 10 mg PO DAILY 18








                                  Previous Rx's











 Medication  Instructions  Recorded


 


Citalopram Hydrobromide [CeleXA] 20 mg PO DAILY #30 tab 18











                                    Allergies











Allergy/AdvReac Type Severity Reaction Status Date / Time


 


No Known Allergies Allergy   Verified 19 11:47














Review of Systems


ROS Statement: 


Those systems with pertinent positive or pertinent negative responses have been 

documented in the HPI.





ROS Other: All systems not noted in ROS Statement are negative.


Constitutional: Denies: fever


Eyes: Denies: eye pain


ENT: Denies: ear pain


Respiratory: Reports: cough, dyspnea


Cardiovascular: Denies: chest pain


Endocrine: Denies: fatigue


Gastrointestinal: Denies: abdominal pain


Genitourinary: Denies: dysuria


Musculoskeletal: Denies: back pain


Skin: Denies: rash


Neurological: Denies: headache





Past Medical History


Past Medical History: Cancer, Chest Pain / Angina, COPD, Deep Vein Thrombosis 

(DVT), Eye Disorder, Hearing Disorder / Deafness, Hyperlipidemia, Hypertension, 

Osteoarthritis (OA), Pneumonia, Prostate Disorder, Renal Disease


Additional Past Medical History / Comment(s): Severe COPD with an FEV1 of 33% of

 predicted, chronic hypoxic respiratory failure, recurrent hospitalization for 

COPD exacerbation, stenotrophomonas tracheal bronchitis - HAD PFT 18.  

Bladder Cancer w/ prev surgery.  RLE DVT.  BPH.  Chronic Back Pain.  VARICOSE 

VEINS.  O2 2L NC.  ACUTE RENAL FAILURE 3/2018 R/T DEHYDRATION/DEPRESSION, PER 

WIFE.


History of Any Multi-Drug Resistant Organisms: None Reported


Past Surgical History: Bladder Surgery, Hernia Repair, Tonsillectomy


Additional Past Surgical History / Comment(s): Bladder CA Surgery, Vasectomy.  

BRONCHIAL WASHING.   EXC CATARACTS KATH.,


Past Anesthesia/Blood Transfusion Reactions: No Reported Reaction


Past Psychological History: Anxiety, Depression


Smoking Status: Former smoker


Past Alcohol Use History: None Reported


Past Drug Use History: None Reported





- Past Family History


  ** Father


Family Medical History: Cancer


Additional Family Medical History / Comment(s): Father had scoliosis.  Father 

 of  lung cancer





  ** Mother


History Unknown: Yes


Family Medical History: CVA/TIA


Additional Family Medical History / Comment(s): Mother . Unknown history





General Exam


Limitations: no limitations, physical limitation


General appearance: alert, in no apparent distress


Head exam: Present: atraumatic


Eye exam: Present: normal appearance, PERRL


ENT exam: Present: normal oropharynx


Neck exam: Present: normal inspection


Respiratory exam: Present: wheezes, decreased breath sounds


Cardiovascular Exam: Present: regular rate, normal rhythm


GI/Abdominal exam: Present: soft.  Absent: tenderness


Extremities exam: Present: normal inspection


Back exam: Present: normal inspection


Neurological exam: Present: alert


Psychiatric exam: Present: normal affect, normal mood


Skin exam: Present: normal color





Course


                                   Vital Signs











  19





  11:01 12:28 12:36


 


Temperature 97.9 F  


 


Pulse Rate 99 96 90


 


Respiratory 17  





Rate   


 


Blood Pressure 98/48  


 


O2 Sat by Pulse 91 L  





Oximetry   














  19





  13:30 14:00


 


Temperature  


 


Pulse Rate 90 85


 


Respiratory 17 18





Rate  


 


Blood Pressure 113/59 115/62


 


O2 Sat by Pulse 89 L 94 L





Oximetry  














EKG Findings





- EKG Comments:


EKG Findings:: Sinus rhythm at 93.  Sinus arrhythmia.  .  QRS 82.  .

  .  Right axis.  Septal Q waves.  No acute ST change.





Medical Decision Making





- Medical Decision Making





Patient reevaluated.  Patient and family updated.  Patient feels somewhat better

 following nebulization treatment.  Dr. Whatley has been paged for admission.





- Lab Data


Result diagrams: 


                                 19 12:08





                                 19 12:08


                                   Lab Results











  19 Range/Units





  12:08 12:08 


 


WBC  8.5   (3.8-10.6)  k/uL


 


RBC  3.10 L   (4.30-5.90)  m/uL


 


Hgb  8.2 L   (13.0-17.5)  gm/dL


 


Hct  26.0 L   (39.0-53.0)  %


 


MCV  84.0   (80.0-100.0)  fL


 


MCH  26.4   (25.0-35.0)  pg


 


MCHC  31.5   (31.0-37.0)  g/dL


 


RDW  16.6 H   (11.5-15.5)  %


 


Plt Count  474 H   (150-450)  k/uL


 


Neutrophils %  89   %


 


Lymphocytes %  4   %


 


Monocytes %  5   %


 


Eosinophils %  1   %


 


Basophils %  0   %


 


Neutrophils #  7.5   (1.3-7.7)  k/uL


 


Lymphocytes #  0.4 L   (1.0-4.8)  k/uL


 


Monocytes #  0.4   (0-1.0)  k/uL


 


Eosinophils #  0.1   (0-0.7)  k/uL


 


Basophils #  0.0   (0-0.2)  k/uL


 


Hypochromasia  Slight   


 


Anisocytosis  Slight   


 


Sodium   141  (137-145)  mmol/L


 


Potassium   4.9  (3.5-5.1)  mmol/L


 


Chloride   103  ()  mmol/L


 


Carbon Dioxide   28  (22-30)  mmol/L


 


Anion Gap   10  mmol/L


 


BUN   38 H  (9-20)  mg/dL


 


Creatinine   1.48 H  (0.66-1.25)  mg/dL


 


Est GFR (CKD-EPI)AfAm   50  (>60 ml/min/1.73 sqM)  


 


Est GFR (CKD-EPI)NonAf   43  (>60 ml/min/1.73 sqM)  


 


Glucose   130 H  (74-99)  mg/dL


 


Calcium   8.4  (8.4-10.2)  mg/dL


 


Total Bilirubin   0.4  (0.2-1.3)  mg/dL


 


AST   21  (17-59)  U/L


 


ALT   23  (21-72)  U/L


 


Alkaline Phosphatase   93  ()  U/L


 


Total Protein   6.6  (6.3-8.2)  g/dL


 


Albumin   3.3 L  (3.5-5.0)  g/dL














- Radiology Data


Radiology results: image reviewed (Chest x-ray shows interstitial changes.)





Disposition


Clinical Impression: 


 Acute exacerbation of chronic obstructive airways disease





Disposition: ADMITTED AS IP TO THIS HOSP


Is patient prescribed a controlled substance at d/c from ED?: No


Referrals: 


Nik Whatley MD [Primary Care Provider] - 1-2 days


Decision Time: 15:25

## 2019-09-24 LAB
ALBUMIN SERPL-MCNC: 3.2 G/DL (ref 3.5–5)
ALP SERPL-CCNC: 92 U/L (ref 38–126)
ALT SERPL-CCNC: 16 U/L (ref 21–72)
ANION GAP SERPL CALC-SCNC: 9 MMOL/L
AST SERPL-CCNC: 18 U/L (ref 17–59)
BUN SERPL-SCNC: 42 MG/DL (ref 9–20)
CALCIUM SPEC-MCNC: 8.8 MG/DL (ref 8.4–10.2)
CHLORIDE SERPL-SCNC: 102 MMOL/L (ref 98–107)
CO2 SERPL-SCNC: 28 MMOL/L (ref 22–30)
ERYTHROCYTE [DISTWIDTH] IN BLOOD BY AUTOMATED COUNT: 3.14 M/UL (ref 4.3–5.9)
ERYTHROCYTE [DISTWIDTH] IN BLOOD: 16.3 % (ref 11.5–15.5)
GLUCOSE SERPL-MCNC: 141 MG/DL (ref 74–99)
HCT VFR BLD AUTO: 26.5 % (ref 39–53)
HGB BLD-MCNC: 8.4 GM/DL (ref 13–17.5)
MCH RBC QN AUTO: 26.7 PG (ref 25–35)
MCHC RBC AUTO-ENTMCNC: 31.6 G/DL (ref 31–37)
MCV RBC AUTO: 84.5 FL (ref 80–100)
PLATELET # BLD AUTO: 485 K/UL (ref 150–450)
POTASSIUM SERPL-SCNC: 5.2 MMOL/L (ref 3.5–5.1)
PROT SERPL-MCNC: 6.7 G/DL (ref 6.3–8.2)
SODIUM SERPL-SCNC: 139 MMOL/L (ref 137–145)
WBC # BLD AUTO: 5.6 K/UL (ref 3.8–10.6)

## 2019-09-24 RX ADMIN — CITALOPRAM HYDROBROMIDE SCH MG: 20 TABLET ORAL at 09:11

## 2019-09-24 RX ADMIN — METHYLPREDNISOLONE SODIUM SUCCINATE SCH MG: 125 INJECTION, POWDER, FOR SOLUTION INTRAMUSCULAR; INTRAVENOUS at 17:19

## 2019-09-24 RX ADMIN — METHYLPREDNISOLONE SODIUM SUCCINATE SCH MG: 125 INJECTION, POWDER, FOR SOLUTION INTRAMUSCULAR; INTRAVENOUS at 06:00

## 2019-09-24 RX ADMIN — ATORVASTATIN CALCIUM SCH MG: 20 TABLET, FILM COATED ORAL at 13:15

## 2019-09-24 RX ADMIN — TAMSULOSIN HYDROCHLORIDE SCH MG: 0.4 CAPSULE ORAL at 09:11

## 2019-09-24 RX ADMIN — IPRATROPIUM BROMIDE AND ALBUTEROL SULFATE SCH ML: .5; 3 SOLUTION RESPIRATORY (INHALATION) at 20:23

## 2019-09-24 RX ADMIN — CEFDINIR SCH MG: 300 CAPSULE ORAL at 09:11

## 2019-09-24 RX ADMIN — IPRATROPIUM BROMIDE AND ALBUTEROL SULFATE SCH ML: .5; 3 SOLUTION RESPIRATORY (INHALATION) at 08:32

## 2019-09-24 RX ADMIN — IPRATROPIUM BROMIDE AND ALBUTEROL SULFATE SCH ML: .5; 3 SOLUTION RESPIRATORY (INHALATION) at 17:20

## 2019-09-24 RX ADMIN — FUROSEMIDE SCH MG: 20 TABLET ORAL at 09:11

## 2019-09-24 RX ADMIN — CEFDINIR SCH MG: 300 CAPSULE ORAL at 23:48

## 2019-09-24 RX ADMIN — METHYLPREDNISOLONE SODIUM SUCCINATE SCH MG: 125 INJECTION, POWDER, FOR SOLUTION INTRAMUSCULAR; INTRAVENOUS at 23:52

## 2019-09-24 RX ADMIN — IPRATROPIUM BROMIDE AND ALBUTEROL SULFATE SCH ML: .5; 3 SOLUTION RESPIRATORY (INHALATION) at 12:57

## 2019-09-24 RX ADMIN — TAMSULOSIN HYDROCHLORIDE SCH MG: 0.4 CAPSULE ORAL at 21:34

## 2019-09-24 RX ADMIN — LISINOPRIL SCH MG: 10 TABLET ORAL at 09:11

## 2019-09-24 RX ADMIN — BUDESONIDE SCH MG: 1 SUSPENSION RESPIRATORY (INHALATION) at 20:23

## 2019-09-24 RX ADMIN — METHYLPREDNISOLONE SODIUM SUCCINATE SCH MG: 125 INJECTION, POWDER, FOR SOLUTION INTRAMUSCULAR; INTRAVENOUS at 13:14

## 2019-09-24 RX ADMIN — FORMOTEROL FUMARATE DIHYDRATE SCH MCG: 20 SOLUTION RESPIRATORY (INHALATION) at 20:23

## 2019-09-24 NOTE — P.CNPUL
History of Present Illness


Consult date: 19


Requesting physician: Nik Whatley


Reason for consult: dyspnea, cough, COPD


Chief complaint: Cough, congestion, dyspnea


History of present illness: 


 This is a 82-year-old male patient of Dr. Whatley who also sees Dr. Terrazas in the

pulmonary clinic for his advanced oxygen-dependent COPD with chronic hypoxemic 

and steroid-dependent restaurant failure with a baseline FEV1 of 33% of 

predicted.  Patient has had the multiple pulmonary complications and pulmonary 

infections with history of stenotrophomonas in the bronchial wash cultures, 

treated with Bactrim.  Patient smoking history is in remission, other medical 

history includes hypertension, hyperlipidemia, neoplasm of bladder, history of 

DVT and patient is currently not on any anticoagulants. 





On 2019 patient was sent in from his primary care physician's office where

he went for evaluation of worsening dyspnea, cough, congestion, and production 

of yellow sputum.  Denied any fever or chills.  Chest x-ray on admission showed 

diffuse increased mid lung markings, with the possibility of developing 

pulmonary fibrosis.  Admission lab work showed a white blood cell count of 8.5, 

hemoglobin of 8.2, platelet count is 474, electrodes were within normal limits, 

B1 is 38 and creatinine was 1.48.  Patient reports no nausea, vomiting, no d

iarrhea.  She was started on oral Omnicef, nebulized bronchodilators and IV 

steroids.  Patient is on a combination of Breo-Ellipta and DuoNeb nebs at home. 

We were asked to see this patient in evaluation for COPD exacerbation








Review of Systems


All systems: negative


Constitutional: Denies chills, Denies fever


Eyes: denies blurred vision, denies pain


Ears, nose, mouth and throat: Denies headache, Denies sore throat


Cardiovascular: Denies chest pain, Denies shortness of breath


Respiratory: Reports congestion, Reports cough, Reports dyspnea, Reports 

respiratory infections, Reports wheezing


Gastrointestinal: Denies abdominal pain, Denies diarrhea, Denies nausea, Denies 

vomiting


Musculoskeletal: Denies myalgias


Integumentary: Denies pruritus, Denies rash


Neurological: Denies numbness, Denies weakness


Psychiatric: Denies anxiety, Denies depression


Endocrine: Denies fatigue, Denies weight change





Past Medical History


Past Medical History: Cancer, Chest Pain / Angina, COPD, Deep Vein Thrombosis 

(DVT), Eye Disorder, Hearing Disorder / Deafness, Hyperlipidemia, Hypertension, 

Osteoarthritis (OA), Pneumonia, Prostate Disorder, Renal Disease


Additional Past Medical History / Comment(s): Severe COPD with an FEV1 of 33% of

predicted, chronic hypoxic respiratory failure, recurrent hospitalization for 

COPD exacerbation, stenotrophomonas tracheal bronchitis - HAD PFT 18.  

Bladder Cancer w/ prev surgery.  RLE DVT.  BPH.  Chronic Back Pain.  VARICOSE 

VEINS.  O2 2L NC.  ACUTE RENAL FAILURE 3/2018 R/T DEHYDRATION/DEPRESSION, PER 

WIFE.


History of Any Multi-Drug Resistant Organisms: None Reported


Past Surgical History: Bladder Surgery, Hernia Repair, Tonsillectomy


Additional Past Surgical History / Comment(s): Bladder CA Surgery, Vasectomy.  

BRONCHIAL WASHING.   EXC CATARACTS KATH.,


Past Anesthesia/Blood Transfusion Reactions: No Reported Reaction


Past Psychological History: Anxiety, Depression


Additional Psychological History / Comment(s): Pt resides with his spouse in a 

single level home that has 4 porchs steps..pt drives.  He has a nebulizer, 02 

uses prn.  He has [facility] home care.


Smoking Status: Former smoker


Past Alcohol Use History: None Reported


Additional Past Alcohol Use History / Comment(s): Pt started smoking in 1955 and

quit in .  At one time smoking 2-3 ppd.


Past Drug Use History: None Reported





- Past Family History


  ** Father


Family Medical History: Cancer


Additional Family Medical History / Comment(s): Father had scoliosis.  Father 

 of  lung cancer





  ** Mother


History Unknown: Yes


Family Medical History: CVA/TIA


Additional Family Medical History / Comment(s): Mother . Unknown history





Medications and Allergies


                                Home Medications











 Medication  Instructions  Recorded  Confirmed  Type


 


Albuterol Sulfate [Proair Hfa] 2 puff INHALATION RT-Q6H PRN 16 

History


 


Simvastatin [Zocor] 40 mg PO DAILY 16 History


 


Ipratropium-Albuterol Nebulize 3 ml INHALATION RT-QID 16 History





[Duoneb 0.5 mg-3 mg/3 ml Soln]    


 


Fluticasone/Vilanterol [Breo 1 puff INHALATION RT-DAILY 17 

History





Ellipta 200-25 Mcg INH]    


 


predniSONE 5 mg PO DAILY 17 History


 


Tamsulosin HCl [Flomax] 0.4 mg PO BID 17 History


 


Furosemide [Lasix] 20 mg PO DAILY 18 History


 


Citalopram Hydrobromide [CeleXA] 20 mg PO DAILY #30 tab 18 Rx


 


ALPRAZolam [Xanax] 0.5 mg PO TID PRN 18 History


 


Lisinopril [Zestril] 10 mg PO DAILY 18 History








                                    Allergies











Allergy/AdvReac Type Severity Reaction Status Date / Time


 


No Known Allergies Allergy   Verified 19 11:47














Physical Exam


Vitals: 


                                   Vital Signs











  Temp Pulse Pulse Resp BP BP Pulse Ox


 


 19 08:49   80     


 


 19 08:32   76     


 


 19 05:04  96.7 F L   78  20   127/74  95


 


 19 21:39  98.2 F   89  18   133/67  92 L


 


 19 20:00    89  18   


 


 19 19:58   84   16   


 


 19 19:46   72   16   


 


 19 19:00   81   16  121/59   93 L


 


 19 18:30   84   17  113/58   90 L


 


 19 18:00   85   16  117/71   93 L


 


 19 17:32   87     


 


 19 17:30   78   16  96/50   98


 


 19 17:26   80     


 


 19 17:00   85   15  107/50   94 L


 


 19 16:30   92   15  139/61   94 L


 


 19 16:00   90   16  117/64   94 L


 


 19 15:30   82   17  124/73   94 L


 


 19 15:00   85   16  107/57   93 L


 


 19 14:30   82   17  119/69   94 L


 


 19 14:00   85   18  115/62   94 L


 


 19 13:30   90   17  113/59   89 L








                                Intake and Output











 19





 22:59 06:59 14:59


 


Intake Total   540


 


Balance   540


 


Intake:   


 


  Oral   540


 


Other:   


 


  # Voids 1 1 1











 GENERAL EXAM: Alert, pleasant, cachectic looking white male, and 2 L of oxygen 

with a pulse ox of 95%, comfortable in no apparent distress.


HEAD: Normocephalic/atraumatic.


EYES: Normal reaction of pupils, equal size.  Conjunctiva pink, sclera white.


NOSE: Clear with pink turbinates.


THROAT: No erythema or exudates.


NECK: No masses, no JVD, no thyroid enlargement, no adenopathy.


CHEST: No chest wall deformity.  Symmetrical expansion. 


LUNGS: Equal air entry with diffuse rhonchi and wheezes


CVS: Regular rate and rhythm, normal S1 and S2, no gallops, no murmurs, no rubs


ABDOMEN: Soft, nontender.  No hepatosplenomegaly, normal bowel sounds, no 

guarding or rigidity.


EXTREMITIES: No clubbing, no edema, no cyanosis, 2+ pulses and upper and lower 

extremities.


MUSCULOSKELETAL: Muscle strength and tone normal.


SPINE: No scoliosis or deformity


SKIN: No rashes


CENTRAL NERVOUS SYSTEM: Alert and oriented -3.  No focal deficits, tone is 

normal in all 4 extremities.


PSYCHIATRIC: Alert and oriented -3.  Appropriate affect.  Intact judgment and 

insight.











Results





- Laboratory Findings


CBC and BMP: 


                                 19 07:24





                                 19 07:24


Abnormal lab findings: 


                                  Abnormal Labs











  19





  12:08 12:08 07:24


 


RBC  3.10 L   3.14 L


 


Hgb  8.2 L   8.4 L


 


Hct  26.0 L   26.5 L


 


RDW  16.6 H   16.3 H


 


Plt Count  474 H   485 H


 


Lymphocytes #  0.4 L  


 


Potassium   


 


BUN   38 H 


 


Creatinine   1.48 H 


 


Glucose   130 H 


 


ALT   


 


Albumin   3.3 L 














  19





  07:24


 


RBC 


 


Hgb 


 


Hct 


 


RDW 


 


Plt Count 


 


Lymphocytes # 


 


Potassium  5.2 H


 


BUN  42 H


 


Creatinine 


 


Glucose  141 H


 


ALT  16 L


 


Albumin  3.2 L














- Diagnostic Findings


Chest x-ray: report reviewed, image reviewed





Assessment and Plan


Plan: 


 Assessment:





#1.  Acute exacerbation of chronic obstructive pulmonary disease with purulent 

tracheobronchitis





#2.  Acute kidney injury, improved with IV hydration, may be related to diuretic

 therapy and intravascular volume depletion





#3.  Advanced steroid-dependent COPD with a baseline FEV1 of 33% of predicted 

oxygen dependent and steroid dependent at baseline





#4.  History of nicotine dependence, currently in remission





#5.  Hypertension





#6.  Hyperlipidemia





#7.  Bladder cancer





#8.  History of deep venous thrombosis, not currently on any anticoagulants





Plan:





We'll continue with current medical treatment, will continue DuoNeb, we'll add 

Pulmicort and Perforomist, and agree with the oral antibiotics, send a sputum 

for culture.  We'll continue





I performed a history & physical examination of the patient and discussed their 

management with my nurse practitioner, Mayda Álvarez.  I reviewed the nurse 

practitioner's note and agree with the documented findings and plan of care.  

Lung sounds are positive for diffuse wheezes throughout the lung fields.  The 

findings and the impression was discussed with the patient.  I attest to the 

documentation by the nurse practitioner. 








Time with Patient: Greater than 30

## 2019-09-24 NOTE — P.HPIM
History of Present Illness


H&P Date: 19


Chief Complaint: Dyspnea





This is history and physical on an 84-year-old male essentially with history of 

COPD who came in to my office today with significant exacerbation elements.  We 

did treat him as an outpatient but told him to come in emergency room for 

appropriate hospitalization.  He has struggled with this in the past.  No 

significant fever or chills but dyspnea is now noted.





Review of Systems


Constitutional: Denies chills, Denies fever


Eyes: denies blurred vision, denies pain


Ears, nose, mouth and throat: Denies headache, Denies sore throat


Cardiovascular: Denies chest pain, Denies shortness of breath


Respiratory: Reports as per HPI, Reports cough, Reports dyspnea, Reports home 

oxygen


Gastrointestinal: Denies abdominal pain, Denies diarrhea, Denies nausea, Denies 

vomiting


Musculoskeletal: Denies myalgias


Integumentary: Denies pruritus, Denies rash


Neurological: Denies numbness, Denies weakness





Past Medical History


Past Medical History: Cancer, Chest Pain / Angina, COPD, Deep Vein Thrombosis 

(DVT), Eye Disorder, Hearing Disorder / Deafness, Hyperlipidemia, Hypertension, 

Osteoarthritis (OA), Pneumonia, Prostate Disorder, Renal Disease


Additional Past Medical History / Comment(s): Severe COPD with an FEV1 of 33% of

predicted, chronic hypoxic respiratory failure, recurrent hospitalization for 

COPD exacerbation, stenotrophomonas tracheal bronchitis - HAD PFT 18.  

Bladder Cancer w/ prev surgery.  RLE DVT.  BPH.  Chronic Back Pain.  VARICOSE 

VEINS.  O2 2L NC.  ACUTE RENAL FAILURE 3/2018 R/T DEHYDRATION/DEPRESSION, PER 

WIFE.


History of Any Multi-Drug Resistant Organisms: None Reported


Past Surgical History: Bladder Surgery, Hernia Repair, Tonsillectomy


Additional Past Surgical History / Comment(s): Bladder CA Surgery, Vasectomy.  

BRONCHIAL WASHING.   EXC CATARACTS KATH.,


Past Anesthesia/Blood Transfusion Reactions: No Reported Reaction


Past Psychological History: Anxiety, Depression


Smoking Status: Former smoker


Past Alcohol Use History: None Reported


Past Drug Use History: None Reported





- Past Family History


  ** Father


Family Medical History: Cancer


Additional Family Medical History / Comment(s): Father had scoliosis.  Father 

 of  lung cancer





  ** Mother


History Unknown: Yes


Family Medical History: CVA/TIA


Additional Family Medical History / Comment(s): Mother . Unknown history





Medications and Allergies


                                Home Medications











 Medication  Instructions  Recorded  Confirmed  Type


 


Albuterol Sulfate [Proair Hfa] 2 puff INHALATION RT-Q6H PRN 16 

History


 


Simvastatin [Zocor] 40 mg PO DAILY 16 History


 


Ipratropium-Albuterol Nebulize 3 ml INHALATION RT-QID 16 History





[Duoneb 0.5 mg-3 mg/3 ml Soln]    


 


Fluticasone/Vilanterol [Breo 1 puff INHALATION RT-DAILY 17 

History





Ellipta 200-25 Mcg INH]    


 


predniSONE 5 mg PO DAILY 17 History


 


Tamsulosin HCl [Flomax] 0.4 mg PO BID 17 History


 


Furosemide [Lasix] 20 mg PO DAILY 18 History


 


Citalopram Hydrobromide [CeleXA] 20 mg PO DAILY #30 tab 18 Rx


 


ALPRAZolam [Xanax] 0.5 mg PO TID PRN 18 History


 


Lisinopril [Zestril] 10 mg PO DAILY 18 History








                                    Allergies











Allergy/AdvReac Type Severity Reaction Status Date / Time


 


No Known Allergies Allergy   Verified 19 11:47














Physical Exam


Vitals: 


                                   Vital Signs











  Temp Pulse Resp BP Pulse Ox


 


 19 17:32   87   


 


 19 17:30   78  16  96/50  98


 


 19 17:26   80   


 


 19 17:00   85  15  107/50  94 L


 


 19 16:30   92  15  139/61  94 L


 


 19 16:00   90  16  117/64  94 L


 


 19 15:30   82  17  124/73  94 L


 


 19 15:00   85  16  107/57  93 L


 


 19 14:30   82  17  119/69  94 L


 


 19 14:00   85  18  115/62  94 L


 


 19 13:30   90  17  113/59  89 L


 


 19 12:36   90   


 


 19 12:28   96   


 


 19 11:01  97.9 F  99  17  98/48  91 L








                                Intake and Output











 19





 06:59 14:59 22:59


 


Other:   


 


  Weight  58.06 kg 














- Constitutional


General appearance: mild distress





- EENT


Eyes: EOMI





- Neck


Neck: no lymphadenopathy





- Respiratory


Respiratory: bilateral: dullness, prolonged expiration





- Cardiovascular


Rhythm: regular


Heart sounds: normal: S1, S2


Abnormal Heart Sounds: no S3 Gallop





- Gastrointestinal


General gastrointestinal: soft, no tenderness





- Integumentary


Integumentary: no cellulitis





- Musculoskeletal


Musculoskeletal: generalized weakness





- Psychiatric


Psychiatric: A&O x's 3, appropriate affect, intact judgment & insight





Results


CBC & Chem 7: 


                                 19 12:08





                                 19 12:08


Labs: 


                  Abnormal Lab Results - Last 24 Hours (Table)











  19 Range/Units





  12:08 12:08 


 


RBC  3.10 L   (4.30-5.90)  m/uL


 


Hgb  8.2 L   (13.0-17.5)  gm/dL


 


Hct  26.0 L   (39.0-53.0)  %


 


RDW  16.6 H   (11.5-15.5)  %


 


Plt Count  474 H   (150-450)  k/uL


 


Lymphocytes #  0.4 L   (1.0-4.8)  k/uL


 


BUN   38 H  (9-20)  mg/dL


 


Creatinine   1.48 H  (0.66-1.25)  mg/dL


 


Glucose   130 H  (74-99)  mg/dL


 


Albumin   3.3 L  (3.5-5.0)  g/dL














Assessment and Plan


(1) COPD with acute exacerbation


Current Visit: No   Status: Acute   Code(s): J44.1 - CHRONIC OBSTRUCTIVE 

PULMONARY DISEASE W (ACUTE) EXACERBATION   SNOMED Code(s): 544790737


   





(2) Hypertension


Current Visit: No   Status: Acute   Code(s): I10 - ESSENTIAL (PRIMARY) 

HYPERTENSION   SNOMED Code(s): 61701279


   





(3) Hypoxia


Current Visit: No   Status: Acute   Code(s): R09.02 - HYPOXEMIA   SNOMED 

Code(s): 000031994


   


Plan: 





Empiric pathway for treatment.





Consult pulmonology.





Check CBC and CMP in a.m.





Reconcile medications.





CODE STATUS will be addressed.


Time with Patient: Greater than 30

## 2019-09-25 RX ADMIN — BUDESONIDE SCH MG: 1 SUSPENSION RESPIRATORY (INHALATION) at 07:29

## 2019-09-25 RX ADMIN — IPRATROPIUM BROMIDE AND ALBUTEROL SULFATE SCH ML: .5; 3 SOLUTION RESPIRATORY (INHALATION) at 11:15

## 2019-09-25 RX ADMIN — ATORVASTATIN CALCIUM SCH MG: 20 TABLET, FILM COATED ORAL at 07:20

## 2019-09-25 RX ADMIN — TAMSULOSIN HYDROCHLORIDE SCH MG: 0.4 CAPSULE ORAL at 07:20

## 2019-09-25 RX ADMIN — FUROSEMIDE SCH MG: 20 TABLET ORAL at 07:20

## 2019-09-25 RX ADMIN — CITALOPRAM HYDROBROMIDE SCH MG: 20 TABLET ORAL at 07:19

## 2019-09-25 RX ADMIN — FORMOTEROL FUMARATE DIHYDRATE SCH MCG: 20 SOLUTION RESPIRATORY (INHALATION) at 07:29

## 2019-09-25 RX ADMIN — FORMOTEROL FUMARATE DIHYDRATE SCH MCG: 20 SOLUTION RESPIRATORY (INHALATION) at 19:22

## 2019-09-25 RX ADMIN — LISINOPRIL SCH MG: 10 TABLET ORAL at 07:19

## 2019-09-25 RX ADMIN — METHYLPREDNISOLONE SODIUM SUCCINATE SCH MG: 125 INJECTION, POWDER, FOR SOLUTION INTRAMUSCULAR; INTRAVENOUS at 07:19

## 2019-09-25 RX ADMIN — TAMSULOSIN HYDROCHLORIDE SCH MG: 0.4 CAPSULE ORAL at 21:37

## 2019-09-25 RX ADMIN — BUDESONIDE SCH MG: 1 SUSPENSION RESPIRATORY (INHALATION) at 19:22

## 2019-09-25 RX ADMIN — CEFDINIR SCH MG: 300 CAPSULE ORAL at 21:37

## 2019-09-25 RX ADMIN — IPRATROPIUM BROMIDE AND ALBUTEROL SULFATE SCH ML: .5; 3 SOLUTION RESPIRATORY (INHALATION) at 19:22

## 2019-09-25 RX ADMIN — METHYLPREDNISOLONE SODIUM SUCCINATE SCH MG: 125 INJECTION, POWDER, FOR SOLUTION INTRAMUSCULAR; INTRAVENOUS at 11:13

## 2019-09-25 RX ADMIN — METHYLPREDNISOLONE SODIUM SUCCINATE SCH MG: 125 INJECTION, POWDER, FOR SOLUTION INTRAMUSCULAR; INTRAVENOUS at 17:13

## 2019-09-25 RX ADMIN — CEFDINIR SCH MG: 300 CAPSULE ORAL at 07:19

## 2019-09-25 RX ADMIN — IPRATROPIUM BROMIDE AND ALBUTEROL SULFATE SCH ML: .5; 3 SOLUTION RESPIRATORY (INHALATION) at 15:27

## 2019-09-25 RX ADMIN — IPRATROPIUM BROMIDE AND ALBUTEROL SULFATE SCH ML: .5; 3 SOLUTION RESPIRATORY (INHALATION) at 07:29

## 2019-09-25 NOTE — P.PN
Subjective


Progress Note Date: 09/25/19


Principal diagnosis: 





Acute exacerbation of chronic obstructive pulmonary disease.





 This is a 82-year-old male patient of Dr. Whatley who also sees Dr. Terrazas in the

pulmonary clinic for his advanced oxygen-dependent COPD with chronic hypoxemic 

and steroid-dependent restaurant failure with a baseline FEV1 of 33% of 

predicted.  Patient has had the multiple pulmonary complications and pulmonary 

infections with history of stenotrophomonas in the bronchial wash cultures, 

treated with Bactrim.  Patient smoking history is in remission, other medical 

history includes hypertension, hyperlipidemia, neoplasm of bladder, history of 

DVT and patient is currently not on any anticoagulants. 





On 09/23/2019 patient was sent in from his primary care physician's office where

he went for evaluation of worsening dyspnea, cough, congestion, and production 

of yellow sputum.  Denied any fever or chills.  Chest x-ray on admission showed 

diffuse increased mid lung markings, with the possibility of developing 

pulmonary fibrosis.  Admission lab work showed a white blood cell count of 8.5, 

hemoglobin of 8.2, platelet count is 474, electrodes were within normal limits, 

B1 is 38 and creatinine was 1.48.  Patient reports no nausea, vomiting, no 

diarrhea.  She was started on oral Omnicef, nebulized bronchodilators and IV 

steroids.  Patient is on a combination of Breo-Ellipta and DuoNeb nebs at home. 

We were asked to see this patient in evaluation for COPD exacerbation





The patient is seen today 09/25/2019 in follow-up on the regular medical floor. 

He is currently resting in bed.  Still somewhat bronchospastic and wheezy.  

Still not back to his baseline.  Continues with a productive cough.  Sputum 

culture pending.  He is continued on DuoNeb inhalations, Pulmicort and 

Perforomist inhalations, IV Solu-Medrol.  Antibiotics in the form of Omnicef.





Objective





- Vital Signs


Vital signs: 


                                   Vital Signs











Temp  97.7 F   09/25/19 07:00


 


Pulse  96   09/25/19 11:16


 


Resp  16   09/25/19 07:00


 


BP  164/73   09/25/19 07:00


 


Pulse Ox  92 L  09/25/19 07:00








                                 Intake & Output











 09/24/19 09/25/19 09/25/19





 18:59 06:59 18:59


 


Intake Total 1620 320 


 


Balance 1620 320 


 


Weight 56.608 kg  


 


Intake:   


 


  Oral 1620 320 


 


Other:   


 


  Voiding Method Toilet Toilet Toilet


 


  # Voids 2 2 1














- Exam





GENERAL EXAM: Alert, pleasant 84-year-old gentleman on 2 L nasal cannula, 

comfortable in no apparent distress.


HEAD: Normocephalic.


EYES: Normal reaction of pupils, equal size.


NOSE: Clear with pink turbinates.


THROAT: No erythema or exudates.


NECK: No masses, no JVD.


CHEST: No chest wall deformity.


LUNGS: Equal air entry with bilateral end expiratory wheeze, diminished, few 

scattered rhonchi.


CVS: S1 and S2 normal with no audible murmur, regular rhythm.


ABDOMEN: No hepatosplenomegaly, normal bowel sounds, no guarding or rigidity.


SPINE: No scoliosis or deformity


SKIN: No rashes


CENTRAL NERVOUS SYSTEM: No focal deficits, tone is normal in all 4 extremities.


EXTREMITIES: There is no peripheral edema.  No clubbing, no cyanosis.  

Peripheral pulses are intact.





- Labs


CBC & Chem 7: 


                                 09/24/19 07:24





                                 09/24/19 07:24


Labs: 


                      Microbiology - Last 24 Hours (Table)











 09/24/19 13:00 Gram Stain - Preliminary





 Sputum 














Assessment and Plan


Assessment: 





Assessment:





#1.  Acute exacerbation of chronic obstructive pulmonary disease with purulent 

tracheobronchitis





#2.  Acute kidney injury, improved with IV hydration, may be related to diuretic

therapy and intravascular volume depletion





#3.  Advanced steroid-dependent COPD with a baseline FEV1 of 33% of predicted 

oxygen dependent and steroid dependent at baseline





#4.  History of nicotine dependence, currently in remission





#5.  Hypertension





#6.  Hyperlipidemia





#7.  Bladder cancer





#8.  History of deep venous thrombosis, not currently on any anticoagulants





Plan:





The patient was seen and evaluated by Dr. Daniels.  He is not quite back to his

baseline.  We'll continue with the current treatment plan.  Increase his acti

vity as tolerated.  We'll continue to follow.





I, the cosigning physician, performed a history & physical examination of the 

patient. Lungs sounds with bilateral end expiratory wheeze, few scattered 

rhonchi, diminished Maintaining good O2 saturations in the 90s on 2 L/m per 

nasal cannula.  I discussed the assessment and plan of care with my nurse 

practitioner, Khalida Rodriguez. I attest to the above note as dictated by her.

## 2019-09-25 NOTE — XR
EXAMINATION TYPE: XR chest 2V

 

DATE OF EXAM: 9/25/2019

 

COMPARISON: Chest x-ray 2 days ago and older studies. 

 

HISTORY: Pneumonia progress study.

 

TECHNIQUE:  Frontal and lateral views of the chest are obtained.

 

FINDINGS: Persistent increased interstitial markings bilaterally.  There is no new suspicious focal a
ir space opacity, pleural effusion, or pneumothorax seen.  The cardiac silhouette size is within norm
al limits.   The osseous structures are intact.

 

IMPRESSION:  Increased interstitial markings likely reflect developing interstitial fibrosis more pro
minent in the upper lobes. Interstitial edema is not excluded. No new focal infiltrate.

## 2019-09-25 NOTE — P.PN
Subjective


Progress Note Date: 09/25/19


Principal diagnosis: 





COPD exacerbation


Minimal improvement.  Still with significant dyspnea.  No voiding difficulties.





Objective





- Vital Signs


Vital signs: 


                                   Vital Signs











Temp  97.7 F   09/25/19 07:00


 


Pulse  92   09/25/19 07:55


 


Resp  16   09/25/19 07:00


 


BP  164/73   09/25/19 07:00


 


Pulse Ox  92 L  09/25/19 07:00








                                 Intake & Output











 09/24/19 09/25/19 09/25/19





 18:59 06:59 18:59


 


Intake Total 1620 320 


 


Balance 1620 320 


 


Weight 56.608 kg  


 


Intake:   


 


  Oral 1620 320 


 


Other:   


 


  Voiding Method Toilet Toilet Toilet


 


  # Voids 2 2 














- Constitutional


General appearance: Present: average body habitus, thin





- EENT


Eyes: Absent: abnormal pupil





- Respiratory


Respiratory: bilateral: wheezing





- Cardiovascular


Rhythm: regular


Heart sounds: normal: S1, S2


Abnormal Heart Sounds: Absent: S3 Gallop





- Gastrointestinal


General gastrointestinal: Present: soft





- Integumentary


Integumentary: Absent: cellulitis, rash





- Psychiatric


Psychiatric: Present: A&O x's 3.  Absent: appropriate affect





- Labs


CBC & Chem 7: 


                                 09/24/19 07:24





                                 09/24/19 07:24


Labs: 


                      Microbiology - Last 24 Hours (Table)











 09/24/19 13:00 Gram Stain - Preliminary





 Sputum 














Assessment and Plan


(1) COPD with acute exacerbation


Current Visit: No   Status: Acute   Code(s): J44.1 - CHRONIC OBSTRUCTIVE 

PULMONARY DISEASE W (ACUTE) EXACERBATION   SNOMED Code(s): 729109747


   





(2) Hypertension


Current Visit: No   Status: Acute   Code(s): I10 - ESSENTIAL (PRIMARY) 

HYPERTENSION   SNOMED Code(s): 39021963


   





(3) Hypoxia


Current Visit: No   Status: Acute   Code(s): R09.02 - HYPOXEMIA   SNOMED 

Code(s): 364556414


   


Plan: 





Continue steroid treatment.  No fever or chills.  Appetite minimall





Appreciate pulmonology input.





Check CBC and CMP in AM


Time with Patient: Less than 30

## 2019-09-26 RX ADMIN — LISINOPRIL SCH MG: 10 TABLET ORAL at 07:16

## 2019-09-26 RX ADMIN — ATORVASTATIN CALCIUM SCH MG: 20 TABLET, FILM COATED ORAL at 07:15

## 2019-09-26 RX ADMIN — CEFDINIR SCH MG: 300 CAPSULE ORAL at 07:16

## 2019-09-26 RX ADMIN — FORMOTEROL FUMARATE DIHYDRATE SCH MCG: 20 SOLUTION RESPIRATORY (INHALATION) at 19:06

## 2019-09-26 RX ADMIN — BUDESONIDE SCH MG: 1 SUSPENSION RESPIRATORY (INHALATION) at 07:56

## 2019-09-26 RX ADMIN — TAMSULOSIN HYDROCHLORIDE SCH MG: 0.4 CAPSULE ORAL at 21:39

## 2019-09-26 RX ADMIN — CITALOPRAM HYDROBROMIDE SCH MG: 20 TABLET ORAL at 07:15

## 2019-09-26 RX ADMIN — IPRATROPIUM BROMIDE AND ALBUTEROL SULFATE SCH ML: .5; 3 SOLUTION RESPIRATORY (INHALATION) at 07:56

## 2019-09-26 RX ADMIN — IPRATROPIUM BROMIDE AND ALBUTEROL SULFATE SCH ML: .5; 3 SOLUTION RESPIRATORY (INHALATION) at 19:06

## 2019-09-26 RX ADMIN — METHYLPREDNISOLONE SODIUM SUCCINATE SCH MG: 125 INJECTION, POWDER, FOR SOLUTION INTRAMUSCULAR; INTRAVENOUS at 00:52

## 2019-09-26 RX ADMIN — IPRATROPIUM BROMIDE AND ALBUTEROL SULFATE SCH ML: .5; 3 SOLUTION RESPIRATORY (INHALATION) at 14:53

## 2019-09-26 RX ADMIN — METHYLPREDNISOLONE SODIUM SUCCINATE SCH MG: 125 INJECTION, POWDER, FOR SOLUTION INTRAMUSCULAR; INTRAVENOUS at 11:16

## 2019-09-26 RX ADMIN — BUDESONIDE SCH MG: 1 SUSPENSION RESPIRATORY (INHALATION) at 19:06

## 2019-09-26 RX ADMIN — METHYLPREDNISOLONE SODIUM SUCCINATE SCH MG: 125 INJECTION, POWDER, FOR SOLUTION INTRAMUSCULAR; INTRAVENOUS at 17:23

## 2019-09-26 RX ADMIN — AMOXICILLIN AND CLAVULANATE POTASSIUM SCH EACH: 875; 125 TABLET, FILM COATED ORAL at 21:40

## 2019-09-26 RX ADMIN — TAMSULOSIN HYDROCHLORIDE SCH MG: 0.4 CAPSULE ORAL at 07:16

## 2019-09-26 RX ADMIN — FORMOTEROL FUMARATE DIHYDRATE SCH MCG: 20 SOLUTION RESPIRATORY (INHALATION) at 07:56

## 2019-09-26 RX ADMIN — FUROSEMIDE SCH MG: 20 TABLET ORAL at 07:16

## 2019-09-26 RX ADMIN — IPRATROPIUM BROMIDE AND ALBUTEROL SULFATE SCH ML: .5; 3 SOLUTION RESPIRATORY (INHALATION) at 11:17

## 2019-09-26 RX ADMIN — METHYLPREDNISOLONE SODIUM SUCCINATE SCH MG: 125 INJECTION, POWDER, FOR SOLUTION INTRAMUSCULAR; INTRAVENOUS at 05:52

## 2019-09-26 NOTE — P.PN
Subjective


Progress Note Date: 09/26/19








 This is a 82-year-old male patient of Dr. Whatley who also sees Dr. Trerazas in the

pulmonary clinic for his advanced oxygen-dependent COPD with chronic hypoxemic 

and steroid-dependent restaurant failure with a baseline FEV1 of 33% of 

predicted.  Patient has had the multiple pulmonary complications and pulmonary 

infections with history of stenotrophomonas in the bronchial wash cultures, 

treated with Bactrim.  Patient smoking history is in remission, other medical 

history includes hypertension, hyperlipidemia, neoplasm of bladder, history of 

DVT and patient is currently not on any anticoagulants. 





On 09/23/2019 patient was sent in from his primary care physician's office where

he went for evaluation of worsening dyspnea, cough, congestion, and production 

of yellow sputum.  Denied any fever or chills.  Chest x-ray on admission showed 

diffuse increased mid lung markings, with the possibility of developing 

pulmonary fibrosis.  Admission lab work showed a white blood cell count of 8.5, 

hemoglobin of 8.2, platelet count is 474, electrodes were within normal limits, 

B1 is 38 and creatinine was 1.48.  Patient reports no nausea, vomiting, no 

diarrhea.  She was started on oral Omnicef, nebulized bronchodilators and IV 

steroids.  Patient is on a combination of Breo-Ellipta and DuoNeb nebs at home. 

We were asked to see this patient in evaluation for COPD exacerbation





The patient is seen today 09/25/2019 in follow-up on the regular medical floor. 

He is currently resting in bed.  Still somewhat bronchospastic and wheezy.  

Still not back to his baseline.  Continues with a productive cough.  Sputum 

culture pending.  He is continued on DuoNeb inhalations, Pulmicort and 

Perforomist inhalations, IV Solu-Medrol.  Antibiotics in the form of Omnicef.





On 09/26/2019 the patient is feeling slightly better compared to yesterday.  He 

remains bronchospastic and wheezy and shortness of breath with limited amount of

activity.  No chest pain.  No hemoptysis or pleurisy.  No altered mentation.  

The sputum cultures positive for Haemophilus influenza and the patient is 

currently on oral Omnicef 300 mg by mouth twice a day.  He remains on IV Solu-

Medrol 60 mg IV push every 6 hours.





Objective





- Vital Signs


Vital signs: 


                                   Vital Signs











Temp  98.2 F   09/26/19 14:20


 


Pulse  78   09/26/19 15:03


 


Resp  16   09/26/19 14:20


 


BP  129/67   09/26/19 14:20


 


Pulse Ox  94 L  09/26/19 14:20








                                 Intake & Output











 09/25/19 09/26/19 09/26/19





 18:59 06:59 18:59


 


Intake Total  550 200


 


Balance  550 200


 


Weight   56.608 kg


 


Intake:   


 


  Oral  550 200


 


Other:   


 


  Voiding Method Toilet Toilet Toilet


 


  # Voids 1 2 2


 


  # Bowel Movements  0 














- Exam








GENERAL EXAM: Alert, pleasant 84-year-old gentleman on 2 L nasal cannula, 

comfortable in no apparent distress.


HEAD: Normocephalic.


EYES: Normal reaction of pupils, equal size.


NOSE: Clear with pink turbinates.


THROAT: No erythema or exudates.


NECK: No masses, no JVD.


CHEST: No chest wall deformity.


LUNGS: Equal air entry with bilateral end expiratory wheeze, diminished, few 

scattered rhonchi.


CVS: S1 and S2 normal with no audible murmur, regular rhythm.


ABDOMEN: No hepatosplenomegaly, normal bowel sounds, no guarding or rigidity.


SPINE: No scoliosis or deformity


SKIN: No rashes


CENTRAL NERVOUS SYSTEM: No focal deficits, tone is normal in all 4 extremities.


EXTREMITIES: There is no peripheral edema.  No clubbing, no cyanosis.  

Peripheral pulses are intact.








- Labs


CBC & Chem 7: 


                                 09/24/19 07:24





                                 09/24/19 07:24


Labs: 


                      Microbiology - Last 24 Hours (Table)











 09/24/19 13:00 Gram Stain - Final





 Sputum Sputum Culture - Final





    Haemophilus influenzae














Assessment and Plan


Plan: 











#1.  Acute exacerbation of chronic obstructive pulmonary disease with purulent 

tracheobronchitis secondary to Haemophilus influenza.





#2.  Acute kidney injury, improved with IV hydration, may be related to diuretic

therapy and intravascular volume depletion





#3.  Advanced steroid-dependent COPD with a baseline FEV1 of 33% of predicted 

oxygen dependent and steroid dependent at baseline





#4.  History of nicotine dependence, currently in remission





#5.  Hypertension





#6.  Hyperlipidemia





#7.  Bladder cancer





#8.  History of deep venous thrombosis, not currently on any anticoagulants





Plan





Recommend Augmentin regarding the Haemophilus influenza taken bronchitis and 

COPD exacerbation.  Continue IV Solu Medrol with the intention of starting 

tapering the IV Solu Medrol with the next 24 hours as the patient continues to 

improve.  No new complaints otherwise for now.

## 2019-09-26 NOTE — P.PN
Subjective


Principal diagnosis: 





COPD exacerbation


Minimal improvement.  Still with significant dyspnea.  No voiding difficulties.





I appreciate pulmonology input.  He seems to be improving





Objective





- Vital Signs


Vital signs: 


                                   Vital Signs











Temp  98.2 F   09/26/19 14:20


 


Pulse  82   09/26/19 19:28


 


Resp  16   09/26/19 14:20


 


BP  129/67   09/26/19 14:20


 


Pulse Ox  94 L  09/26/19 14:20








                                 Intake & Output











 09/26/19 09/26/19 09/27/19





 06:59 18:59 06:59


 


Intake Total 550 200 


 


Balance 550 200 


 


Weight  56.608 kg 


 


Intake:   


 


  Oral 550 200 


 


Other:   


 


  Voiding Method Toilet Toilet 


 


  # Voids 2 2 0


 


  # Bowel Movements 0  0














- Constitutional


General appearance: Present: thin





- EENT


Eyes: Absent: abnormal pupil





- Neck


Neck: Absent: lymphadenopathy





- Respiratory


Respiratory: bilateral: diminished





- Cardiovascular


Rhythm: regular


Heart sounds: normal: S1, S2


Abnormal Heart Sounds: Absent: S3 Gallop





- Gastrointestinal


General gastrointestinal: Present: soft.  Absent: tenderness





- Psychiatric


Psychiatric: Present: A&O x's 3, appropriate affect





- Labs


CBC & Chem 7: 


                                 09/24/19 07:24





                                 09/24/19 07:24


Labs: 


                      Microbiology - Last 24 Hours (Table)











 09/24/19 13:00 Gram Stain - Final





 Sputum Sputum Culture - Final





    Haemophilus influenzae














Assessment and Plan


(1) COPD with acute exacerbation


Current Visit: No   Status: Acute   Code(s): J44.1 - CHRONIC OBSTRUCTIVE 

PULMONARY DISEASE W (ACUTE) EXACERBATION   SNOMED Code(s): 618531948


   





(2) Hypertension


Current Visit: No   Status: Acute   Code(s): I10 - ESSENTIAL (PRIMARY) 

HYPERTENSION   SNOMED Code(s): 50472747


   





(3) Hypoxia


Current Visit: No   Status: Acute   Code(s): R09.02 - HYPOXEMIA   SNOMED 

Code(s): 170391856


   


Plan: 





Continue steroid treatment.  No fever or chills.  Appetite minimall





Appreciate pulmonology input.





Check CBC and CMP in AM


Time with Patient: Less than 30

## 2019-09-27 RX ADMIN — METHYLPREDNISOLONE SODIUM SUCCINATE SCH MG: 125 INJECTION, POWDER, FOR SOLUTION INTRAMUSCULAR; INTRAVENOUS at 06:19

## 2019-09-27 RX ADMIN — CITALOPRAM HYDROBROMIDE SCH MG: 20 TABLET ORAL at 07:53

## 2019-09-27 RX ADMIN — FUROSEMIDE SCH MG: 20 TABLET ORAL at 07:52

## 2019-09-27 RX ADMIN — AMOXICILLIN AND CLAVULANATE POTASSIUM SCH EACH: 875; 125 TABLET, FILM COATED ORAL at 21:40

## 2019-09-27 RX ADMIN — METHYLPREDNISOLONE SODIUM SUCCINATE SCH MG: 125 INJECTION, POWDER, FOR SOLUTION INTRAMUSCULAR; INTRAVENOUS at 23:25

## 2019-09-27 RX ADMIN — IPRATROPIUM BROMIDE AND ALBUTEROL SULFATE SCH ML: .5; 3 SOLUTION RESPIRATORY (INHALATION) at 19:27

## 2019-09-27 RX ADMIN — FORMOTEROL FUMARATE DIHYDRATE SCH MCG: 20 SOLUTION RESPIRATORY (INHALATION) at 07:10

## 2019-09-27 RX ADMIN — BUDESONIDE SCH MG: 1 SUSPENSION RESPIRATORY (INHALATION) at 07:10

## 2019-09-27 RX ADMIN — IPRATROPIUM BROMIDE AND ALBUTEROL SULFATE SCH ML: .5; 3 SOLUTION RESPIRATORY (INHALATION) at 11:06

## 2019-09-27 RX ADMIN — METHYLPREDNISOLONE SODIUM SUCCINATE SCH MG: 125 INJECTION, POWDER, FOR SOLUTION INTRAMUSCULAR; INTRAVENOUS at 01:11

## 2019-09-27 RX ADMIN — ATORVASTATIN CALCIUM SCH MG: 20 TABLET, FILM COATED ORAL at 07:53

## 2019-09-27 RX ADMIN — FORMOTEROL FUMARATE DIHYDRATE SCH MCG: 20 SOLUTION RESPIRATORY (INHALATION) at 19:27

## 2019-09-27 RX ADMIN — IPRATROPIUM BROMIDE AND ALBUTEROL SULFATE SCH ML: .5; 3 SOLUTION RESPIRATORY (INHALATION) at 07:10

## 2019-09-27 RX ADMIN — METHYLPREDNISOLONE SODIUM SUCCINATE SCH MG: 125 INJECTION, POWDER, FOR SOLUTION INTRAMUSCULAR; INTRAVENOUS at 12:15

## 2019-09-27 RX ADMIN — TAMSULOSIN HYDROCHLORIDE SCH MG: 0.4 CAPSULE ORAL at 21:40

## 2019-09-27 RX ADMIN — AMOXICILLIN AND CLAVULANATE POTASSIUM SCH EACH: 875; 125 TABLET, FILM COATED ORAL at 07:53

## 2019-09-27 RX ADMIN — METHYLPREDNISOLONE SODIUM SUCCINATE SCH MG: 125 INJECTION, POWDER, FOR SOLUTION INTRAMUSCULAR; INTRAVENOUS at 17:12

## 2019-09-27 RX ADMIN — BUDESONIDE SCH MG: 1 SUSPENSION RESPIRATORY (INHALATION) at 19:27

## 2019-09-27 RX ADMIN — LISINOPRIL SCH MG: 10 TABLET ORAL at 07:53

## 2019-09-27 RX ADMIN — IPRATROPIUM BROMIDE AND ALBUTEROL SULFATE SCH ML: .5; 3 SOLUTION RESPIRATORY (INHALATION) at 15:54

## 2019-09-27 RX ADMIN — TAMSULOSIN HYDROCHLORIDE SCH MG: 0.4 CAPSULE ORAL at 07:52

## 2019-09-27 NOTE — P.PN
Subjective


Principal diagnosis: 





COPD exacerbation


Minimal improvement.  Still with significant dyspnea.  No voiding difficulties.





I appreciate pulmonology input.  He seems to be improving





The patient seems to finishing sentences much easier today.





Minimal cough is stated.





Objective





- Vital Signs


Vital signs: 


                                   Vital Signs











Temp  98.3 F   09/27/19 07:00


 


Pulse  82   09/27/19 07:31


 


Resp  22   09/27/19 07:00


 


BP  146/76   09/27/19 07:00


 


Pulse Ox  94 L  09/27/19 07:10








                                 Intake & Output











 09/26/19 09/27/19 09/27/19





 18:59 06:59 18:59


 


Intake Total 200 550 


 


Balance 200 550 


 


Weight 56.608 kg  


 


Intake:   


 


  Oral 200 550 


 


Other:   


 


  Voiding Method Toilet Toilet 


 


  # Voids 2 1 


 


  # Bowel Movements  0 














- Constitutional


General appearance: Present: thin





- EENT


Eyes: Absent: abnormal pupil





- Neck


Neck: Absent: lymphadenopathy





- Respiratory


Respiratory: bilateral: diminished, prolonged expiration





- Cardiovascular


Rhythm: regular


Heart sounds: normal: S1, S2


Abnormal Heart Sounds: Absent: S3 Gallop





- Gastrointestinal


General gastrointestinal: Present: soft.  Absent: tenderness





- Musculoskeletal


Musculoskeletal: Present: gait normal





- Psychiatric


Psychiatric: Present: A&O x's 3, appropriate affect, intact judgment & insight





- Labs


CBC & Chem 7: 


                                 09/24/19 07:24





                                 09/24/19 07:24


Labs: 


                      Microbiology - Last 24 Hours (Table)











 09/24/19 13:00 Gram Stain - Final





 Sputum Sputum Culture - Final





    Haemophilus influenzae














Assessment and Plan


(1) COPD with acute exacerbation


Current Visit: No   Status: Acute   Code(s): J44.1 - CHRONIC OBSTRUCTIVE 

PULMONARY DISEASE W (ACUTE) EXACERBATION   SNOMED Code(s): 433185979


   





(2) Hypertension


Current Visit: No   Status: Acute   Code(s): I10 - ESSENTIAL (PRIMARY) 

HYPERTENSION   SNOMED Code(s): 13526997


   





(3) Hypoxia


Current Visit: No   Status: Acute   Code(s): R09.02 - HYPOXEMIA   SNOMED 

Code(s): 615172493


   


Plan: 





Continue current regimen of treatment with possible taper off Solu-Medrol.





Again, appreciate pulmonology input.





Check CBC and CMP in a.m.


Time with Patient: Less than 30

## 2019-09-28 VITALS
HEART RATE: 77 BPM | TEMPERATURE: 97.6 F | RESPIRATION RATE: 18 BRPM | SYSTOLIC BLOOD PRESSURE: 126 MMHG | DIASTOLIC BLOOD PRESSURE: 60 MMHG

## 2019-09-28 LAB
ALBUMIN SERPL-MCNC: 3.6 G/DL (ref 3.5–5)
ALP SERPL-CCNC: 83 U/L (ref 38–126)
ALT SERPL-CCNC: 30 U/L (ref 21–72)
ANION GAP SERPL CALC-SCNC: 9 MMOL/L
AST SERPL-CCNC: 29 U/L (ref 17–59)
BUN SERPL-SCNC: 54 MG/DL (ref 9–20)
CALCIUM SPEC-MCNC: 8.8 MG/DL (ref 8.4–10.2)
CHLORIDE SERPL-SCNC: 95 MMOL/L (ref 98–107)
CO2 SERPL-SCNC: 35 MMOL/L (ref 22–30)
ERYTHROCYTE [DISTWIDTH] IN BLOOD BY AUTOMATED COUNT: 3.67 M/UL (ref 4.3–5.9)
ERYTHROCYTE [DISTWIDTH] IN BLOOD: 15.7 % (ref 11.5–15.5)
GLUCOSE SERPL-MCNC: 128 MG/DL (ref 74–99)
HCT VFR BLD AUTO: 30.6 % (ref 39–53)
HGB BLD-MCNC: 9.7 GM/DL (ref 13–17.5)
MCH RBC QN AUTO: 26.5 PG (ref 25–35)
MCHC RBC AUTO-ENTMCNC: 31.8 G/DL (ref 31–37)
MCV RBC AUTO: 83.3 FL (ref 80–100)
PLATELET # BLD AUTO: 722 K/UL (ref 150–450)
POTASSIUM SERPL-SCNC: 5.1 MMOL/L (ref 3.5–5.1)
PROT SERPL-MCNC: 7.1 G/DL (ref 6.3–8.2)
SODIUM SERPL-SCNC: 139 MMOL/L (ref 137–145)
WBC # BLD AUTO: 15.6 K/UL (ref 3.8–10.6)

## 2019-09-28 RX ADMIN — FUROSEMIDE SCH MG: 20 TABLET ORAL at 09:07

## 2019-09-28 RX ADMIN — METHYLPREDNISOLONE SODIUM SUCCINATE SCH MG: 125 INJECTION, POWDER, FOR SOLUTION INTRAMUSCULAR; INTRAVENOUS at 11:36

## 2019-09-28 RX ADMIN — ATORVASTATIN CALCIUM SCH MG: 20 TABLET, FILM COATED ORAL at 09:07

## 2019-09-28 RX ADMIN — CITALOPRAM HYDROBROMIDE SCH MG: 20 TABLET ORAL at 09:07

## 2019-09-28 RX ADMIN — FORMOTEROL FUMARATE DIHYDRATE SCH MCG: 20 SOLUTION RESPIRATORY (INHALATION) at 07:53

## 2019-09-28 RX ADMIN — LISINOPRIL SCH MG: 10 TABLET ORAL at 09:07

## 2019-09-28 RX ADMIN — IPRATROPIUM BROMIDE AND ALBUTEROL SULFATE SCH ML: .5; 3 SOLUTION RESPIRATORY (INHALATION) at 11:23

## 2019-09-28 RX ADMIN — METHYLPREDNISOLONE SODIUM SUCCINATE SCH MG: 125 INJECTION, POWDER, FOR SOLUTION INTRAMUSCULAR; INTRAVENOUS at 05:52

## 2019-09-28 RX ADMIN — IPRATROPIUM BROMIDE AND ALBUTEROL SULFATE SCH ML: .5; 3 SOLUTION RESPIRATORY (INHALATION) at 07:53

## 2019-09-28 RX ADMIN — TAMSULOSIN HYDROCHLORIDE SCH MG: 0.4 CAPSULE ORAL at 09:07

## 2019-09-28 RX ADMIN — AMOXICILLIN AND CLAVULANATE POTASSIUM SCH EACH: 875; 125 TABLET, FILM COATED ORAL at 09:07

## 2019-09-28 RX ADMIN — BUDESONIDE SCH MG: 1 SUSPENSION RESPIRATORY (INHALATION) at 07:53

## 2019-09-28 NOTE — P.DS
Providers


Date of admission: 


09/25/19 20:06





Attending physician: 


Nik Whatley





Consults: 





                                        





09/23/19 15:25


Consult Physician Routine 


   Consulting Provider: Yumiko Daniels


   Consult Reason/Comments: copd


   Do you want consulting provider notified?: Yes











Primary care physician: 


Nik Whatley





Hospital Course: 


84-year-old pleasant gentleman was treated for COPD exacerbation clinically 

doing well at this time is still wheezing patient has advanced COPD patient is 

cleared for discharge from my pulmonary perspective patient wheezing is his 

baseline patient has tracheobronchitis with Haemophilus influenza patient will 

be discharged on Augmentin











PHYSICAL EXAMINATION: 





GENERAL: The patient is alert and oriented x3, not in any acute distress. Well 

developed, well nourished. 


HEENT: Pupils are round and equally reacting to light. EOMI. No scleral icterus.

No conjunctival pallor. Normocephalic, atraumatic. No pharyngeal erythema. No 

thyromegaly. 


CARDIOVASCULAR: S1 and S2 present. No murmurs, rubs, or gallops. 


PULMONARY: Expiratory wheezing on exam rhonchus breath sounds


ABDOMEN: Soft, nontender, nondistended, normoactive bowel sounds. No palpable 

organomegaly. 


MUSCULOSKELETAL: No joint swelling or deformity.


EXTREMITIES: No cyanosis, clubbing, or pedal edema. 


NEUROLOGICAL: Gross neurological examination did not reveal any focal deficits. 


SKIN: No rashes. 





Please refer to dictation from Erna for further details of 

hospitalization course








Plan - Discharge Summary


New Discharge Prescriptions: 


New


   Amoxicillin/Potassium Clav [Augmentin 875-125 Tablet] 1 tab PO Q12HR #14 tab


   predniSONE 10 mg PO DAILY #30 tab


   Ranitidine HCl [Zantac] 150 mg PO BID #30 tablet





Continue


   Albuterol Sulfate [Proair Hfa] 2 puff INHALATION RT-Q6H PRN


     PRN Reason: Shortness Of Breath


   Simvastatin [Zocor] 40 mg PO DAILY


   Ipratropium-Albuterol Nebulize [Duoneb 0.5 mg-3 mg/3 ml Soln] 3 ml INHALATION

RT-QID


   predniSONE 5 mg PO DAILY


   Fluticasone/Vilanterol [Breo Ellipta 200-25 Mcg INH] 1 puff INHALATION RT-

DAILY


   Tamsulosin HCl [Flomax] 0.4 mg PO BID


   Furosemide [Lasix] 20 mg PO DAILY


   Citalopram Hydrobromide [CeleXA] 20 mg PO DAILY #30 tab


   ALPRAZolam [Xanax] 0.5 mg PO TID PRN


     PRN Reason: Anxiety


   Lisinopril [Zestril] 10 mg PO DAILY


Discharge Medication List





Albuterol Sulfate [Proair Hfa] 2 puff INHALATION RT-Q6H PRN 01/03/16 [History]


Simvastatin [Zocor] 40 mg PO DAILY 01/03/16 [History]


Ipratropium-Albuterol Nebulize [Duoneb 0.5 mg-3 mg/3 ml Soln] 3 ml INHALATION 

RT-QID 01/04/16 [History]


Fluticasone/Vilanterol [Breo Ellipta 200-25 Mcg INH] 1 puff INHALATION RT-DAILY 

03/31/17 [History]


predniSONE 5 mg PO DAILY 03/31/17 [History]


Tamsulosin HCl [Flomax] 0.4 mg PO BID 11/30/17 [History]


Furosemide [Lasix] 20 mg PO DAILY 02/09/18 [History]


Citalopram Hydrobromide [CeleXA] 20 mg PO DAILY #30 tab 03/14/18 [Rx]


ALPRAZolam [Xanax] 0.5 mg PO TID PRN 09/21/18 [History]


Lisinopril [Zestril] 10 mg PO DAILY 09/28/18 [History]


Amoxicillin/Potassium Clav [Augmentin 875-125 Tablet] 1 tab PO Q12HR #14 tab 

09/28/19 [Rx]


Ranitidine HCl [Zantac] 150 mg PO BID #30 tablet 09/28/19 [Rx]


predniSONE 10 mg PO DAILY #30 tab 09/28/19 [Rx]








Follow up Appointment(s)/Referral(s): 


Nik Whatley MD [Primary Care Provider] - 3 Days (Patient to call Dr. Whatley's 

office Monday morning to schedule follow up appointment. The office is closed at

time of discharge. )


[facility] Visiting Nurse, [NON-STAFF] - 


Patient Instructions/Handouts:  Ranitidine (By mouth), Prednisone (By mouth), 

Amoxicillin/Clavulanate Potassium (By mouth), COPD (Chronic Obstructive 

Pulmonary Disease) (DC)


Discharge Disposition: HOME WITH HOME HEALTH SERVICES

## 2019-09-28 NOTE — P.PN
Subjective


Progress Note Date: 09/28/19


Principal diagnosis: 





Acute exacerbation of chronic obstructive pulmonary disease.





 This is a 82-year-old male patient of Dr. Whatley who also sees Dr. Terrazas in the

pulmonary clinic for his advanced oxygen-dependent COPD with chronic hypoxemic 

and steroid-dependent restaurant failure with a baseline FEV1 of 33% of 

predicted.  Patient has had the multiple pulmonary complications and pulmonary 

infections with history of stenotrophomonas in the bronchial wash cultures, 

treated with Bactrim.  Patient smoking history is in remission, other medical 

history includes hypertension, hyperlipidemia, neoplasm of bladder, history of 

DVT and patient is currently not on any anticoagulants. 





The patient is seen today 09/28/2019 in follow-up on the regular medical floor. 

He is awake and alert in no acute distress.  He continues with a loose 

productive cough.  Breathing easier today as compared to yesterday.  Sputum was 

positive for Haemophilus influenza.  He count 15.6.  Hemoglobin 9.7.  Creatinine

1.15.  He remains on Augmentin.  On IV Solu-Medrol.





Objective





- Vital Signs


Vital signs: 


                                   Vital Signs











Temp  97.6 F   09/28/19 11:16


 


Pulse  91   09/28/19 11:34


 


Resp  18   09/28/19 11:16


 


BP  126/60   09/28/19 11:16


 


Pulse Ox  94 L  09/28/19 11:16








                                 Intake & Output











 09/27/19 09/28/19 09/28/19





 18:59 06:59 18:59


 


Intake Total 840 1200 


 


Balance 840 1200 


 


Intake:   


 


  Oral 840 1200 


 


Other:   


 


  Voiding Method  Toilet 


 


  # Voids 2 1 


 


  # Bowel Movements  1 














- Exam





GENERAL EXAM: Alert, pleasant 84-year-old gentleman on 2 L nasal cannula, 

comfortable in no apparent distress.


HEAD: Normocephalic.


EYES: Normal reaction of pupils, equal size.


NOSE: Clear with pink turbinates.


THROAT: No erythema or exudates.


NECK: No masses, no JVD.


CHEST: No chest wall deformity.


LUNGS: Equal air entry with bilateral end expiratory wheeze, diminished, few 

scattered rhonchi.


CVS: S1 and S2 normal with no audible murmur, regular rhythm.


ABDOMEN: No hepatosplenomegaly, normal bowel sounds, no guarding or rigidity.


SPINE: No scoliosis or deformity


SKIN: No rashes


CENTRAL NERVOUS SYSTEM: No focal deficits, tone is normal in all 4 extremities.


EXTREMITIES: There is no peripheral edema.  No clubbing, no cyanosis.  

Peripheral pulses are intact.





- Labs


CBC & Chem 7: 


                                 09/28/19 07:16





                                 09/28/19 07:16


Labs: 


                  Abnormal Lab Results - Last 24 Hours (Table)











  09/28/19 09/28/19 Range/Units





  07:16 07:16 


 


WBC  15.6 H   (3.8-10.6)  k/uL


 


RBC  3.67 L   (4.30-5.90)  m/uL


 


Hgb  9.7 L   (13.0-17.5)  gm/dL


 


Hct  30.6 L   (39.0-53.0)  %


 


RDW  15.7 H   (11.5-15.5)  %


 


Plt Count  722 H   (150-450)  k/uL


 


Chloride   95 L  ()  mmol/L


 


Carbon Dioxide   35 H  (22-30)  mmol/L


 


BUN   54 H  (9-20)  mg/dL


 


Glucose   128 H  (74-99)  mg/dL














Assessment and Plan


Assessment: 





Assessment:





#1.  Acute exacerbation of chronic obstructive pulmonary disease with purulent 

tracheobronchitis, sputum positive for Haemophilus influenza.





#2.  Acute kidney injury, improved with IV hydration, may be related to diuretic

therapy and intravascular volume depletion





#3.  Advanced steroid-dependent COPD with a baseline FEV1 of 33% of predicted 

oxygen dependent and steroid dependent at baseline





#4.  History of nicotine dependence, currently in remission





#5.  Hypertension





#6.  Hyperlipidemia





#7.  Bladder cancer





#8.  History of deep venous thrombosis, not currently on any anticoagulants





Plan:





The patient was seen and evaluated by Dr. Daniels.  He is cleared for discharge

from the pulmonary standpoint.  Complete a course of Augmentin.  Complete a 

prednisone taper.  Continue home oxygen.  Continue his home pulmonary 

medications.  Follow-up in the office in 1 week's time.  We'll repeat a chest x-

ray then.





I, the cosigning physician, performed a history & physical examination of the 

patient. Lungs sounds with bilateral end expiratory wheeze, few scattered 

rhonchi, diminished Maintaining good O2 saturations in the 90s on 2 L/m per 

nasal cannula.  I discussed the assessment and plan of care with my nurse 

practitioner, Khalida Rodriguez. I attest to the above note as dictated by her.

## 2019-10-08 ENCOUNTER — HOSPITAL ENCOUNTER (EMERGENCY)
Dept: HOSPITAL 47 - EC | Age: 84
Discharge: HOME | End: 2019-10-08
Payer: MEDICARE

## 2019-10-08 VITALS — TEMPERATURE: 98.2 F | HEART RATE: 86 BPM | DIASTOLIC BLOOD PRESSURE: 82 MMHG | SYSTOLIC BLOOD PRESSURE: 150 MMHG

## 2019-10-08 VITALS — RESPIRATION RATE: 18 BRPM

## 2019-10-08 DIAGNOSIS — Z79.51: ICD-10-CM

## 2019-10-08 DIAGNOSIS — F32.9: ICD-10-CM

## 2019-10-08 DIAGNOSIS — F41.9: ICD-10-CM

## 2019-10-08 DIAGNOSIS — E78.5: ICD-10-CM

## 2019-10-08 DIAGNOSIS — Z85.51: ICD-10-CM

## 2019-10-08 DIAGNOSIS — Z87.891: ICD-10-CM

## 2019-10-08 DIAGNOSIS — J44.9: ICD-10-CM

## 2019-10-08 DIAGNOSIS — I10: ICD-10-CM

## 2019-10-08 DIAGNOSIS — N28.9: ICD-10-CM

## 2019-10-08 DIAGNOSIS — I95.9: Primary | ICD-10-CM

## 2019-10-08 DIAGNOSIS — Z79.899: ICD-10-CM

## 2019-10-08 DIAGNOSIS — E86.0: ICD-10-CM

## 2019-10-08 DIAGNOSIS — Z86.718: ICD-10-CM

## 2019-10-08 LAB
ALBUMIN SERPL-MCNC: 3.1 G/DL (ref 3.5–5)
ALP SERPL-CCNC: 63 U/L (ref 38–126)
ALT SERPL-CCNC: 24 U/L (ref 21–72)
ANION GAP SERPL CALC-SCNC: 7 MMOL/L
AST SERPL-CCNC: 20 U/L (ref 17–59)
BASOPHILS # BLD AUTO: 0 K/UL (ref 0–0.2)
BASOPHILS NFR BLD AUTO: 0 %
BUN SERPL-SCNC: 45 MG/DL (ref 9–20)
CALCIUM SPEC-MCNC: 8.2 MG/DL (ref 8.4–10.2)
CHLORIDE SERPL-SCNC: 102 MMOL/L (ref 98–107)
CK SERPL-CCNC: 44 U/L (ref 55–170)
CO2 SERPL-SCNC: 31 MMOL/L (ref 22–30)
EOSINOPHIL # BLD AUTO: 0.1 K/UL (ref 0–0.7)
EOSINOPHIL NFR BLD AUTO: 1 %
ERYTHROCYTE [DISTWIDTH] IN BLOOD BY AUTOMATED COUNT: 3.07 M/UL (ref 4.3–5.9)
ERYTHROCYTE [DISTWIDTH] IN BLOOD: 17.1 % (ref 11.5–15.5)
GLUCOSE SERPL-MCNC: 148 MG/DL (ref 74–99)
HCT VFR BLD AUTO: 26.2 % (ref 39–53)
HGB BLD-MCNC: 8.3 GM/DL (ref 13–17.5)
LYMPHOCYTES # SPEC AUTO: 0.6 K/UL (ref 1–4.8)
LYMPHOCYTES NFR SPEC AUTO: 6 %
MAGNESIUM SPEC-SCNC: 2.4 MG/DL (ref 1.6–2.3)
MCH RBC QN AUTO: 27.2 PG (ref 25–35)
MCHC RBC AUTO-ENTMCNC: 31.9 G/DL (ref 31–37)
MCV RBC AUTO: 85.2 FL (ref 80–100)
MONOCYTES # BLD AUTO: 0.5 K/UL (ref 0–1)
MONOCYTES NFR BLD AUTO: 5 %
NEUTROPHILS # BLD AUTO: 9.6 K/UL (ref 1.3–7.7)
NEUTROPHILS NFR BLD AUTO: 88 %
PH UR: 6 [PH] (ref 5–8)
PLATELET # BLD AUTO: 356 K/UL (ref 150–450)
POTASSIUM SERPL-SCNC: 4.5 MMOL/L (ref 3.5–5.1)
PROT SERPL-MCNC: 6 G/DL (ref 6.3–8.2)
SODIUM SERPL-SCNC: 140 MMOL/L (ref 137–145)
SP GR UR: 1.01 (ref 1–1.03)
UROBILINOGEN UR QL STRIP: <2 MG/DL (ref ?–2)
WBC # BLD AUTO: 11 K/UL (ref 3.8–10.6)

## 2019-10-08 PROCEDURE — 82550 ASSAY OF CK (CPK): CPT

## 2019-10-08 PROCEDURE — 81003 URINALYSIS AUTO W/O SCOPE: CPT

## 2019-10-08 PROCEDURE — 36415 COLL VENOUS BLD VENIPUNCTURE: CPT

## 2019-10-08 PROCEDURE — 96360 HYDRATION IV INFUSION INIT: CPT

## 2019-10-08 PROCEDURE — 99284 EMERGENCY DEPT VISIT MOD MDM: CPT

## 2019-10-08 PROCEDURE — 84484 ASSAY OF TROPONIN QUANT: CPT

## 2019-10-08 PROCEDURE — 96361 HYDRATE IV INFUSION ADD-ON: CPT

## 2019-10-08 PROCEDURE — 93005 ELECTROCARDIOGRAM TRACING: CPT

## 2019-10-08 PROCEDURE — 80053 COMPREHEN METABOLIC PANEL: CPT

## 2019-10-08 PROCEDURE — 83735 ASSAY OF MAGNESIUM: CPT

## 2019-10-08 PROCEDURE — 85025 COMPLETE CBC W/AUTO DIFF WBC: CPT

## 2019-10-08 NOTE — ED
Recheck HPI





- General


Chief Complaint: Recheck/Abnormal Lab/Rx


Stated Complaint: Low blood pressure


Time Seen by Provider: 10/08/19 18:40


Source: patient, family, RN notes reviewed


Mode of arrival: ambulatory


Limitations: physical limitation





- History of Present Illness


Initial Comments: 





This 84-year-old male with a history of admission recently for COPD exacerbation

presents today with complaints of 2 days of hypotension.  He has no symptoms no 

lightheadedness dizziness fevers chills nausea vomiting sweats he states his 

breathing is normal per his wife blood pressure had gotten down into the 87/33 

range.  This started yesterday the last one prior to arrival was 88/43.  He 

states he may not been drinking as much fluids as he should he has no other 

complaints hour.





- Related Data


                                Home Medications











 Medication  Instructions  Recorded  Confirmed


 


Albuterol Sulfate [Proair Hfa] 2 puff INHALATION RT-Q6H PRN 16


 


Simvastatin [Zocor] 40 mg PO DAILY 16


 


Ipratropium-Albuterol Nebulize 3 ml INHALATION RT-QID 16





[Duoneb 0.5 mg-3 mg/3 ml Soln]   


 


Fluticasone/Vilanterol [Breo 1 puff INHALATION RT-DAILY 17





Ellipta 200-25 Mcg INH]   


 


predniSONE 5 mg PO DAILY 17


 


Tamsulosin HCl [Flomax] 0.4 mg PO BID 17


 


Furosemide [Lasix] 20 mg PO DAILY 18


 


ALPRAZolam [Xanax] 0.5 mg PO TID PRN 18


 


Lisinopril [Zestril] 10 mg PO DAILY 18








                                  Previous Rx's











 Medication  Instructions  Recorded


 


Citalopram Hydrobromide [CeleXA] 20 mg PO DAILY #30 tab 18


 


Amoxicillin/Potassium Clav 1 tab PO Q12HR #14 tab 19





[Augmentin 875-125 Tablet]  


 


Ranitidine HCl [Zantac] 150 mg PO BID #30 tablet 19


 


predniSONE 10 mg PO DAILY #30 tab 19











                                    Allergies











Allergy/AdvReac Type Severity Reaction Status Date / Time


 


No Known Allergies Allergy   Verified 10/08/19 18:35














Review of Systems


ROS Statement: 


Those systems with pertinent positive or pertinent negative responses have been 

documented in the HPI.





ROS Other: All systems not noted in ROS Statement are negative.





Past Medical History


Past Medical History: Cancer, Chest Pain / Angina, COPD, Deep Vein Thrombosis 

(DVT), Eye Disorder, Hearing Disorder / Deafness, Hyperlipidemia, Hypertension, 

Osteoarthritis (OA), Pneumonia, Prostate Disorder, Renal Disease


Additional Past Medical History / Comment(s): Severe COPD with an FEV1 of 33% of

 predicted, chronic hypoxic respiratory failure, recurrent hospitalization for 

COPD exacerbation, stenotrophomonas tracheal bronchitis - HAD PFT 18.  

Bladder Cancer w/ prev surgery.  RLE DVT.  BPH.  Chronic Back Pain.  VARICOSE 

VEINS.  O2 2L NC.  ACUTE RENAL FAILURE 3/2018 R/T DEHYDRATION/DEPRESSION, PER 

WIFE.


History of Any Multi-Drug Resistant Organisms: None Reported


Past Surgical History: Bladder Surgery, Hernia Repair, Tonsillectomy


Additional Past Surgical History / Comment(s): Bladder CA Surgery, Vasectomy.  

BRONCHIAL WASHING.   EXC CATARACTS KATH.,


Past Anesthesia/Blood Transfusion Reactions: No Reported Reaction


Past Psychological History: Anxiety, Depression


Smoking Status: Former smoker


Past Alcohol Use History: None Reported


Past Drug Use History: None Reported





- Past Family History


  ** Father


Family Medical History: Cancer


Additional Family Medical History / Comment(s): Father had scoliosis.  Father 

 of  lung cancer





  ** Mother


History Unknown: Yes


Family Medical History: CVA/TIA


Additional Family Medical History / Comment(s): Mother . Unknown history





General Exam





- General Exam Comments


Initial Comments: 





This a well-developed asthenic appearing male who is awake alert oriented 3


Limitations: physical limitation


General appearance: alert


Head exam: Present: atraumatic, normocephalic, normal inspection


Eye exam: Present: normal appearance, PERRL, EOMI.  Absent: scleral icterus, 

conjunctival injection, periorbital swelling


ENT exam: Present: mucous membranes dry


Neck exam: Present: normal inspection.  Absent: tenderness, meningismus, 

lymphadenopathy


Respiratory exam: Present: normal lung sounds bilaterally.  Absent: respiratory 

distress, wheezes, rales, rhonchi, stridor


Cardiovascular Exam: Present: regular rate, normal rhythm, normal heart sounds. 

 Absent: systolic murmur, diastolic murmur, rubs, gallop, clicks


GI/Abdominal exam: Present: soft, normal bowel sounds.  Absent: distended, te

nderness, guarding, rebound, rigid


Extremities exam: Present: normal inspection, full ROM, normal capillary refill.

  Absent: tenderness, pedal edema, joint swelling, calf tenderness


Back exam: Present: normal inspection


Neurological exam: Present: alert, oriented X3, CN II-XII intact


Psychiatric exam: Present: normal affect, normal mood


Skin exam: Present: warm, dry, intact, normal color.  Absent: rash





Course


                                   Vital Signs











  10/08/19 10/08/19





  18:32 19:41


 


Temperature 98.4 F 


 


Pulse Rate 98 91


 


Respiratory 24 18





Rate  


 


Blood Pressure 110/60 131/60


 


O2 Sat by Pulse 97 100





Oximetry  














- Reevaluation(s)


Reevaluation #1: 





10/08/19 20:13


Patient is feeling improved after IV fluids his blood pressure is normalized.





Medical Decision Making





- Medical Decision Making





Patient presentation consistent with dehydration with episodic hypotension 

patient was instructed to increase his oral fluids





- Lab Data


Result diagrams: 


                                 10/08/19 18:58





                                 10/08/19 18:58


                                   Lab Results











  10/08/19 10/08/19 10/08/19 Range/Units





  18:58 18:58 18:58 


 


WBC  11.0 H    (3.8-10.6)  k/uL


 


RBC  3.07 L    (4.30-5.90)  m/uL


 


Hgb  8.3 L    (13.0-17.5)  gm/dL


 


Hct  26.2 L    (39.0-53.0)  %


 


MCV  85.2    (80.0-100.0)  fL


 


MCH  27.2    (25.0-35.0)  pg


 


MCHC  31.9    (31.0-37.0)  g/dL


 


RDW  17.1 H    (11.5-15.5)  %


 


Plt Count  356    (150-450)  k/uL


 


Neutrophils %  88    %


 


Lymphocytes %  6    %


 


Monocytes %  5    %


 


Eosinophils %  1    %


 


Basophils %  0    %


 


Neutrophils #  9.6 H    (1.3-7.7)  k/uL


 


Lymphocytes #  0.6 L    (1.0-4.8)  k/uL


 


Monocytes #  0.5    (0-1.0)  k/uL


 


Eosinophils #  0.1    (0-0.7)  k/uL


 


Basophils #  0.0    (0-0.2)  k/uL


 


Hypochromasia  Slight    


 


Anisocytosis  Slight    


 


Sodium   140   (137-145)  mmol/L


 


Potassium   4.5   (3.5-5.1)  mmol/L


 


Chloride   102   ()  mmol/L


 


Carbon Dioxide   31 H   (22-30)  mmol/L


 


Anion Gap   7   mmol/L


 


BUN   45 H   (9-20)  mg/dL


 


Creatinine   1.31 H   (0.66-1.25)  mg/dL


 


Est GFR (CKD-EPI)AfAm   58   (>60 ml/min/1.73 sqM)  


 


Est GFR (CKD-EPI)NonAf   50   (>60 ml/min/1.73 sqM)  


 


Glucose   148 H   (74-99)  mg/dL


 


Calcium   8.2 L   (8.4-10.2)  mg/dL


 


Magnesium   2.4 H   (1.6-2.3)  mg/dL


 


Total Bilirubin   0.3   (0.2-1.3)  mg/dL


 


AST   20   (17-59)  U/L


 


ALT   24   (21-72)  U/L


 


Alkaline Phosphatase   63   ()  U/L


 


Creatine Kinase   44 L   ()  U/L


 


Troponin I    <0.012  (0.000-0.034)  ng/mL


 


Total Protein   6.0 L   (6.3-8.2)  g/dL


 


Albumin   3.1 L   (3.5-5.0)  g/dL


 


Urine Color     


 


Urine Appearance     (Clear)  


 


Urine pH     (5.0-8.0)  


 


Ur Specific Gravity     (1.001-1.035)  


 


Urine Protein     (Negative)  


 


Urine Glucose (UA)     (Negative)  


 


Urine Ketones     (Negative)  


 


Urine Blood     (Negative)  


 


Urine Nitrite     (Negative)  


 


Urine Bilirubin     (Negative)  


 


Urine Urobilinogen     (<2.0)  mg/dL


 


Ur Leukocyte Esterase     (Negative)  














  10/08/19 Range/Units





  19:30 


 


WBC   (3.8-10.6)  k/uL


 


RBC   (4.30-5.90)  m/uL


 


Hgb   (13.0-17.5)  gm/dL


 


Hct   (39.0-53.0)  %


 


MCV   (80.0-100.0)  fL


 


MCH   (25.0-35.0)  pg


 


MCHC   (31.0-37.0)  g/dL


 


RDW   (11.5-15.5)  %


 


Plt Count   (150-450)  k/uL


 


Neutrophils %   %


 


Lymphocytes %   %


 


Monocytes %   %


 


Eosinophils %   %


 


Basophils %   %


 


Neutrophils #   (1.3-7.7)  k/uL


 


Lymphocytes #   (1.0-4.8)  k/uL


 


Monocytes #   (0-1.0)  k/uL


 


Eosinophils #   (0-0.7)  k/uL


 


Basophils #   (0-0.2)  k/uL


 


Hypochromasia   


 


Anisocytosis   


 


Sodium   (137-145)  mmol/L


 


Potassium   (3.5-5.1)  mmol/L


 


Chloride   ()  mmol/L


 


Carbon Dioxide   (22-30)  mmol/L


 


Anion Gap   mmol/L


 


BUN   (9-20)  mg/dL


 


Creatinine   (0.66-1.25)  mg/dL


 


Est GFR (CKD-EPI)AfAm   (>60 ml/min/1.73 sqM)  


 


Est GFR (CKD-EPI)NonAf   (>60 ml/min/1.73 sqM)  


 


Glucose   (74-99)  mg/dL


 


Calcium   (8.4-10.2)  mg/dL


 


Magnesium   (1.6-2.3)  mg/dL


 


Total Bilirubin   (0.2-1.3)  mg/dL


 


AST   (17-59)  U/L


 


ALT   (21-72)  U/L


 


Alkaline Phosphatase   ()  U/L


 


Creatine Kinase   ()  U/L


 


Troponin I   (0.000-0.034)  ng/mL


 


Total Protein   (6.3-8.2)  g/dL


 


Albumin   (3.5-5.0)  g/dL


 


Urine Color  Yellow  


 


Urine Appearance  Clear  (Clear)  


 


Urine pH  6.0  (5.0-8.0)  


 


Ur Specific Gravity  1.013  (1.001-1.035)  


 


Urine Protein  Negative  (Negative)  


 


Urine Glucose (UA)  Negative  (Negative)  


 


Urine Ketones  Negative  (Negative)  


 


Urine Blood  Negative  (Negative)  


 


Urine Nitrite  Negative  (Negative)  


 


Urine Bilirubin  Negative  (Negative)  


 


Urine Urobilinogen  <2.0  (<2.0)  mg/dL


 


Ur Leukocyte Esterase  Negative  (Negative)  














Disposition


Clinical Impression: 


 Hypotensive episode, Dehydration, Renal insufficiency syndrome





Disposition: HOME SELF-CARE


Condition: Good


Instructions (If sedation given, give patient instructions):  Dehydration (ED), 

Hypotension (ED)


Is patient prescribed a controlled substance at d/c from ED?: No


Referrals: 


Nik Whatley MD [Primary Care Provider] - 1-2 days

## 2021-07-15 ENCOUNTER — HOSPITAL ENCOUNTER (OUTPATIENT)
Dept: HOSPITAL 47 - LABWHC1 | Age: 86
Discharge: HOME | End: 2021-07-15
Attending: NURSE PRACTITIONER
Payer: MEDICARE

## 2021-07-15 DIAGNOSIS — D50.9: Primary | ICD-10-CM

## 2021-07-15 LAB
BASOPHILS # BLD AUTO: 0.04 X 10*3/UL (ref 0–0.1)
BASOPHILS NFR BLD AUTO: 0.5 %
EOSINOPHIL # BLD AUTO: 0.13 X 10*3/UL (ref 0.04–0.35)
EOSINOPHIL NFR BLD AUTO: 1.5 %
ERYTHROCYTE [DISTWIDTH] IN BLOOD BY AUTOMATED COUNT: 3.25 X 10*6/UL (ref 4.4–5.6)
ERYTHROCYTE [DISTWIDTH] IN BLOOD: 17.3 % (ref 11.5–14.5)
HCT VFR BLD AUTO: 26.2 % (ref 39.6–50)
HGB BLD-MCNC: 7.4 G/DL (ref 13–17)
LYMPHOCYTES # SPEC AUTO: 1 X 10*3/UL (ref 0.9–5)
LYMPHOCYTES NFR SPEC AUTO: 11.5 %
MCH RBC QN AUTO: 22.8 PG (ref 27–32)
MCHC RBC AUTO-ENTMCNC: 28.2 G/DL (ref 32–37)
MCV RBC AUTO: 80.6 FL (ref 80–97)
MONOCYTES # BLD AUTO: 0.61 X 10*3/UL (ref 0.2–1)
MONOCYTES NFR BLD AUTO: 7 %
NEUTROPHILS # BLD AUTO: 6.9 X 10*3/UL (ref 1.8–7.7)
NEUTROPHILS NFR BLD AUTO: 79.2 %
PLATELET # BLD AUTO: 509 X 10*3/UL (ref 140–440)
WBC # BLD AUTO: 8.71 X 10*3/UL (ref 4.5–10)

## 2021-07-15 PROCEDURE — 82728 ASSAY OF FERRITIN: CPT

## 2021-07-15 PROCEDURE — 85025 COMPLETE CBC W/AUTO DIFF WBC: CPT

## 2021-07-15 PROCEDURE — 83550 IRON BINDING TEST: CPT

## 2021-07-15 PROCEDURE — 82272 OCCULT BLD FECES 1-3 TESTS: CPT

## 2021-07-15 PROCEDURE — 83540 ASSAY OF IRON: CPT

## 2021-07-15 PROCEDURE — 36415 COLL VENOUS BLD VENIPUNCTURE: CPT

## 2021-07-16 LAB
FERRITIN SERPL-MCNC: 13.2 NG/ML (ref 22–322)
IRON SERPL-MCNC: 20 UG/DL (ref 65–175)
TIBC SERPL-MCNC: 423 UG/DL (ref 228–460)

## 2022-10-14 ENCOUNTER — HOSPITAL ENCOUNTER (INPATIENT)
Dept: HOSPITAL 47 - EC | Age: 87
LOS: 5 days | Discharge: HOME | DRG: 191 | End: 2022-10-19
Attending: FAMILY MEDICINE | Admitting: FAMILY MEDICINE
Payer: MEDICARE

## 2022-10-14 VITALS — BODY MASS INDEX: 17.6 KG/M2

## 2022-10-14 DIAGNOSIS — M54.9: ICD-10-CM

## 2022-10-14 DIAGNOSIS — R53.1: ICD-10-CM

## 2022-10-14 DIAGNOSIS — D63.1: ICD-10-CM

## 2022-10-14 DIAGNOSIS — N18.30: ICD-10-CM

## 2022-10-14 DIAGNOSIS — G89.29: ICD-10-CM

## 2022-10-14 DIAGNOSIS — Z28.310: ICD-10-CM

## 2022-10-14 DIAGNOSIS — N39.0: ICD-10-CM

## 2022-10-14 DIAGNOSIS — M19.90: ICD-10-CM

## 2022-10-14 DIAGNOSIS — H91.90: ICD-10-CM

## 2022-10-14 DIAGNOSIS — Z87.891: ICD-10-CM

## 2022-10-14 DIAGNOSIS — Z79.899: ICD-10-CM

## 2022-10-14 DIAGNOSIS — Z85.51: ICD-10-CM

## 2022-10-14 DIAGNOSIS — I12.9: ICD-10-CM

## 2022-10-14 DIAGNOSIS — N13.8: ICD-10-CM

## 2022-10-14 DIAGNOSIS — J96.11: ICD-10-CM

## 2022-10-14 DIAGNOSIS — Z99.81: ICD-10-CM

## 2022-10-14 DIAGNOSIS — E78.5: ICD-10-CM

## 2022-10-14 DIAGNOSIS — Z86.718: ICD-10-CM

## 2022-10-14 DIAGNOSIS — Z92.21: ICD-10-CM

## 2022-10-14 DIAGNOSIS — N17.9: ICD-10-CM

## 2022-10-14 DIAGNOSIS — N40.1: ICD-10-CM

## 2022-10-14 DIAGNOSIS — J44.1: Primary | ICD-10-CM

## 2022-10-14 LAB
ALBUMIN SERPL-MCNC: 4.1 G/DL (ref 3.5–5)
ALP SERPL-CCNC: 86 U/L (ref 38–126)
ALT SERPL-CCNC: 16 U/L (ref 4–49)
ANION GAP SERPL CALC-SCNC: 10 MMOL/L
APTT BLD: 25.4 SEC (ref 22–30)
AST SERPL-CCNC: 22 U/L (ref 17–59)
BASOPHILS # BLD AUTO: 0 K/UL (ref 0–0.2)
BASOPHILS NFR BLD AUTO: 0 %
BUN SERPL-SCNC: 31 MG/DL (ref 9–20)
CALCIUM SPEC-MCNC: 8.8 MG/DL (ref 8.4–10.2)
CHLORIDE SERPL-SCNC: 104 MMOL/L (ref 98–107)
CO2 SERPL-SCNC: 26 MMOL/L (ref 22–30)
EOSINOPHIL # BLD AUTO: 0.1 K/UL (ref 0–0.7)
EOSINOPHIL NFR BLD AUTO: 1 %
ERYTHROCYTE [DISTWIDTH] IN BLOOD BY AUTOMATED COUNT: 3.52 M/UL (ref 4.3–5.9)
ERYTHROCYTE [DISTWIDTH] IN BLOOD: 15 % (ref 11.5–15.5)
GLUCOSE SERPL-MCNC: 82 MG/DL (ref 74–99)
HCT VFR BLD AUTO: 32.8 % (ref 39–53)
HGB BLD-MCNC: 10.4 GM/DL (ref 13–17.5)
INR PPP: 0.9 (ref ?–1.2)
LYMPHOCYTES # SPEC AUTO: 1.3 K/UL (ref 1–4.8)
LYMPHOCYTES NFR SPEC AUTO: 16 %
MCH RBC QN AUTO: 29.7 PG (ref 25–35)
MCHC RBC AUTO-ENTMCNC: 31.8 G/DL (ref 31–37)
MCV RBC AUTO: 93.2 FL (ref 80–100)
MONOCYTES # BLD AUTO: 0.6 K/UL (ref 0–1)
MONOCYTES NFR BLD AUTO: 7 %
NEUTROPHILS # BLD AUTO: 5.9 K/UL (ref 1.3–7.7)
NEUTROPHILS NFR BLD AUTO: 73 %
PLATELET # BLD AUTO: 333 K/UL (ref 150–450)
POTASSIUM SERPL-SCNC: 5 MMOL/L (ref 3.5–5.1)
PROT SERPL-MCNC: 7.2 G/DL (ref 6.3–8.2)
PT BLD: 9.8 SEC (ref 9–12)
SODIUM SERPL-SCNC: 140 MMOL/L (ref 137–145)
WBC # BLD AUTO: 8.1 K/UL (ref 3.8–10.6)

## 2022-10-14 PROCEDURE — 71046 X-RAY EXAM CHEST 2 VIEWS: CPT

## 2022-10-14 PROCEDURE — 94640 AIRWAY INHALATION TREATMENT: CPT

## 2022-10-14 PROCEDURE — 85730 THROMBOPLASTIN TIME PARTIAL: CPT

## 2022-10-14 PROCEDURE — 86140 C-REACTIVE PROTEIN: CPT

## 2022-10-14 PROCEDURE — 84484 ASSAY OF TROPONIN QUANT: CPT

## 2022-10-14 PROCEDURE — 36415 COLL VENOUS BLD VENIPUNCTURE: CPT

## 2022-10-14 PROCEDURE — 84145 PROCALCITONIN (PCT): CPT

## 2022-10-14 PROCEDURE — 85379 FIBRIN DEGRADATION QUANT: CPT

## 2022-10-14 PROCEDURE — 83735 ASSAY OF MAGNESIUM: CPT

## 2022-10-14 PROCEDURE — 87086 URINE CULTURE/COLONY COUNT: CPT

## 2022-10-14 PROCEDURE — 85025 COMPLETE CBC W/AUTO DIFF WBC: CPT

## 2022-10-14 PROCEDURE — 85610 PROTHROMBIN TIME: CPT

## 2022-10-14 PROCEDURE — 85027 COMPLETE CBC AUTOMATED: CPT

## 2022-10-14 PROCEDURE — 94760 N-INVAS EAR/PLS OXIMETRY 1: CPT

## 2022-10-14 PROCEDURE — 80061 LIPID PANEL: CPT

## 2022-10-14 PROCEDURE — 83605 ASSAY OF LACTIC ACID: CPT

## 2022-10-14 PROCEDURE — 99285 EMERGENCY DEPT VISIT HI MDM: CPT

## 2022-10-14 PROCEDURE — 93005 ELECTROCARDIOGRAM TRACING: CPT

## 2022-10-14 PROCEDURE — 80053 COMPREHEN METABOLIC PANEL: CPT

## 2022-10-14 PROCEDURE — 83880 ASSAY OF NATRIURETIC PEPTIDE: CPT

## 2022-10-14 PROCEDURE — 80048 BASIC METABOLIC PNL TOTAL CA: CPT

## 2022-10-14 PROCEDURE — 81001 URINALYSIS AUTO W/SCOPE: CPT

## 2022-10-14 RX ADMIN — HEPARIN SODIUM SCH UNIT: 5000 INJECTION INTRAVENOUS; SUBCUTANEOUS at 22:21

## 2022-10-14 RX ADMIN — LATANOPROST SCH DROPS: 50 SOLUTION OPHTHALMIC at 22:21

## 2022-10-14 NOTE — ED
General Adult HPI





- General


Chief complaint: Shortness of Breath


Stated complaint: SOB


Time Seen by Provider: 10/14/22 18:07


Source: patient, RN notes reviewed


Mode of arrival: ambulatory


Limitations: no limitations





- History of Present Illness


Initial comments: 





Patient is a pleasant 87-year-old male presenting to the emergency department 

with concerns with episodes of chest discomfort and dyspnea.  Symptoms occurred 

more this morning.  Patient had 5 or so episodes lasting a couple of minutes 

each.  None in the past couple hours.  Patient had discomfort in his chest with 

associated dyspnea and some nausea.  No history of similar symptoms previously. 

Currently patient is symptom-free.





- Related Data


                                Home Medications











 Medication  Instructions  Recorded  Confirmed


 


Albuterol Sulfate [Proair Hfa] 2 puff INHALATION RT-Q6H PRN 01/03/16 10/08/19


 


Simvastatin [Zocor] 40 mg PO DAILY 01/03/16 10/08/19


 


Ipratropium-Albuterol Nebulize 3 ml INHALATION RT-QID 01/04/16 10/08/19





[Duoneb 0.5 mg-3 mg/3 ml Soln]   


 


Fluticasone/Vilanterol [Breo 1 puff INHALATION RT-DAILY 03/31/17 10/08/19





Ellipta 200-25 Mcg Inhaler]   


 


predniSONE 5 mg PO DAILY 03/31/17 10/08/19


 


Tamsulosin HCl [Flomax] 0.4 mg PO BID 11/30/17 10/08/19


 


Furosemide [Lasix] 20 mg PO DAILY 02/09/18 10/08/19


 


ALPRAZolam [Xanax] 0.5 mg PO TID PRN 09/21/18 10/08/19


 


lisinopriL [Zestril] 10 mg PO DAILY 09/28/18 10/08/19








                                  Previous Rx's











 Medication  Instructions  Recorded


 


Citalopram Hydrobromide [CeleXA] 20 mg PO DAILY #30 tab 18











                                    Allergies











Allergy/AdvReac Type Severity Reaction Status Date / Time


 


No Known Allergies Allergy   Verified 10/08/19 20:45














Review of Systems


ROS Statement: 


Those systems with pertinent positive or pertinent negative responses have been 

documented in the HPI.





ROS Other: All systems not noted in ROS Statement are negative.


Constitutional: Denies: fever


Eyes: Denies: eye pain


ENT: Denies: ear pain


Respiratory: Reports: as per HPI


Cardiovascular: Reports: as per HPI, chest pain


Gastrointestinal: Denies: abdominal pain


Genitourinary: Denies: dysuria


Musculoskeletal: Denies: back pain


Skin: Denies: rash


Neurological: Denies: weakness





Past Medical History


Past Medical History: Cancer, Chest Pain / Angina, COPD, Deep Vein Thrombosis 

(DVT), Eye Disorder, Hearing Disorder / Deafness, Hyperlipidemia, Hypertension, 

Osteoarthritis (OA), Pneumonia, Prostate Disorder, Renal Disease


Additional Past Medical History / Comment(s): Severe COPD with an FEV1 of 33% of

 predicted, chronic hypoxic respiratory failure, recurrent hospitalization for 

COPD exacerbation, stenotrophomonas tracheal bronchitis - HAD PFT 18.  

Bladder Cancer w/ prev surgery.  RLE DVT.  BPH.  Chronic Back Pain.  VARICOSE 

VEINS.  O2 2L NC.  ACUTE RENAL FAILURE 3/2018 R/T DEHYDRATION/DEPRESSION, PER 

WIFE.


History of Any Multi-Drug Resistant Organisms: None Reported


Past Surgical History: Bladder Surgery, Hernia Repair, Tonsillectomy


Additional Past Surgical History / Comment(s): Bladder CA Surgery, Vasectomy.  

BRONCHIAL WASHING.   EXC CATARACTS KATH.,


Past Anesthesia/Blood Transfusion Reactions: No Reported Reaction


Past Psychological History: Anxiety, Depression


Smoking Status: Never smoker


Past Alcohol Use History: None Reported


Past Drug Use History: None Reported





- Past Family History


  ** Father


Family Medical History: Cancer


Additional Family Medical History / Comment(s): Father had scoliosis.  Father 

 of  lung cancer





  ** Mother


History Unknown: Yes


Family Medical History: CVA/TIA


Additional Family Medical History / Comment(s): Mother . Unknown history





General Exam


Limitations: no limitations


General appearance: alert, in no apparent distress


Head exam: Present: normocephalic


Eye exam: Present: normal appearance


Neck exam: Present: normal inspection


Respiratory exam: Present: normal lung sounds bilaterally


Cardiovascular Exam: Present: regular rate, normal rhythm


  ** Expanded


Peripheral pulses: 2+: Radial (R), Radial (L), Posterior Tibialis (R), Posterior

 Tibialis (L)


GI/Abdominal exam: Present: soft.  Absent: tenderness


Extremities exam: Present: normal inspection.  Absent: pedal edema, calf 

tenderness


Neurological exam: Present: alert


Psychiatric exam: Present: normal affect, normal mood


Skin exam: Present: normal color





Course





                                   Vital Signs











  10/14/22





  15:00


 


Temperature 97.6 F


 


Pulse Rate 87


 


Respiratory 18





Rate 


 


Blood Pressure 163/81


 


O2 Sat by Pulse 96





Oximetry 














Medical Decision Making





- Medical Decision Making





Patient updated on results and plan.  Case was discussed with Dr. Montgomery, who 

will admit covering for Dr. Whatley.





- Lab Data


Result diagrams: 


                                 10/14/22 15:26





                                 10/14/22 15:26





                                   Lab Results











  10/14/22 10/14/22 10/14/22 Range/Units





  15:26 15:26 15:26 


 


WBC  8.1    (3.8-10.6)  k/uL


 


RBC  3.52 L    (4.30-5.90)  m/uL


 


Hgb  10.4 L    (13.0-17.5)  gm/dL


 


Hct  32.8 L    (39.0-53.0)  %


 


MCV  93.2    (80.0-100.0)  fL


 


MCH  29.7    (25.0-35.0)  pg


 


MCHC  31.8    (31.0-37.0)  g/dL


 


RDW  15.0    (11.5-15.5)  %


 


Plt Count  333    (150-450)  k/uL


 


MPV  7.5    


 


Neutrophils %  73    %


 


Lymphocytes %  16    %


 


Monocytes %  7    %


 


Eosinophils %  1    %


 


Basophils %  0    %


 


Neutrophils #  5.9    (1.3-7.7)  k/uL


 


Lymphocytes #  1.3    (1.0-4.8)  k/uL


 


Monocytes #  0.6    (0-1.0)  k/uL


 


Eosinophils #  0.1    (0-0.7)  k/uL


 


Basophils #  0.0    (0-0.2)  k/uL


 


Hypochromasia  Moderate    


 


PT   9.8   (9.0-12.0)  sec


 


INR   0.9   (<1.2)  


 


APTT   25.4   (22.0-30.0)  sec


 


Sodium    140  (137-145)  mmol/L


 


Potassium    5.0  (3.5-5.1)  mmol/L


 


Chloride    104  ()  mmol/L


 


Carbon Dioxide    26  (22-30)  mmol/L


 


Anion Gap    10  mmol/L


 


BUN    31 H  (9-20)  mg/dL


 


Creatinine    2.03 H  (0.66-1.25)  mg/dL


 


Est GFR (CKD-EPI)AfAm    33  (>60 ml/min/1.73 sqM)  


 


Est GFR (CKD-EPI)NonAf    29  (>60 ml/min/1.73 sqM)  


 


Glucose    82  (74-99)  mg/dL


 


Plasma Lactic Acid Samuel     (0.7-2.0)  mmol/L


 


Calcium    8.8  (8.4-10.2)  mg/dL


 


Total Bilirubin    0.3  (0.2-1.3)  mg/dL


 


AST    22  (17-59)  U/L


 


ALT    16  (4-49)  U/L


 


Alkaline Phosphatase    86  ()  U/L


 


Troponin I     (0.000-0.034)  ng/mL


 


NT-Pro-B Natriuret Pep     pg/mL


 


Total Protein    7.2  (6.3-8.2)  g/dL


 


Albumin    4.1  (3.5-5.0)  g/dL














  10/14/22 10/14/22 10/14/22 Range/Units





  15:26 15:26 15:26 


 


WBC     (3.8-10.6)  k/uL


 


RBC     (4.30-5.90)  m/uL


 


Hgb     (13.0-17.5)  gm/dL


 


Hct     (39.0-53.0)  %


 


MCV     (80.0-100.0)  fL


 


MCH     (25.0-35.0)  pg


 


MCHC     (31.0-37.0)  g/dL


 


RDW     (11.5-15.5)  %


 


Plt Count     (150-450)  k/uL


 


MPV     


 


Neutrophils %     %


 


Lymphocytes %     %


 


Monocytes %     %


 


Eosinophils %     %


 


Basophils %     %


 


Neutrophils #     (1.3-7.7)  k/uL


 


Lymphocytes #     (1.0-4.8)  k/uL


 


Monocytes #     (0-1.0)  k/uL


 


Eosinophils #     (0-0.7)  k/uL


 


Basophils #     (0-0.2)  k/uL


 


Hypochromasia     


 


PT     (9.0-12.0)  sec


 


INR     (<1.2)  


 


APTT     (22.0-30.0)  sec


 


Sodium     (137-145)  mmol/L


 


Potassium     (3.5-5.1)  mmol/L


 


Chloride     ()  mmol/L


 


Carbon Dioxide     (22-30)  mmol/L


 


Anion Gap     mmol/L


 


BUN     (9-20)  mg/dL


 


Creatinine     (0.66-1.25)  mg/dL


 


Est GFR (CKD-EPI)AfAm     (>60 ml/min/1.73 sqM)  


 


Est GFR (CKD-EPI)NonAf     (>60 ml/min/1.73 sqM)  


 


Glucose     (74-99)  mg/dL


 


Plasma Lactic Acid Samuel  1.3    (0.7-2.0)  mmol/L


 


Calcium     (8.4-10.2)  mg/dL


 


Total Bilirubin     (0.2-1.3)  mg/dL


 


AST     (17-59)  U/L


 


ALT     (4-49)  U/L


 


Alkaline Phosphatase     ()  U/L


 


Troponin I   0.015   (0.000-0.034)  ng/mL


 


NT-Pro-B Natriuret Pep    1110  pg/mL


 


Total Protein     (6.3-8.2)  g/dL


 


Albumin     (3.5-5.0)  g/dL














- Radiology Data


Radiology results: image reviewed (Chest x-ray shows no acute process)





Disposition


Clinical Impression: 


 Chest pain





Disposition: ADMITTED AS IP TO THIS HOSP


Is patient prescribed a controlled substance at d/c from ED?: No


Referrals: 


Nik Whatley MD [Primary Care Provider] - 1-2 days


Time of Disposition: 19:03

## 2022-10-14 NOTE — XR
EXAMINATION TYPE: XR chest 2V

 

DATE OF EXAM: 10/14/2022

 

COMPARISON: 9/25/2019

 

HISTORY: 87 year-old male shortness of breath, difficulty breathing

 

TECHNIQUE:  PA and lateral views

 

FINDINGS:  

Heart normal size. Aorta and pulmonary vasculature within normal limits. Inflation. No consolidation 
or pleural effusion. Nodular density at the right base may reflect nipple shadow.

 

 

IMPRESSION:  

COPD without acute process. Nodular density at the right base may reflect nipple shadow. However, giv
en the patient's increased risk for the development of lung cancer, nonemergent CT chest is recommend
ed to exclude a suspicious underlying pulmonary nodule.

## 2022-10-15 LAB
ANION GAP SERPL CALC-SCNC: 9.3 MMOL/L (ref 10–18)
BASOPHILS # BLD AUTO: 0.04 X 10*3/UL (ref 0–0.1)
BASOPHILS NFR BLD AUTO: 0.6 %
BUN SERPL-SCNC: 28.4 MG/DL (ref 9–27)
BUN/CREAT SERPL: 14.72 RATIO (ref 12–20)
CALCIUM SPEC-MCNC: 8.2 MG/DL (ref 8.7–10.3)
CHLORIDE SERPL-SCNC: 107 MMOL/L (ref 96–109)
CHOLEST SERPL-MCNC: 177 MG/DL (ref 0–200)
CO2 SERPL-SCNC: 23.5 MMOL/L (ref 20–27.5)
EOSINOPHIL # BLD AUTO: 0.49 X 10*3/UL (ref 0.04–0.35)
EOSINOPHIL NFR BLD AUTO: 7.8 %
ERYTHROCYTE [DISTWIDTH] IN BLOOD BY AUTOMATED COUNT: 3.15 X 10*6/UL (ref 4.4–5.6)
ERYTHROCYTE [DISTWIDTH] IN BLOOD: 15.5 % (ref 11.5–14.5)
GLUCOSE SERPL-MCNC: 89 MG/DL (ref 70–110)
HCT VFR BLD AUTO: 29.2 % (ref 39.6–50)
HDLC SERPL-MCNC: 53.8 MG/DL (ref 40–60)
HGB BLD-MCNC: 9.2 G/DL (ref 13–17)
IMM GRANULOCYTES BLD QL AUTO: 0.6 %
LDLC SERPL CALC-MCNC: 109.1 MG/DL (ref 0–131)
LYMPHOCYTES # SPEC AUTO: 1.29 X 10*3/UL (ref 0.9–5)
LYMPHOCYTES NFR SPEC AUTO: 20.4 %
MCH RBC QN AUTO: 29.2 PG (ref 27–32)
MCHC RBC AUTO-ENTMCNC: 31.5 G/DL (ref 32–37)
MCV RBC AUTO: 92.7 FL (ref 80–97)
MONOCYTES # BLD AUTO: 0.9 X 10*3/UL (ref 0.2–1)
MONOCYTES NFR BLD AUTO: 14.3 %
NEUTROPHILS # BLD AUTO: 3.55 X 10*3/UL (ref 1.8–7.7)
NEUTROPHILS NFR BLD AUTO: 56.3 %
NRBC BLD AUTO-RTO: 0 /100 WBCS (ref 0–0)
PH UR: 6.5 [PH] (ref 5–8)
PLATELET # BLD AUTO: 327 X 10*3/UL (ref 140–440)
POTASSIUM SERPL-SCNC: 4.4 MMOL/L (ref 3.5–5.5)
RBC UR QL: 122 /HPF (ref 0–5)
SODIUM SERPL-SCNC: 140 MMOL/L (ref 135–145)
SP GR UR: 1.01 (ref 1–1.03)
SQUAMOUS UR QL AUTO: <1 /HPF (ref 0–4)
TRIGL SERPL-MCNC: 70.6 MG/DL (ref 0–149)
UROBILINOGEN UR QL STRIP: <2 MG/DL (ref ?–2)
VLDLC SERPL CALC-MCNC: 14.12 MG/DL (ref 5–40)
WBC # BLD AUTO: 6.31 X 10*3/UL (ref 4.5–10)
WBC # UR AUTO: >182 /HPF (ref 0–5)

## 2022-10-15 RX ADMIN — IPRATROPIUM BROMIDE AND ALBUTEROL SULFATE PRN ML: .5; 3 SOLUTION RESPIRATORY (INHALATION) at 19:36

## 2022-10-15 RX ADMIN — IPRATROPIUM BROMIDE AND ALBUTEROL SULFATE PRN ML: .5; 3 SOLUTION RESPIRATORY (INHALATION) at 15:36

## 2022-10-15 RX ADMIN — HEPARIN SODIUM SCH UNIT: 5000 INJECTION INTRAVENOUS; SUBCUTANEOUS at 23:41

## 2022-10-15 RX ADMIN — HEPARIN SODIUM SCH UNIT: 5000 INJECTION INTRAVENOUS; SUBCUTANEOUS at 07:39

## 2022-10-15 RX ADMIN — CITALOPRAM HYDROBROMIDE SCH MG: 20 TABLET ORAL at 07:39

## 2022-10-15 RX ADMIN — PANTOPRAZOLE SODIUM SCH MG: 40 TABLET, DELAYED RELEASE ORAL at 10:22

## 2022-10-15 RX ADMIN — PANTOPRAZOLE SODIUM SCH: 40 TABLET, DELAYED RELEASE ORAL at 19:21

## 2022-10-15 RX ADMIN — HEPARIN SODIUM SCH UNIT: 5000 INJECTION INTRAVENOUS; SUBCUTANEOUS at 16:10

## 2022-10-15 RX ADMIN — Medication SCH MG: at 07:39

## 2022-10-15 RX ADMIN — LATANOPROST SCH DROPS: 50 SOLUTION OPHTHALMIC at 21:16

## 2022-10-15 RX ADMIN — BUDESONIDE AND FORMOTEROL FUMARATE DIHYDRATE SCH PUFF: 160; 4.5 AEROSOL RESPIRATORY (INHALATION) at 19:37

## 2022-10-15 RX ADMIN — ATORVASTATIN CALCIUM SCH MG: 20 TABLET, FILM COATED ORAL at 07:39

## 2022-10-15 RX ADMIN — IPRATROPIUM BROMIDE AND ALBUTEROL SULFATE PRN ML: .5; 3 SOLUTION RESPIRATORY (INHALATION) at 11:36

## 2022-10-15 NOTE — P.HPIM
History of Present Illness





This is a pleasant 87 years old male with multiple medical problems including 

Chest Pain / Angina, COPD, Deep Vein Thrombosis , not on anticoagulation, 

hearing difficulty, hypertension, hyperlipidemia, osteoarthritis, chronic hypoxi

c respiratory failure, bladder cancer surgery and vasectomy, anxiety and 

depression


Patient presents because of chest tightness that started 2 days one day ago with

more cough and phlegm than usual and dyspnea.  Patient denies any chest pain and

even when asking specifically he confirms no chest pain.  No dizziness.  No 

diarrhea or vomiting, no change in frequency of urination, no headache or weak

ness or numbness


Patient is afebrile and vitals stable


Hemoglobin 9.2, creatinine 1.9, compared to 2.0 yesterday


Urine analysis showing evidence of infection


EKG: Normal sinus rhythm with no significant ST-T changes


Chest x-ray: No acute process.  COPD changes with no acute process.  Nodular 

density on the right base may reflect the pulse should've however during patient

increased risk of development of lung cancer none emergency to the chest is 

recommended to exclude suspicious underlying pulmonary nodule











Review of Systems





Review of systems


CONSTITUTIONAL: No fever, no malaise, no fatigue. 


HEENT: No recent visual problems or hearing problems. Denied any sore throat. 


CARDIOVASCULAR: No  orthopnea, PND, no palpitations, no syncope. 


PULMONARY: No chest wall tenderness, no hemoptysis. 


GASTROINTESTINAL: No diarrhea, no nausea, no vomiting, no abdominal pain. 

Normoactive bowel sounds. 


NEUROLOGICAL: No headaches, no weakness, no numbness. 


HEMATOLOGICAL: Denies any bleeding or petechiae. 


GENITOURINARY: Denies any burning micturition, frequency, or urgency. 


MUSCULOSKELETAL/RHEUMATOLOGICAL: Denies any joint pain, swelling, or any muscle 

pain. 


ENDOCRINE: Denies any polyuria or polydipsia.





Past Medical History


Past Medical History: Cancer, Chest Pain / Angina, COPD, Deep Vein Thrombosis 

(DVT), Eye Disorder, Hearing Disorder / Deafness, Hyperlipidemia, Hypertension, 

Osteoarthritis (OA), Pneumonia, Prostate Disorder, Renal Disease


Additional Past Medical History / Comment(s): Severe COPD with an FEV1 of 33% of

predicted, chronic hypoxic respiratory failure, recurrent hospitalization for 

COPD exacerbation, stenotrophomonas tracheal bronchitis - HAD PFT 18.  

Bladder Cancer w/ prev surgery.  RLE DVT.  BPH.  Chronic Back Pain.  VARICOSE 

VEINS.  O2 2L NC.  ACUTE RENAL FAILURE 3/2018 R/T DEHYDRATION/DEPRESSION, PER 

WIFE.


History of Any Multi-Drug Resistant Organisms: None Reported


Past Surgical History: Bladder Surgery, Hernia Repair, Tonsillectomy


Additional Past Surgical History / Comment(s): Bladder CA Surgery, Vasectomy.  

BRONCHIAL WASHING.   EXC CATARACTS KATH.,


Past Anesthesia/Blood Transfusion Reactions: No Reported Reaction


Past Psychological History: Anxiety, Depression


Additional Psychological History / Comment(s): Pt resides with his spouse in a 

single level home that has 4 porchs steps..pt drives.  He has a nebulizer, 02 

uses prn.  He has [facility] home care.


Smoking Status: Never smoker


Past Alcohol Use History: None Reported


Additional Past Alcohol Use History / Comment(s): Pt started smoking in 5 and

quit in .  At one time smoking 2-3 ppd.


Past Drug Use History: None Reported





- Past Family History


  ** Father


Family Medical History: Cancer


Additional Family Medical History / Comment(s): Father had scoliosis.  Father 

 of  lung cancer





  ** Mother


History Unknown: Yes


Family Medical History: CVA/TIA


Additional Family Medical History / Comment(s): Mother . Unknown history





Medications and Allergies


                                Home Medications











 Medication  Instructions  Recorded  Confirmed  Type


 


Simvastatin [Zocor] 40 mg PO DAILY 01/03/16 10/14/22 History


 


predniSONE 5 mg PO DAILY 03/31/17 10/14/22 History


 


Citalopram Hydrobromide [CeleXA] 20 mg PO DAILY #30 tab 03/14/18 10/14/22 Rx


 


ALPRAZolam [Xanax] 0.5 mg PO TID PRN 09/21/18 10/14/22 History


 


Ferrous Sulfate [Feosol] 325 mg PO DAILY 10/14/22 10/14/22 History


 


Latanoprost/Pf [Latanoprost 0.005% 1 drop BOTH EYES HS 10/14/22 10/14/22 History





Eye Drop]    


 


Sulfamethox-Tmp 800-160Mg [Bactrim 1 tab PO Q12HR 10/14/22 10/14/22 History





-160 mg]    


 


Budesonide [Pulmicort] 0.5 mg BID 10/15/22 10/15/22 History


 


Formoterol Fumarate [Perforomist] 20 mcg INHALATION BID 10/15/22 10/15/22 

History








                                    Allergies











Allergy/AdvReac Type Severity Reaction Status Date / Time


 


No Known Allergies Allergy   Verified 10/14/22 20:26














Physical Exam


Vitals: 


                                   Vital Signs











  Temp Pulse Pulse Resp BP BP Pulse Ox


 


 10/15/22 07:39     18   


 


 10/15/22 07:00  98.3 F   89  18   167/81  96


 


 10/15/22 02:01  97.5 F L   81  18   148/63  99


 


 10/14/22 21:53   90     


 


 10/14/22 21:39   88     


 


 10/14/22 21:25  97.9 F   72  18   181/82  100


 


 10/14/22 15:00  97.6 F  87   18  163/81   96








                                Intake and Output











 10/14/22 10/15/22 10/15/22





 22:59 06:59 14:59


 


Other:   


 


  Voiding Method Toilet  Toilet





   Diaper


 


  # Voids 1 4 


 


  Weight 57.606 kg  














GENERAL: The patient is alert and oriented x3, not in any acute distress. Well 

developed, well nourished. 


HEENT: Pupils are round and equally reacting to light. EOMI. No scleral icterus.

 No conjunctival pallor. Normocephalic, atraumatic. No pharyngeal erythema. No 

thyromegaly. 


CARDIOVASCULAR: S1 and S2 present. No murmurs, rubs, or gallops. 


-PULMONARY: Chest is clear to auscultation, bilateral expiratory wheezing. 


ABDOMEN: Soft, nontender, nondistended, normoactive bowel sounds. No palpable 

organomegaly. 


MUSCULOSKELETAL: No joint swelling or deformity. 


EXTREMITIES: No cyanosis, clubbing, or pedal edema. 


NEUROLOGICAL: Gross neurological examination did not reveal any focal deficits. 


SKIN: No rashes. no petechiae.





Results


CBC & Chem 7: 


                                 10/15/22 06:00





                                 10/15/22 06:00


Labs: 


                  Abnormal Lab Results - Last 24 Hours (Table)











  10/14/22 10/14/22 10/14/22 Range/Units





  15:26 15:26 23:47 


 


RBC  3.52 L    (4.30-5.90)  m/uL


 


Hgb  10.4 L    (13.0-17.5)  gm/dL


 


Hct  32.8 L    (39.0-53.0)  %


 


MCHC     (32.0-37.0)  g/dL


 


RDW     (11.5-14.5)  %


 


Eosinophils #     (0.04-0.35)  X 10*3/uL


 


D-Dimer    1.35 H  (<0.60)  mg/L FEU


 


Anion Gap     (10.00-18.00)  mmol/L


 


BUN   31 H   (9-20)  mg/dL


 


Creatinine   2.03 H   (0.66-1.25)  mg/dL


 


Est GFR (CKD-EPI)AfAm     (60.0-200.0)   


 


Est GFR (CKD-EPI)NonAf     (60.0-200.0)   


 


Calcium     (8.7-10.3)  mg/dL


 


Urine Protein     (Negative)  


 


Urine Blood     (Negative)  


 


Ur Leukocyte Esterase     (Negative)  


 


Urine RBC     (0-5)  /hpf


 


Urine WBC     (0-5)  /hpf


 


Urine WBC Clumps     (None)  /hpf


 


Urine Bacteria     (None)  /hpf


 


Urine Mucus     (None)  /hpf














  10/15/22 10/15/22 10/15/22 Range/Units





  06:00 06:00 10:25 


 


RBC   3.15 L   (4.30-5.90)  m/uL


 


Hgb   9.2 L   (13.0-17.5)  gm/dL


 


Hct   29.2 L   (39.0-53.0)  %


 


MCHC   31.5 L   (32.0-37.0)  g/dL


 


RDW   15.5 H   (11.5-14.5)  %


 


Eosinophils #   0.49 H   (0.04-0.35)  X 10*3/uL


 


D-Dimer     (<0.60)  mg/L FEU


 


Anion Gap  9.30 L    (10.00-18.00)  mmol/L


 


BUN  28.4 H    (9-20)  mg/dL


 


Creatinine  1.9 H    (0.66-1.25)  mg/dL


 


Est GFR (CKD-EPI)AfAm  35.3 L    (60.0-200.0)   


 


Est GFR (CKD-EPI)NonAf  30.4 L    (60.0-200.0)   


 


Calcium  8.2 L    (8.7-10.3)  mg/dL


 


Urine Protein    Trace H  (Negative)  


 


Urine Blood    Moderate H  (Negative)  


 


Ur Leukocyte Esterase    Large H  (Negative)  


 


Urine RBC    122 H  (0-5)  /hpf


 


Urine WBC    >182 H  (0-5)  /hpf


 


Urine WBC Clumps    Few H  (None)  /hpf


 


Urine Bacteria    Few H  (None)  /hpf


 


Urine Mucus    Rare H  (None)  /hpf














Assessment and Plan


Assessment: 





Acute COPD exacerbation


chest tightness rather than chest pain, cardiologist already consulted


Acute urinary tract infection


acute kidney injury on chronic kidney disease stage III


Possible pulmonary nodule 


History of deep venous thrombosis


Hearing difficulty


Hypertension


Hyperlipidemia


History of osteoarthritis


Chronic hypoxic respiratory Failure


History of bladder cancer status post surgery and vasectomy





Plan: 





This is a pleasant 87 years old male who presents with chesttightness and UTI 


Continue with aspirin


Cardiology consult


Add Symbicort, continue with bronchodilator


Start prednisone 40 mg on the top of his home dose of 5 mg


Start ceftriaxone follow-up urine culture


Labs and medication were reviewed..  Continue same treatment.  Continue with 

symptomatic treatment.  Resume home medication.  Monitor lytes and vitals.  DVT 

and GI prophylaxis.  Further recommendations as per clinical course of the 

patient


DVT prophylaxis: Subcutaneous heparin


GI Prophylaxis: Pepcid


PT/OT: Pending


Prognosis is guarded

## 2022-10-15 NOTE — P.CRDCN
History of Present Illness


Consult date: 10/15/22


Consult reason: chest pain


History of present illness: 


The patient is an 87-year-old male who awoke in the middle of the night to chest

discomfort.  He states it started in his neck and radiated down to his stomach. 

He states this would come and go throughout the night and his wife encouraged 

him to come to the emergency room this morning.  He states since his admission 

to the hospital he has not had any chest discomfort or difficulty breathing.  





DIAGNOSTICS:


EKG shows sinus rhythm without acute ST or T-wave abnormalities


Chest x-ray shows COPD.  No consolidation or pleural effusion.  Nodular density 

at the right base.


Lab data: WBC 6.3 hemoglobin 9.2, hematocrit 29.2, platelet 327, sodium 140, 

potassium 4.4, BUN 20, creatinine 1.9, troponin negative 3, cholesterol 177, 

, triglycerides 70, HDL 53, BNP 1100





PAST MEDICAL HISTORY: 


COPD, bladder cancer surgery, hypertension, renal disease





REVIEW OF SYSTEMS: No fever or chills. No cough or expectoration. No 

diaphoresis. Patient denies headache, dizziness, blurred vision, double vision. 

Patient denies any stomach discomfort. No nausea, vomiting. No hematochezia. No 

hematemesis. Denies any black stools or blood in his stools. Denies dysuria or 

hematuria. No muscle weakness or numbness.  No current chest pain.  No shortness

of breath at rest.





PHYSICAL EXAMINATION: This is a 87-year-old male in no apparent distress at the 

time of my examination.is supple. There is no jugular venous distention. No 

carotid bruit is heard. 


CHEST EXAMINATION: Lungs are diminished to auscultation. No chest wall 

tenderness is noted on palpation or with deep breathing.  Bilateral inspiratory 

wheezes.


HEART EXAMINATION: Heart regular rate and rhythm. S1, S2 heard. No murmurs, 

gallops or rub.


ABDOMEN: Soft, nontender. Bowel sounds are heard. No organomegaly noted. 


EXTREMITIES: 2+ peripheral pulses with no evidence of peripheral edema and no 

calf tenderness noted. 


NEUROLOGIC EXAMINATION: Patient is awake, alert and oriented x3. 





FINAL ASSESSMENT AND PLAN: 


Midsternal chest discomfort, not indicative of acute coronary syndrome


Hypertension, start low-dose calcium channel blocker


Anemia, likely secondary to CKD


Urinary tract infection


History of COPD


History of chronic kidney disease





PLAN: 


Patient to start GI prophylaxis


Start low-dose amlodipine for hypertension


Check lipid profile


Further recommendations based on clinical course








I am dictating on behalf of Dr Tyrese Sweeney's history/physical and 

assessment/plan.














Past Medical History


Past Medical History: Cancer, Chest Pain / Angina, COPD, Deep Vein Thrombosis 

(DVT), Eye Disorder, Hearing Disorder / Deafness, Hyperlipidemia, Hypertension, 

Osteoarthritis (OA), Pneumonia, Prostate Disorder, Renal Disease


Additional Past Medical History / Comment(s): Severe COPD with an FEV1 of 33% of

predicted, chronic hypoxic respiratory failure, recurrent hospitalization for 

COPD exacerbation, stenotrophomonas tracheal bronchitis - HAD PFT 18.  

Bladder Cancer w/ prev surgery.  RLE DVT.  BPH.  Chronic Back Pain.  VARICOSE 

VEINS.  O2 2L NC.  ACUTE RENAL FAILURE 3/2018 R/T DEHYDRATION/DEPRESSION, PER 

WIFE.


History of Any Multi-Drug Resistant Organisms: None Reported


Past Surgical History: Bladder Surgery, Hernia Repair, Tonsillectomy


Additional Past Surgical History / Comment(s): Bladder CA Surgery, Vasectomy.  

BRONCHIAL WASHING.   EXC CATARACTS KATH.,


Past Anesthesia/Blood Transfusion Reactions: No Reported Reaction


Past Psychological History: Anxiety, Depression


Additional Psychological History / Comment(s): Pt resides with his spouse in a 

single level home that has 4 porStylects steps..pt drives.  He has a nebulizer, 02 

uses prn.  He has [facility] home care.


Smoking Status: Never smoker


Past Alcohol Use History: None Reported


Additional Past Alcohol Use History / Comment(s): Pt started smoking in 1955 and

quit in .  At one time smoking 2-3 ppd.


Past Drug Use History: None Reported





- Past Family History


  ** Father


Family Medical History: Cancer


Additional Family Medical History / Comment(s): Father had scoliosis.  Father 

 of  lung cancer





  ** Mother


History Unknown: Yes


Family Medical History: CVA/TIA


Additional Family Medical History / Comment(s): Mother . Unknown history





Medications and Allergies


                                Home Medications











 Medication  Instructions  Recorded  Confirmed  Type


 


Simvastatin [Zocor] 40 mg PO DAILY 01/03/16 10/14/22 History


 


predniSONE 5 mg PO DAILY 03/31/17 10/14/22 History


 


Citalopram Hydrobromide [CeleXA] 20 mg PO DAILY #30 tab 03/14/18 10/14/22 Rx


 


ALPRAZolam [Xanax] 0.5 mg PO TID PRN 09/21/18 10/14/22 History


 


Ferrous Sulfate [Feosol] 325 mg PO DAILY 10/14/22 10/14/22 History


 


Latanoprost/Pf [Latanoprost 0.005% 1 drop BOTH EYES HS 10/14/22 10/14/22 History





Eye Drop]    


 


Sulfamethox-Tmp 800-160Mg [Bactrim 1 tab PO Q12HR 10/14/22 10/14/22 History





-160 mg]    


 


Budesonide [Pulmicort] 0.5 mg BID 10/15/22 10/15/22 History


 


Formoterol Fumarate [Perforomist] 20 mcg INHALATION BID 10/15/22 10/15/22 

History








                                    Allergies











Allergy/AdvReac Type Severity Reaction Status Date / Time


 


No Known Allergies Allergy   Verified 10/14/22 20:26














Physical Exam


Vitals: 


                                   Vital Signs











  Temp Pulse Pulse Resp BP BP Pulse Ox


 


 10/15/22 02:01  97.5 F L   81  18   148/63  99


 


 10/14/22 21:53   90     


 


 10/14/22 21:39   88     


 


 10/14/22 21:25  97.9 F   72  18   181/82  100


 


 10/14/22 15:00  97.6 F  87   18  163/81   96








                                Intake and Output











 10/14/22 10/15/22 10/15/22





 22:59 06:59 14:59


 


Other:   


 


  Voiding Method Toilet  


 


  # Voids 1 4 


 


  Weight 57.606 kg  














Results





                                 10/15/22 06:00





                                 10/15/22 06:00


                                 Cardiac Enzymes











  10/14/22 10/14/22 10/14/22 Range/Units





  15:26 15:26 22:10 


 


AST  22    (17-59)  U/L


 


Troponin I   0.015  0.024  (0.000-0.034)  ng/mL














  10/15/22 Range/Units





  00:45 


 


AST   (17-59)  U/L


 


Troponin I  0.024  (0.000-0.034)  ng/mL








                                   Coagulation











  10/14/22 Range/Units





  15:26 


 


PT  9.8  (9.0-12.0)  sec


 


APTT  25.4  (22.0-30.0)  sec








                                       CBC











  10/14/22 Range/Units





  15:26 


 


WBC  8.1  (3.8-10.6)  k/uL


 


RBC  3.52 L  (4.30-5.90)  m/uL


 


Hgb  10.4 L  (13.0-17.5)  gm/dL


 


Hct  32.8 L  (39.0-53.0)  %


 


Plt Count  333  (150-450)  k/uL








                          Comprehensive Metabolic Panel











  10/14/22 Range/Units





  15:26 


 


Sodium  140  (137-145)  mmol/L


 


Potassium  5.0  (3.5-5.1)  mmol/L


 


Chloride  104  ()  mmol/L


 


Carbon Dioxide  26  (22-30)  mmol/L


 


BUN  31 H  (9-20)  mg/dL


 


Creatinine  2.03 H  (0.66-1.25)  mg/dL


 


Glucose  82  (74-99)  mg/dL


 


Calcium  8.8  (8.4-10.2)  mg/dL


 


AST  22  (17-59)  U/L


 


ALT  16  (4-49)  U/L


 


Alkaline Phosphatase  86  ()  U/L


 


Total Protein  7.2  (6.3-8.2)  g/dL


 


Albumin  4.1  (3.5-5.0)  g/dL








                               Current Medications











Generic Name Dose Route Start Last Admin





  Trade Name Freq  PRN Reason Stop Dose Admin


 


Alprazolam  0.5 mg  10/14/22 21:50 





  Alprazolam 0.5 Mg Tab  PO  





  TID PRN  





  Anxiety  


 


Amlodipine Besylate  2.5 mg  10/15/22 09:00 





  Amlodipine 2.5 Mg Tab  PO  





  DAILY HENRIETTA  


 


Atorvastatin Calcium  20 mg  10/15/22 09:00  10/15/22 07:39





  Atorvastatin 20 Mg Tab  PO   20 mg





  DAILY HENRIETTA   Administration


 


Citalopram Hydrobromide  20 mg  10/15/22 09:00  10/15/22 07:39





  Citalopram Hydrobromide 20 Mg Tab  PO   20 mg





  DAILY HENRIETTA   Administration


 


Ferrous Sulfate  325 mg  10/15/22 09:00  10/15/22 07:39





  Ferrous Sulfate 325 Mg Tab  PO   325 mg





  DAILY HENRIETTA   Administration


 


Heparin Sodium (Porcine)  5,000 unit  10/15/22 00:00  10/15/22 07:39





  Heparin Sodium,Porcine/Pf 5,000 Unit/0.5 Ml Syringe  SQ   5,000 unit





  Q8HR HENRIETTA   Administration


 


Sodium Chloride  1,000 mls @ 75 mls/hr  10/14/22 22:00  10/14/22 22:22





  Saline 0.9%  IV  10/15/22 10:01  75 mls/hr





  .[id number] HENRIETTA   Administration


 


Latanoprost  1 drops  10/14/22 22:00  10/14/22 22:21





  Latanoprost 0.005% Ophth Drops 2.5 Ml Btl  BOTH EYES   1 drops





  HS HENRIETTA   Administration


 


Nitroglycerin  0.4 mg  10/14/22 19:03 





  Nitroglycerin Sl Tabs 0.4 Mg Tab  SUBLINGUAL  





  Q5M PRN  





  Chest Pain  


 


Pantoprazole Sodium  40 mg  10/15/22 08:15 





  Pantoprazole 40 Mg Tablet  PO  





  AC-BID HENRIETTA  


 


Prednisone  5 mg  10/15/22 09:00  10/15/22 07:39





  Prednisone 5 Mg Tab  PO   5 mg





  DAILY HENRIETTA   Administration








                                Intake and Output











 10/14/22 10/15/22 10/15/22





 22:59 06:59 14:59


 


Other:   


 


  Voiding Method Toilet  


 


  # Voids 1 4 


 


  Weight 57.606 kg  








                                        





                                 10/14/22 15:26 





                                 10/14/22 15:26

## 2022-10-16 LAB
ANION GAP SERPL CALC-SCNC: 10 MMOL/L
BASOPHILS # BLD AUTO: 0 K/UL (ref 0–0.2)
BASOPHILS NFR BLD AUTO: 0 %
BUN SERPL-SCNC: 37 MG/DL (ref 9–20)
CALCIUM SPEC-MCNC: 8.2 MG/DL (ref 8.4–10.2)
CHLORIDE SERPL-SCNC: 102 MMOL/L (ref 98–107)
CO2 SERPL-SCNC: 22 MMOL/L (ref 22–30)
EOSINOPHIL # BLD AUTO: 0 K/UL (ref 0–0.7)
EOSINOPHIL NFR BLD AUTO: 0 %
ERYTHROCYTE [DISTWIDTH] IN BLOOD BY AUTOMATED COUNT: 3.37 M/UL (ref 4.3–5.9)
ERYTHROCYTE [DISTWIDTH] IN BLOOD: 15.2 % (ref 11.5–15.5)
GLUCOSE SERPL-MCNC: 82 MG/DL (ref 74–99)
HCT VFR BLD AUTO: 31.5 % (ref 39–53)
HGB BLD-MCNC: 9.8 GM/DL (ref 13–17.5)
LYMPHOCYTES # SPEC AUTO: 1 K/UL (ref 1–4.8)
LYMPHOCYTES NFR SPEC AUTO: 11 %
MAGNESIUM SPEC-SCNC: 2 MG/DL (ref 1.6–2.3)
MCH RBC QN AUTO: 29.1 PG (ref 25–35)
MCHC RBC AUTO-ENTMCNC: 31.2 G/DL (ref 31–37)
MCV RBC AUTO: 93.3 FL (ref 80–100)
MONOCYTES # BLD AUTO: 0.6 K/UL (ref 0–1)
MONOCYTES NFR BLD AUTO: 6 %
NEUTROPHILS # BLD AUTO: 7.3 K/UL (ref 1.3–7.7)
NEUTROPHILS NFR BLD AUTO: 81 %
PH UR: 6.5 [PH] (ref 5–8)
PLATELET # BLD AUTO: 362 K/UL (ref 150–450)
POTASSIUM SERPL-SCNC: 4.7 MMOL/L (ref 3.5–5.1)
PROT UR QL: (no result)
RBC UR QL: 83 /HPF (ref 0–5)
SODIUM SERPL-SCNC: 134 MMOL/L (ref 137–145)
SP GR UR: 1.01 (ref 1–1.03)
UROBILINOGEN UR QL STRIP: <2 MG/DL (ref ?–2)
WBC # BLD AUTO: 9 K/UL (ref 3.8–10.6)
WBC #/AREA URNS HPF: 140 /HPF (ref 0–5)

## 2022-10-16 RX ADMIN — HEPARIN SODIUM SCH UNIT: 5000 INJECTION INTRAVENOUS; SUBCUTANEOUS at 08:46

## 2022-10-16 RX ADMIN — Medication SCH MG: at 08:46

## 2022-10-16 RX ADMIN — CEFAZOLIN SCH MLS/HR: 330 INJECTION, POWDER, FOR SOLUTION INTRAMUSCULAR; INTRAVENOUS at 17:38

## 2022-10-16 RX ADMIN — LATANOPROST SCH DROPS: 50 SOLUTION OPHTHALMIC at 20:42

## 2022-10-16 RX ADMIN — ATORVASTATIN CALCIUM SCH MG: 20 TABLET, FILM COATED ORAL at 08:46

## 2022-10-16 RX ADMIN — CEFAZOLIN SCH MLS/HR: 330 INJECTION, POWDER, FOR SOLUTION INTRAMUSCULAR; INTRAVENOUS at 08:52

## 2022-10-16 RX ADMIN — BUDESONIDE AND FORMOTEROL FUMARATE DIHYDRATE SCH PUFF: 160; 4.5 AEROSOL RESPIRATORY (INHALATION) at 19:24

## 2022-10-16 RX ADMIN — PANTOPRAZOLE SODIUM SCH MG: 40 TABLET, DELAYED RELEASE ORAL at 17:36

## 2022-10-16 RX ADMIN — CITALOPRAM HYDROBROMIDE SCH MG: 20 TABLET ORAL at 08:46

## 2022-10-16 RX ADMIN — PANTOPRAZOLE SODIUM SCH MG: 40 TABLET, DELAYED RELEASE ORAL at 08:46

## 2022-10-16 RX ADMIN — TAMSULOSIN HYDROCHLORIDE SCH MG: 0.4 CAPSULE ORAL at 20:42

## 2022-10-16 RX ADMIN — BUDESONIDE AND FORMOTEROL FUMARATE DIHYDRATE SCH PUFF: 160; 4.5 AEROSOL RESPIRATORY (INHALATION) at 08:06

## 2022-10-16 RX ADMIN — HEPARIN SODIUM SCH UNIT: 5000 INJECTION INTRAVENOUS; SUBCUTANEOUS at 17:37

## 2022-10-16 RX ADMIN — HEPARIN SODIUM SCH UNIT: 5000 INJECTION INTRAVENOUS; SUBCUTANEOUS at 20:41

## 2022-10-16 NOTE — P.PN
Subjective





This is a pleasant 87 years old male with multiple medical problems including 

Chest Pain / Angina, COPD, Deep Vein Thrombosis , not on anticoagulation, 

hearing difficulty, hypertension, hyperlipidemia, osteoarthritis, chronic 

hypoxic respiratory failure, bladder cancer surgery and vasectomy, anxiety and 

depression


Patient presents because of chest tightness that started 2 days one day ago with

more cough and phlegm than usual and dyspnea.  Patient denies any chest pain and

even when asking specifically he confirms no chest pain.  No dizziness.  No 

diarrhea or vomiting, no change in frequency of urination, no headache or 

weakness or numbness


Patient is afebrile and vitals stable


Hemoglobin 9.2, creatinine 1.9, compared to 2.0 yesterday


Urine analysis showing evidence of infection


EKG: Normal sinus rhythm with no significant ST-T changes


Chest x-ray: No acute process.  COPD changes with no acute process.  Nodular 

density on the right base may reflect the pulse should've however during patient

increased risk of development of lung cancer none emergency to the chest is 

recommended to exclude suspicious underlying pulmonary nodule





10/16/2022


Patient awake and alert and pleasant


Within significantly improved and prednisone 40 mg per to 20 mg tomorrow and can

be tapered gradually down to his home dose of 5 mg daily.


He has evidence of elevated creatinine 2.0.  Urine analysis was suspicious for 

infection and patient is on ceftriaxone however her urine culture is negative.  

The patient denies dysuria.  May consider discontinuing antibiotics and repeat 

urine analysis.  Most likely patient acute kidney injury secondary to obstruct

shimon uropathy with seen on bladder scan 2 with postvoid residual volume 

increased.


Null catheter inserted, discussed with the staff.  Also start the patient on 

Flomax

















Objective





- Vital Signs


Vital signs: 


                                   Vital Signs











Temp  98.3 F   10/16/22 13:43


 


Pulse  73   10/16/22 13:43


 


Resp  18   10/16/22 13:43


 


BP  165/83   10/16/22 13:43


 


Pulse Ox  99   10/16/22 13:43


 


FiO2      








                                 Intake & Output











 10/15/22 10/16/22 10/16/22





 18:59 06:59 18:59


 


Intake Total 240  600


 


Output Total   1425


 


Balance 240  -825


 


Intake:   


 


  Oral 240  600


 


Output:   


 


  Urine   1200


 


    Uretheral (Null)   600


 


  Post Void Residual   225


 


Other:   


 


  Voiding Method Toilet Toilet Indwelling Catheter





 Diaper  


 


  # Voids 2 4 














- Exam





GENERAL: The patient is alert and oriented x3, not in any acute distress. Well 

developed, well nourished. 


HEENT: Pupils are round and equally reacting to light. EOMI. No scleral icterus.

No conjunctival pallor. Normocephalic, atraumatic. No pharyngeal erythema. No 

thyromegaly. 


CARDIOVASCULAR: S1 and S2 present. No murmurs, rubs, or gallops. 


-PULMONARY: Chest is clear to auscultation, bilateral expiratory wheezing. 


ABDOMEN: Soft, nontender, nondistended, normoactive bowel sounds. No palpable 

organomegaly. 


MUSCULOSKELETAL: No joint swelling or deformity. 


EXTREMITIES: No cyanosis, clubbing, or pedal edema. 


NEUROLOGICAL: Gross neurological examination did not reveal any focal deficits. 


SKIN: No rashes. no petechiae.








- Labs


CBC & Chem 7: 


                                 10/16/22 09:56





                                 10/16/22 09:56


Labs: 


                  Abnormal Lab Results - Last 24 Hours (Table)











  10/16/22 10/16/22 Range/Units





  09:56 09:56 


 


RBC  3.37 L   (4.30-5.90)  m/uL


 


Hgb  9.8 L   (13.0-17.5)  gm/dL


 


Hct  31.5 L   (39.0-53.0)  %


 


Sodium   134 L  (137-145)  mmol/L


 


BUN   37 H  (9-20)  mg/dL


 


Creatinine   2.10 H  (0.66-1.25)  mg/dL


 


Calcium   8.2 L  (8.4-10.2)  mg/dL








                      Microbiology - Last 24 Hours (Table)











 10/15/22 10:25 Urine Culture - Preliminary





 Urine,Clean Catch 














Assessment and Plan


Assessment: 





Urine retention status post Null catheter


acute kidney injury on chronic kidney disease stage III.  Secondary to 

obstructive uropathy


Acute COPD exacerbation, improving significantly.


chest tightness rather than chest pain, cardiologist already consulted and 

cleared the patient


Most likely patient has asymptomatic bacteriuria or abnormal urine analysis 

greater than infection with abscess of symptoms and negative urine culture


Possible pulmonary nodule , we recommend follow-up as an outpatient


History of deep venous thrombosis


Hearing difficulty


Hypertension


Hyperlipidemia


History of osteoarthritis


Chronic hypoxic respiratory Failure


History of bladder cancer status post surgery and vasectomy





Plan: 





This is a pleasant 87 years old male who presents with a KI secondary to urinary

retention


Discontinue IV fluid


Place Null catheter start Flomax


Cardiology consult, chest pain resolved


Add to prednisone to 20 mg on the top of his home dose of 5 mg


Discontinue ceftriaxone.  Repeat urinalysis


Labs and medication were reviewed..  Continue same treatment.  Continue with 

symptomatic treatment.  Resume home medication.  Monitor lytes and vitals.  DVT 

and GI prophylaxis.  Further recommendations as per clinical course of the 

patient


DVT prophylaxis: Subcutaneous heparin


GI Prophylaxis: Pepcid


PT/OT: Pending


Prognosis is guarded

## 2022-10-16 NOTE — P.PN
Subjective


Progress Note Date: 10/16/22


The patient is an 87-year-old male is currently admitted to the hospital with 

chest pains.  He awoke in the middle the night up with midsternal chest pains 

that radiated down to his stomach.  The symptoms had resolved by the time he got

to the emergency room.





No recurrent symptoms overnight.  He states he is breathing well.  No dizziness 

or lightheadedness when ambulating around the room.





GENERAL: Well-appearing, well-nourished and in no acute distress.


NECK: Supple without JVD or thyromegaly.


LUNGS: Breath sounds diminished to auscultation bilaterally. Respiration equal 

and unlabored.  Bilateral inspiratory wheezes


HEART: Regular rate and rhythm without murmurs, rubs or gallops. S1 and S2 

heard.


EXTREMITIES: Normal range of motion, no edema.  No clubbing or cyanosis. 

Peripheral pulses intact and strong.





VITALS:


Blood pressure 148/86, pulse 69, respiratory rate 18, temp 97.6F, SpO2 99% on 2

L nasal cannula





TELEMETRY:


Sinus rhythm overnight





LABS:


WBC 9.0, hemoglobin 9.8, hematocrit 31.5, platelet 62, sodium 134, potassium 

4.7, BUN 37, creatinine 2.10





IMPRESSION:


Midsternal chest discomfort, not indicative of acute coronary syndrome


Hypertension, increase amlodipine to 20 mg twice daily


Anemia, likely secondary to chronic kidney disease


Acute urinary tract infection


History of COPD


History of chronic kidney disease





PLAN:


Check blood pressure manually


Increase amlodipine to 2-1/2 mg twice daily


No further recommendations from the cardiac standpoint


 


I am dictating on behalf of Dr Tyrese Sweeney's history/physical and 

assessment/plan.








Objective





- Vital Signs


Vital signs: 


                                   Vital Signs











Temp  97.6 F   10/16/22 07:00


 


Pulse  69   10/16/22 07:00


 


Resp  18   10/16/22 08:46


 


BP  148/86   10/16/22 08:00


 


Pulse Ox  99   10/16/22 07:00


 


FiO2      








                                 Intake & Output











 10/15/22 10/16/22 10/16/22





 18:59 06:59 18:59


 


Intake Total 240  360


 


Output Total   225


 


Balance 240  135


 


Intake:   


 


  Oral 240  360


 


Output:   


 


  Post Void Residual   225


 


Other:   


 


  Voiding Method Toilet Toilet Toilet





 Diaper  


 


  # Voids 2 4 














- Labs


CBC & Chem 7: 


                                 10/16/22 09:56





                                 10/16/22 09:56


Labs: 


                  Abnormal Lab Results - Last 24 Hours (Table)











  10/16/22 10/16/22 Range/Units





  09:56 09:56 


 


RBC  3.37 L   (4.30-5.90)  m/uL


 


Hgb  9.8 L   (13.0-17.5)  gm/dL


 


Hct  31.5 L   (39.0-53.0)  %


 


Sodium   134 L  (137-145)  mmol/L


 


BUN   37 H  (9-20)  mg/dL


 


Creatinine   2.10 H  (0.66-1.25)  mg/dL


 


Calcium   8.2 L  (8.4-10.2)  mg/dL








                      Microbiology - Last 24 Hours (Table)











 10/15/22 10:25 Urine Culture - Preliminary





 Urine,Clean Catch

## 2022-10-17 LAB
ANION GAP SERPL CALC-SCNC: 9.3 MMOL/L (ref 10–18)
BASOPHILS # BLD AUTO: 0.01 X 10*3/UL (ref 0–0.1)
BASOPHILS NFR BLD AUTO: 0.1 %
BUN SERPL-SCNC: 43.9 MG/DL (ref 9–27)
BUN/CREAT SERPL: 24.39 RATIO (ref 12–20)
CALCIUM SPEC-MCNC: 8.8 MG/DL (ref 8.7–10.3)
CHLORIDE SERPL-SCNC: 105 MMOL/L (ref 96–109)
CO2 SERPL-SCNC: 22.7 MMOL/L (ref 20–27.5)
EOSINOPHIL # BLD AUTO: 0 X 10*3/UL (ref 0.04–0.35)
EOSINOPHIL NFR BLD AUTO: 0 %
ERYTHROCYTE [DISTWIDTH] IN BLOOD BY AUTOMATED COUNT: 3.45 X 10*6/UL (ref 4.4–5.6)
ERYTHROCYTE [DISTWIDTH] IN BLOOD: 15.3 % (ref 11.5–14.5)
GLUCOSE SERPL-MCNC: 117 MG/DL (ref 70–110)
HCT VFR BLD AUTO: 32.4 % (ref 39.6–50)
HGB BLD-MCNC: 10 G/DL (ref 13–17)
IMM GRANULOCYTES BLD QL AUTO: 0.9 %
LYMPHOCYTES # SPEC AUTO: 0.89 X 10*3/UL (ref 0.9–5)
LYMPHOCYTES NFR SPEC AUTO: 8.1 %
MCH RBC QN AUTO: 29 PG (ref 27–32)
MCHC RBC AUTO-ENTMCNC: 30.9 G/DL (ref 32–37)
MCV RBC AUTO: 93.9 FL (ref 80–97)
MONOCYTES # BLD AUTO: 0.58 X 10*3/UL (ref 0.2–1)
MONOCYTES NFR BLD AUTO: 5.3 %
NEUTROPHILS # BLD AUTO: 9.46 X 10*3/UL (ref 1.8–7.7)
NEUTROPHILS NFR BLD AUTO: 85.6 %
NRBC BLD AUTO-RTO: 0 /100 WBCS (ref 0–0)
PLATELET # BLD AUTO: 408 X 10*3/UL (ref 140–440)
POTASSIUM SERPL-SCNC: 5.4 MMOL/L (ref 3.5–5.5)
SODIUM SERPL-SCNC: 137 MMOL/L (ref 135–145)
WBC # BLD AUTO: 11.04 X 10*3/UL (ref 4.5–10)

## 2022-10-17 RX ADMIN — HEPARIN SODIUM SCH UNIT: 5000 INJECTION INTRAVENOUS; SUBCUTANEOUS at 17:30

## 2022-10-17 RX ADMIN — CITALOPRAM HYDROBROMIDE SCH MG: 20 TABLET ORAL at 09:24

## 2022-10-17 RX ADMIN — PANTOPRAZOLE SODIUM SCH MG: 40 TABLET, DELAYED RELEASE ORAL at 09:24

## 2022-10-17 RX ADMIN — BUDESONIDE AND FORMOTEROL FUMARATE DIHYDRATE SCH PUFF: 160; 4.5 AEROSOL RESPIRATORY (INHALATION) at 19:26

## 2022-10-17 RX ADMIN — PANTOPRAZOLE SODIUM SCH MG: 40 TABLET, DELAYED RELEASE ORAL at 17:32

## 2022-10-17 RX ADMIN — TAMSULOSIN HYDROCHLORIDE SCH MG: 0.4 CAPSULE ORAL at 20:29

## 2022-10-17 RX ADMIN — Medication SCH MG: at 09:24

## 2022-10-17 RX ADMIN — LATANOPROST SCH DROPS: 50 SOLUTION OPHTHALMIC at 20:29

## 2022-10-17 RX ADMIN — HEPARIN SODIUM SCH UNIT: 5000 INJECTION INTRAVENOUS; SUBCUTANEOUS at 09:24

## 2022-10-17 RX ADMIN — BUDESONIDE AND FORMOTEROL FUMARATE DIHYDRATE SCH PUFF: 160; 4.5 AEROSOL RESPIRATORY (INHALATION) at 08:23

## 2022-10-17 RX ADMIN — HEPARIN SODIUM SCH UNIT: 5000 INJECTION INTRAVENOUS; SUBCUTANEOUS at 20:30

## 2022-10-17 RX ADMIN — ATORVASTATIN CALCIUM SCH MG: 20 TABLET, FILM COATED ORAL at 09:24

## 2022-10-17 NOTE — P.GSCN
History of Present Illness


Consult date: 10/17/22


Reason for Consult: 


Urinary Retention





Requesting physician: Sally Fox


History of present illness: 


This is a pleasant 87 years old male with a past medical history of angina, 

COPD, Deep Vein Thrombosis, hearing difficulty, hypertension, hyperlipidemia, 

osteoarthritis, chronic hypoxic respiratory failure, bladder cancer currently on

intravesicle gemcitibine, follow is with dr Kim as an outpatient.  He 

presented to the emergency room on 10/14 22 with concerns with episodes of chest

discomfort and dyspnea. He described the pain as more of a burning sensation.  

No chest pain episodes since admission. He also has evidence of JACQUE, elevated 

creatinine 2.0.  Urine analysis was suspicious for infection and patient is on 

ceftriaxone. However, his urine culture is negative.  Antibiotics have been 

discontinued. Urology is consulted for urinary retention, his PVR was elevated 

at 600 mL, subsequently  Rojas catheter inserted and Flomax started.  











Review of Systems





- Constitutional


Denies chills, Denies fever





- Cardiovascular


Denies chest pain, Denies shortness of breath





- Respiratory


Denies cough, Denies 7





- Gastrointestinal


Reports as per HPI, Denies abdominal pain, Denies nausea, Denies vomiting





- Genitourinary


Reports urinary retention





- Neurological


Denies headaches, Denies syncope





Past Medical History


Past Medical History: Cancer, Chest Pain / Angina, COPD, Deep Vein Thrombosis 

(DVT), Eye Disorder, Hearing Disorder / Deafness, Hyperlipidemia, Hypertension, 

Osteoarthritis (OA), Pneumonia, Prostate Disorder, Renal Disease


Additional Past Medical History / Comment(s): Severe COPD with an FEV1 of 33% of

predicted, chronic hypoxic respiratory failure, recurrent hospitalization for 

COPD exacerbation, stenotrophomonas tracheal bronchitis - HAD PFT 18.  

Bladder Cancer w/ prev surgery.  RLE DVT.  BPH.  Chronic Back Pain.  VARICOSE 

VEINS.  O2 2L NC.  ACUTE RENAL FAILURE 3/2018 R/T DEHYDRATION/DEPRESSION, PER 

WIFE.


History of Any Multi-Drug Resistant Organisms: None Reported


Past Surgical History: Bladder Surgery, Hernia Repair, Tonsillectomy


Additional Past Surgical History / Comment(s): Bladder CA Surgery, Vasectomy.  

BRONCHIAL WASHING.   EXC CATARACTS KATH.,


Past Anesthesia/Blood Transfusion Reactions: No Reported Reaction


Past Psychological History: Anxiety, Depression


Additional Psychological History / Comment(s): Pt resides with his spouse in a 

single level home that has 4 porchs steps..pt drives.  He has a nebulizer, 02 

uses prn.  He has [facility] home care.


Smoking Status: Never smoker


Past Alcohol Use History: None Reported


Additional Past Alcohol Use History / Comment(s): Pt started smoking in 1955 and

quit in .  At one time smoking 2-3 ppd.


Past Drug Use History: None Reported





- Past Family History


  ** Father


Family Medical History: Cancer


Additional Family Medical History / Comment(s): Father had scoliosis.  Father 

 of  lung cancer





  ** Mother


History Unknown: Yes


Family Medical History: CVA/TIA


Additional Family Medical History / Comment(s): Mother . Unknown history





Medications and Allergies


                                Home Medications











 Medication  Instructions  Recorded  Confirmed  Type


 


Simvastatin [Zocor] 40 mg PO DAILY 01/03/16 10/14/22 History


 


predniSONE 5 mg PO DAILY 03/31/17 10/14/22 History


 


Citalopram Hydrobromide [CeleXA] 20 mg PO DAILY #30 tab 03/14/18 10/14/22 Rx


 


ALPRAZolam [Xanax] 0.5 mg PO TID PRN 09/21/18 10/14/22 History


 


Ferrous Sulfate [Feosol] 325 mg PO DAILY 10/14/22 10/14/22 History


 


Latanoprost/Pf [Latanoprost 0.005% 1 drop BOTH EYES HS 10/14/22 10/14/22 History





Eye Drop]    


 


Sulfamethox-Tmp 800-160Mg [Bactrim 1 tab PO Q12HR 10/14/22 10/14/22 History





-160 mg]    


 


Budesonide [Pulmicort] 0.5 mg BID 10/15/22 10/15/22 History


 


Formoterol Fumarate [Perforomist] 20 mcg INHALATION BID 10/15/22 10/15/22 

History








                                    Allergies











Allergy/AdvReac Type Severity Reaction Status Date / Time


 


No Known Allergies Allergy   Verified 10/14/22 20:26














Surgical - Exam


                                   Vital Signs











Temp Pulse Resp BP Pulse Ox


 


 97.6 F   87   18   163/81   96 


 


 10/14/22 15:00  10/14/22 15:00  10/14/22 15:00  10/14/22 15:00  10/14/22 15:00











General: Well developed, well nourished. No acute distress. Chronically ill 

appearing  


HEENT: Head is atraumatic, normocephalic. Sclerae are clear.


Lungs:  Respirations even and nonlabored. 


Abdomen/GI: Soft. No guarding, rigidity, or abdominal tenderness.  


: Rojas catheter with clear yellow urine draining


Musculoskeletal/ Extremities:  No gross atrophy. + generalized weakness


Neurologic: Awake, alert and oriented times 3. CN II-XII grossly intact. No 

focal deficits.


Psychiatric: Appropriate mood and affect.











Results





- Labs





                                 10/17/22 06:42





                                 10/17/22 06:42


                  Abnormal Lab Results - Last 24 Hours (Table)











  10/16/22 10/17/22 10/17/22 Range/Units





  20:40 06:42 06:42 


 


WBC   11.04 H   (4.50-10.00)  X 10*3/uL


 


RBC   3.45 L   (4.40-5.60)  X 10*6/uL


 


Hgb   10.0 L   (13.0-17.0)  g/dL


 


Hct   32.4 L   (39.6-50.0)  %


 


MCHC   30.9 L   (32.0-37.0)  g/dL


 


RDW   15.3 H   (11.5-14.5)  %


 


Immature Gran #   0.10 H   (0.00-0.04)  X 10*3/uL


 


Neutrophils #   9.46 H   (1.80-7.70)  X 10*3/uL


 


Lymphocytes #   0.89 L   (0.90-5.00)  X 10*3/uL


 


Eosinophils #   0 L   (0.04-0.35)  X 10*3/uL


 


Anion Gap    9.30 L  (10.00-18.00)  mmol/L


 


BUN    43.9 H  (9.0-27.0)  mg/dL


 


Creatinine    1.8 H  (0.6-1.5)  mg/dL


 


Est GFR (CKD-EPI)AfAm    38.4 L  (60.0-200.0)   


 


Est GFR (CKD-EPI)NonAf    33.1 L  (60.0-200.0)   


 


BUN/Creatinine Ratio    24.39 H  (12.00-20.00)  Ratio


 


Glucose    117 H  ()  mg/dL


 


Urine Protein  1+ H    (Negative)  


 


Urine Blood  Moderate H    (Negative)  


 


Ur Leukocyte Esterase  Large H    (Negative)  


 


Urine RBC  83 H    (0-5)  /hpf


 


Urine WBC  140 H    (0-5)  /hpf


 


Urine WBC Clumps  Few H    (None)  /hpf


 


Urine Bacteria  Rare H    (None)  /hpf


 


Urine Yeast (Budding)  Moderate H    (None)  /hpf








                      Microbiology - Last 24 Hours (Table)











 10/15/22 10:25 Urine Culture - Final





 Urine,Clean Catch 








                                 Diabetes panel











  10/17/22 Range/Units





  06:42 


 


Sodium  137  (135-145)  mmol/L


 


Potassium  5.4  (3.5-5.5)  mmol/L


 


Chloride  105  ()  mmol/L


 


Carbon Dioxide  22.7  (20.0-27.5)  mmol/L


 


BUN  43.9 H  (9.0-27.0)  mg/dL


 


Creatinine  1.8 H  (0.6-1.5)  mg/dL


 


Glucose  117 H  ()  mg/dL


 


Calcium  8.8  (8.7-10.3)  mg/dL








                                  Calcium panel











  10/17/22 Range/Units





  06:42 


 


Calcium  8.8  (8.7-10.3)  mg/dL








                                 Pituitary panel











  10/17/22 Range/Units





  06:42 


 


Sodium  137  (135-145)  mmol/L


 


Potassium  5.4  (3.5-5.5)  mmol/L


 


Chloride  105  ()  mmol/L


 


Carbon Dioxide  22.7  (20.0-27.5)  mmol/L


 


BUN  43.9 H  (9.0-27.0)  mg/dL


 


Creatinine  1.8 H  (0.6-1.5)  mg/dL


 


Glucose  117 H  ()  mg/dL


 


Calcium  8.8  (8.7-10.3)  mg/dL








                                  Adrenal panel











  10/17/22 Range/Units





  06:42 


 


Sodium  137  (135-145)  mmol/L


 


Potassium  5.4  (3.5-5.5)  mmol/L


 


Chloride  105  ()  mmol/L


 


Carbon Dioxide  22.7  (20.0-27.5)  mmol/L


 


BUN  43.9 H  (9.0-27.0)  mg/dL


 


Creatinine  1.8 H  (0.6-1.5)  mg/dL


 


Glucose  117 H  ()  mg/dL


 


Calcium  8.8  (8.7-10.3)  mg/dL














- Imaging


Chest x-ray: report reviewed





Assessment and Plan


Plan: 


Patient known to Dr. Kim.  He has a history of bladder cancer with 

intravesical chemotherapy.  Recommend to keep rojas catheter in until ready for 

discharge, then attempt a trial of void. Patient to keep follow up in office on 

Thursday 10/20/22. Continue Flomax.





Impression and plan of care have been directed as dictated by the signing 

physician.  Brigette Ross nurse practitioner acting as scribe for signing 

physician.





Brigette Ross [facility]


Palliative Care/Urology


Spectralink [id number]


Email: [email or web address]





I personally performed and participated in history, physical Exam and decision 

making. I agree with the assessment and plan of the NP.








Time with Patient: Less than 30

## 2022-10-17 NOTE — P.PN
Subjective


Progress Note Date: 10/17/22


Principal diagnosis: 





Chest pain


This is an 87-year-old male who presented to the ER with chest pain.  Chest pain

has since resolved.  Now questioning acute kidney injury secondary to 

obstructive uropathy along with urinary retention.  New Null catheter was 

placed and patient was started on Flomax.  Patient denies any current 

complaints.








Objective





- Vital Signs


Vital signs: 


                                   Vital Signs











Temp  97.9 F   10/17/22 07:00


 


Pulse  74   10/17/22 07:00


 


Resp  22   10/17/22 07:00


 


BP  180/66   10/17/22 07:00


 


Pulse Ox  100   10/17/22 08:24


 


FiO2      








                                 Intake & Output











 10/16/22 10/17/22 10/17/22





 18:59 06:59 18:59


 


Intake Total 600  


 


Output Total 2450 1300 


 


Balance -1850 -1300 


 


Intake:   


 


  Oral 600  


 


Output:   


 


  Urine 2225 1300 


 


    Uretheral (Null) 600  


 


  Post Void Residual 225  


 


Other:   


 


  Voiding Method Indwelling Catheter Indwelling Catheter 


 


  # Voids  1 














- Constitutional


General appearance: Present: cooperative, no acute distress





- Neck


Neck: Present: normal ROM.  Absent: rigidity, thyromegaly





- Respiratory


Respiratory: bilateral: CTA





- Cardiovascular


Rhythm: regular


Heart sounds: normal: S1, S2





- Gastrointestinal


General gastrointestinal: Present: normal bowel sounds, soft.  Absent: 

tenderness





- Psychiatric


Psychiatric: Present: A&O x's 3, appropriate affect, intact judgment & insight





- Labs


CBC & Chem 7: 


                                 10/16/22 09:56





                                 10/16/22 09:56


Labs: 


                  Abnormal Lab Results - Last 24 Hours (Table)











  10/16/22 10/16/22 10/16/22 Range/Units





  09:56 09:56 20:40 


 


RBC  3.37 L    (4.30-5.90)  m/uL


 


Hgb  9.8 L    (13.0-17.5)  gm/dL


 


Hct  31.5 L    (39.0-53.0)  %


 


Sodium   134 L   (137-145)  mmol/L


 


BUN   37 H   (9-20)  mg/dL


 


Creatinine   2.10 H   (0.66-1.25)  mg/dL


 


Calcium   8.2 L   (8.4-10.2)  mg/dL


 


Urine Protein    1+ H  (Negative)  


 


Urine Blood    Moderate H  (Negative)  


 


Ur Leukocyte Esterase    Large H  (Negative)  


 


Urine RBC    83 H  (0-5)  /hpf


 


Urine WBC    140 H  (0-5)  /hpf


 


Urine WBC Clumps    Few H  (None)  /hpf


 


Urine Bacteria    Rare H  (None)  /hpf


 


Urine Yeast (Budding)    Moderate H  (None)  /hpf








                      Microbiology - Last 24 Hours (Table)











 10/15/22 10:25 Urine Culture - Final





 Urine,Clean Catch 














Assessment and Plan


(1) Urine retention


Current Visit: Yes   Status: Acute   Code(s): R33.9 - RETENTION OF URINE, 

UNSPECIFIED   SNOMED Code(s): 064869901


   





(2) Acute kidney injury


Current Visit: Yes   Status: Acute   Code(s): N17.9 - ACUTE KIDNEY FAILURE, 

UNSPECIFIED   SNOMED Code(s): 60628984


   





(3) Chest pain


Current Visit: Yes   Status: Acute   Code(s): R07.9 - CHEST PAIN, UNSPECIFIED   

SNOMED Code(s): 96982727


   





(4) Acute exacerbation of chronic obstructive airways disease


Current Visit: No   Status: Acute   Code(s): J44.1 - CHRONIC OBSTRUCTIVE 

PULMONARY DISEASE W (ACUTE) EXACERBATION   SNOMED Code(s): 069687064


   


Plan: 


Will consult urology and await their recommendation.





The patient was seen and examined by nurse practitioner.  Physician and 

agreement with plan.

## 2022-10-18 LAB
ALBUMIN SERPL-MCNC: 3.5 G/DL (ref 3.8–4.9)
ALBUMIN/GLOB SERPL: 1.3 G/DL (ref 1.6–3.17)
ALP SERPL-CCNC: 65 U/L (ref 41–126)
ALT SERPL-CCNC: 17 U/L (ref 10–49)
ANION GAP SERPL CALC-SCNC: 10.8 MMOL/L (ref 10–18)
AST SERPL-CCNC: 25 U/L (ref 14–35)
BASOPHILS # BLD AUTO: 0 K/UL (ref 0–0.2)
BASOPHILS NFR BLD AUTO: 0 %
BUN SERPL-SCNC: 55 MG/DL (ref 9–27)
BUN/CREAT SERPL: 30.56 RATIO (ref 12–20)
CALCIUM SPEC-MCNC: 8.6 MG/DL (ref 8.7–10.3)
CHLORIDE SERPL-SCNC: 103 MMOL/L (ref 96–109)
CO2 SERPL-SCNC: 24.2 MMOL/L (ref 20–27.5)
EOSINOPHIL # BLD AUTO: 0 K/UL (ref 0–0.7)
EOSINOPHIL NFR BLD AUTO: 0 %
ERYTHROCYTE [DISTWIDTH] IN BLOOD BY AUTOMATED COUNT: 3.46 M/UL (ref 4.3–5.9)
ERYTHROCYTE [DISTWIDTH] IN BLOOD: 15.2 % (ref 11.5–15.5)
GLOBULIN SER CALC-MCNC: 2.7 G/DL (ref 1.6–3.3)
GLUCOSE SERPL-MCNC: 98 MG/DL (ref 70–110)
HCT VFR BLD AUTO: 32.3 % (ref 39–53)
HGB BLD-MCNC: 10.1 GM/DL (ref 13–17.5)
LYMPHOCYTES # SPEC AUTO: 1.2 K/UL (ref 1–4.8)
LYMPHOCYTES NFR SPEC AUTO: 13 %
MCH RBC QN AUTO: 29.2 PG (ref 25–35)
MCHC RBC AUTO-ENTMCNC: 31.3 G/DL (ref 31–37)
MCV RBC AUTO: 93.4 FL (ref 80–100)
MONOCYTES # BLD AUTO: 0.8 K/UL (ref 0–1)
MONOCYTES NFR BLD AUTO: 9 %
NEUTROPHILS # BLD AUTO: 7.6 K/UL (ref 1.3–7.7)
NEUTROPHILS NFR BLD AUTO: 77 %
PLATELET # BLD AUTO: 370 K/UL (ref 150–450)
POTASSIUM SERPL-SCNC: 4.9 MMOL/L (ref 3.5–5.5)
PROT SERPL-MCNC: 6.2 G/DL (ref 6.2–8.2)
SODIUM SERPL-SCNC: 138 MMOL/L (ref 135–145)
WBC # BLD AUTO: 9.9 K/UL (ref 3.8–10.6)

## 2022-10-18 RX ADMIN — BUDESONIDE AND FORMOTEROL FUMARATE DIHYDRATE SCH PUFF: 160; 4.5 AEROSOL RESPIRATORY (INHALATION) at 09:33

## 2022-10-18 RX ADMIN — PANTOPRAZOLE SODIUM SCH MG: 40 TABLET, DELAYED RELEASE ORAL at 08:50

## 2022-10-18 RX ADMIN — CITALOPRAM HYDROBROMIDE SCH MG: 20 TABLET ORAL at 08:50

## 2022-10-18 RX ADMIN — ATORVASTATIN CALCIUM SCH MG: 20 TABLET, FILM COATED ORAL at 08:49

## 2022-10-18 RX ADMIN — METHYLPREDNISOLONE SODIUM SUCCINATE SCH MG: 40 INJECTION, POWDER, FOR SOLUTION INTRAMUSCULAR; INTRAVENOUS at 08:55

## 2022-10-18 RX ADMIN — TAMSULOSIN HYDROCHLORIDE SCH MG: 0.4 CAPSULE ORAL at 20:19

## 2022-10-18 RX ADMIN — METHYLPREDNISOLONE SODIUM SUCCINATE SCH MG: 40 INJECTION, POWDER, FOR SOLUTION INTRAMUSCULAR; INTRAVENOUS at 23:34

## 2022-10-18 RX ADMIN — HEPARIN SODIUM SCH UNIT: 5000 INJECTION INTRAVENOUS; SUBCUTANEOUS at 08:49

## 2022-10-18 RX ADMIN — PANTOPRAZOLE SODIUM SCH MG: 40 TABLET, DELAYED RELEASE ORAL at 16:32

## 2022-10-18 RX ADMIN — IPRATROPIUM BROMIDE AND ALBUTEROL SULFATE PRN ML: .5; 3 SOLUTION RESPIRATORY (INHALATION) at 01:47

## 2022-10-18 RX ADMIN — METHYLPREDNISOLONE SODIUM SUCCINATE SCH MG: 40 INJECTION, POWDER, FOR SOLUTION INTRAMUSCULAR; INTRAVENOUS at 16:31

## 2022-10-18 RX ADMIN — HEPARIN SODIUM SCH UNIT: 5000 INJECTION INTRAVENOUS; SUBCUTANEOUS at 16:31

## 2022-10-18 RX ADMIN — Medication SCH MG: at 08:49

## 2022-10-18 RX ADMIN — BUDESONIDE AND FORMOTEROL FUMARATE DIHYDRATE SCH PUFF: 160; 4.5 AEROSOL RESPIRATORY (INHALATION) at 20:44

## 2022-10-18 RX ADMIN — LATANOPROST SCH DROPS: 50 SOLUTION OPHTHALMIC at 20:20

## 2022-10-18 RX ADMIN — HEPARIN SODIUM SCH UNIT: 5000 INJECTION INTRAVENOUS; SUBCUTANEOUS at 23:35

## 2022-10-18 NOTE — P.CONS
History of Present Illness





- Reason for Consult


Consult date: 10/17/22





- History of Present Illness


patient is a 87-year-old  male with a past medical history significant 

for bladder cancer currently on intravesical treatment in this patient also with

a history of COPD DVT hypertension hyperlipidemia, patient presenting to the ER 

3 days ago for evaluation of chest discomfort and dyspnea patient denies having 

significant chest pain cough or sputum production patient did have a urinary 

retention overnight a Null catheter was placed patient had did not have any 

fever during this hospital stay he did have a normal white count however 

slightly up to 11.04 today patient did have elevated BUN/creatinine seen has 

slightly improved patient did have a positive UA on 10/15/2022 and the patient 

was treated with Rocephin however the cultures came back negative and Rocephin 

was discontinued he did have a repeat UA which is positive infectious disease 

was consulted because of positive UA concerning for UTI and need for antibiotic 

therapy








Past Medical History


Past Medical History: Cancer, Chest Pain / Angina, COPD, Deep Vein Thrombosis 

(DVT), Eye Disorder, Hearing Disorder / Deafness, Hyperlipidemia, Hypertension, 

Osteoarthritis (OA), Pneumonia, Prostate Disorder, Renal Disease


Additional Past Medical History / Comment(s): Severe COPD with an FEV1 of 33% of

predicted, chronic hypoxic respiratory failure, recurrent hospitalization for 

COPD exacerbation, stenotrophomonas tracheal bronchitis - HAD PFT 18.  B

ladder Cancer w/ prev surgery.  RLE DVT.  BPH.  Chronic Back Pain.  VARICOSE 

VEINS.  O2 2L NC.  ACUTE RENAL FAILURE 3/2018 R/T DEHYDRATION/DEPRESSION, PER 

WIFE.


History of Any Multi-Drug Resistant Organisms: None Reported


Past Surgical History: Bladder Surgery, Hernia Repair, Tonsillectomy


Additional Past Surgical History / Comment(s): Bladder CA Surgery, Vasectomy.  

BRONCHIAL WASHING.   EXC CATARACTS KATH.,


Past Anesthesia/Blood Transfusion Reactions: No Reported Reaction


Past Psychological History: Anxiety, Depression


Additional Psychological History / Comment(s): Pt resides with his spouse in a 

single level home that has 4 porchs steps..pt drives.  He has a nebulizer, 02 

uses prn.  He has [facility] home care.


Smoking Status: Never smoker


Past Alcohol Use History: None Reported


Additional Past Alcohol Use History / Comment(s): Pt started smoking in 1955 and

quit in .  At one time smoking 2-3 ppd.


Past Drug Use History: None Reported





- Past Family History


  ** Father


Family Medical History: Cancer


Additional Family Medical History / Comment(s): Father had scoliosis.  Father 

 of  lung cancer





  ** Mother


History Unknown: Yes


Family Medical History: CVA/TIA


Additional Family Medical History / Comment(s): Mother . Unknown history





Medications and Allergies


                                Home Medications











 Medication  Instructions  Recorded  Confirmed  Type


 


Simvastatin [Zocor] 40 mg PO DAILY 01/03/16 10/14/22 History


 


predniSONE 5 mg PO DAILY 03/31/17 10/14/22 History


 


Citalopram Hydrobromide [CeleXA] 20 mg PO DAILY #30 tab 03/14/18 10/14/22 Rx


 


ALPRAZolam [Xanax] 0.5 mg PO TID PRN 09/21/18 10/14/22 History


 


Ferrous Sulfate [Feosol] 325 mg PO DAILY 10/14/22 10/14/22 History


 


Latanoprost/Pf [Latanoprost 0.005% 1 drop BOTH EYES HS 10/14/22 10/14/22 History





Eye Drop]    


 


Sulfamethox-Tmp 800-160Mg [Bactrim 1 tab PO Q12HR 10/14/22 10/14/22 History





-160 mg]    


 


Budesonide [Pulmicort] 0.5 mg BID 10/15/22 10/15/22 History


 


Formoterol Fumarate [Perforomist] 20 mcg INHALATION BID 10/15/22 10/15/22 

History








                                    Allergies











Allergy/AdvReac Type Severity Reaction Status Date / Time


 


No Known Allergies Allergy   Verified 10/14/22 20:26














Physical Exam


Vitals: 


                                   Vital Signs











  Temp Pulse Resp BP Pulse Ox


 


 10/17/22 08:24      100


 


 10/17/22 07:00  97.9 F  74  22  180/66  98


 


 10/17/22 02:00  98.0 F  89  19  166/80  93 L


 


 10/16/22 20:00    19  


 


 10/16/22 19:15  97.5 F L  96  19  167/78  95


 


 10/16/22 13:43  98.3 F  73  18  165/83  99


 


 10/16/22 13:00    18  








                                Intake and Output











 10/16/22 10/17/22 10/17/22





 22:59 06:59 14:59


 


Intake Total   360


 


Output Total 1025 1300 


 


Balance -1025 -1300 360


 


Intake:   


 


  Oral   360


 


Output:   


 


  Urine 1025 1300 


 


Other:   


 


  Voiding Method Indwelling Catheter  Indwelling Catheter


 


  # Voids 1  














Results


CBC & Chem 7: 


                                 10/17/22 06:42





                                 10/17/22 06:42


Labs: 


                  Abnormal Lab Results - Last 24 Hours (Table)











  10/16/22 Range/Units





  20:40 


 


Urine Protein  1+ H  (Negative)  


 


Urine Blood  Moderate H  (Negative)  


 


Ur Leukocyte Esterase  Large H  (Negative)  


 


Urine RBC  83 H  (0-5)  /hpf


 


Urine WBC  140 H  (0-5)  /hpf


 


Urine WBC Clumps  Few H  (None)  /hpf


 


Urine Bacteria  Rare H  (None)  /hpf


 


Urine Yeast (Budding)  Moderate H  (None)  /hpf








                      Microbiology - Last 24 Hours (Table)











 10/15/22 10:25 Urine Culture - Final





 Urine,Clean Catch 














Assessment and Plan


Plan: 


1patient with a history of for bladder cancer currently on intravesical 

treatment per urology did have urinary retention requiring Null catheter pl

acement did have a positive UA with initial urine culture negative however the 

patient now do have mild elevated white count and a positive UA and concerning 

for a urinary tract infection.


2we will restart his Rocephin 2 g daily while waiting for repeat urine culture 

to finalize.


3gentle IV fluid.


We will follow on clinical condition and cultures to further adjust medication 

if needed


Thank you for this consultation will follow this patient along with you





Time with Patient: Greater than 30

## 2022-10-18 NOTE — P.PN
Subjective


Progress Note Date: 10/18/22


Principal diagnosis: 





The patient's essentially for urinary retention and exacerbation of COPD.  UTI 

was noted.  We will check pulse oximetry on room air.





No fever or chills.  Appreciate urology consult.





Objective





- Vital Signs


Vital signs: 


                                   Vital Signs











Temp  97.7 F   10/18/22 01:39


 


Pulse  84   10/18/22 01:54


 


Resp  19   10/18/22 01:39


 


BP  175/75   10/18/22 01:39


 


Pulse Ox  97   10/18/22 01:39


 


FiO2      








                                 Intake & Output











 10/17/22 10/18/22 10/18/22





 18:59 06:59 18:59


 


Intake Total 800 150 


 


Balance 800 150 


 


Weight 57.606 kg  


 


Intake:   


 


  Oral 800 150 


 


Other:   


 


  Voiding Method Indwelling Catheter Indwelling Catheter 














- Constitutional


General appearance: Present: average body habitus, cooperative, mild distress





- EENT


Eyes: Absent: abnormal pupil





- Neck


Neck: Absent: lymphadenopathy





- Respiratory


Respiratory: bilateral: rhonchi





- Cardiovascular


Rhythm: regular


Heart sounds: normal: S1, S2


Abnormal Heart Sounds: Absent: S3 Gallop





- Gastrointestinal


General gastrointestinal: Present: scaphoid.  Absent: tenderness





- Musculoskeletal


Musculoskeletal: Present: gait normal, generalized weakness





- Labs


CBC & Chem 7: 


                                 10/17/22 06:42





                                 10/17/22 06:42


Labs: 


                  Abnormal Lab Results - Last 24 Hours (Table)











  10/17/22 10/17/22 Range/Units





  06:42 06:42 


 


WBC  11.04 H   (4.50-10.00)  X 10*3/uL


 


RBC  3.45 L   (4.40-5.60)  X 10*6/uL


 


Hgb  10.0 L   (13.0-17.0)  g/dL


 


Hct  32.4 L   (39.6-50.0)  %


 


MCHC  30.9 L   (32.0-37.0)  g/dL


 


RDW  15.3 H   (11.5-14.5)  %


 


Immature Gran #  0.10 H   (0.00-0.04)  X 10*3/uL


 


Neutrophils #  9.46 H   (1.80-7.70)  X 10*3/uL


 


Lymphocytes #  0.89 L   (0.90-5.00)  X 10*3/uL


 


Eosinophils #  0 L   (0.04-0.35)  X 10*3/uL


 


Anion Gap   9.30 L  (10.00-18.00)  mmol/L


 


BUN   43.9 H  (9.0-27.0)  mg/dL


 


Creatinine   1.8 H  (0.6-1.5)  mg/dL


 


Est GFR (CKD-EPI)AfAm   38.4 L  (60.0-200.0)   


 


Est GFR (CKD-EPI)NonAf   33.1 L  (60.0-200.0)   


 


BUN/Creatinine Ratio   24.39 H  (12.00-20.00)  Ratio


 


Glucose   117 H  ()  mg/dL








                      Microbiology - Last 24 Hours (Table)











 10/17/22 17:54 Urine Culture - Preliminary





 Urine,Catheterized 














Assessment and Plan


(1) Urine retention


Current Visit: Yes   Status: Acute   Code(s): R33.9 - RETENTION OF URINE, 

UNSPECIFIED   SNOMED Code(s): 881369398


   





(2) Acute exacerbation of chronic obstructive airways disease


Current Visit: No   Status: Acute   Code(s): J44.1 - CHRONIC OBSTRUCTIVE 

PULMONARY DISEASE W (ACUTE) EXACERBATION   SNOMED Code(s): 702855638


   





(3) Dyspnea


Current Visit: No   Status: Acute   Code(s): R06.00 - DYSPNEA, UNSPECIFIED   

SNOMED Code(s): 225758706


   


Plan: 





Trial for voiding today.  New.  SCOTTY Null.





Check CBC in a.m.





Somewhat worsening congestion from a COPD perspective today.





Continue current regimen of treatment

## 2022-10-19 VITALS — DIASTOLIC BLOOD PRESSURE: 72 MMHG | TEMPERATURE: 97.5 F | SYSTOLIC BLOOD PRESSURE: 153 MMHG | HEART RATE: 71 BPM

## 2022-10-19 VITALS — RESPIRATION RATE: 16 BRPM

## 2022-10-19 LAB
ALBUMIN SERPL-MCNC: 3.6 G/DL (ref 3.8–4.9)
ALBUMIN/GLOB SERPL: 1.33 G/DL (ref 1.6–3.17)
ALP SERPL-CCNC: 65 U/L (ref 41–126)
ALT SERPL-CCNC: 24 U/L (ref 10–49)
ANION GAP SERPL CALC-SCNC: 10.4 MMOL/L (ref 10–18)
AST SERPL-CCNC: 28 U/L (ref 14–35)
BUN SERPL-SCNC: 59.5 MG/DL (ref 9–27)
BUN/CREAT SERPL: 31.32 RATIO (ref 12–20)
CALCIUM SPEC-MCNC: 9 MG/DL (ref 8.7–10.3)
CHLORIDE SERPL-SCNC: 99 MMOL/L (ref 96–109)
CO2 SERPL-SCNC: 26.6 MMOL/L (ref 20–27.5)
ERYTHROCYTE [DISTWIDTH] IN BLOOD BY AUTOMATED COUNT: 3.32 X 10*6/UL (ref 4.4–5.6)
ERYTHROCYTE [DISTWIDTH] IN BLOOD: 15.4 % (ref 11.5–14.5)
GLOBULIN SER CALC-MCNC: 2.7 G/DL (ref 1.6–3.3)
GLUCOSE SERPL-MCNC: 109 MG/DL (ref 70–110)
HCT VFR BLD AUTO: 30.6 % (ref 39.6–50)
HGB BLD-MCNC: 9.6 G/DL (ref 13–17)
MCH RBC QN AUTO: 28.9 PG (ref 27–32)
MCHC RBC AUTO-ENTMCNC: 31.4 G/DL (ref 32–37)
MCV RBC AUTO: 92.2 FL (ref 80–97)
NRBC BLD AUTO-RTO: 0 /100 WBCS (ref 0–0)
PLATELET # BLD AUTO: 403 X 10*3/UL (ref 140–440)
POTASSIUM SERPL-SCNC: 6 MMOL/L (ref 3.5–5.5)
PROT SERPL-MCNC: 6.3 G/DL (ref 6.2–8.2)
SODIUM SERPL-SCNC: 136 MMOL/L (ref 135–145)
WBC # BLD AUTO: 10.83 X 10*3/UL (ref 4.5–10)

## 2022-10-19 RX ADMIN — CITALOPRAM HYDROBROMIDE SCH MG: 20 TABLET ORAL at 08:18

## 2022-10-19 RX ADMIN — ATORVASTATIN CALCIUM SCH MG: 20 TABLET, FILM COATED ORAL at 08:18

## 2022-10-19 RX ADMIN — HEPARIN SODIUM SCH UNIT: 5000 INJECTION INTRAVENOUS; SUBCUTANEOUS at 08:17

## 2022-10-19 RX ADMIN — PANTOPRAZOLE SODIUM SCH MG: 40 TABLET, DELAYED RELEASE ORAL at 08:17

## 2022-10-19 RX ADMIN — METHYLPREDNISOLONE SODIUM SUCCINATE SCH MG: 40 INJECTION, POWDER, FOR SOLUTION INTRAMUSCULAR; INTRAVENOUS at 08:18

## 2022-10-19 RX ADMIN — BUDESONIDE AND FORMOTEROL FUMARATE DIHYDRATE SCH: 160; 4.5 AEROSOL RESPIRATORY (INHALATION) at 06:57

## 2022-10-19 RX ADMIN — Medication SCH MG: at 08:19

## 2022-10-19 NOTE — P.DS
Providers


Date of admission: 


10/17/22 13:56





Attending physician: 


Nik Whatley





Consults: 





                                        





10/14/22 19:03


Consult Physician Urgent 


   Consulting Provider: Nabil Coyle


   Consult Reason/Comments: cp


   Do you want consulting provider notified?: Yes





10/17/22 05:57


Consult Physician Routine 


   Consulting Provider: Vilma Benoit


   Consult Reason/Comments: possible UTI, culture negative but repeat UA is 

showing yeast


   Do you want consulting provider notified?: Yes, Notify in am





10/17/22 08:24


Consult Physician Routine 


   Consulting Provider: Asher Kim


   Consult Reason/Comments: urinary retention


   Do you want consulting provider notified?: Yes











Primary care physician: 


Nik Whatley








- Discharge Diagnosis(es)


(1) Urine retention


Current Visit: Yes   Status: Acute   





(2) Acute exacerbation of chronic obstructive airways disease


Current Visit: No   Status: Acute   





(3) Dyspnea


Current Visit: No   Status: Acute   


Hospital Course: 





This is a discharge summary an 87-year-old white male essentially admitted for 

exacerbation of COPD.  The patient was placed on empiric therapy but developed 

urinary retention.  The patient had Null catheter placed and then urology was 

consulted trial of removal was successful.  His breathing was stabilized and he 

is placed on prednisone taper to follow-up with me in about one week.


Patient Condition at Discharge: Stable





Plan - Discharge Summary


New Discharge Prescriptions: 


New


   predniSONE [Deltasone] 0 mg PO AS DIRECTED #18 tab


   Ipratropium-Albuterol Nebulize [Duoneb 0.5 mg-3 mg/3 ml Soln] 3 ml INHALATION

Q6HR PRN #12 each


     PRN Reason: WHEEZING, sob


   Pantoprazole [Protonix] 40 mg PO AC-BID #30 tab


   Tamsulosin [Flomax] 0.4 mg PO HS #30 cap


   amLODIPine [Norvasc] 2.5 mg PO BID #60 tab





Continue


   Simvastatin [Zocor] 40 mg PO DAILY


   Citalopram Hydrobromide [CeleXA] 20 mg PO DAILY #30 tab


   ALPRAZolam [Xanax] 0.5 mg PO TID PRN


     PRN Reason: Anxiety


   Budesonide [Pulmicort] 0.5 mg BID


   Formoterol Fumarate [Perforomist] 20 mcg INHALATION BID


   Sulfamethox-Tmp 800-160Mg [Bactrim -160 mg] 1 tab PO Q12HR #10 tab


   Latanoprost/Pf [Latanoprost 0.005% Eye Drop] 1 drop BOTH EYES HS


   Ferrous Sulfate [Iron (65 MG Elemental)] 325 mg PO DAILY


   predniSONE 5 mg PO DAILY #0


Discharge Medication List





Simvastatin [Zocor] 40 mg PO DAILY 01/03/16 [History]


Citalopram Hydrobromide [CeleXA] 20 mg PO DAILY #30 tab 03/14/18 [Rx]


ALPRAZolam [Xanax] 0.5 mg PO TID PRN 09/21/18 [History]


Ferrous Sulfate [Iron (65 MG Elemental)] 325 mg PO DAILY 10/14/22 [History]


Latanoprost/Pf [Latanoprost 0.005% Eye Drop] 1 drop BOTH EYES HS 10/14/22 

[History]


Budesonide [Pulmicort] 0.5 mg BID 10/15/22 [History]


Formoterol Fumarate [Perforomist] 20 mcg INHALATION BID 10/15/22 [History]


Ipratropium-Albuterol Nebulize [Duoneb 0.5 mg-3 mg/3 ml Soln] 3 ml INHALATION 

Q6HR PRN #12 each 10/19/22 [Rx]


Pantoprazole [Protonix] 40 mg PO AC-BID #30 tab 10/19/22 [Rx]


Sulfamethox-Tmp 800-160Mg [Bactrim -160 mg] 1 tab PO Q12HR #10 tab 

10/19/22 [Rx]


Tamsulosin [Flomax] 0.4 mg PO HS #30 cap 10/19/22 [Rx]


amLODIPine [Norvasc] 2.5 mg PO BID #60 tab 10/19/22 [Rx]


predniSONE 5 mg PO DAILY #0 10/19/22 [Rx]


predniSONE [Deltasone] 0 mg PO AS DIRECTED #18 tab 10/19/22 [Rx]








Follow up Appointment(s)/Referral(s): 


Tyrese Sweeney MD [STAFF PHYSICIAN] - 3 Weeks


Nik Whatley MD [Primary Care Provider] - 1-2 days

## 2023-02-24 NOTE — ED
General Adult HPI





- General


Chief complaint: Neck Pain/Injury


Stated complaint: NECK PAIN


Time Seen by Provider: 17 08:49


Source: patient, family, RN notes reviewed


Mode of arrival: wheelchair


Limitations: no limitations





- History of Present Illness


Initial comments: 





82-year-old male presents emergency Department with chief complaint of left-

sided neck pain.  Patient states his been bothering him for last 2 days 

progressively getting worse.  Patient states he had no trauma states he just 

felt sore neck but states this is the worst ever been.  Patient states she took 

some Norco this morning with no relief of the pain.  Patient denies headache, 

dizziness, focal weakness.  Patient states she has no pain with range of motion 

of his upper extremities.  Patient states she does have COPD and he states she'

s had a worsening cough last few days also.  He states she's a tough time 

breathing and his cough up.  Patient denies any nausea, vomiting diarrhea 

constipation.  Denies fever or chills.  Patient had no prior neck surgeries.





- Related Data


 Home Medications











 Medication  Instructions  Recorded  Confirmed


 


Albuterol Sulfate [Proair Hfa] 2 puff INHALATION RT-Q6H PRN 16


 


Simvastatin [Zocor] 40 mg PO DAILY 16


 


Ipratropium-Albuterol Nebulize 3 ml INHALATION RT-QID 16





[Duoneb 0.5 mg-3 mg/3 ml Soln]   


 


Fluticasone/Vilanterol [Breo 1 puff INHALATION RT-DAILY 17





Ellipta 200-25 Mcg INH]   


 


Lisinopril/Hydrochlorothiazide 1 tab PO DAILY 17





[Zestoretic 20-12.5 mg Tablet]   


 


predniSONE 5 mg PO DAILY 17


 


Tamsulosin HCl [Flomax] 0.4 mg PO DAILY 17








 Previous Rx's











 Medication  Instructions  Recorded


 


HYDROcodone/APAP 10-325MG [Norco 1 tab PO Q6H PRN #20 tab 17





]  


 


Meloxicam [Mobic] 7.5 mg PO DAILY #14 tab 17











 Allergies











Allergy/AdvReac Type Severity Reaction Status Date / Time


 


No Known Allergies Allergy   Verified 17 09:40














Review of Systems


ROS Statement: 


Those systems with pertinent positive or pertinent negative responses have been 

documented in the HPI.





ROS Other: All systems not noted in ROS Statement are negative.





Past Medical History


Past Medical History: COPD, Deep Vein Thrombosis (DVT), Hearing Disorder / 

Deafness, Hyperlipidemia, Osteoarthritis (OA), Pneumonia


Additional Past Medical History / Comment(s): 16  Pt presented to [facility] ER 

via EMS with having started about 2 hours before presentation some L sided 

chest pain.  He thought it felt like heart burn so he took tums without relief.

  He called EMS and pain subsided.  Pain then returned and EMS gave NTG  with 

relief.  Pt was wheezing and having some UBALDO.  He is being admitted with 

clinical impression of unstable angina pectoris, renal insufficiency, elevated D

-dimer and pancreatitis. Pt was recently admitted to [facility] on 1/3/16 with acute 

exacerbation COPD, purulent tracheobronchitis, chronic bronchitis.    Other HX:

  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 

2L/NC.


History of Any Multi-Drug Resistant Organisms: None Reported


Past Surgical History: Tonsillectomy


Additional Past Surgical History / Comment(s): vasectomy


Past Anesthesia/Blood Transfusion Reactions: No Reported Reaction


Past Psychological History: No Psychological Hx Reported


Smoking Status: Former smoker


Past Alcohol Use History: Occasional


Past Drug Use History: None Reported





- Past Family History


  ** Father


Family Medical History: Cancer


Additional Family Medical History / Comment(s): cancer. client reported father 

had scoliosis.





  ** Mother


Additional Family Medical History / Comment(s): mother . unknown history





General Exam


Limitations: no limitations


General appearance: alert, in no apparent distress


Head exam: Present: atraumatic, normocephalic, normal inspection


Eye exam: Present: normal appearance, PERRL, EOMI.  Absent: scleral icterus, 

conjunctival injection, periorbital swelling


ENT exam: Present: normal exam, normal oropharynx, mucous membranes moist, TM's 

normal bilaterally, normal external ear exam


Neck exam: Present: normal inspection, tenderness (Mild tenderness over the 

left cervical paraspinal, trapezius region), full ROM.  Absent: meningismus, 

lymphadenopathy


Respiratory exam: Present: rales, rhonchi.  Absent: normal lung sounds 

bilaterally, respiratory distress, wheezes, stridor


Cardiovascular Exam: Present: regular rate, normal rhythm, normal heart sounds.

  Absent: systolic murmur, diastolic murmur, rubs, gallop, clicks


Extremities exam: Present: normal inspection, full ROM, normal capillary 

refill.  Absent: tenderness, pedal edema, joint swelling, calf tenderness


Neurological exam: Present: alert, oriented X3, CN II-XII intact, reflexes 

normal.  Absent: motor sensory deficit





Course


 Vital Signs











  17





  08:34 09:11 09:19


 


Temperature 97.9 F  


 


Pulse Rate 85 75 80


 


Respiratory 20 18 





Rate   


 


Blood Pressure 142/65 136/59 


 


O2 Sat by Pulse 98 96 





Oximetry   














  17





  09:27


 


Temperature 


 


Pulse Rate 72


 


Respiratory 





Rate 


 


Blood Pressure 


 


O2 Sat by Pulse 





Oximetry 














EKG Findings





- EKG Comments:


EKG Findings:: EKG performed at 19:15 normal sinus rhythm with a rate of 74 CT 

132 QRS 90 QT//424





Medical Decision Making





- Medical Decision Making





82-year-old male presented for left sided neck pain.  This pain is reproducible 

pain in over the left paraspinal trapezius region.  Patient's x-ray shows 

possible arthritis biolytic changes.  Patient does feel improved after pain 

medication here.  Patient had complete workup including cardiac enzymes EKG 

chest x-ray labs.  Labs seem to be at his normal baseline.  Patient will be 

discharged with increase of his Norco at this time and follow-up with Dr. Nicholson [facility] neurologist.  Return parameters were discussed.  Patient has no 

focal weakness.





- Lab Data


Result diagrams: 


 17 09:00





 17 09:00


 Lab Results











  17 Range/Units





  09:00 09:00 09:00 


 


WBC   11.4 H   (3.8-10.6)  k/uL


 


RBC   3.70 L   (4.30-5.90)  m/uL


 


Hgb   10.5 L   (13.0-17.5)  gm/dL


 


Hct   33.0 L   (39.0-53.0)  %


 


MCV   89.1   (80.0-100.0)  fL


 


MCH   28.4   (25.0-35.0)  pg


 


MCHC   31.9   (31.0-37.0)  g/dL


 


RDW   15.3   (11.5-15.5)  %


 


Plt Count   368   (150-450)  k/uL


 


Neutrophils %   79   %


 


Lymphocytes %   8   %


 


Monocytes %   7   %


 


Eosinophils %   5   %


 


Basophils %   0   %


 


Neutrophils #   9.0 H   (1.3-7.7)  k/uL


 


Lymphocytes #   0.9 L   (1.0-4.8)  k/uL


 


Monocytes #   0.8   (0-1.0)  k/uL


 


Eosinophils #   0.6   (0-0.7)  k/uL


 


Basophils #   0.0   (0-0.2)  k/uL


 


PT     (9.0-12.0)  sec


 


INR     (<1.2)  


 


APTT     (22.0-30.0)  sec


 


Sodium    135 L  (137-145)  mmol/L


 


Potassium    4.3  (3.5-5.1)  mmol/L


 


Chloride    103  ()  mmol/L


 


Carbon Dioxide    26  (22-30)  mmol/L


 


Anion Gap    6  mmol/L


 


BUN    28 H  (9-20)  mg/dL


 


Creatinine    1.10  (0.66-1.25)  mg/dL


 


Est GFR (MDRD) Af Amer    >60  (>60 ml/min/1.73 sqM)  


 


Est GFR (MDRD) Non-Af    >60  (>60 ml/min/1.73 sqM)  


 


Glucose    117 H  (74-99)  mg/dL


 


Calcium    9.1  (8.4-10.2)  mg/dL


 


Magnesium    2.0  (1.6-2.3)  mg/dL


 


Total Bilirubin    0.7  (0.2-1.3)  mg/dL


 


AST    19  (17-59)  U/L


 


ALT    28  (21-72)  U/L


 


Alkaline Phosphatase    76  ()  U/L


 


Total Creatine Kinase  110    ()  U/L


 


CK-MB (CK-2)  6.2 H*    (0.0-2.4)  ng/mL


 


CK-MB (CK-2) Rel Index  5.6    


 


Troponin I  <0.012    (0.000-0.034)  ng/mL


 


NT-Pro-B Natriuret Pep     pg/mL


 


Total Protein    6.6  (6.3-8.2)  g/dL


 


Albumin    3.6  (3.5-5.0)  g/dL














  17 Range/Units





  09:00 09:00 


 


WBC    (3.8-10.6)  k/uL


 


RBC    (4.30-5.90)  m/uL


 


Hgb    (13.0-17.5)  gm/dL


 


Hct    (39.0-53.0)  %


 


MCV    (80.0-100.0)  fL


 


MCH    (25.0-35.0)  pg


 


MCHC    (31.0-37.0)  g/dL


 


RDW    (11.5-15.5)  %


 


Plt Count    (150-450)  k/uL


 


Neutrophils %    %


 


Lymphocytes %    %


 


Monocytes %    %


 


Eosinophils %    %


 


Basophils %    %


 


Neutrophils #    (1.3-7.7)  k/uL


 


Lymphocytes #    (1.0-4.8)  k/uL


 


Monocytes #    (0-1.0)  k/uL


 


Eosinophils #    (0-0.7)  k/uL


 


Basophils #    (0-0.2)  k/uL


 


PT   10.1  (9.0-12.0)  sec


 


INR   1.0  (<1.2)  


 


APTT   23.4  (22.0-30.0)  sec


 


Sodium    (137-145)  mmol/L


 


Potassium    (3.5-5.1)  mmol/L


 


Chloride    ()  mmol/L


 


Carbon Dioxide    (22-30)  mmol/L


 


Anion Gap    mmol/L


 


BUN    (9-20)  mg/dL


 


Creatinine    (0.66-1.25)  mg/dL


 


Est GFR (MDRD) Af Amer    (>60 ml/min/1.73 sqM)  


 


Est GFR (MDRD) Non-Af    (>60 ml/min/1.73 sqM)  


 


Glucose    (74-99)  mg/dL


 


Calcium    (8.4-10.2)  mg/dL


 


Magnesium    (1.6-2.3)  mg/dL


 


Total Bilirubin    (0.2-1.3)  mg/dL


 


AST    (17-59)  U/L


 


ALT    (21-72)  U/L


 


Alkaline Phosphatase    ()  U/L


 


Total Creatine Kinase    ()  U/L


 


CK-MB (CK-2)    (0.0-2.4)  ng/mL


 


CK-MB (CK-2) Rel Index    


 


Troponin I    (0.000-0.034)  ng/mL


 


NT-Pro-B Natriuret Pep  212   pg/mL


 


Total Protein    (6.3-8.2)  g/dL


 


Albumin    (3.5-5.0)  g/dL














Disposition


Clinical Impression: 


 Neck pain





Disposition: HOME SELF-CARE


Condition: Stable


Instructions:  Cervical Strain (ED)


Additional Instructions: 


Please return to the Emergency Department if symptoms worsen or any other 

concerns.


Prescriptions: 


HYDROcodone/APAP 10-325MG [Norco ] 1 tab PO Q6H PRN #20 tab


 PRN Reason: pain


Meloxicam [Mobic] 7.5 mg PO DAILY #14 tab


Referrals: 


Nik Whatley MD [Primary Care Provider] - 1-2 days


Time of Disposition: 11:03 L QL SS    Patient location during procedure: pre-op   Block not for primary anesthetic.  Reason for block: at surgeon's request and post-op pain management   Post-op Pain Location: abdominal pain   Start time: 2/24/2023 9:25 AM  Timeout: 2/24/2023 9:25 AM   End time: 2/24/2023 9:25 AM    Staffing  Authorizing Provider: Larissa Nation MD  Performing Provider: Triston Jones MD    Preanesthetic Checklist  Completed: patient identified, IV checked, site marked, risks and benefits discussed, surgical consent, monitors and equipment checked, pre-op evaluation and timeout performed  Peripheral Block  Patient position: right lateral decubitus  Prep: ChloraPrep  Patient monitoring: heart rate, cardiac monitor, continuous pulse ox, continuous capnometry and frequent blood pressure checks  Block type: Quadratus Lumborum  Laterality: left  Injection technique: single shot  Needle  Needle type: Stimuplex   Needle gauge: 20 G  Needle length: 4 in  Needle localization: anatomical landmarks and ultrasound guidance   -ultrasound image captured on disc.  Assessment  Injection assessment: negative aspiration, negative parasthesia and local visualized surrounding nerve  Paresthesia pain: none  Heart rate change: no  Slow fractionated injection: yes  Pain Tolerance: comfortable throughout block and no complaints  Medications:    Medications: bupivacaine (pf) (MARCAINE) injection 0.75% - Perineural   15 mL - 2/24/2023 9:20:00 AM

## 2023-07-19 ENCOUNTER — HOSPITAL ENCOUNTER (INPATIENT)
Dept: HOSPITAL 47 - EC | Age: 88
LOS: 7 days | Discharge: HOME HEALTH | DRG: 190 | End: 2023-07-26
Attending: FAMILY MEDICINE | Admitting: FAMILY MEDICINE
Payer: MEDICARE

## 2023-07-19 VITALS — BODY MASS INDEX: 18.1 KG/M2

## 2023-07-19 DIAGNOSIS — N17.0: ICD-10-CM

## 2023-07-19 DIAGNOSIS — N40.1: ICD-10-CM

## 2023-07-19 DIAGNOSIS — Z87.01: ICD-10-CM

## 2023-07-19 DIAGNOSIS — E87.5: ICD-10-CM

## 2023-07-19 DIAGNOSIS — Z71.3: ICD-10-CM

## 2023-07-19 DIAGNOSIS — G89.29: ICD-10-CM

## 2023-07-19 DIAGNOSIS — C67.9: ICD-10-CM

## 2023-07-19 DIAGNOSIS — Z87.891: ICD-10-CM

## 2023-07-19 DIAGNOSIS — F41.9: ICD-10-CM

## 2023-07-19 DIAGNOSIS — F32.A: ICD-10-CM

## 2023-07-19 DIAGNOSIS — E78.5: ICD-10-CM

## 2023-07-19 DIAGNOSIS — I12.9: ICD-10-CM

## 2023-07-19 DIAGNOSIS — D63.1: ICD-10-CM

## 2023-07-19 DIAGNOSIS — I27.20: ICD-10-CM

## 2023-07-19 DIAGNOSIS — I83.90: ICD-10-CM

## 2023-07-19 DIAGNOSIS — Z79.899: ICD-10-CM

## 2023-07-19 DIAGNOSIS — Z20.822: ICD-10-CM

## 2023-07-19 DIAGNOSIS — N18.4: ICD-10-CM

## 2023-07-19 DIAGNOSIS — H91.90: ICD-10-CM

## 2023-07-19 DIAGNOSIS — Z86.718: ICD-10-CM

## 2023-07-19 DIAGNOSIS — Z80.1: ICD-10-CM

## 2023-07-19 DIAGNOSIS — M19.90: ICD-10-CM

## 2023-07-19 DIAGNOSIS — T38.0X5A: ICD-10-CM

## 2023-07-19 DIAGNOSIS — N32.3: ICD-10-CM

## 2023-07-19 DIAGNOSIS — J96.21: ICD-10-CM

## 2023-07-19 DIAGNOSIS — Z99.81: ICD-10-CM

## 2023-07-19 DIAGNOSIS — R33.8: ICD-10-CM

## 2023-07-19 DIAGNOSIS — Z79.52: ICD-10-CM

## 2023-07-19 DIAGNOSIS — J44.1: Primary | ICD-10-CM

## 2023-07-19 LAB
ALBUMIN SERPL-MCNC: 3.8 G/DL (ref 3.5–5)
ALP SERPL-CCNC: 66 U/L (ref 38–126)
ALT SERPL-CCNC: 25 U/L (ref 4–49)
ANION GAP SERPL CALC-SCNC: 9 MMOL/L
APTT BLD: 21.7 SEC (ref 22–30)
AST SERPL-CCNC: 41 U/L (ref 17–59)
BASOPHILS # BLD AUTO: 0 K/UL (ref 0–0.2)
BASOPHILS NFR BLD AUTO: 0 %
BUN SERPL-SCNC: 50 MG/DL (ref 9–20)
CALCIUM SPEC-MCNC: 8.4 MG/DL (ref 8.4–10.2)
CHLORIDE SERPL-SCNC: 103 MMOL/L (ref 98–107)
CO2 SERPL-SCNC: 24 MMOL/L (ref 22–30)
EOSINOPHIL # BLD AUTO: 0.1 K/UL (ref 0–0.7)
EOSINOPHIL NFR BLD AUTO: 1 %
ERYTHROCYTE [DISTWIDTH] IN BLOOD BY AUTOMATED COUNT: 3.56 M/UL (ref 4.3–5.9)
ERYTHROCYTE [DISTWIDTH] IN BLOOD: 16.4 % (ref 11.5–15.5)
GLUCOSE SERPL-MCNC: 104 MG/DL (ref 74–99)
HCT VFR BLD AUTO: 32.6 % (ref 39–53)
HGB BLD-MCNC: 9.2 GM/DL (ref 13–17.5)
INR PPP: 0.9 (ref ?–1.2)
LYMPHOCYTES # SPEC AUTO: 0.9 K/UL (ref 1–4.8)
LYMPHOCYTES NFR SPEC AUTO: 12 %
MAGNESIUM SPEC-SCNC: 2.3 MG/DL (ref 1.6–2.3)
MCH RBC QN AUTO: 25.9 PG (ref 25–35)
MCHC RBC AUTO-ENTMCNC: 28.3 G/DL (ref 31–37)
MCV RBC AUTO: 91.5 FL (ref 80–100)
MONOCYTES # BLD AUTO: 0.4 K/UL (ref 0–1)
MONOCYTES NFR BLD AUTO: 5 %
NEUTROPHILS # BLD AUTO: 5.9 K/UL (ref 1.3–7.7)
NEUTROPHILS NFR BLD AUTO: 80 %
PLATELET # BLD AUTO: 383 K/UL (ref 150–450)
POTASSIUM SERPL-SCNC: 5.4 MMOL/L (ref 3.5–5.1)
PROT SERPL-MCNC: 7.6 G/DL (ref 6.3–8.2)
PT BLD: 9.6 SEC (ref 9–12)
SODIUM SERPL-SCNC: 136 MMOL/L (ref 137–145)
WBC # BLD AUTO: 7.3 K/UL (ref 3.8–10.6)

## 2023-07-19 PROCEDURE — 94668 MNPJ CHEST WALL SBSQ: CPT

## 2023-07-19 PROCEDURE — 84132 ASSAY OF SERUM POTASSIUM: CPT

## 2023-07-19 PROCEDURE — 76770 US EXAM ABDO BACK WALL COMP: CPT

## 2023-07-19 PROCEDURE — 96376 TX/PRO/DX INJ SAME DRUG ADON: CPT

## 2023-07-19 PROCEDURE — 71045 X-RAY EXAM CHEST 1 VIEW: CPT

## 2023-07-19 PROCEDURE — 94640 AIRWAY INHALATION TREATMENT: CPT

## 2023-07-19 PROCEDURE — 96365 THER/PROPH/DIAG IV INF INIT: CPT

## 2023-07-19 PROCEDURE — 94667 MNPJ CHEST WALL 1ST: CPT

## 2023-07-19 PROCEDURE — 99291 CRITICAL CARE FIRST HOUR: CPT

## 2023-07-19 PROCEDURE — 94660 CPAP INITIATION&MGMT: CPT

## 2023-07-19 PROCEDURE — 80048 BASIC METABOLIC PNL TOTAL CA: CPT

## 2023-07-19 PROCEDURE — 83605 ASSAY OF LACTIC ACID: CPT

## 2023-07-19 PROCEDURE — 93005 ELECTROCARDIOGRAM TRACING: CPT

## 2023-07-19 PROCEDURE — 85025 COMPLETE CBC W/AUTO DIFF WBC: CPT

## 2023-07-19 PROCEDURE — 80053 COMPREHEN METABOLIC PANEL: CPT

## 2023-07-19 PROCEDURE — 87635 SARS-COV-2 COVID-19 AMP PRB: CPT

## 2023-07-19 PROCEDURE — 81001 URINALYSIS AUTO W/SCOPE: CPT

## 2023-07-19 PROCEDURE — 84484 ASSAY OF TROPONIN QUANT: CPT

## 2023-07-19 PROCEDURE — 83735 ASSAY OF MAGNESIUM: CPT

## 2023-07-19 PROCEDURE — 83880 ASSAY OF NATRIURETIC PEPTIDE: CPT

## 2023-07-19 PROCEDURE — 85730 THROMBOPLASTIN TIME PARTIAL: CPT

## 2023-07-19 PROCEDURE — 36415 COLL VENOUS BLD VENIPUNCTURE: CPT

## 2023-07-19 PROCEDURE — 96368 THER/DIAG CONCURRENT INF: CPT

## 2023-07-19 PROCEDURE — 74150 CT ABDOMEN W/O CONTRAST: CPT

## 2023-07-19 PROCEDURE — 84145 PROCALCITONIN (PCT): CPT

## 2023-07-19 PROCEDURE — 85610 PROTHROMBIN TIME: CPT

## 2023-07-19 PROCEDURE — 85027 COMPLETE CBC AUTOMATED: CPT

## 2023-07-19 PROCEDURE — 94760 N-INVAS EAR/PLS OXIMETRY 1: CPT

## 2023-07-19 PROCEDURE — 87086 URINE CULTURE/COLONY COUNT: CPT

## 2023-07-19 PROCEDURE — 96375 TX/PRO/DX INJ NEW DRUG ADDON: CPT

## 2023-07-19 RX ADMIN — LATANOPROST SCH DROPS: 50 SOLUTION OPHTHALMIC at 21:15

## 2023-07-19 RX ADMIN — METHYLPREDNISOLONE SODIUM SUCCINATE SCH MG: 125 INJECTION, POWDER, FOR SOLUTION INTRAMUSCULAR; INTRAVENOUS at 18:30

## 2023-07-19 RX ADMIN — FORMOTEROL FUMARATE DIHYDRATE SCH MCG: 20 SOLUTION RESPIRATORY (INHALATION) at 20:44

## 2023-07-19 RX ADMIN — IPRATROPIUM BROMIDE AND ALBUTEROL SULFATE SCH ML: .5; 3 SOLUTION RESPIRATORY (INHALATION) at 15:39

## 2023-07-19 RX ADMIN — BUDESONIDE SCH MG: 0.5 INHALANT ORAL at 20:44

## 2023-07-19 RX ADMIN — IPRATROPIUM BROMIDE AND ALBUTEROL SULFATE SCH ML: .5; 3 SOLUTION RESPIRATORY (INHALATION) at 20:44

## 2023-07-19 RX ADMIN — METHYLPREDNISOLONE SODIUM SUCCINATE SCH MG: 125 INJECTION, POWDER, FOR SOLUTION INTRAMUSCULAR; INTRAVENOUS at 23:46

## 2023-07-19 RX ADMIN — TAMSULOSIN HYDROCHLORIDE SCH MG: 0.4 CAPSULE ORAL at 21:15

## 2023-07-19 NOTE — ED
General Adult HPI





- General


Chief complaint: Shortness of Breath


Stated complaint: UBALDO, Near syncope


Time Seen by Provider: 23 11:12


Source: patient, EMS, RN notes reviewed, old records reviewed


Mode of arrival: EMS


Limitations: no limitations





- History of Present Illness


Initial comments: 





87-year-old male history of COPD presents in extremis.  Patient has had 

progressive dyspnea over the past several weeks with productive cough.  Upon 

arrival the patient is in respiratory distress.  He had been transported by 

paramedics, found to be hypertensive and tachycardic.  He had elevated 

respiratory rate around 30 with wheezing bilaterally.  He was given DuoNeb by 

paramedics during transport without significant improvement.  Patient denies 

fever.  Denies central chest pain.





- Related Data


                                Home Medications











 Medication  Instructions  Recorded  Confirmed


 


Simvastatin [Zocor] 40 mg PO DAILY 01/03/16 10/14/22


 


ALPRAZolam [Xanax] 0.5 mg PO TID PRN 09/21/18 10/14/22


 


Ferrous Sulfate [Iron (65  mg PO DAILY 10/14/22 10/14/22





Elemental)]   


 


Latanoprost/Pf [Latanoprost 0.005% 1 drop BOTH EYES HS 10/14/22 10/14/22





Eye Drop]   


 


Budesonide [Pulmicort] 0.5 mg BID 10/15/22 10/15/22


 


Formoterol Fumarate [Perforomist] 20 mcg INHALATION BID 10/15/22 10/15/22








                                  Previous Rx's











 Medication  Instructions  Recorded


 


Citalopram Hydrobromide [CeleXA] 20 mg PO DAILY #30 tab 18


 


Ipratropium-Albuterol Nebulize 3 ml INHALATION Q6HR PRN #12 each 10/19/22





[Duoneb 0.5 mg-3 mg/3 ml Soln]  


 


Pantoprazole [Protonix] 40 mg PO AC-BID #30 tab 10/19/22


 


Sulfamethox-Tmp 800-160Mg [Bactrim 1 tab PO Q12HR #10 tab 10/19/22





-160 mg]  


 


Tamsulosin [Flomax] 0.4 mg PO HS #30 cap 10/19/22


 


amLODIPine [Norvasc] 2.5 mg PO BID #60 tab 10/19/22


 


predniSONE 5 mg PO DAILY #0 10/19/22


 


predniSONE [Deltasone] 0 mg PO AS DIRECTED #18 tab 10/19/22











                                    Allergies











Allergy/AdvReac Type Severity Reaction Status Date / Time


 


No Known Allergies Allergy   Verified 10/14/22 20:26














Review of Systems


ROS Statement: 


Those systems with pertinent positive or pertinent negative responses have been 

documented in the HPI.





ROS Other: All systems not noted in ROS Statement are negative.





Past Medical History


Past Medical History: Cancer, Chest Pain / Angina, COPD, Deep Vein Thrombosis 

(DVT), Eye Disorder, Hearing Disorder / Deafness, Hyperlipidemia, Hypertension, 

Osteoarthritis (OA), Pneumonia, Prostate Disorder, Renal Disease


Additional Past Medical History / Comment(s): Severe COPD with an FEV1 of 33% of

 predicted, chronic hypoxic respiratory failure, recurrent hospitalization for 

COPD exacerbation, stenotrophomonas tracheal bronchitis - HAD PFT 18.  

Bladder Cancer w/ prev surgery.  RLE DVT.  BPH.  Chronic Back Pain.  VARICOSE 

VEINS.  O2 2L NC.  ACUTE RENAL FAILURE 3/2018 R/T DEHYDRATION/DEPRESSION, PER 

WIFE.


History of Any Multi-Drug Resistant Organisms: None Reported


Past Surgical History: Bladder Surgery, Hernia Repair, Tonsillectomy


Additional Past Surgical History / Comment(s): Bladder CA Surgery, Vasectomy.  

BRONCHIAL WASHING.   EXC CATARACTS KATH.,


Past Anesthesia/Blood Transfusion Reactions: No Reported Reaction


Past Psychological History: Anxiety, Depression


Smoking Status: Never smoker


Past Alcohol Use History: None Reported


Past Drug Use History: None Reported





- Past Family History


  ** Father


Family Medical History: Cancer


Additional Family Medical History / Comment(s): Father had scoliosis.  Father 

 of  lung cancer





  ** Mother


History Unknown: Yes


Family Medical History: CVA/TIA


Additional Family Medical History / Comment(s): Mother . Unknown history





General Exam


General appearance: alert, in distress


Head exam: Present: atraumatic, normocephalic


Eye exam: Present: normal appearance, PERRL


ENT exam: Present: normal exam


Neck exam: Present: normal inspection.  Absent: tenderness, meningismus


Respiratory exam: Present: respiratory distress, wheezes, accessory muscle use, 

decreased breath sounds


Cardiovascular Exam: Present: regular rate, normal rhythm


GI/Abdominal exam: Present: soft.  Absent: distended, tenderness, guarding


Extremities exam: Present: normal inspection, normal capillary refill.  Absent: 

calf tenderness


Neurological exam: Present: alert, oriented X3, CN II-XII intact.  Absent: motor

 sensory deficit


Psychiatric exam: Present: normal affect, normal mood


Skin exam: Present: warm, dry, intact





Course


                                   Vital Signs











  23





  11:13 11:21 11:43


 


Temperature 98.7 F  


 


Pulse Rate 93  


 


Respiratory 22 22 





Rate   


 


Blood Pressure 194/94  


 


O2 Sat by Pulse 100  





Oximetry   


 


Fraction of   40





Inspired Oxygen   





(FIO2)   














  23





  11:45 11:55 12:10


 


Temperature   


 


Pulse Rate 80 88 92


 


Respiratory   





Rate   


 


Blood Pressure   


 


O2 Sat by Pulse   





Oximetry   


 


Fraction of   





Inspired Oxygen   





(FIO2)   














Medical Decision Making





- Medical Decision Making





Was pt. sent in by a medical professional or institution (, PA, NP, urgent 

care, hospital, or nursing home...) When possible be specific


@  -No


Did you speak to anyone other than the patient for history (EMS, parent, family,

 police, friend...)? What history was obtained from this source 


@  -Paramedics, and patient's family members were at bedside


Did you review nursing and triage notes (agree or disagree)?  Why? 


@  -I reviewed and agree with nursing and triage notes


Were old charts reviewed (outside hosp., previous admission, EMS record, old 

EKG, old radiological studies, urgent care reports/EKG's, nursing home records)?

 Report findings 


@  -No old charts were reviewed


Differential Diagnosis (chest pain, altered mental status, abdominal pain women,

 abdominal pain men, vaginal bleeding, weakness, fever, dyspnea, syncope, 

headache, dizziness, GI bleed, back pain, seizure, CVA, palpatations, mental 

health, musculoskeletal)? 


@  -Differential Dyspnea:


Coronary syndrome, arrhythmia, tamponade, asthma, COPD, pulmonary embolism, 

pneumonia, pneumothorax, pulmonary effusion, anaphylaxis, diabetic ketoacidosis,

 flailed chest, pulmonary contusion, diaphragmatic rupture, anemia, 

neuromuscular, this is not meant to be an all-inclusive list. 


EKG interpreted by me (3pts min.).


@EKG: Sinus rhythm rate of 97 MA interval 172, QRS duration 98, , tremor 

artifact limiting assessment, no ST segment elevation.


X-rays interpreted by me (1pt min.).


@  Hyperinflation, no acute findings


CT interpreted by me (1pt min.).


@  -None done


U/S interpreted by me (1pt. min.).


@  -None done


What testing was considered but not performed or refused? (CT, X-rays, U/S, 

labs)? Why?


@  -None


What meds were considered but not given or refused? Why?


@  -None


Did you discuss the management of the patient with other professionals 

(professionals i.e. , PA, NP, lab, RT, psych nurse, , , 

teacher, , )? Give summary


@  -[Dr. Whatley


Was smoking cessation discussed for >3mins.?


@  -No


Was critical care preformed (if so, how long)?


@  -[yes 35 min


Were there social determinants of health that impacted care today? How? 

(Homelessness, low income, unemployed, alcoholism, drug addiction, trans

portation, low edu. Level, literacy, decrease access to med. care, correction, rehab)?


@  -No


Was there de-escalation of care discussed even if they declined (Discuss DNR or 

withdrawal of care, Hospice)? DNR status


@  -No


What co-morbidities impacted this encounter? (DM, HTN, Smoking, COPD, CAD, 

Cancer, CVA, ARF, Chemo, Hep., AIDS, mental health diagnosis, sleep apnea, 

morbid obesity)?


@  -[COPD


Was patient admitted / discharged? Hospital course, mention meds given and 

route, prescriptions, significant lab abnormalities, going to OR and other 

pertinent info.


@  86 yo male with severe  resp distress.  Patient has wheezing throughout 

bilateral lung fields.  Patient is in moderate to severe respiratory distress.  

Chest x-ray shows hyperinflation without focal pneumonia or acute findings.  

Patient given albuterol, Atrovent, steroids in the emergency department.  He has

 chronic stable lab abnormalities.  He will be admitted for COPD exacerbation.


Undiagnosed new problem with uncertain prognosis?


@  -No


Drug Therapy requiring intensive monitoring for toxicity (Heparin, Nitro, 

Insulin, Cardizem)?


@  -No


Were any procedures done?


@  -No


Diagnosis/symptom?


@  -COPD exacerbation


Acute, or Chronic, or Acute on Chronic?


@  -[Acute on chronic


Uncomplicated (without systemic symptoms) or Complicated (systemic symptoms)?


@  -default


Side effects of treatment?


@  -No


Exacerbation, Progression, or Severe Exacerbation?


@  -No


Poses a threat to life or bodily function? How? (Chest pain, USA, MI, pneumonia,

 PE, COPD, DKA, ARF, appy, cholecystitis, CVA, Diverticulitis, Homicidal, 

Suicidal, threat to staff... and all critical care pts)


@  -[yes, hypoxia resp failure





- Lab Data


Result diagrams: 


                                 23 12:02





                                 23 12:02


                                   Lab Results











  23 Range/Units





  12:02 12:02 12:02 


 


WBC  7.3    (3.8-10.6)  k/uL


 


RBC  3.56 L    (4.30-5.90)  m/uL


 


Hgb  9.2 L    (13.0-17.5)  gm/dL


 


Hct  32.6 L    (39.0-53.0)  %


 


MCV  91.5    (80.0-100.0)  fL


 


MCH  25.9    (25.0-35.0)  pg


 


MCHC  28.3 L    (31.0-37.0)  g/dL


 


RDW  16.4 H    (11.5-15.5)  %


 


Plt Count  383    (150-450)  k/uL


 


MPV  7.5    


 


Neutrophils %  80    %


 


Lymphocytes %  12    %


 


Monocytes %  5    %


 


Eosinophils %  1    %


 


Basophils %  0    %


 


Neutrophils #  5.9    (1.3-7.7)  k/uL


 


Lymphocytes #  0.9 L    (1.0-4.8)  k/uL


 


Monocytes #  0.4    (0-1.0)  k/uL


 


Eosinophils #  0.1    (0-0.7)  k/uL


 


Basophils #  0.0    (0-0.2)  k/uL


 


Hypochromasia  Slight    


 


Anisocytosis  Slight    


 


PT   9.6   (9.0-12.0)  sec


 


INR   0.9   (<1.2)  


 


APTT   21.7 L   (22.0-30.0)  sec


 


Sodium    136 L  (137-145)  mmol/L


 


Potassium    5.4 H  (3.5-5.1)  mmol/L


 


Chloride    103  ()  mmol/L


 


Carbon Dioxide    24  (22-30)  mmol/L


 


Anion Gap    9  mmol/L


 


BUN    50 H  (9-20)  mg/dL


 


Creatinine    1.46 H  (0.66-1.25)  mg/dL


 


Est GFR (CKD-EPI)AfAm    49  (>60 ml/min/1.73 sqM)  


 


Est GFR (CKD-EPI)NonAf    43  (>60 ml/min/1.73 sqM)  


 


Glucose    104 H  (74-99)  mg/dL


 


Plasma Lactic Acid Samuel     (0.7-2.0)  mmol/L


 


Calcium    8.4  (8.4-10.2)  mg/dL


 


Magnesium    2.3  (1.6-2.3)  mg/dL


 


Total Bilirubin    0.5  (0.2-1.3)  mg/dL


 


AST    41  (17-59)  U/L


 


ALT    25  (4-49)  U/L


 


Alkaline Phosphatase    66  ()  U/L


 


Troponin I     (0.000-0.034)  ng/mL


 


NT-Pro-B Natriuret Pep     pg/mL


 


Total Protein    7.6  (6.3-8.2)  g/dL


 


Albumin    3.8  (3.5-5.0)  g/dL














  23 Range/Units





  12:02 12:02 12:02 


 


WBC     (3.8-10.6)  k/uL


 


RBC     (4.30-5.90)  m/uL


 


Hgb     (13.0-17.5)  gm/dL


 


Hct     (39.0-53.0)  %


 


MCV     (80.0-100.0)  fL


 


MCH     (25.0-35.0)  pg


 


MCHC     (31.0-37.0)  g/dL


 


RDW     (11.5-15.5)  %


 


Plt Count     (150-450)  k/uL


 


MPV     


 


Neutrophils %     %


 


Lymphocytes %     %


 


Monocytes %     %


 


Eosinophils %     %


 


Basophils %     %


 


Neutrophils #     (1.3-7.7)  k/uL


 


Lymphocytes #     (1.0-4.8)  k/uL


 


Monocytes #     (0-1.0)  k/uL


 


Eosinophils #     (0-0.7)  k/uL


 


Basophils #     (0-0.2)  k/uL


 


Hypochromasia     


 


Anisocytosis     


 


PT     (9.0-12.0)  sec


 


INR     (<1.2)  


 


APTT     (22.0-30.0)  sec


 


Sodium     (137-145)  mmol/L


 


Potassium     (3.5-5.1)  mmol/L


 


Chloride     ()  mmol/L


 


Carbon Dioxide     (22-30)  mmol/L


 


Anion Gap     mmol/L


 


BUN     (9-20)  mg/dL


 


Creatinine     (0.66-1.25)  mg/dL


 


Est GFR (CKD-EPI)AfAm     (>60 ml/min/1.73 sqM)  


 


Est GFR (CKD-EPI)NonAf     (>60 ml/min/1.73 sqM)  


 


Glucose     (74-99)  mg/dL


 


Plasma Lactic Acid Samuel  2.1 H*    (0.7-2.0)  mmol/L


 


Calcium     (8.4-10.2)  mg/dL


 


Magnesium     (1.6-2.3)  mg/dL


 


Total Bilirubin     (0.2-1.3)  mg/dL


 


AST     (17-59)  U/L


 


ALT     (4-49)  U/L


 


Alkaline Phosphatase     ()  U/L


 


Troponin I   0.025   (0.000-0.034)  ng/mL


 


NT-Pro-B Natriuret Pep    1150  pg/mL


 


Total Protein     (6.3-8.2)  g/dL


 


Albumin     (3.5-5.0)  g/dL














Critical Care Time


Critical Care Time: Yes


Total Critical Care Time: 35





Disposition


Clinical Impression: 


 Acute exacerbation of chronic obstructive airways disease





Disposition: ADMITTED AS IP TO THIS HOSP


Condition: Stable


Is patient prescribed a controlled substance at d/c from ED?: No


Referrals: 


Nik Whatley MD [Primary Care Provider] - 1-2 days


Time of Disposition: 13:46

## 2023-07-19 NOTE — XR
EXAMINATION TYPE: XR chest 1V portable

 

DATE OF EXAM: 7/19/2023

 

Comparison: 10/14/2022

 

Clinical History: 87-year-old male shortness of breath, filomena

 

Findings:

Heart normal size. Aorta and coronary vasculature within normal limits. Hyperinflation. No consolidat
ion or pleural effusion.

 

 

Impression:

COPD. No acute process seen.

## 2023-07-20 RX ADMIN — AZITHROMYCIN SCH MG: 250 TABLET, FILM COATED ORAL at 10:13

## 2023-07-20 RX ADMIN — IPRATROPIUM BROMIDE AND ALBUTEROL SULFATE SCH ML: .5; 3 SOLUTION RESPIRATORY (INHALATION) at 16:56

## 2023-07-20 RX ADMIN — METHYLPREDNISOLONE SODIUM SUCCINATE SCH MG: 125 INJECTION, POWDER, FOR SOLUTION INTRAMUSCULAR; INTRAVENOUS at 23:48

## 2023-07-20 RX ADMIN — TAMSULOSIN HYDROCHLORIDE SCH MG: 0.4 CAPSULE ORAL at 17:13

## 2023-07-20 RX ADMIN — PANTOPRAZOLE SODIUM SCH MG: 40 TABLET, DELAYED RELEASE ORAL at 09:53

## 2023-07-20 RX ADMIN — METHYLPREDNISOLONE SODIUM SUCCINATE SCH MG: 125 INJECTION, POWDER, FOR SOLUTION INTRAMUSCULAR; INTRAVENOUS at 11:39

## 2023-07-20 RX ADMIN — IPRATROPIUM BROMIDE AND ALBUTEROL SULFATE SCH ML: .5; 3 SOLUTION RESPIRATORY (INHALATION) at 12:25

## 2023-07-20 RX ADMIN — IPRATROPIUM BROMIDE AND ALBUTEROL SULFATE SCH ML: .5; 3 SOLUTION RESPIRATORY (INHALATION) at 08:58

## 2023-07-20 RX ADMIN — FORMOTEROL FUMARATE DIHYDRATE SCH MCG: 20 SOLUTION RESPIRATORY (INHALATION) at 08:57

## 2023-07-20 RX ADMIN — ATORVASTATIN CALCIUM SCH MG: 20 TABLET, FILM COATED ORAL at 09:53

## 2023-07-20 RX ADMIN — LATANOPROST SCH DROPS: 50 SOLUTION OPHTHALMIC at 20:22

## 2023-07-20 RX ADMIN — FORMOTEROL FUMARATE DIHYDRATE SCH MCG: 20 SOLUTION RESPIRATORY (INHALATION) at 20:24

## 2023-07-20 RX ADMIN — CITALOPRAM HYDROBROMIDE SCH MG: 20 TABLET ORAL at 09:53

## 2023-07-20 RX ADMIN — BUDESONIDE SCH MG: 0.5 INHALANT ORAL at 20:25

## 2023-07-20 RX ADMIN — METHYLPREDNISOLONE SODIUM SUCCINATE SCH MG: 125 INJECTION, POWDER, FOR SOLUTION INTRAMUSCULAR; INTRAVENOUS at 17:13

## 2023-07-20 RX ADMIN — BUDESONIDE SCH MG: 0.5 INHALANT ORAL at 08:57

## 2023-07-20 RX ADMIN — METHYLPREDNISOLONE SODIUM SUCCINATE SCH MG: 125 INJECTION, POWDER, FOR SOLUTION INTRAMUSCULAR; INTRAVENOUS at 05:40

## 2023-07-20 RX ADMIN — IPRATROPIUM BROMIDE AND ALBUTEROL SULFATE SCH ML: .5; 3 SOLUTION RESPIRATORY (INHALATION) at 20:25

## 2023-07-20 NOTE — P.CNPUL
History of Present Illness


Consult date: 23


Requesting physician: Dameon Castillo


Reason for consult: COPD


Chief complaint: Shortness of breath


History of present illness: 





I am seeing this patient in new consultation today 2023 for suspected 

acute COPD exacerbation.  Patient is an 87-year-old white male with past medical

history significant for severe oxygen dependent COPD, pulmonary hypertension, 

hypertension, hyperlipidemia, DVT, bladder cancer.  Patient does follow up in 

the office with Dr. Terrazas, for management of his severe COPD with FEV1 41% of 

predicted.  Patient does utilize 2 L/m nasal cannula . Patient's primary 

care provider is Dr. Whatley.  Patient is fairly hard of hearing, making it 

difficult to communicate.  Apparently, the patient has had some progressive 

shortness of breath and cough for the last several weeks.  He presented to the 

emergency room via EMS yesterday afternoon.  He was found to be in some acute 

respiratory distress.  He was also hypertensive and tachycardic.  Chest x-ray on

arrival showed no acute cardiopulmonary process.  Patient is currently sitting 

up in bed, on 2 L/m nasal cannula, in no acute distress.  He is currently on a 

combination of DuoNeb's, desonide, formoterol, and IV Solu-Medrol.  He seems 

fairly comfortable, and is already showing improvement in his respiratory 

status. Denies any fever, chills, myalgias, cough, hemoptysis.  Denies any chest

pain, heart palpitations, lower extremity edema, orthopnea.  CBC on arrival 

showed a WBC count of 7.3, hemoglobin 9.2, hematocrit 32.6, platelets 383.  BMP 

on arrival shows sodium 136, potassium 5.4, chloride 103, serum bicarb 24, BUN 

50, creatinine 1.46, glucose 104.  NT proBNP was mildly elevated at 1150, but 

not significant for age.  Troponin was 0.025.  Overall, the patient's condition 

seems to be improving nicely.





Review of Systems





REVIEW OF SYSTEMS:


CONSTITUTIONAL: Denies any recent significant weight loss or weight gain.


EYES: Denies change in vision.


EARS, NOSE, MOUTH, THROAT: Denies headaches, denies sore throat.


CARDIOVASCULAR: See HPI


RESPIRATORY: See HPI. 


GASTROINTESTINAL: Denies change in appetite, abdominal pain, nausea and 

vomiting, or diarrhea 


GENITOURINARY: Denies hematuria, denies infections.


MUSKULOSKELETAL: Denies pain, denies swelling.


INTEGUMENTARY: Denies rash, denies eczema.


NEUROLOGICAL: Denies recent memory loss, no recent seizure activity. 


PSYCHIATRIC: Denies anxiety, denies depression.


HEMATOLOGIC/LYMPHATIC: Denies anemia, denies enlarged lymph node





Past Medical History


Past Medical History: Cancer, Chest Pain / Angina, COPD, Deep Vein Thrombosis 

(DVT), Eye Disorder, Hearing Disorder / Deafness, Hyperlipidemia, Hypertension, 

Osteoarthritis (OA), Pneumonia, Prostate Disorder, Renal Disease


Additional Past Medical History / Comment(s): Severe COPD with an FEV1 of 33% of

predicted, chronic hypoxic respiratory failure, recurrent hospitalization for 

COPD exacerbation, stenotrophomonas tracheal bronchitis - HAD PFT 18.  

Bladder Cancer w/ prev surgery.  RLE DVT.  BPH.  Chronic Back Pain.  VARICOSE 

VEINS.  O2 2L NC.  ACUTE RENAL FAILURE 3/2018 R/T DEHYDRATION/DEPRESSION, PER 

WIFE.


History of Any Multi-Drug Resistant Organisms: None Reported


Past Surgical History: Bladder Surgery, Hernia Repair, Tonsillectomy


Additional Past Surgical History / Comment(s): Bladder CA Surgery, Vasectomy.  

BRONCHIAL WASHING.   EXC CATARACTS KATH.,


Past Anesthesia/Blood Transfusion Reactions: No Reported Reaction


Past Psychological History: Anxiety, Depression


Additional Psychological History / Comment(s): Pt resides with his spouse in a 

single level home that has 4 [address] steps..pt drives.  He has a nebulizer, 02 

uses prn.  He has [facility] home care.


Smoking Status: Former smoker


Past Alcohol Use History: None Reported


Additional Past Alcohol Use History / Comment(s): Pt started smoking in 1955 and

quit in .  At one time smoking 2-3 ppd.


Past Drug Use History: None Reported





- Past Family History


  ** Father


Family Medical History: Cancer


Additional Family Medical History / Comment(s): Father had scoliosis.  Father 

 of  lung cancer





  ** Mother


History Unknown: Yes


Family Medical History: CVA/TIA


Additional Family Medical History / Comment(s): Mother . Unknown history





Medications and Allergies


                                Home Medications











 Medication  Instructions  Recorded  Confirmed  Type


 


Simvastatin [Zocor] 40 mg PO DAILY 16 History


 


Citalopram Hydrobromide [CeleXA] 20 mg PO DAILY #30 tab 18 Rx


 


ALPRAZolam [Xanax] 0.5 mg PO TID PRN 18 History


 


Latanoprost/Pf [Latanoprost 0.005% 1 drop BOTH EYES HS 10/14/22 07/19/23 History





Eye Drop]    


 


Tamsulosin [Flomax] 0.4 mg PO HS #30 cap 10/19/22 07/19/23 Rx


 


amLODIPine [Norvasc] 2.5 mg PO BID #60 tab 10/19/22 07/19/23 Rx


 


predniSONE 5 mg PO DAILY #0 10/19/22 07/19/23 Rx


 


Budesonide [Pulmicort] 0.5 mg INHALATION RT-BID 23 History


 


Formoterol Fumarate [Perforomist] 20 mcg INHALATION RT-BID 23 

History








                                    Allergies











Allergy/AdvReac Type Severity Reaction Status Date / Time


 


No Known Allergies Allergy   Verified 23 14:09














Physical Exam


Vitals: 


                                   Vital Signs











  Temp Pulse Pulse Resp BP BP Pulse Ox


 


 23 07:07  97.6 F   84  15   136/68  98


 


 23 00:35  96.7 F L   90  18   112/61  97


 


 23 21:15   99     


 


 23 21:03   92     


 


 23 20:56  98.2 F   87  18   125/57  100


 


 23 20:47   90      99


 


 23 19:42   88   20  144/92   96


 


 23 18:31  98.2 F  91   20  144/63   97


 


 23 15:50   91     


 


 23 15:39   94     


 


 23 14:07  98.0 F  80   16  144/83   97


 


 23 12:10   92     


 


 23 11:55   88     


 


 23 11:45   80     


 


 23 11:43       


 


 23 11:21     22   


 


 23 11:13  98.7 F  93   22  194/94   100














  FiO2


 


 23 07:07 


 


 23 00:35 


 


 23 21:15 


 


 23 21:03 


 


 23 20:56 


 


 23 20:47 


 


 23 19:42 


 


 23 18:31 


 


 23 15:50 


 


 23 15:39 


 


 23 14:07 


 


 23 12:10 


 


 23 11:55 


 


 23 11:45 


 


 23 11:43  40


 


 23 11:21 


 


 23 11:13 








                                Intake and Output











 23





 22:59 06:59 14:59


 


Other:   


 


  Voiding Method Diaper  


 


  Weight 58.967 kg 54 kg 














GENERAL EXAM: Alert, 87-year-old white male who is frail, and comfortable in no 

apparent distress.


HEAD: Normocephalic and atraumatic


EYES: Normal reaction of pupils, equal size.


NOSE: Clear with pink turbinates.


THROAT: No erythema or exudates.


NECK: No masses, no JVD.


CHEST: No chest wall deformity.


LUNGS: Equal air entry with right lower lobe inspiratory crackles.  No 

significant wheezing, rhonchi, or focal dullness.  He does have an intermittent 

congested cough.  On 2 L/m nasal cannula.  No conversational dyspnea or 

accessory muscle use.. 


CVS: S1 and S2 normal with no audible murmur, regular rhythm. No extra heart 

sounds


ABDOMEN: No hepatosplenomegaly, active bowel sounds, no guarding or rigidity. 


SPINE: No scoliosis or deformity


SKIN: No rashes


CENTRAL NERVOUS SYSTEM: No focal deficits, tone is normal in all 4 extremities.


EXTREMITIES: There is no peripheral edema, clubbing, or cyanosis.  Peripheral 

pulses are intact.





Results





- Laboratory Findings


CBC and BMP: 


                                 23 12:02





                                 23 12:02


PT/INR, D-dimer











PT  9.6 sec (9.0-12.0)   23  12:02    


 


INR  0.9  (<1.2)   23  12:02    








Abnormal lab findings: 


                                  Abnormal Labs











  23





  12:02 12:02 12:02


 


RBC  3.56 L  


 


Hgb  9.2 L  


 


Hct  32.6 L  


 


MCHC  28.3 L  


 


RDW  16.4 H  


 


Lymphocytes #  0.9 L  


 


APTT   21.7 L 


 


Sodium    136 L


 


Potassium    5.4 H


 


BUN    50 H


 


Creatinine    1.46 H


 


Glucose    104 H


 


Plasma Lactic Acid Samuel   














  23





  12:02


 


RBC 


 


Hgb 


 


Hct 


 


MCHC 


 


RDW 


 


Lymphocytes # 


 


APTT 


 


Sodium 


 


Potassium 


 


BUN 


 


Creatinine 


 


Glucose 


 


Plasma Lactic Acid Samuel  2.1 H*














- Diagnostic Findings


Chest x-ray: image reviewed





Assessment and Plan


Assessment: 





Acute exacerbation of COPD, improving





Chronic hypoxemic respiratory failure, currently on 2 L/m nasal cannula





History of pulmonary hypertension





Benign essential hypertension





Hyperlipidemia





Chronic kidney disease stage III





Anemia of chronic disease





History DVT





History of bladder cancer





Ex-smoker














Plan:


Patient's medications, labs, chest x-ray reviewed


Continue supplemental oxygen


Continue combination of DuoNeb's, formoterol, budesonide, IV Solu-Medrol


Patient appears to be improving nicely, and will likely be switched over to 

prednisone taper


Continue empiric azithromycin and check procalcitonin


Anticipate discharge in the next 24-48 hours


We will continue to follow








I have personally seen and examined the patient, performed the documentation and

 the assessment and plan as written.  Number of minutes spent on the visit:20











Time with Patient: Greater than 30

## 2023-07-20 NOTE — P.HPIM
History of Present Illness


H&P Date: 23


Chief Complaint: Shortness of breath


This is an 87-year-old male who presented to the emergency room with shortness 

of breath which has increased over the last several weeks.  Upon arrival of EMS 

he was in respiratory distress, hypertensive and tachycardic, with a respiratory

rate of 30.  Patient has a medical history of severe oxygen-dependent COPD, 

pulmonary hypertension, hypertension, hyperlipidemia, DVT, and bladder cancer.  

He is maintained on 2 L of oxygen via nasal cannula at home.  This morning he is

seen sitting up in bed, maintained on 2 L of oxygen.  He reports shortness of 

breath has somewhat improved, but does feel fatigued.








Review of Systems


Constitutional: Denies chills, Denies fever


Cardiovascular: Reports dyspnea on exertion, Denies chest pain


Respiratory: Reports cough, Reports dyspnea


Gastrointestinal: Denies abdominal pain, Denies nausea


Musculoskeletal: Denies arm numbness/tingling, Denies leg numbness/tingling


Neurological: Reports weakness, Denies headaches





Past Medical History


Past Medical History: Cancer, Chest Pain / Angina, COPD, Deep Vein Thrombosis 

(DVT), Eye Disorder, Hearing Disorder / Deafness, Hyperlipidemia, Hypertension, 

Osteoarthritis (OA), Pneumonia, Prostate Disorder, Renal Disease


Additional Past Medical History / Comment(s): Severe COPD with an FEV1 of 33% of

predicted, chronic hypoxic respiratory failure, recurrent hospitalization for 

COPD exacerbation, stenotrophomonas tracheal bronchitis - HAD PFT 18.  

Bladder Cancer w/ prev surgery.  RLE DVT.  BPH.  Chronic Back Pain.  VARICOSE 

VEINS.  O2 2L NC.  ACUTE RENAL FAILURE 3/2018 R/T DEHYDRATION/DEPRESSION, PER 

WIFE.


History of Any Multi-Drug Resistant Organisms: None Reported


Past Surgical History: Bladder Surgery, Hernia Repair, Tonsillectomy


Additional Past Surgical History / Comment(s): Bladder CA Surgery, Vasectomy.  

BRONCHIAL WASHING.   EXC CATARACTS KATH.,


Past Anesthesia/Blood Transfusion Reactions: No Reported Reaction


Past Psychological History: Anxiety, Depression


Additional Psychological History / Comment(s): Pt resides with his spouse in a 

single level home that has 4 porchs steps..pt drives.  He has a nebulizer, 02 

uses prn.  He has [facility] home care.


Smoking Status: Former smoker


Past Alcohol Use History: None Reported


Additional Past Alcohol Use History / Comment(s): Pt started smoking in 5 and

quit in .  At one time smoking 2-3 ppd.


Past Drug Use History: None Reported





- Past Family History


  ** Father


Family Medical History: Cancer


Additional Family Medical History / Comment(s): Father had scoliosis.  Father 

 of  lung cancer





  ** Mother


History Unknown: Yes


Family Medical History: CVA/TIA


Additional Family Medical History / Comment(s): Mother . Unknown history





Medications and Allergies


                                Home Medications











 Medication  Instructions  Recorded  Confirmed  Type


 


Simvastatin [Zocor] 40 mg PO DAILY 16 History


 


Citalopram Hydrobromide [CeleXA] 20 mg PO DAILY #30 tab 18 Rx


 


ALPRAZolam [Xanax] 0.5 mg PO TID PRN 18 History


 


Latanoprost/Pf [Latanoprost 0.005% 1 drop BOTH EYES HS 10/14/22 07/19/23 History





Eye Drop]    


 


Tamsulosin [Flomax] 0.4 mg PO HS #30 cap 10/19/22 07/19/23 Rx


 


amLODIPine [Norvasc] 2.5 mg PO BID #60 tab 10/19/22 07/19/23 Rx


 


predniSONE 5 mg PO DAILY #0 10/19/22 07/19/23 Rx


 


Budesonide [Pulmicort] 0.5 mg INHALATION RT-BID 23 History


 


Formoterol Fumarate [Perforomist] 20 mcg INHALATION RT-BID 23 

History








                                    Allergies











Allergy/AdvReac Type Severity Reaction Status Date / Time


 


No Known Allergies Allergy   Verified 23 14:09














Physical Exam


Vitals: 


                                   Vital Signs











  Temp Pulse Pulse Resp BP BP Pulse Ox


 


 23 07:07  97.6 F   84  15   136/68  98


 


 23 00:35  96.7 F L   90  18   112/61  97


 


 23 21:15   99     


 


 23 21:03   92     


 


 23 20:56  98.2 F   87  18   125/57  100


 


 23 20:47   90      99


 


 23 19:42   88   20  144/92   96


 


 23 18:31  98.2 F  91   20  144/63   97


 


 23 15:50   91     


 


 23 15:39   94     


 


 23 14:07  98.0 F  80   16  144/83   97


 


 23 12:10   92     


 


 23 11:55   88     


 


 23 11:45   80     


 


 23 11:43       


 


 23 11:21     22   


 


 23 11:13  98.7 F  93   22  194/94   100














  FiO2


 


 23 07:07 


 


 23 00:35 


 


 23 21:15 


 


 23 21:03 


 


 23 20:56 


 


 23 20:47 


 


 23 19:42 


 


 23 18:31 


 


 23 15:50 


 


 23 15:39 


 


 23 14:07 


 


 23 12:10 


 


 23 11:55 


 


 23 11:45 


 


 23 11:43  40


 


 23 11:21 


 


 23 11:13 








                                Intake and Output











 23





 22:59 06:59 14:59


 


Other:   


 


  Voiding Method Diaper  


 


  Weight 58.967 kg 54 kg 














- Constitutional


General appearance: cooperative, no acute distress





- EENT


Eyes: PERRLA





- Neck


Neck: no lymphadenopathy, normal ROM, no rigidity





- Respiratory


Respiratory: bilateral: diminished





- Cardiovascular


Rhythm: regular


Heart sounds: normal: S1, S2





- Gastrointestinal


General gastrointestinal: soft, no tenderness





- Integumentary


Integumentary: normal, normal turgor





- Musculoskeletal


Musculoskeletal: generalized weakness





- Psychiatric


Psychiatric: A&O x's 3, appropriate affect, intact judgment & insight





Results


CBC & Chem 7: 


                                 23 12:02





                                 23 12:02


Labs: 


                  Abnormal Lab Results - Last 24 Hours (Table)











  23 Range/Units





  12:02 12:02 12:02 


 


RBC  3.56 L    (4.30-5.90)  m/uL


 


Hgb  9.2 L    (13.0-17.5)  gm/dL


 


Hct  32.6 L    (39.0-53.0)  %


 


MCHC  28.3 L    (31.0-37.0)  g/dL


 


RDW  16.4 H    (11.5-15.5)  %


 


Lymphocytes #  0.9 L    (1.0-4.8)  k/uL


 


APTT   21.7 L   (22.0-30.0)  sec


 


Sodium    136 L  (137-145)  mmol/L


 


Potassium    5.4 H  (3.5-5.1)  mmol/L


 


BUN    50 H  (9-20)  mg/dL


 


Creatinine    1.46 H  (0.66-1.25)  mg/dL


 


Glucose    104 H  (74-99)  mg/dL


 


Plasma Lactic Acid Samuel     (0.7-2.0)  mmol/L














  23 Range/Units





  12:02 


 


RBC   (4.30-5.90)  m/uL


 


Hgb   (13.0-17.5)  gm/dL


 


Hct   (39.0-53.0)  %


 


MCHC   (31.0-37.0)  g/dL


 


RDW   (11.5-15.5)  %


 


Lymphocytes #   (1.0-4.8)  k/uL


 


APTT   (22.0-30.0)  sec


 


Sodium   (137-145)  mmol/L


 


Potassium   (3.5-5.1)  mmol/L


 


BUN   (9-20)  mg/dL


 


Creatinine   (0.66-1.25)  mg/dL


 


Glucose   (74-99)  mg/dL


 


Plasma Lactic Acid Samuel  2.1 H*  (0.7-2.0)  mmol/L














Thrombosis Risk Factor Assmnt





- Choose All That Apply


Any of the Below Risk Factors Present?: Yes


Each Factor Represents 1 point: Abnormal pulmonary function (COPD)


Other Risk Factors: Yes


Each Risk Factor Represents 3 Points: Age 75 years or older


Thrombosis Risk Factor Assessment Total Risk Factor Score: 4


Thrombosis Risk Factor Assessment Level: Moderate Risk





Assessment and Plan


(1) Acute exacerbation of chronic obstructive airways disease


Current Visit: Yes   Status: Acute   Code(s): J44.1 - CHRONIC OBSTRUCTIVE 

PULMONARY DISEASE W (ACUTE) EXACERBATION   SNOMED Code(s): 693215166


   





(2) Dyspnea


Current Visit: No   Status: Acute   Code(s): R06.00 - DYSPNEA, UNSPECIFIED   

SNOMED Code(s): 623684286


   





(3) Hyperlipidemia


Current Visit: No   Status: Acute   Code(s): E78.5 - HYPERLIPIDEMIA, UNSPECIFIED

   SNOMED Code(s): 93540761


   





(4) Hypertension


Current Visit: No   Status: Acute   Code(s): I10 - ESSENTIAL (PRIMARY) 

HYPERTENSION   SNOMED Code(s): 47838087


   





(5) History of pulmonary hypertension


Current Visit: Yes   Status: Acute   Code(s): Z86.79 - PERSONAL HISTORY OF OTHER

 DISEASES OF THE CIRCULATORY SYSTEM   SNOMED Code(s): 502818764


   


Plan: 


Home medications have been reconciled.





Add Protonix for GI prophylaxis.





Pulmonology has been consulted, appreciate recommendations.





Patient wishes to be a no code.





Patient seen and evaluated by nurse practitioner, physician in agreement with 

plan

## 2023-07-21 LAB
ALBUMIN SERPL-MCNC: 3.6 D/DL (ref 3.8–4.9)
ALBUMIN/GLOB SERPL: 1.2 RATIO (ref 1.6–3.17)
ALP SERPL-CCNC: 65 U/L (ref 41–126)
ALT SERPL-CCNC: 20 U/L (ref 10–49)
ANION GAP SERPL CALC-SCNC: 11.1 MMOL/L (ref 4–12)
AST SERPL-CCNC: 22 U/L (ref 14–35)
BUN SERPL-SCNC: 59.1 MG/DL (ref 9–27)
BUN/CREAT SERPL: 29.55 RATIO (ref 12–20)
CALCIUM SPEC-MCNC: 8.7 MG/DL (ref 8.7–10.3)
CHLORIDE SERPL-SCNC: 104 MMOL/L (ref 96–109)
CO2 SERPL-SCNC: 23.9 MMOL/L (ref 21.6–31.8)
ERYTHROCYTE [DISTWIDTH] IN BLOOD BY AUTOMATED COUNT: 3.09 X 10*6/UL (ref 4.4–5.6)
ERYTHROCYTE [DISTWIDTH] IN BLOOD: 17.2 % (ref 11.5–14.5)
GLOBULIN SER CALC-MCNC: 3 D/DL (ref 1.6–3.3)
GLUCOSE SERPL-MCNC: 116 MG/DL (ref 70–110)
HCT VFR BLD AUTO: 28.9 % (ref 39.6–50)
HGB BLD-MCNC: 8.7 D/DL (ref 12–15)
MCH RBC QN AUTO: 28.2 PG (ref 27–32)
MCHC RBC AUTO-ENTMCNC: 30.1 D/DL (ref 32–37)
MCV RBC AUTO: 93.5 FL (ref 80–97)
NRBC BLD AUTO-RTO: 0 X 10*3/UL (ref 0–0.01)
PLATELET # BLD AUTO: 534 X 10*3/UL (ref 140–440)
POTASSIUM SERPL-SCNC: 5.7 MMOL/L (ref 3.5–5.5)
PROT SERPL-MCNC: 6.6 D/DL (ref 6.2–8.2)
SODIUM SERPL-SCNC: 139 MMOL/L (ref 135–145)
WBC # BLD AUTO: 11.67 X 10*3/UL (ref 4.5–10)

## 2023-07-21 RX ADMIN — CITALOPRAM HYDROBROMIDE SCH MG: 20 TABLET ORAL at 08:33

## 2023-07-21 RX ADMIN — IPRATROPIUM BROMIDE AND ALBUTEROL SULFATE SCH ML: .5; 3 SOLUTION RESPIRATORY (INHALATION) at 11:44

## 2023-07-21 RX ADMIN — IPRATROPIUM BROMIDE AND ALBUTEROL SULFATE SCH ML: .5; 3 SOLUTION RESPIRATORY (INHALATION) at 08:49

## 2023-07-21 RX ADMIN — METHYLPREDNISOLONE SODIUM SUCCINATE SCH MG: 125 INJECTION, POWDER, FOR SOLUTION INTRAMUSCULAR; INTRAVENOUS at 17:54

## 2023-07-21 RX ADMIN — TAMSULOSIN HYDROCHLORIDE SCH MG: 0.4 CAPSULE ORAL at 17:54

## 2023-07-21 RX ADMIN — FORMOTEROL FUMARATE DIHYDRATE SCH MCG: 20 SOLUTION RESPIRATORY (INHALATION) at 20:42

## 2023-07-21 RX ADMIN — ATORVASTATIN CALCIUM SCH MG: 20 TABLET, FILM COATED ORAL at 08:32

## 2023-07-21 RX ADMIN — METHYLPREDNISOLONE SODIUM SUCCINATE SCH MG: 125 INJECTION, POWDER, FOR SOLUTION INTRAMUSCULAR; INTRAVENOUS at 23:47

## 2023-07-21 RX ADMIN — PANTOPRAZOLE SODIUM SCH MG: 40 TABLET, DELAYED RELEASE ORAL at 05:58

## 2023-07-21 RX ADMIN — FORMOTEROL FUMARATE DIHYDRATE SCH MCG: 20 SOLUTION RESPIRATORY (INHALATION) at 08:49

## 2023-07-21 RX ADMIN — LATANOPROST SCH DROPS: 50 SOLUTION OPHTHALMIC at 22:11

## 2023-07-21 RX ADMIN — IPRATROPIUM BROMIDE AND ALBUTEROL SULFATE SCH ML: .5; 3 SOLUTION RESPIRATORY (INHALATION) at 20:42

## 2023-07-21 RX ADMIN — BUDESONIDE SCH MG: 0.5 INHALANT ORAL at 20:42

## 2023-07-21 RX ADMIN — METHYLPREDNISOLONE SODIUM SUCCINATE SCH MG: 125 INJECTION, POWDER, FOR SOLUTION INTRAMUSCULAR; INTRAVENOUS at 05:57

## 2023-07-21 RX ADMIN — BUDESONIDE SCH MG: 0.5 INHALANT ORAL at 08:50

## 2023-07-21 RX ADMIN — AZITHROMYCIN SCH MG: 250 TABLET, FILM COATED ORAL at 08:32

## 2023-07-21 RX ADMIN — METHYLPREDNISOLONE SODIUM SUCCINATE SCH MG: 125 INJECTION, POWDER, FOR SOLUTION INTRAMUSCULAR; INTRAVENOUS at 11:41

## 2023-07-21 RX ADMIN — IPRATROPIUM BROMIDE AND ALBUTEROL SULFATE SCH ML: .5; 3 SOLUTION RESPIRATORY (INHALATION) at 15:34

## 2023-07-21 NOTE — P.PN
Subjective


Progress Note Date: 07/21/23


Principal diagnosis: 





Acute exacerbation of COPD.





The patient is here essentially for COPD.  He states he is feeling minimal 

improvement today.  No voiding difficulties.  Minimal ambulation is noted.  No 

significant nausea, vomiting or diarrhea.





Objective





- Vital Signs


Vital signs: 


                                   Vital Signs











Temp  97.4 F L  07/21/23 06:54


 


Pulse  87   07/21/23 06:54


 


Resp  19   07/21/23 06:54


 


BP  165/72   07/21/23 06:54


 


Pulse Ox  100   07/21/23 06:54


 


FiO2  40   07/19/23 11:43








                                 Intake & Output











 07/20/23 07/21/23 07/21/23





 18:59 06:59 18:59


 


Intake Total   180


 


Balance   180


 


Weight 54 kg 55 kg 


 


Intake:   


 


  Oral   180


 


Other:   


 


  Voiding Method Diaper Urinal 


 


  # Voids 4 1 


 


  # Bowel Movements 0  














- Constitutional


General appearance: Present: thin





- EENT


Eyes: Absent: abnormal pupil





- Neck


Neck: Absent: lymphadenopathy





- Respiratory


Respiratory: bilateral: diminished





- Cardiovascular


Rhythm: regular


Heart sounds: normal: S1, S2


Abnormal Heart Sounds: Absent: S3 Gallop





- Gastrointestinal


General gastrointestinal: Present: soft.  Absent: tenderness





- Integumentary


Integumentary: Absent: cellulitis





- Labs


CBC & Chem 7: 


                                 07/19/23 12:02





                                 07/19/23 12:02


Labs: 


                  Abnormal Lab Results - Last 24 Hours (Table)











  07/20/23 Range/Units





  02:16 


 


Procalcitonin  0.10 H  (0.02-0.09)  ng/mL














Assessment and Plan


(1) Acute exacerbation of chronic obstructive airways disease


Current Visit: Yes   Status: Acute   Code(s): J44.1 - CHRONIC OBSTRUCTIVE 

PULMONARY DISEASE W (ACUTE) EXACERBATION   SNOMED Code(s): 830683602


   





(2) History of pulmonary hypertension


Current Visit: Yes   Status: Acute   Code(s): Z86.79 - PERSONAL HISTORY OF OTHER

DISEASES OF THE CIRCULATORY SYSTEM   SNOMED Code(s): 116019133


   


Plan: 





Continue steroids with empiric treatment for COPD.





Check CBC and CMP in a.m.





Appreciate pulmonology input.





Prognosis is guarded secondary to his advancing age and multiple comorbidity 

history.





[facility] will be covering for the weekend.

## 2023-07-21 NOTE — P.PN
Subjective


Progress Note Date: 07/21/23





I am seeing this patient in new consultation today 07/20/2023 for suspected 

acute COPD exacerbation.  Patient is an 87-year-old white male with past medical

history significant for severe oxygen dependent COPD, pulmonary hypertension, 

hypertension, hyperlipidemia, DVT, bladder cancer.  Patient does follow up in 

the office with Dr. Terrazas, for management of his severe COPD with FEV1 41% of 

predicted.  Patient does utilize 2 L/m nasal cannula 24/7. Patient's primary 

care provider is Dr. Whatley.  Patient is fairly hard of hearing, making it 

difficult to communicate.  Apparently, the patient has had some progressive 

shortness of breath and cough for the last several weeks.  He presented to the 

emergency room via EMS yesterday afternoon.  He was found to be in some acute 

respiratory distress.  He was also hypertensive and tachycardic.  Chest x-ray on

arrival showed no acute cardiopulmonary process.  Patient is currently sitting 

up in bed, on 2 L/m nasal cannula, in no acute distress.  He is currently on a 

combination of DuoNeb's, desonide, formoterol, and IV Solu-Medrol.  He seems 

fairly comfortable, and is already showing improvement in his respiratory 

status. Denies any fever, chills, myalgias, cough, hemoptysis.  Denies any chest

pain, heart palpitations, lower extremity edema, orthopnea.  CBC on arrival 

showed a WBC count of 7.3, hemoglobin 9.2, hematocrit 32.6, platelets 383.  BMP 

on arrival shows sodium 136, potassium 5.4, chloride 103, serum bicarb 24, BUN 

50, creatinine 1.46, glucose 104.  NT proBNP was mildly elevated at 1150, but 

not significant for age.  Troponin was 0.025.  Overall, the patient's condition 

seems to be improving nicely.





The patient is seen today 07/21/2023 in follow-up on the regular medical floor. 

He is currently sitting up in bed.  Awake and alert.  Still with wheezing and 

bronchospasm.  Maintaining good O2 saturations up to 100% on 2 L/m per nasal 

cannula.  Continued on DuoNeb inhalations, Perforomist and Pulmicort 

inhalations, Solu-Medrol.  Antibiotics in the form of azithromycin.  Corona 

virus not detected.





Objective





- Vital Signs


Vital signs: 


                                   Vital Signs











Temp  97.4 F L  07/21/23 06:54


 


Pulse  84   07/21/23 09:10


 


Resp  19   07/21/23 06:54


 


BP  165/72   07/21/23 06:54


 


Pulse Ox  100   07/21/23 08:50


 


FiO2  40   07/19/23 11:43








                                 Intake & Output











 07/20/23 07/21/23 07/21/23





 18:59 06:59 18:59


 


Intake Total   180


 


Balance   180


 


Weight 54 kg 55 kg 


 


Intake:   


 


  Oral   180


 


Other:   


 


  Voiding Method Diaper Urinal 


 


  # Voids 4 1 


 


  # Bowel Movements 0  














- Exam





GENERAL EXAM: Alert, pleasant, frail 87-year-old male, 2 L nasal cannula, 

comfortable in no apparent distress.


HEAD: Normocephalic and atraumatic


EYES: Normal reaction of pupils, equal size.


NOSE: Clear with pink turbinates.


THROAT: No erythema or exudates.


NECK: No masses, no JVD.


CHEST: No chest wall deformity.


LUNGS: Equal air entry with right lower lobe inspiratory crackles.  Bilateral 

wheeze. 


CVS: S1 and S2 normal with no audible murmur, regular rhythm. No extra heart 

sounds


ABDOMEN: No hepatosplenomegaly, active bowel sounds, no guarding or rigidity. 


SPINE: No scoliosis or deformity


SKIN: No rashes


CENTRAL NERVOUS SYSTEM: No focal deficits, tone is normal in all 4 extremities.


EXTREMITIES: There is no peripheral edema, clubbing, or cyanosis.  Peripheral 

pulses are intact.





- Labs


CBC & Chem 7: 


                                 07/19/23 12:02





                                 07/19/23 12:02





Assessment and Plan


Assessment: 





Acute exacerbation of obstructive pulmonary disease.  Chest x-ray showed no 

acute process





Chronic hypoxemic respiratory failure, currently on 2 L/m nasal cannula





History of pulmonary hypertension





Benign essential hypertension





Hyperlipidemia





Chronic kidney disease stage III





Anemia of chronic disease





History DVT





History of bladder cancer





Ex-smoker





Plan:





The patient was seen and evaluated


Medications are reviewed


Stable on 2 L nasal cannula


Continued on bronchodilators, steroids


Empiric antibiotics


Not quite ready for discharge


We will continue to follow





I have personally seen and examined the patient, performed the documentation and

the assessment and plan as written.  Number of minutes spent on the visit: 10.

## 2023-07-22 LAB
ALBUMIN SERPL-MCNC: 3.7 D/DL (ref 3.8–4.9)
ALBUMIN/GLOB SERPL: 1.23 RATIO (ref 1.6–3.17)
ALP SERPL-CCNC: 63 U/L (ref 41–126)
ALT SERPL-CCNC: 24 U/L (ref 10–49)
ANION GAP SERPL CALC-SCNC: 8.1 MMOL/L (ref 4–12)
AST SERPL-CCNC: 24 U/L (ref 14–35)
BUN SERPL-SCNC: 70.8 MG/DL (ref 9–27)
BUN/CREAT SERPL: 37.26 RATIO (ref 12–20)
CALCIUM SPEC-MCNC: 9.1 MG/DL (ref 8.7–10.3)
CHLORIDE SERPL-SCNC: 104 MMOL/L (ref 96–109)
CO2 SERPL-SCNC: 25.9 MMOL/L (ref 21.6–31.8)
ERYTHROCYTE [DISTWIDTH] IN BLOOD BY AUTOMATED COUNT: 3.15 X 10*6/UL (ref 4.4–5.6)
ERYTHROCYTE [DISTWIDTH] IN BLOOD: 17.5 % (ref 11.5–14.5)
GLOBULIN SER CALC-MCNC: 3 D/DL (ref 1.6–3.3)
GLUCOSE SERPL-MCNC: 123 MG/DL (ref 70–110)
HCT VFR BLD AUTO: 29.8 % (ref 39.6–50)
HGB BLD-MCNC: 9 D/DL (ref 12–15)
MCH RBC QN AUTO: 28.6 PG (ref 27–32)
MCHC RBC AUTO-ENTMCNC: 30.2 D/DL (ref 32–37)
MCV RBC AUTO: 94.6 FL (ref 80–97)
NRBC BLD AUTO-RTO: 0 X 10*3/UL (ref 0–0.01)
PLATELET # BLD AUTO: 615 X 10*3/UL (ref 140–440)
POTASSIUM SERPL-SCNC: 6.1 MMOL/L (ref 3.5–5.5)
PROT SERPL-MCNC: 6.7 D/DL (ref 6.2–8.2)
SODIUM SERPL-SCNC: 138 MMOL/L (ref 135–145)
WBC # BLD AUTO: 10.41 X 10*3/UL (ref 4.5–10)

## 2023-07-22 RX ADMIN — CEFAZOLIN SCH MLS/HR: 330 INJECTION, POWDER, FOR SOLUTION INTRAMUSCULAR; INTRAVENOUS at 15:37

## 2023-07-22 RX ADMIN — METHYLPREDNISOLONE SODIUM SUCCINATE SCH MG: 125 INJECTION, POWDER, FOR SOLUTION INTRAMUSCULAR; INTRAVENOUS at 11:57

## 2023-07-22 RX ADMIN — FORMOTEROL FUMARATE DIHYDRATE SCH MCG: 20 SOLUTION RESPIRATORY (INHALATION) at 07:57

## 2023-07-22 RX ADMIN — BUDESONIDE SCH MG: 0.5 INHALANT ORAL at 20:05

## 2023-07-22 RX ADMIN — TAMSULOSIN HYDROCHLORIDE SCH MG: 0.4 CAPSULE ORAL at 18:32

## 2023-07-22 RX ADMIN — HEPARIN SODIUM SCH UNIT: 5000 INJECTION INTRAVENOUS; SUBCUTANEOUS at 21:05

## 2023-07-22 RX ADMIN — METHYLPREDNISOLONE SODIUM SUCCINATE SCH MG: 125 INJECTION, POWDER, FOR SOLUTION INTRAMUSCULAR; INTRAVENOUS at 18:32

## 2023-07-22 RX ADMIN — AZITHROMYCIN SCH MG: 250 TABLET, FILM COATED ORAL at 09:12

## 2023-07-22 RX ADMIN — BUDESONIDE SCH MG: 0.5 INHALANT ORAL at 07:57

## 2023-07-22 RX ADMIN — METHYLPREDNISOLONE SODIUM SUCCINATE SCH MG: 125 INJECTION, POWDER, FOR SOLUTION INTRAMUSCULAR; INTRAVENOUS at 23:40

## 2023-07-22 RX ADMIN — METHYLPREDNISOLONE SODIUM SUCCINATE SCH MG: 125 INJECTION, POWDER, FOR SOLUTION INTRAMUSCULAR; INTRAVENOUS at 05:59

## 2023-07-22 RX ADMIN — IPRATROPIUM BROMIDE AND ALBUTEROL SULFATE SCH ML: .5; 3 SOLUTION RESPIRATORY (INHALATION) at 15:19

## 2023-07-22 RX ADMIN — FORMOTEROL FUMARATE DIHYDRATE SCH MCG: 20 SOLUTION RESPIRATORY (INHALATION) at 20:04

## 2023-07-22 RX ADMIN — ATORVASTATIN CALCIUM SCH MG: 20 TABLET, FILM COATED ORAL at 09:12

## 2023-07-22 RX ADMIN — IPRATROPIUM BROMIDE AND ALBUTEROL SULFATE SCH ML: .5; 3 SOLUTION RESPIRATORY (INHALATION) at 07:57

## 2023-07-22 RX ADMIN — LATANOPROST SCH DROPS: 50 SOLUTION OPHTHALMIC at 21:05

## 2023-07-22 RX ADMIN — IPRATROPIUM BROMIDE AND ALBUTEROL SULFATE SCH ML: .5; 3 SOLUTION RESPIRATORY (INHALATION) at 11:37

## 2023-07-22 RX ADMIN — PANTOPRAZOLE SODIUM SCH MG: 40 TABLET, DELAYED RELEASE ORAL at 06:53

## 2023-07-22 RX ADMIN — IPRATROPIUM BROMIDE AND ALBUTEROL SULFATE SCH ML: .5; 3 SOLUTION RESPIRATORY (INHALATION) at 20:04

## 2023-07-22 RX ADMIN — CITALOPRAM HYDROBROMIDE SCH MG: 20 TABLET ORAL at 09:12

## 2023-07-22 NOTE — US
EXAMINATION TYPE: US kidneys/renal and bladder

 

DATE OF EXAM: 7/22/2023

 

COMPARISON: Renal ultrasound 3/7/2018

 

CLINICAL INDICATION: Male, 87 years old with history of silvestre;

 

EXAM MEASUREMENTS:

 

Right Kidney:  10.5 x 4.6 x 3.9 cm

Left Kidney: 9.5 x 4.3 x 3.7 cm

 

 

 

Right Kidney: minimal/mild hydronephrosis  

Left Kidney: moderate to severe hydronephrosis, hydroureter   

Bladder: diverticula noted 

**Bilateral Jets seen: no

 

No nephrolithiasis or solid mass involving either kidney. Mild right and moderate to severe left hydr
onephrosis with left hydroureter. Cervical medullary differentiation is maintained bilaterally. Urina
ry bladder diverticulum noted. Urinary bladder is anechoic. Both ureteral jets are not identified.

 

IMPRESSION:

1. Moderate left and mild right hydronephrosis.

2. Urinary bladder diverticulum.

## 2023-07-22 NOTE — P.PN
Subjective


Progress Note Date: 07/22/23





I am seeing this patient in new consultation today 07/20/2023 for suspected 

acute COPD exacerbation.  Patient is an 87-year-old white male with past medical

history significant for severe oxygen dependent COPD, pulmonary hypertension, 

hypertension, hyperlipidemia, DVT, bladder cancer.  Patient does follow up in 

the office with Dr. Terrazas, for management of his severe COPD with FEV1 41% of 

predicted.  Patient does utilize 2 L/m nasal cannula 24/7. Patient's primary 

care provider is Dr. Whatley.  Patient is fairly hard of hearing, making it 

difficult to communicate.  Apparently, the patient has had some progressive 

shortness of breath and cough for the last several weeks.  He presented to the 

emergency room via EMS yesterday afternoon.  He was found to be in some acute 

respiratory distress.  He was also hypertensive and tachycardic.  Chest x-ray on

arrival showed no acute cardiopulmonary process.  Patient is currently sitting 

up in bed, on 2 L/m nasal cannula, in no acute distress.  He is currently on a 

combination of DuoNeb's, desonide, formoterol, and IV Solu-Medrol.  He seems 

fairly comfortable, and is already showing improvement in his respiratory 

status. Denies any fever, chills, myalgias, cough, hemoptysis.  Denies any chest

pain, heart palpitations, lower extremity edema, orthopnea.  CBC on arrival 

showed a WBC count of 7.3, hemoglobin 9.2, hematocrit 32.6, platelets 383.  BMP 

on arrival shows sodium 136, potassium 5.4, chloride 103, serum bicarb 24, BUN 

50, creatinine 1.46, glucose 104.  NT proBNP was mildly elevated at 1150, but 

not significant for age.  Troponin was 0.025.  Overall, the patient's condition 

seems to be improving nicely.





The patient is seen today 07/21/2023 in follow-up on the regular medical floor. 

He is currently sitting up in bed.  Awake and alert.  Still with wheezing and 

bronchospasm.  Maintaining good O2 saturations up to 100% on 2 L/m per nasal 

cannula.  Continued on DuoNeb inhalations, Perforomist and Pulmicort 

inhalations, Solu-Medrol.  Antibiotics in the form of azithromycin.  Corona 

virus not detected.





The patient is seen today 07/22/2023 in follow-up on the regular medical floor. 

He is awake and alert in no acute distress.  Maintaining O2 saturations in the 

90s on 2 L/m per nasal cannula.  He is still having significant cough and 

congestion and wheezing.  Continued on DuoNeb inhalations, Pulmicort and 

perforomist inhalations, IV Solu-Medrol.  Completed azithromycin.  White count 

10.4.  Hemoglobin 9.0.  Platelets 6:15.  Sodium 138.  Potassium 6.1.  BUN 70.  

Creatinine 1.9.  Glucose 123.





Objective





- Vital Signs


Vital signs: 


                                   Vital Signs











Temp  97.5 F L  07/22/23 07:33


 


Pulse  90   07/22/23 08:31


 


Resp  18   07/22/23 07:33


 


BP  151/72   07/22/23 07:33


 


Pulse Ox  99   07/22/23 07:58


 


FiO2  40   07/19/23 11:43








                                 Intake & Output











 07/21/23 07/22/23 07/22/23





 18:59 06:59 18:59


 


Intake Total 360  


 


Balance 360  


 


Weight  56.5 kg 


 


Intake:   


 


  Oral 360  


 


Other:   


 


  Voiding Method  Urinal 


 


  # Voids 6 1 


 


  # Bowel Movements 2  














- Exam





GENERAL EXAM: Alert, pleasant, hard of hearing 87-year-old male, 2 L nasal 

cannula, comfortable in no apparent distress.


HEAD: Normocephalic and atraumatic


EYES: Normal reaction of pupils, equal size.


NOSE: Clear with pink turbinates.


THROAT: No erythema or exudates.


NECK: No masses, no JVD.


CHEST: No chest wall deformity.


LUNGS: Equal air entry with right lower lobe inspiratory crackles.  Bilateral 

wheeze. 


CVS: S1 and S2 normal with no audible murmur, regular rhythm. No extra heart 

sounds


ABDOMEN: No hepatosplenomegaly, active bowel sounds, no guarding or rigidity. 


SPINE: No scoliosis or deformity


SKIN: No rashes


CENTRAL NERVOUS SYSTEM: No focal deficits, tone is normal in all 4 extremities.


EXTREMITIES: There is no peripheral edema, clubbing, or cyanosis.  Peripheral 

pulses are intact.





- Labs


CBC & Chem 7: 


                                 07/22/23 06:10





                                 07/22/23 06:10


Labs: 


                  Abnormal Lab Results - Last 24 Hours (Table)











  07/21/23 07/21/23 07/22/23 Range/Units





  06:22 06:22 06:10 


 


WBC  11.67 H   10.41 H  (4.50-10.00)  X 10*3/uL


 


RBC  3.09 L   3.15 L  (4.40-5.60)  X 10*6/uL


 


Hgb  8.7 L   9.0 L  (12.0-15.0)  d/dL


 


Hct  28.9 L   29.8 L  (39.6-50.0)  %


 


MCHC  30.1 L   30.2 L  (32.0-37.0)  d/dL


 


RDW  17.2 H   17.5 H  (11.5-14.5)  %


 


Plt Count  534 H   615 H  (140-440)  X 10*3/uL


 


Potassium   5.7 H   (3.5-5.5)  mmol/L


 


BUN   59.1 H   (9.0-27.0)  mg/dL


 


Creatinine   2.0 H   (0.6-1.5)  mg/dL


 


Est GFR (CKD-EPI)   32 L   (>=60)   


 


BUN/Creatinine Ratio   29.55 H   (12.00-20.00)  Ratio


 


Glucose   116 H   ()  mg/dL


 


Total Bilirubin   <0.2 L   (0.3-1.2)  mg/dL


 


Albumin   3.6 L   (3.8-4.9)  d/dL


 


Albumin/Globulin Ratio   1.20 L   (1.60-3.17)  Ratio














  07/22/23 Range/Units





  06:10 


 


WBC   (4.50-10.00)  X 10*3/uL


 


RBC   (4.40-5.60)  X 10*6/uL


 


Hgb   (12.0-15.0)  d/dL


 


Hct   (39.6-50.0)  %


 


MCHC   (32.0-37.0)  d/dL


 


RDW   (11.5-14.5)  %


 


Plt Count   (140-440)  X 10*3/uL


 


Potassium  6.1 H*  (3.5-5.5)  mmol/L


 


BUN  70.8 H  (9.0-27.0)  mg/dL


 


Creatinine  1.9 H  (0.6-1.5)  mg/dL


 


Est GFR (CKD-EPI)  34 L  (>=60)   


 


BUN/Creatinine Ratio  37.26 H  (12.00-20.00)  Ratio


 


Glucose  123 H  ()  mg/dL


 


Total Bilirubin  <0.2 L  (0.3-1.2)  mg/dL


 


Albumin  3.7 L  (3.8-4.9)  d/dL


 


Albumin/Globulin Ratio  1.23 L  (1.60-3.17)  Ratio














Assessment and Plan


Assessment: 





Acute exacerbation of obstructive pulmonary disease.  Chest x-ray showed no 

acute process





Chronic hypoxemic respiratory failure, currently on 2 L/m nasal cannula





History of pulmonary hypertension





Benign essential hypertension





Hyperlipidemia





Chronic kidney disease stage III





Anemia of chronic disease





History DVT





History of bladder cancer





Ex-smoker





Plan:





The patient was seen and evaluated


Medications and labs are reviewed


Continued on bronchodilators, steroids


Completed empiric antibiotics


Not quite ready for discharge


We will continue to follow





I have personally seen and examined the patient, performed the documentation and

the assessment and plan as written.  Number of minutes spent on the visit: 10.

## 2023-07-22 NOTE — P.PN
Subjective





This is an 87-year-old male who presented to the emergency room with shortness 

of breath which has increased over the last several weeks.  Upon arrival of EMS 

he was in respiratory distress, hypertensive and tachycardic, with a respiratory

rate of 30.  Patient has a medical history of severe oxygen-dependent COPD, 

pulmonary hypertension, hypertension, hyperlipidemia, DVT, and bladder cancer.  

He is maintained on 2 L of oxygen via nasal cannula at home.  This morning he is

seen sitting up in bed, maintained on 2 L of oxygen.  He reports shortness of 

breath has somewhat improved, but does feel fatigued.





07/22/2023


Patient is still short of breath and tachypnea with mild to moderate respiratory

distress.  However patient is able to eat and talk with his mild difficulty.  

Occasional coughing but no chest pain.


Patient currently on IV Solu-Medrol and followed closely by pulmonary team.


Creatinine at baseline 2.0 and 1.9 however potassium is getting up 5.7 6.1 

today.  3 going to give cocktail and consult nephrology team.





Objective





- Vital Signs


Vital signs: 


                                   Vital Signs











Temp  97.5 F L  07/22/23 07:33


 


Pulse  84   07/22/23 11:48


 


Resp  18   07/22/23 07:33


 


BP  151/72   07/22/23 07:33


 


Pulse Ox  99   07/22/23 07:58


 


FiO2  40   07/19/23 11:43








                                 Intake & Output











 07/21/23 07/22/23 07/22/23





 18:59 06:59 18:59


 


Intake Total 360  


 


Balance 360  


 


Weight  56.5 kg 


 


Intake:   


 


  Oral 360  


 


Other:   


 


  Voiding Method  Urinal 


 


  # Voids 6 1 


 


  # Bowel Movements 2  














- Exam





GENERAL: The patient is alert and oriented x3, not in any acute distress. Well 

developed, well nourished. 


HEENT: Pupils are round and equally reacting to light. EOMI. No scleral icterus.

No conjunctival pallor. Normocephalic, atraumatic. No pharyngeal erythema. No 

thyromegaly. 


CARDIOVASCULAR: S1 and S2 present. No murmurs, rubs, or gallops. 


-PULMONARY: Chest is clear to auscultation, bilateral expiratory wheezing , no 

crackles. 


ABDOMEN: Soft, nontender, nondistended, normoactive bowel sounds. No palpable 

organomegaly. 


MUSCULOSKELETAL: No joint swelling or deformity. 


EXTREMITIES: No cyanosis, clubbing, or pedal edema. 


NEUROLOGICAL: Gross neurological examination did not reveal any focal deficits. 


SKIN: No rashes. no petechiae.





- Labs


CBC & Chem 7: 


                                 07/22/23 06:10





                                 07/22/23 06:10


Labs: 


                  Abnormal Lab Results - Last 24 Hours (Table)











  07/22/23 07/22/23 Range/Units





  06:10 06:10 


 


WBC  10.41 H   (4.50-10.00)  X 10*3/uL


 


RBC  3.15 L   (4.40-5.60)  X 10*6/uL


 


Hgb  9.0 L   (12.0-15.0)  d/dL


 


Hct  29.8 L   (39.6-50.0)  %


 


MCHC  30.2 L   (32.0-37.0)  d/dL


 


RDW  17.5 H   (11.5-14.5)  %


 


Plt Count  615 H   (140-440)  X 10*3/uL


 


Potassium   6.1 H*  (3.5-5.5)  mmol/L


 


BUN   70.8 H  (9.0-27.0)  mg/dL


 


Creatinine   1.9 H  (0.6-1.5)  mg/dL


 


Est GFR (CKD-EPI)   34 L  (>=60)   


 


BUN/Creatinine Ratio   37.26 H  (12.00-20.00)  Ratio


 


Glucose   123 H  ()  mg/dL


 


Total Bilirubin   <0.2 L  (0.3-1.2)  mg/dL


 


Albumin   3.7 L  (3.8-4.9)  d/dL


 


Albumin/Globulin Ratio   1.23 L  (1.60-3.17)  Ratio














Assessment and Plan


Assessment: 





Acute COPD exacerbation


Acute hypoxic respiratory failure


Hyperkalemia


Chronic kidney disease stage III


Normochromic, normocytic anemia


Chronic kidney disease stage III


Plan: 





Continue with IV Solu-Medrol


Give calcium and sodium bicarbonate for hyperkalemia and monitor potassium level

with nephrology consult


Labs and medication were reviewed..  Continue same treatment.  Continue with 

symptomatic treatment.  Resume home medication.  Monitor labs and vitals.  DVT 

and GI prophylaxis.  Further recommendations as per clinical course of the 

patient


DVT prophylaxis: Subcutaneous heparin


GI Prophylaxis: Ppi


Prognosis is guarded

## 2023-07-22 NOTE — P.NPCON
History of Present Illness





- Reason for Consult


acute renal failure





- History of Present Illness





Patient is an 87-year-old male who is very hard of hearing.  He has underlying 

history of COPD, DVT, hypertension and hyperlipidemia.  Patient is admitted to 

the hospital with complaints of increased weakness.  He has had decreased oral 

intake.  No significant shortness of breath.  Patient has oxygen-dependent COPD.


He also has a history of bladder cancer with no plans on intervention according 

to the patient.





Serum creatinine was 1.4 mg/dL on initial admission and increased to 2.0 

yesterday and it is 1.9 today.  Previous creatinine noted to be 1.8-2 mg/dL in 

2022 and previous creatinine was 1.3 in 2019.


Blood pressure is not low


Patient is currently voiding.





Not on ACE inhibitor's or NSAIDs.





Currently not on IV fluids.  Chest x-ray on admission does not show any acute 

findings.





Potassium was elevated at 6.1.  Blood sugar is not high.  Bowel movements 

recorded 2 on 2023





Review of Systems





As per HPI





Past Medical History


Past Medical History: Cancer, Chest Pain / Angina, COPD, Deep Vein Thrombosis 

(DVT), Eye Disorder, Hearing Disorder / Deafness, Hyperlipidemia, Hypertension, 

Osteoarthritis (OA), Pneumonia, Prostate Disorder, Renal Disease


Additional Past Medical History / Comment(s): Severe COPD with an FEV1 of 33% of

predicted, chronic hypoxic respiratory failure, recurrent hospitalization for 

COPD exacerbation, stenotrophomonas tracheal bronchitis - HAD PFT 18.  

Bladder Cancer w/ prev surgery.  RLE DVT.  BPH.  Chronic Back Pain.  VARICOSE 

VEINS.  O2 2L NC.  ACUTE RENAL FAILURE 3/2018 R/T DEHYDRATION/DEPRESSION, PER 

WIFE.


History of Any Multi-Drug Resistant Organisms: None Reported


Past Surgical History: Bladder Surgery, Hernia Repair, Tonsillectomy


Additional Past Surgical History / Comment(s): Bladder CA Surgery, Vasectomy.  

BRONCHIAL WASHING.   EXC CATARACTS KATH.,


Past Anesthesia/Blood Transfusion Reactions: No Reported Reaction


Past Psychological History: Anxiety, Depression


Additional Psychological History / Comment(s): Pt resides with his spouse in a 

single level home that has 4 porchs steps..pt drives.  He has a nebulizer, 02 

uses prn.  He has [facility] home care.


Smoking Status: Former smoker


Past Alcohol Use History: None Reported


Additional Past Alcohol Use History / Comment(s): Pt started smoking in 5 and

quit in .  At one time smoking 2-3 ppd.


Past Drug Use History: None Reported





- Past Family History


  ** Father


Family Medical History: Cancer


Additional Family Medical History / Comment(s): Father had scoliosis.  Father 

 of  lung cancer





  ** Mother


History Unknown: Yes


Family Medical History: CVA/TIA


Additional Family Medical History / Comment(s): Mother . Unknown history





Medications and Allergies


                                Home Medications











 Medication  Instructions  Recorded  Confirmed  Type


 


Simvastatin [Zocor] 40 mg PO DAILY 16 History


 


Citalopram Hydrobromide [CeleXA] 20 mg PO DAILY #30 tab 18 Rx


 


ALPRAZolam [Xanax] 0.5 mg PO TID PRN 18 History


 


Latanoprost/Pf [Latanoprost 0.005% 1 drop BOTH EYES HS 10/14/22 07/19/23 History





Eye Drop]    


 


Tamsulosin [Flomax] 0.4 mg PO HS #30 cap 10/19/22 07/19/23 Rx


 


amLODIPine [Norvasc] 2.5 mg PO BID #60 tab 10/19/22 07/19/23 Rx


 


predniSONE 5 mg PO DAILY #0 10/19/22 07/19/23 Rx


 


Budesonide [Pulmicort] 0.5 mg INHALATION RT-BID 23 History


 


Formoterol Fumarate [Perforomist] 20 mcg INHALATION RT-BID 23 

History








                                    Allergies











Allergy/AdvReac Type Severity Reaction Status Date / Time


 


No Known Allergies Allergy   Verified 23 14:09














Physical Exam


Vitals: 


                                   Vital Signs











  Temp Pulse Pulse Resp BP Pulse Ox


 


 23 13:54  97.7 F   103 H  16  170/64  98


 


 23 11:48   84    


 


 23 11:37   80    


 


 23 08:31   90    


 


 23 08:08   88    


 


 23 08:07   88    


 


 23 07:58   91     99


 


 23 07:33  97.5 F L   87  18  151/72  98


 


 23 01:48  97.8 F   88  17  138/75  96


 


 23 21:14   78    


 


 07/21/23 20:59   82    


 


 23 20:44   80    


 


 23 19:29  97.7 F   90  18  152/62  100


 


 23 15:46   78    


 


 23 15:35   76    








                                Intake and Output











 23





 22:59 06:59 14:59


 


Intake Total 180  


 


Balance 180  


 


Intake:   


 


  Oral 180  


 


Other:   


 


  Voiding Method Urinal  


 


  # Voids 6 1 


 


  # Bowel Movements 2  


 


  Weight  56.5 kg 














Patient is awake, comfortable, no acute distress


Examination of the heart S1 and S2


Examination of the lungs bilateral breath sounds are heard, occasional wheezing


Abdomen is soft nontender


Examination of lower extremities shows no significant edema


CNS exam grossly intact.  Patient is very hard of hearing.





Results





- Lab Results


                             Most recent lab results











Calcium  9.1 mg/dL (8.7-10.3)   23  06:10    


 


Magnesium  2.3 mg/dL (1.6-2.3)   23  12:02    














                                 23 06:10





                                 23 06:10





Assessment and Plan


Assessment: 





1.  Acute kidney injury, nonoliguric ATN.  Worsened with anemia.  Rule out 

obstructive uropathy.  Check UA and check ultrasound of the kidneys.  No 

nephrotoxic agents on board.


2.  Hyperkalemia associated with acute kidney injury rule out obstructive 

uropathy


3.  History of bladder cancer


4.  Anemia rule out iron deficiency


5.  Acute hypoxic respiratory failure secondary to COPD exacerbation


6.  Acute COPD exacerbation maintained on steroids


Plan: 





Check bladder scan


Check ultrasound of the kidneys


Check urine analysis


Add gentle IV hydration


Continue to encourage increase oral intake


Avoid hypotension


Repeat labs in a.m.





Thank you for the consultation.  We will continue to follow the patient with you

 during his hospitalization

## 2023-07-23 LAB
ANION GAP SERPL CALC-SCNC: 12 MMOL/L
BUN SERPL-SCNC: 73 MG/DL (ref 9–20)
CALCIUM SPEC-MCNC: 8.2 MG/DL (ref 8.4–10.2)
CHLORIDE SERPL-SCNC: 102 MMOL/L (ref 98–107)
CO2 SERPL-SCNC: 25 MMOL/L (ref 22–30)
GLUCOSE SERPL-MCNC: 102 MG/DL (ref 74–99)
PH UR: 5.5 [PH] (ref 5–8)
POTASSIUM SERPL-SCNC: 5.4 MMOL/L (ref 3.5–5.1)
RBC UR QL: 38 /HPF (ref 0–5)
SODIUM SERPL-SCNC: 139 MMOL/L (ref 137–145)
SP GR UR: 1.02 (ref 1–1.03)
UROBILINOGEN UR QL STRIP: <2 MG/DL (ref ?–2)
WBC #/AREA URNS HPF: 172 /HPF (ref 0–5)

## 2023-07-23 RX ADMIN — ATORVASTATIN CALCIUM SCH MG: 20 TABLET, FILM COATED ORAL at 07:50

## 2023-07-23 RX ADMIN — BUDESONIDE SCH MG: 0.5 INHALANT ORAL at 08:18

## 2023-07-23 RX ADMIN — IPRATROPIUM BROMIDE AND ALBUTEROL SULFATE SCH ML: .5; 3 SOLUTION RESPIRATORY (INHALATION) at 08:18

## 2023-07-23 RX ADMIN — IPRATROPIUM BROMIDE AND ALBUTEROL SULFATE SCH ML: .5; 3 SOLUTION RESPIRATORY (INHALATION) at 15:17

## 2023-07-23 RX ADMIN — CEFAZOLIN SCH: 330 INJECTION, POWDER, FOR SOLUTION INTRAMUSCULAR; INTRAVENOUS at 12:18

## 2023-07-23 RX ADMIN — BUDESONIDE SCH MG: 0.5 INHALANT ORAL at 19:40

## 2023-07-23 RX ADMIN — TAMSULOSIN HYDROCHLORIDE SCH MG: 0.4 CAPSULE ORAL at 17:05

## 2023-07-23 RX ADMIN — FORMOTEROL FUMARATE DIHYDRATE SCH MCG: 20 SOLUTION RESPIRATORY (INHALATION) at 19:40

## 2023-07-23 RX ADMIN — IPRATROPIUM BROMIDE AND ALBUTEROL SULFATE SCH ML: .5; 3 SOLUTION RESPIRATORY (INHALATION) at 11:41

## 2023-07-23 RX ADMIN — CITALOPRAM HYDROBROMIDE SCH MG: 20 TABLET ORAL at 07:50

## 2023-07-23 RX ADMIN — FORMOTEROL FUMARATE DIHYDRATE SCH MCG: 20 SOLUTION RESPIRATORY (INHALATION) at 08:18

## 2023-07-23 RX ADMIN — METHYLPREDNISOLONE SODIUM SUCCINATE SCH MG: 125 INJECTION, POWDER, FOR SOLUTION INTRAMUSCULAR; INTRAVENOUS at 17:46

## 2023-07-23 RX ADMIN — HEPARIN SODIUM SCH UNIT: 5000 INJECTION INTRAVENOUS; SUBCUTANEOUS at 20:17

## 2023-07-23 RX ADMIN — PANTOPRAZOLE SODIUM SCH MG: 40 TABLET, DELAYED RELEASE ORAL at 06:12

## 2023-07-23 RX ADMIN — LATANOPROST SCH DROPS: 50 SOLUTION OPHTHALMIC at 20:18

## 2023-07-23 RX ADMIN — METHYLPREDNISOLONE SODIUM SUCCINATE SCH MG: 125 INJECTION, POWDER, FOR SOLUTION INTRAMUSCULAR; INTRAVENOUS at 12:57

## 2023-07-23 RX ADMIN — METHYLPREDNISOLONE SODIUM SUCCINATE SCH MG: 125 INJECTION, POWDER, FOR SOLUTION INTRAMUSCULAR; INTRAVENOUS at 06:12

## 2023-07-23 RX ADMIN — IPRATROPIUM BROMIDE AND ALBUTEROL SULFATE SCH ML: .5; 3 SOLUTION RESPIRATORY (INHALATION) at 19:40

## 2023-07-23 RX ADMIN — HEPARIN SODIUM SCH UNIT: 5000 INJECTION INTRAVENOUS; SUBCUTANEOUS at 07:50

## 2023-07-23 NOTE — P.PN
Subjective


Progress Note Date: 07/23/23


Principal diagnosis: 





Acute exacerbation of COPD





I am seeing this patient in new consultation today 07/20/2023 for suspected 

acute COPD exacerbation.  Patient is an 87-year-old white male with past medical

history significant for severe oxygen dependent COPD, pulmonary hypertension, 

hypertension, hyperlipidemia, DVT, bladder cancer.  Patient does follow up in 

the office with Dr. Terrazas, for management of his severe COPD with FEV1 41% of 

predicted.  Patient does utilize 2 L/m nasal cannula 24/7. Patient's primary 

care provider is Dr. Whatley.  Patient is fairly hard of hearing, making it 

difficult to communicate.  Apparently, the patient has had some progressive 

shortness of breath and cough for the last several weeks.  He presented to the 

emergency room via EMS yesterday afternoon.  He was found to be in some acute 

respiratory distress.  He was also hypertensive and tachycardic.  Chest x-ray on

arrival showed no acute cardiopulmonary process.  Patient is currently sitting 

up in bed, on 2 L/m nasal cannula, in no acute distress.  He is currently on a 

combination of DuoNeb's, desonide, formoterol, and IV Solu-Medrol.  He seems 

fairly comfortable, and is already showing improvement in his respiratory 

status. Denies any fever, chills, myalgias, cough, hemoptysis.  Denies any chest

pain, heart palpitations, lower extremity edema, orthopnea.  CBC on arrival 

showed a WBC count of 7.3, hemoglobin 9.2, hematocrit 32.6, platelets 383.  BMP 

on arrival shows sodium 136, potassium 5.4, chloride 103, serum bicarb 24, BUN 

50, creatinine 1.46, glucose 104.  NT proBNP was mildly elevated at 1150, but 

not significant for age.  Troponin was 0.025.  Overall, the patient's condition 

seems to be improving nicely.





The patient is seen today 07/21/2023 in follow-up on the regular medical floor. 

He is currently sitting up in bed.  Awake and alert.  Still with wheezing and b

ronchospasm.  Maintaining good O2 saturations up to 100% on 2 L/m per nasal 

cannula.  Continued on DuoNeb inhalations, Perforomist and Pulmicort 

inhalations, Solu-Medrol.  Antibiotics in the form of azithromycin.  Corona 

virus not detected.





The patient is seen today 07/22/2023 in follow-up on the regular medical floor. 

He is awake and alert in no acute distress.  Maintaining O2 saturations in the 

90s on 2 L/m per nasal cannula.  He is still having significant cough and 

congestion and wheezing.  Continued on DuoNeb inhalations, Pulmicort and 

perforomist inhalations, IV Solu-Medrol.  Completed azithromycin.  White count 

10.4.  Hemoglobin 9.0.  Platelets 6:15.  Sodium 138.  Potassium 6.1.  BUN 70.  

Creatinine 1.9.  Glucose 123.





Reevaluated today on 7/23/23, patient is basically about the same, he states 

that his breathing a bit easier but nonetheless on physical examination 

continues to have significant rhonchi and wheezes bilaterally, patient is not 

showing much improvement in spite of maximal bronchodilators and IV steroids as 

well as antibiotics.  He is being followed by nephrology for his acute kidney 

injury.  Potassium today is 5.4.  BUN is 70.8 creatinine is 1.19











Objective





- Vital Signs


Vital signs: 


                                   Vital Signs











Temp  97.5 F L  07/23/23 06:53


 


Pulse  100   07/23/23 11:52


 


Resp  19   07/23/23 06:53


 


BP  170/77   07/23/23 06:53


 


Pulse Ox  98   07/23/23 08:18


 


FiO2  40   07/19/23 11:43








                                 Intake & Output











 07/22/23 07/23/23 07/23/23





 18:59 06:59 18:59


 


Intake Total 1080  118


 


Balance 1080  118


 


Weight  57.5 kg 


 


Intake:   


 


  Oral 1080  118


 


Other:   


 


  Voiding Method  Toilet Toilet


 


  # Voids 4 3 














- Exam





Physical Exam: Revealed 87-year-old white male in no distress on few liters 

nasal cannula.


Head: Atraumatic normocephalic.


HEENT:[Neck is supple.] [No neck masses.] [No thyromegaly.] [No JVD.]


Chest: [Diffuse rhonchi and wheezes noted bilaterally.


Cardiac Exam: [Normal S1 and S2, no S3 gallop, no murmur.]


Abdomen: [Soft, nontender,  no megaly, no rebound, no guarding, normal bowel 

sounds.]


Extremities: [No clubbing, no edema, no cyanosis.]


Neurological Exam: [No focal neurologic deficit.]


Psychiatric: Normal mood affect and normal mental status examination.


Skin: No rashes.





- Labs


CBC & Chem 7: 


                                 07/22/23 06:10





                                 07/22/23 16:14


Labs: 


                  Abnormal Lab Results - Last 24 Hours (Table)











  07/22/23 Range/Units





  16:14 


 


Potassium  5.4 H  (3.5-5.1)  mmol/L














Assessment and Plan


Assessment: 


Impression:


Acute exacerbation of obstructive pulmonary disease.  Chest x-ray showed no 

acute process


Chronic hypoxemic respiratory failure, currently on 2 L/m nasal cannula


History of pulmonary hypertension


Benign essential hypertension


Hyperlipidemia


Chronic kidney disease stage III


Anemia of chronic disease


History DVT


History of bladder cancer


Ex-smoker





Recommendation:


Continue bronchodilators and steroids


Continue intermittent gentle diuresis


Continue empiric antibiotics


Continue oxygen and titrate accordingly


Continue GI and DVT prophylaxis


Not ready for discharge planning


We will continue to follow


Prognosis is definitely poor and guarded








Time with Patient: Less than 30

## 2023-07-23 NOTE — P.GSCN
History of Present Illness


Consult date: 23


Reason for Consult: 





Bilateral hydronephrosis


History of present illness: 





This is an 87-year-old male with history of bladder cancer follows up with Dr. Kim.  Admitted to the hospital with COPD exacerbation.  Urology is consulted 

for hydronephrosis.  Ultrasound was  obtained for acute kidney injury for 

creatinine of 2 from a baseline of 1.5.  Which showed evidence of bilateral 

hydronephrosis.  Denies any gross hematuria, flank pain  or dysuria or any 

difficulty voiding.  Patient is very hard of hearing vessel limited history 

could be obtained.  His postvoid residual was checked and it was 30 mL. 





Review of Systems





- Constitutional


Denies fever, Denies weight loss





- Cardiovascular


Denies chest pain, Denies shortness of breath





- Respiratory


Reports cough, Reports dyspnea





- Gastrointestinal


Reports as per HPI





- Genitourinary


Denies dysuria, Denies hematuria





- Neurological


Denies headaches, Denies syncope





Past Medical History


Past Medical History: Cancer, Chest Pain / Angina, COPD, Deep Vein Thrombosis 

(DVT), Eye Disorder, Hearing Disorder / Deafness, Hyperlipidemia, Hypertension, 

Osteoarthritis (OA), Pneumonia, Prostate Disorder, Renal Disease


Additional Past Medical History / Comment(s): Severe COPD with an FEV1 of 33% of

predicted, chronic hypoxic respiratory failure, recurrent hospitalization for 

COPD exacerbation, stenotrophomonas tracheal bronchitis - HAD PFT 18.  

Bladder Cancer w/ prev surgery.  RLE DVT.  BPH.  Chronic Back Pain.  VARICOSE 

VEINS.  O2 2L NC.  ACUTE RENAL FAILURE 3/2018 R/T DEHYDRATION/DEPRESSION, PER 

WIFE.


History of Any Multi-Drug Resistant Organisms: None Reported


Past Surgical History: Bladder Surgery, Hernia Repair, Tonsillectomy


Additional Past Surgical History / Comment(s): Bladder CA Surgery, Vasectomy.  

BRONCHIAL WASHING.   EXC CATARACTS KATH.,


Past Anesthesia/Blood Transfusion Reactions: No Reported Reaction


Past Psychological History: Anxiety, Depression


Additional Psychological History / Comment(s): Pt resides with his spouse in a 

single level home that has 4 porchs steps..pt drives.  He has a nebulizer, 02 

uses prn.  He has [facility] home care.


Smoking Status: Former smoker


Past Alcohol Use History: None Reported


Additional Past Alcohol Use History / Comment(s): Pt started smoking in 1955 and

quit in .  At one time smoking 2-3 ppd.


Past Drug Use History: None Reported





- Past Family History


  ** Father


Family Medical History: Cancer


Additional Family Medical History / Comment(s): Father had scoliosis.  Father 

 of  lung cancer





  ** Mother


History Unknown: Yes


Family Medical History: CVA/TIA


Additional Family Medical History / Comment(s): Mother . Unknown history





Medications and Allergies


                                Home Medications











 Medication  Instructions  Recorded  Confirmed  Type


 


Simvastatin [Zocor] 40 mg PO DAILY 16 History


 


Citalopram Hydrobromide [CeleXA] 20 mg PO DAILY #30 tab 18 Rx


 


ALPRAZolam [Xanax] 0.5 mg PO TID PRN 18 History


 


Latanoprost/Pf [Latanoprost 0.005% 1 drop BOTH EYES HS 10/14/22 07/19/23 History





Eye Drop]    


 


Tamsulosin [Flomax] 0.4 mg PO HS #30 cap 10/19/22 07/19/23 Rx


 


amLODIPine [Norvasc] 2.5 mg PO BID #60 tab 10/19/22 07/19/23 Rx


 


predniSONE 5 mg PO DAILY #0 10/19/22 07/19/23 Rx


 


Budesonide [Pulmicort] 0.5 mg INHALATION RT-BID 23 History


 


Formoterol Fumarate [Perforomist] 20 mcg INHALATION RT-BID 23 

History








                                    Allergies











Allergy/AdvReac Type Severity Reaction Status Date / Time


 


No Known Allergies Allergy   Verified 23 14:09














Surgical - Exam


                                   Vital Signs











Temp Pulse Resp BP Pulse Ox


 


 98.7 F   93   22   194/94   100 


 


 23 11:13  23 11:13  23 11:13  23 11:13  23 11:13














- General


no distress, no pain





- Eyes


normal ocular movement, no pale





- ENT


normal nares, normal mucosa





- Respiratory


normal expansion, normal respiratory effort





- Abdomen


Abdomen: soft, non tender, no distended





Results





- Labs





                                 23 06:10





                                 23 16:14


                  Abnormal Lab Results - Last 24 Hours (Table)











  23 Range/Units





  16:14 


 


Potassium  5.4 H  (3.5-5.1)  mmol/L








                                 Diabetes panel











  23 Range/Units





  16:14 


 


Potassium  5.4 H  (3.5-5.1)  mmol/L








                                 Pituitary panel











  23 Range/Units





  16:14 


 


Potassium  5.4 H  (3.5-5.1)  mmol/L








                                  Adrenal panel











  23 Range/Units





  16:14 


 


Potassium  5.4 H  (3.5-5.1)  mmol/L














Assessment and Plan


Assessment: 





87-year-old male history of bladder cancer, no evidence of recent recurrence.  

No voiding dysfunction.  Underwent a renal ultrasound for creatinine of 2.0 from

a baseline of 1.5 which showed evidence of bilateral hydronephrosis.  Postvoid 

residuals 30 mL's


(1) Hydronephrosis


Narrative/Plan: 


 -CT abdomen and pelvis


-Continue to trend creat 





Current Visit: Yes   Status: Acute   Code(s): N13.30 - UNSPECIFIED 

HYDRONEPHROSIS   SNOMED Code(s): 52011005

## 2023-07-23 NOTE — P.PN
Subjective





This is an 87-year-old male who presented to the emergency room with shortness 

of breath which has increased over the last several weeks.  Upon arrival of EMS 

he was in respiratory distress, hypertensive and tachycardic, with a respiratory

rate of 30.  Patient has a medical history of severe oxygen-dependent COPD, 

pulmonary hypertension, hypertension, hyperlipidemia, DVT, and bladder cancer.  

He is maintained on 2 L of oxygen via nasal cannula at home.  This morning he is

seen sitting up in bed, maintained on 2 L of oxygen.  He reports shortness of 

breath has somewhat improved, but does feel fatigued.





07/22/2023


Patient is still short of breath and tachypnea with mild to moderate respiratory

distress.  However patient is able to eat and talk with his mild difficulty.  

Occasional coughing but no chest pain.


Patient currently on IV Solu-Medrol and followed closely by pulmonary team.


Creatinine at baseline 2.0 and 1.9 however potassium is getting up 5.7 6.1 

today.  3 going to give cocktail and consult nephrology team.





07/23/2023


Patient is the same or slightly better, especially when he talks about his 

breathing he feels little better but still patient has significant wheezing and 

he is still mildly hypoxic requiring 2-3 L of oxygen via nasal cannula and 

currently Solu-Medrol 60 Mg


Also Patient with Evidence with Acute Kidney Injury and Chronic Kidney Disease 

with Imaging Showing Bilateral Hydroureteronephrosis, Neurology and Nephrology 

Team Input Is Appreciated and Patient Has Evidence of Urinary Tract Infection 

Which May Be Contributing to His Obstructive Uropathy Therefore Started on 

Ceftriaxone and Follow-Up Urine Culture.


Flomax was on board.  Continue with I be iv solumedrol 60 mg.


Discussed with staff


                               Active Medications











Generic Name Dose Route Start Last Admin





  Trade Name Freq  PRN Reason Stop Dose Admin


 


Albuterol/Ipratropium  3 ml  07/19/23 13:44 





  Ipratropium-Albuterol 3 Ml Neb  INHALATION  





  RT-Q2H PRN  





  Shortness Of Breath Or Wheezing  


 


Albuterol/Ipratropium  3 ml  07/19/23 16:00  07/23/23 11:41





  Ipratropium-Albuterol 3 Ml Neb  INHALATION   3 ml





  RT-QID HENRIETTA   Administration


 


Alprazolam  0.5 mg  07/20/23 08:36 





  Alprazolam 0.5 Mg Tab  PO  





  TID PRN  





  Anxiety  


 


Amlodipine Besylate  2.5 mg  07/19/23 21:00  07/23/23 07:50





  Amlodipine 2.5 Mg Tab  PO   2.5 mg





  BID HENRIETTA   Administration


 


Atorvastatin Calcium  20 mg  07/20/23 09:00  07/23/23 07:50





  Atorvastatin 20 Mg Tab  PO   20 mg





  DAILY HENRIETTA   Administration


 


Budesonide  0.5 mg  07/19/23 18:30  07/23/23 08:18





  Budesonide 0.5 Mg/2 Ml Nebu  INHALATION   0.5 mg





  RT-BID HENRIETTA   Administration


 


Citalopram Hydrobromide  20 mg  07/20/23 09:00  07/23/23 07:50





  Citalopram Hydrobromide 20 Mg Tab  PO   20 mg





  DAILY HENRIETTA   Administration


 


Formoterol Fumarate  20 mcg  07/19/23 18:30  07/23/23 08:18





  Formoterol Fumarate 20 Mcg/2 Ml Nebu  INHALATION   20 mcg





  RT-BID HENRIETTA   Administration


 


Heparin Sodium (Porcine)  5,000 unit  07/22/23 21:00  07/23/23 07:50





  Heparin Sodium,Porcine/Pf 5,000 Unit/0.5 Ml Syringe  SQ   5,000 unit





  Q12HR HENRIETTA   Administration


 


Sodium Chloride  1,000 mls @ 50 mls/hr  07/22/23 14:45  07/23/23 12:18





  Saline 0.9%  IV   Not Given





  .Q20H HENRIETTA  


 


Ceftriaxone Sodium 1 gm/  50 mls @ 100 mls/hr  07/23/23 14:45 





  Sodium Chloride  IVPB  





  Q24HR [facility]  





  Protocol  


 


Latanoprost  1 drops  07/19/23 21:00  07/22/23 21:05





  Latanoprost 0.005% Ophth Drops 2.5 Ml Btl  BOTH EYES   1 drops





  HS HENRIETTA   Administration


 


Methylprednisolone Sodium Succinate  60 mg  07/19/23 18:00  07/23/23 12:57





  Methylprednisolone Sod Succi 125 Mg/2 Ml Vial  IV   60 mg





  Q6HR HENRIETTA   Administration


 


Naloxone HCl  0.2 mg  07/19/23 13:44 





  Naloxone 0.4 Mg/Ml 1 Ml Vial  IVP  





  Q2M PRN  





  Opioid Reversal  


 


Pantoprazole Sodium  40 mg  07/20/23 09:00  07/23/23 06:12





  Pantoprazole 40 Mg Tablet  PO   40 mg





  AC-BRKFST HENRIETTA   Administration


 


Tamsulosin HCl  0.4 mg  07/19/23 18:30  07/22/23 18:32





  Tamsulosin 0.4 Mg Cap.Er.24h  PO   0.4 mg





  PC-SUPPER HENRIETTA   Administration














Objective





- Vital Signs


Vital signs: 


                                   Vital Signs











Temp  97.5 F L  07/23/23 06:53


 


Pulse  100   07/23/23 11:52


 


Resp  19   07/23/23 06:53


 


BP  170/77   07/23/23 06:53


 


Pulse Ox  98   07/23/23 08:18


 


FiO2  40   07/19/23 11:43








                                 Intake & Output











 07/22/23 07/23/23 07/23/23





 18:59 06:59 18:59


 


Intake Total 1080  118


 


Balance 1080  118


 


Weight  57.5 kg 


 


Intake:   


 


  Oral 1080  118


 


Other:   


 


  Voiding Method  Toilet Toilet


 


  # Voids 4 3 














- Exam





GENERAL: The patient is alert and oriented x3, not in any acute distress. Well 

developed, well nourished. 


HEENT: Pupils are round and equally reacting to light. EOMI. No scleral icterus.

No conjunctival pallor. Normocephalic, atraumatic. No pharyngeal erythema. No 

thyromegaly. 


CARDIOVASCULAR: S1 and S2 present. No murmurs, rubs, or gallops. 


-PULMONARY: Chest is clear to auscultation, bilateral expiratory wheezing , no 

crackles. 


ABDOMEN: Soft, nontender, nondistended, normoactive bowel sounds. No palpable 

organomegaly. 


MUSCULOSKELETAL: No joint swelling or deformity. 


EXTREMITIES: No cyanosis, clubbing, or pedal edema. 


NEUROLOGICAL: Gross neurological examination did not reveal any focal deficits. 


SKIN: No rashes. no petechiae.





- Labs


CBC & Chem 7: 


                                 07/22/23 06:10





                                 07/22/23 16:14


Labs: 


                  Abnormal Lab Results - Last 24 Hours (Table)











  07/22/23 07/23/23 Range/Units





  16:14 12:30 


 


Potassium  5.4 H   (3.5-5.1)  mmol/L


 


Urine Protein   Trace H  (Negative)  


 


Urine Blood   Moderate H  (Negative)  


 


Ur Leukocyte Esterase   Large H  (Negative)  


 


Urine RBC   38 H  (0-5)  /hpf


 


Urine WBC   172 H  (0-5)  /hpf


 


Urine Mucus   Rare H  (None)  /hpf


 


Urine Yeast (Budding)   Many H  (None)  /hpf














Assessment and Plan


Assessment: 





Acute COPD exacerbation


Acute hypoxic respiratory failure


Hyperkalemia


Acute kidney injury on Chronic kidney disease stage III


Bilateral Hydroureteronephrosis, 


Urinary Tract Infection 


Obstructive uropathy


Normochromic, normocytic anemia


Chronic kidney disease stage III


Plan: 





Continue with IV Solu-Medrol


Nephrology and urology consult


Start ceftriaxone and follow-up urine culture


 continue with Flomax


Labs and medication were reviewed..  Continue same treatment.  Continue with 

symptomatic treatment.  Resume home medication.  Monitor labs and vitals.  DVT 

and GI prophylaxis.  Further recommendations as per clinical course of the 

patient


DVT prophylaxis: Subcutaneous heparin


GI Prophylaxis: Ppi


Prognosis is guarded

## 2023-07-23 NOTE — CT
EXAMINATION TYPE: CT renal stones wo con

CT DLP: 389.7 mGycm, Automated exposure control for dose reduction was used.

 

DATE OF EXAM: 7/23/2023 12:31 PM

 

COMPARISON: Renal ultrasound 7/22/2023

CLINICAL INDICATION:Male, 87 years old with history of Hydronephrosis; hydronephrosis

 

TECHNIQUE:  Standard  CT of the abdomen and pelvis without IV or oral contrast. Lack of IV or oral co
ntrast limits evaluation of solid and hollow organ viscera. Coronal and sagittal reformats were perfo
rmed. 

 

FINDINGS: 

LOWER CHEST: Centrilobular emphysematous changes.

 

ABDOMEN

LIVER: Unremarkable noncontrast appearance

GALLBLADDER AND BILE DUCTS: Unremarkable noncontrast appearance

PANCREAS: Unremarkable noncontrast appearance

SPLEEN: Unremarkable noncontrast appearance

ADRENAL GLANDS: Unremarkable noncontrast appearance.

KIDNEYS AND URETERS: Mild right and moderate left hydroureteronephrosis. There is cortical thinning o
f the left kidney with bilateral perinephric fat stranding. Bilateral renal vascular calcifications. 
No obstructing calculus identified.

 

PELVIS

BLADDER: Mildly distended. Posterior right and right lateral wall urinary bladder diverticulum.

REPRODUCTIVE: Coarse calcifications of the prostate gland are identified. Mildly prominent prostate g
land.

 

ABDOMEN & PELVIS

STOMACH AND BOWEL: Distal colonic diverticulosis without evidence for acute diverticulitis. The appen
josue is within normal limits. Mild colonic stool burden. No evidence of bowel obstruction. 

PERITONEUM: No evidence of pneumoperitoneum or free fluid.

 

VASCULATURE: Moderate to severe atherosclerotic calcifications are present throughout the abdominal a
iveth and its branches. No evidence of aortic aneurysm. 

MUSCULOSKELETAL: No acute osseous abnormalities. Levoscoliotic of the lumbar spine. Mild to moderate 
multilevel degenerative disc disease of the lumbar spine.

LYMPH NODES: No gross evidence for lymphadenopathy.

SOFT TISSUE/ABDOMINAL WALL: Small fat filled umbilical hernia. Diffuse anasarca.

 

IMPRESSION:

1.  Moderate left and mild right hydroureteronephrosis without obstructing calculus identified. 

2. Urinary bladder diverticulum with mildly prominent prostate gland which could represent chronic ou
tlet obstruction. Patient may benefit from Null catheter placement.

3. Colonic diverticulosis without evidence for acute diverticulitis.

4. COPD changes.

## 2023-07-23 NOTE — P.PN
Subjective





Patient is seen for follow-up for acute kidney injury.  He also has underlying 

chronic kidney disease NKF stage IIIB with baseline creatinine around 1.3-1.8 

mg/dL.


Currently being treated for acute exacerbation of COPD.





Serum creatinine increased to 2.0 from 1.4 on initial admission.


Blood pressure has not been low.


Started on gentle IV hydration yesterday





No urine retention was noted yesterday however ultrasound shows bilateral 

hydronephrosis.





Objective





- Vital Signs


Vital signs: 


                                   Vital Signs











Temp  97.5 F L  07/23/23 06:53


 


Pulse  92   07/23/23 08:41


 


Resp  19   07/23/23 06:53


 


BP  170/77   07/23/23 06:53


 


Pulse Ox  98   07/23/23 08:18


 


FiO2  40   07/19/23 11:43








                                 Intake & Output











 07/22/23 07/23/23 07/23/23





 18:59 06:59 18:59


 


Intake Total 1080  118


 


Balance 1080  118


 


Weight  57.5 kg 


 


Intake:   


 


  Oral 1080  118


 


Other:   


 


  Voiding Method  Toilet Toilet


 


  # Voids 4 3 














- Exam





Patient is awake, comfortable, no acute distress


Patient is very hard of hearing


Examination of the heart S1 and S2


Examination of the lungs bilateral breath sounds are heard, occasional wheezing


Abdomen is soft nontender


Examination of lower extremities shows no significant edema


CNS exam grossly intact.  Patient is very hard of hearing.





- Labs


CBC & Chem 7: 


                                 07/22/23 06:10





                                 07/22/23 16:14


Labs: 


                  Abnormal Lab Results - Last 24 Hours (Table)











  07/22/23 Range/Units





  16:14 


 


Potassium  5.4 H  (3.5-5.1)  mmol/L














Assessment and Plan


Assessment: 





1.  Acute kidney injury, nonoliguric ATN.  Worsened with anemia.  Also component

of obstructive uropathy with ultrasound showing bilateral hydronephrosis.  No 

urine retention noted on bladder scan yesterday. No nephrotoxic agents on board.

 UA is still pending


2.  Hyperkalemia associated with acute kidney injury rule out obstructive 

uropathy


3.  History of bladder cancer


4.  Anemia rule out iron deficiency


5.  Acute hypoxic respiratory failure secondary to COPD exacerbation


6.  Acute COPD exacerbation maintained on steroids


7.  Chronic kidney disease NKF stage IIIB to 4 with baseline creatinine about 

1.8-1.2 mg/dL in October 2022 and serum creatinine around 0.3 in October 2019


Plan: 





Consult urology


Continue with IV fluids


Check labs today


Continue with Flomax


Repeat labs in a.m.

## 2023-07-24 LAB
BASOPHILS # BLD MANUAL: 0 X 10*3/UL (ref 0–0.1)
EOSINOPHIL # BLD MANUAL: 0 X 10*3/UL (ref 0.04–0.35)
ERYTHROCYTE [DISTWIDTH] IN BLOOD BY AUTOMATED COUNT: 3.12 X 10*6/UL (ref 4.4–5.6)
ERYTHROCYTE [DISTWIDTH] IN BLOOD: 17.6 % (ref 11.5–14.5)
HCT VFR BLD AUTO: 29.3 % (ref 39.6–50)
HGB BLD-MCNC: 8.6 D/DL (ref 12–15)
LYMPHOCYTES # BLD MANUAL: 0.08 X 10*3/UL (ref 0.9–5)
MCH RBC QN AUTO: 27.6 PG (ref 27–32)
MCHC RBC AUTO-ENTMCNC: 29.4 D/DL (ref 32–37)
MCV RBC AUTO: 93.9 FL (ref 80–97)
MONOCYTES # BLD MANUAL: 0.38 X 10*3/UL (ref 0.2–1)
NEUTROPHILS NFR BLD MANUAL: 94 %
NEUTS SEG # BLD MANUAL: 7.18 X 10*3/UL (ref 1.8–7.7)
NRBC BLD AUTO-RTO: 0.02 X 10*3/UL (ref 0–0.01)
PLATELET # BLD AUTO: 613 X 10*3/UL (ref 140–440)
SCHISTOCYTES BLD QL SMEAR: (no result)
WBC # BLD AUTO: 7.64 X 10*3/UL (ref 4.5–10)

## 2023-07-24 RX ADMIN — FORMOTEROL FUMARATE DIHYDRATE SCH MCG: 20 SOLUTION RESPIRATORY (INHALATION) at 08:04

## 2023-07-24 RX ADMIN — BUDESONIDE SCH MG: 0.5 INHALANT ORAL at 08:04

## 2023-07-24 RX ADMIN — METHYLPREDNISOLONE SODIUM SUCCINATE SCH MG: 125 INJECTION, POWDER, FOR SOLUTION INTRAMUSCULAR; INTRAVENOUS at 17:39

## 2023-07-24 RX ADMIN — CEFAZOLIN SCH MLS/HR: 330 INJECTION, POWDER, FOR SOLUTION INTRAMUSCULAR; INTRAVENOUS at 05:51

## 2023-07-24 RX ADMIN — ATORVASTATIN CALCIUM SCH MG: 20 TABLET, FILM COATED ORAL at 10:35

## 2023-07-24 RX ADMIN — CITALOPRAM HYDROBROMIDE SCH MG: 20 TABLET ORAL at 10:35

## 2023-07-24 RX ADMIN — IPRATROPIUM BROMIDE AND ALBUTEROL SULFATE SCH ML: .5; 3 SOLUTION RESPIRATORY (INHALATION) at 20:01

## 2023-07-24 RX ADMIN — HEPARIN SODIUM SCH UNIT: 5000 INJECTION INTRAVENOUS; SUBCUTANEOUS at 10:34

## 2023-07-24 RX ADMIN — IPRATROPIUM BROMIDE AND ALBUTEROL SULFATE SCH ML: .5; 3 SOLUTION RESPIRATORY (INHALATION) at 11:35

## 2023-07-24 RX ADMIN — BUDESONIDE SCH MG: 0.5 INHALANT ORAL at 20:01

## 2023-07-24 RX ADMIN — IPRATROPIUM BROMIDE AND ALBUTEROL SULFATE SCH ML: .5; 3 SOLUTION RESPIRATORY (INHALATION) at 15:13

## 2023-07-24 RX ADMIN — FORMOTEROL FUMARATE DIHYDRATE SCH MCG: 20 SOLUTION RESPIRATORY (INHALATION) at 20:01

## 2023-07-24 RX ADMIN — IPRATROPIUM BROMIDE AND ALBUTEROL SULFATE SCH ML: .5; 3 SOLUTION RESPIRATORY (INHALATION) at 08:04

## 2023-07-24 RX ADMIN — METHYLPREDNISOLONE SODIUM SUCCINATE SCH MG: 125 INJECTION, POWDER, FOR SOLUTION INTRAMUSCULAR; INTRAVENOUS at 01:03

## 2023-07-24 RX ADMIN — METHYLPREDNISOLONE SODIUM SUCCINATE SCH MG: 125 INJECTION, POWDER, FOR SOLUTION INTRAMUSCULAR; INTRAVENOUS at 11:47

## 2023-07-24 RX ADMIN — LATANOPROST SCH DROPS: 50 SOLUTION OPHTHALMIC at 21:52

## 2023-07-24 RX ADMIN — TAMSULOSIN HYDROCHLORIDE SCH MG: 0.4 CAPSULE ORAL at 17:34

## 2023-07-24 RX ADMIN — PANTOPRAZOLE SODIUM SCH MG: 40 TABLET, DELAYED RELEASE ORAL at 05:52

## 2023-07-24 RX ADMIN — HEPARIN SODIUM SCH UNIT: 5000 INJECTION INTRAVENOUS; SUBCUTANEOUS at 21:53

## 2023-07-24 RX ADMIN — METHYLPREDNISOLONE SODIUM SUCCINATE SCH MG: 125 INJECTION, POWDER, FOR SOLUTION INTRAMUSCULAR; INTRAVENOUS at 05:40

## 2023-07-24 NOTE — P.PN
Subjective


Progress Note Date: 07/24/23





I am seeing this patient in new consultation today 07/20/2023 for suspected 

acute COPD exacerbation.  Patient is an 87-year-old white male with past medical

history significant for severe oxygen dependent COPD, pulmonary hypertension, 

hypertension, hyperlipidemia, DVT, bladder cancer.  Patient does follow up in 

the office with Dr. Terrazas, for management of his severe COPD with FEV1 41% of 

predicted.  Patient does utilize 2 L/m nasal cannula 24/7. Patient's primary 

care provider is Dr. Whatley.  Patient is fairly hard of hearing, making it 

difficult to communicate.  Apparently, the patient has had some progressive 

shortness of breath and cough for the last several weeks.  He presented to the 

emergency room via EMS yesterday afternoon.  He was found to be in some acute 

respiratory distress.  He was also hypertensive and tachycardic.  Chest x-ray on

arrival showed no acute cardiopulmonary process.  Patient is currently sitting 

up in bed, on 2 L/m nasal cannula, in no acute distress.  He is currently on a 

combination of DuoNeb's, desonide, formoterol, and IV Solu-Medrol.  He seems 

fairly comfortable, and is already showing improvement in his respiratory 

status. Denies any fever, chills, myalgias, cough, hemoptysis.  Denies any chest

pain, heart palpitations, lower extremity edema, orthopnea.  CBC on arrival 

showed a WBC count of 7.3, hemoglobin 9.2, hematocrit 32.6, platelets 383.  BMP 

on arrival shows sodium 136, potassium 5.4, chloride 103, serum bicarb 24, BUN 

50, creatinine 1.46, glucose 104.  NT proBNP was mildly elevated at 1150, but 

not significant for age.  Troponin was 0.025.  Overall, the patient's condition 

seems to be improving nicely.





The patient is seen today 07/21/2023 in follow-up on the regular medical floor. 

He is currently sitting up in bed.  Awake and alert.  Still with wheezing and 

bronchospasm.  Maintaining good O2 saturations up to 100% on 2 L/m per nasal 

cannula.  Continued on DuoNeb inhalations, Perforomist and Pulmicort 

inhalations, Solu-Medrol.  Antibiotics in the form of azithromycin.  Corona 

virus not detected.





The patient is seen today 07/22/2023 in follow-up on the regular medical floor. 

He is awake and alert in no acute distress.  Maintaining O2 saturations in the 

90s on 2 L/m per nasal cannula.  He is still having significant cough and 

congestion and wheezing.  Continued on DuoNeb inhalations, Pulmicort and 

perforomist inhalations, IV Solu-Medrol.  Completed azithromycin.  White count 

10.4.  Hemoglobin 9.0.  Platelets 6:15.  Sodium 138.  Potassium 6.1.  BUN 70.  

Creatinine 1.9.  Glucose 123.





Reevaluated today on 7/23/23, patient is basically about the same, he states 

that his breathing a bit easier but nonetheless on physical examination 

continues to have significant rhonchi and wheezes bilaterally, patient is not 

showing much improvement in spite of maximal bronchodilators and IV steroids as 

well as antibiotics.  He is being followed by nephrology for his acute kidney 

injury.  Potassium today is 5.4.  BUN is 70.8 creatinine is 1.19








The patient is seen today 07/24/2023 in follow-up on the regular medical floor. 

He is awake and alert in no acute distress.  Sitting up in bed.  His been slow 

to progress.  Still with a loose cough.  Normal saline at 50 ML's per hour.  

Procalcitonin was 0.10.  He is continued on ceftriaxone.  Suspected urinary 

tract infection.  White count 7.6.  Hemoglobin 8.6.  Platelets 613.  He is 

continued on DuoNeb inhalations, Pulmicort and perform as inhalations, Solu-

Medrol.  Maintaining O2 saturations in the 90s on 2 L/m per nasal cannula.  

Heparin for DVT prophylaxis.





Objective





- Vital Signs


Vital signs: 


                                   Vital Signs











Temp  97.6 F   07/24/23 07:26


 


Pulse  92   07/24/23 08:28


 


Resp  12   07/24/23 07:26


 


BP  158/81   07/24/23 07:26


 


Pulse Ox  100   07/24/23 08:05


 


FiO2  40   07/19/23 11:43








                                 Intake & Output











 07/23/23 07/24/23 07/24/23





 18:59 06:59 18:59


 


Intake Total 118  


 


Balance 118  


 


Weight  59 kg 


 


Intake:   


 


  Oral 118  


 


Other:   


 


  Voiding Method Toilet Toilet Toilet


 


  # Voids 2 1 














- Exam





GENERAL EXAM: Alert, pleasant 87-year-old male, 2 L nasal cannula, comfortable 

in no apparent distress.


HEAD: Normocephalic and atraumatic


EYES: Normal reaction of pupils, equal size.


NOSE: Clear with pink turbinates.


THROAT: No erythema or exudates.


NECK: No masses, no JVD.


CHEST: No chest wall deformity.


LUNGS: Equal air entry with right lower lobe inspiratory crackles.  Bilateral 

wheeze. 


CVS: S1 and S2 normal with no audible murmur, regular rhythm. No extra heart 

sounds


ABDOMEN: No hepatosplenomegaly, active bowel sounds, no guarding or rigidity. 


SPINE: No scoliosis or deformity


SKIN: No rashes


CENTRAL NERVOUS SYSTEM: No focal deficits, tone is normal in all 4 extremities.


EXTREMITIES: There is no peripheral edema, clubbing, or cyanosis.  Peripheral 

pulses are intact.





- Labs


CBC & Chem 7: 


                                 07/24/23 06:37





                                 07/23/23 14:07


Labs: 


                  Abnormal Lab Results - Last 24 Hours (Table)











  07/23/23 07/23/23 07/24/23 Range/Units





  12:30 14:07 06:37 


 


RBC    3.12 L  (4.40-5.60)  X 10*6/uL


 


Hgb    8.6 L  (12.0-15.0)  d/dL


 


Hct    29.3 L  (39.6-50.0)  %


 


MCHC    29.4 L  (32.0-37.0)  d/dL


 


RDW    17.6 H  (11.5-14.5)  %


 


Plt Count    613 H  (140-440)  X 10*3/uL


 


Lymphocytes # (Manual)    0.08 L  (0.90-5.00)  X 10*3/uL


 


Eosinophils # (Manual)    0 L  (0.04-0.35)  X 10*3/uL


 


NRBC/100 WBC Diff    0.02 H  (0.00-0.01)  X 10*3/uL


 


Schistocytes    1+ A  


 


Potassium   5.4 H   (3.5-5.1)  mmol/L


 


BUN   73 H   (9-20)  mg/dL


 


Creatinine   2.02 H   (0.66-1.25)  mg/dL


 


Glucose   102 H   (74-99)  mg/dL


 


Calcium   8.2 L   (8.4-10.2)  mg/dL


 


Urine Protein  Trace H    (Negative)  


 


Urine Blood  Moderate H    (Negative)  


 


Ur Leukocyte Esterase  Large H    (Negative)  


 


Urine RBC  38 H    (0-5)  /hpf


 


Urine WBC  172 H    (0-5)  /hpf


 


Urine Mucus  Rare H    (None)  /hpf


 


Urine Yeast (Budding)  Many H    (None)  /hpf














Assessment and Plan


Assessment: 





Acute exacerbation of obstructive pulmonary disease.  Chest x-ray showed no acut

e process





Chronic hypoxemic respiratory failure, currently on 2 L/m nasal cannula





History of bladder cancer.  Renal computed tomography scan revealed moderate 

left and mild right hydroureteronephrosis without obstructing calculus 

identified.  Urinary bladder diverticulum with mildly prominent prostate gland 

which could represent chronic outlet obstruction.  





Urinary tract infection suspected, culture pending, currently on ceftriaxone





Colonic diverticulosis without evidence of acute diverticulitis





History of pulmonary hypertension





Benign essential hypertension





Hyperlipidemia





Chronic kidney disease stage III





Anemia of chronic disease





History DVT





Ex-smoker





Plan:





The patient was seen and evaluated


Medications and labs are reviewed


Continued on bronchodilators, steroids


Currently on ceftriaxone


Increase his activity as tolerated


We will continue to follow





I have personally seen and examined the patient, performed the documentation and

the assessment and plan as written.  Number of minutes spent on the visit: 10.

## 2023-07-24 NOTE — P.PN
Subjective





Patient is seen in follow-up for acute kidney injury on chronic kidney disease. 

Renal function fairly stable as of yesterday.  Has been voiding.  On 2 L Hard of

hearing.





Vital signs are stable.


General: No acute distress.


HEENT: Head exam is unremarkable.  On his cannula.


LUNGS: Scattered rhonchi.


HEART: Rate and Rhythm are regular.


ABDOMEN: Nontender.


EXTREMITITES: No edema.





Objective





- Vital Signs


Vital signs: 


                                   Vital Signs











Temp  97.6 F   07/24/23 07:26


 


Pulse  90   07/24/23 11:46


 


Resp  12   07/24/23 07:26


 


BP  158/81   07/24/23 07:26


 


Pulse Ox  100   07/24/23 08:05


 


FiO2  40   07/19/23 11:43








                                 Intake & Output











 07/23/23 07/24/23 07/24/23





 18:59 06:59 18:59


 


Intake Total 118  100


 


Balance 118  100


 


Weight  59 kg 


 


Intake:   


 


  Intake, IV Titration   100





  Amount   


 


    cefTRIAXone 1 gm In   100





    Sodium Chloride 0.9% 50   





    ml @ 100 mls/hr IVPB   





    Q24HR [facility] Rx#:[id number]   


 


  Oral 118  


 


Other:   


 


  Voiding Method Toilet Toilet Toilet


 


  # Voids 2 1 2














- Labs


CBC & Chem 7: 


                                 07/24/23 06:37





                                 07/23/23 14:07


Labs: 


                  Abnormal Lab Results - Last 24 Hours (Table)











  07/23/23 07/24/23 Range/Units





  14:07 06:37 


 


RBC   3.12 L  (4.40-5.60)  X 10*6/uL


 


Hgb   8.6 L  (12.0-15.0)  d/dL


 


Hct   29.3 L  (39.6-50.0)  %


 


MCHC   29.4 L  (32.0-37.0)  d/dL


 


RDW   17.6 H  (11.5-14.5)  %


 


Plt Count   613 H  (140-440)  X 10*3/uL


 


Lymphocytes # (Manual)   0.08 L  (0.90-5.00)  X 10*3/uL


 


Eosinophils # (Manual)   0 L  (0.04-0.35)  X 10*3/uL


 


NRBC/100 WBC Diff   0.02 H  (0.00-0.01)  X 10*3/uL


 


Schistocytes   1+ A  


 


Potassium  5.4 H   (3.5-5.1)  mmol/L


 


BUN  73 H   (9-20)  mg/dL


 


Creatinine  2.02 H   (0.66-1.25)  mg/dL


 


Glucose  102 H   (74-99)  mg/dL


 


Calcium  8.2 L   (8.4-10.2)  mg/dL














Assessment and Plan


Plan: 





Assessment:


1.  Acute kidney injury secondary to ATN and component of obstructive uropathy. 

UA suggestive of UTI.  Trace protein.  Creatinine 2.02 yesterday.


2.  Bilateral hydronephrosis.  Urology following.


3.  History of bladder cancer.


4.  Hyperkalemia secondary to acute kidney injury and obstructive uropathy.  

Better.


5.  Chronic kidney disease stage IIIB/4 with baseline creatinine near 1.5.


6.  Hypertension with chronic kidney disease.  Stable.  Exacerbated by steroids.





Plan:


Maintain gentle IV hydration.


Encouraged oral intake.  


Avoid nephrotoxins.


Continue to monitor renal function and urine output.


Check potassium level today.

## 2023-07-24 NOTE — P.PN
Subjective





This is an 87-year-old male who presented to the emergency room with shortness 

of breath which has increased over the last several weeks.  Upon arrival of EMS 

he was in respiratory distress, hypertensive and tachycardic, with a respiratory

rate of 30.  Patient has a medical history of severe oxygen-dependent COPD, 

pulmonary hypertension, hypertension, hyperlipidemia, DVT, and bladder cancer.  

He is maintained on 2 L of oxygen via nasal cannula at home.  This morning he is

seen sitting up in bed, maintained on 2 L of oxygen.  He reports shortness of 

breath has somewhat improved, but does feel fatigued.





07/22/2023


Patient is still short of breath and tachypnea with mild to moderate respiratory

distress.  However patient is able to eat and talk with his mild difficulty.  

Occasional coughing but no chest pain.


Patient currently on IV Solu-Medrol and followed closely by pulmonary team.


Creatinine at baseline 2.0 and 1.9 however potassium is getting up 5.7 6.1 

today.  3 going to give cocktail and consult nephrology team.





07/23/2023


Patient is the same or slightly better, especially when he talks about his 

breathing he feels little better but still patient has significant wheezing and 

he is still mildly hypoxic requiring 2-3 L of oxygen via nasal cannula and 

currently Solu-Medrol 60 Mg


Also Patient with Evidence with Acute Kidney Injury and Chronic Kidney Disease 

with Imaging Showing Bilateral Hydroureteronephrosis, Neurology and Nephrology 

Team Input Is Appreciated and Patient Has Evidence of Urinary Tract Infection 

Which May Be Contributing to His Obstructive Uropathy Therefore Started on 

Ceftriaxone and Follow-Up Urine Culture.


Flomax was on board.  Continue with I be iv solumedrol 60 mg.


Discussed with staff


                                        


07/24/2023


Patient is improving slowly and gradually, his breathing slightly better, he is 

saturating 100% with oxygen via nasal cannula however is still somewhat 

tachypneic with mild respiratory distress


Is currently kept on salmeterol with pulmonary follow closely.


Labs reviewed and stable.  Potassium 5.4.


Urology and nephrology team on the case for acute kidney injury on chronic 

kidney disease with mild hyperkalemia.  Patient with history of urinary bladder 

cancers and evidence of bilateral hydronephrosis.


Also patient on ceftriaxone for possible urinary tract infection.  Urine culture

is pending..  No Null currently.  There is no evidence of urinary retention and

bladder scan.


PT evaluation





Objective





- Vital Signs


Vital signs: 


                                   Vital Signs











Temp  97.6 F   07/24/23 07:26


 


Pulse  90   07/24/23 11:46


 


Resp  12   07/24/23 07:26


 


BP  158/81   07/24/23 07:26


 


Pulse Ox  100   07/24/23 08:05


 


FiO2  40   07/19/23 11:43








                                 Intake & Output











 07/23/23 07/24/23 07/24/23





 18:59 06:59 18:59


 


Intake Total 118  100


 


Balance 118  100


 


Weight  59 kg 


 


Intake:   


 


  Intake, IV Titration   100





  Amount   


 


    cefTRIAXone 1 gm In   100





    Sodium Chloride 0.9% 50   





    ml @ 100 mls/hr IVPB   





    Q24HR [facility] Rx#:[id number]   


 


  Oral 118  


 


Other:   


 


  Voiding Method Toilet Toilet Toilet


 


  # Voids 2 1 2














- Exam





GENERAL: The patient is alert and oriented x3, not in any acute distress. Well 

developed, well nourished. 


HEENT: Pupils are round and equally reacting to light. EOMI. No scleral icterus.

No conjunctival pallor. Normocephalic, atraumatic. No pharyngeal erythema. No 

thyromegaly. 


CARDIOVASCULAR: S1 and S2 present. No murmurs, rubs, or gallops. 


-PULMONARY: Chest is clear to auscultation, bilateral expiratory wheezing , no 

crackles. 


ABDOMEN: Soft, nontender, nondistended, normoactive bowel sounds. No palpable 

organomegaly. 


MUSCULOSKELETAL: No joint swelling or deformity. 


EXTREMITIES: No cyanosis, clubbing, or pedal edema. 


NEUROLOGICAL: Gross neurological examination did not reveal any focal deficits. 


SKIN: No rashes. no petechiae.





- Labs


CBC & Chem 7: 


                                 07/24/23 06:37





                                 07/23/23 14:07


Labs: 


                  Abnormal Lab Results - Last 24 Hours (Table)











  07/23/23 07/24/23 Range/Units





  14:07 06:37 


 


RBC   3.12 L  (4.40-5.60)  X 10*6/uL


 


Hgb   8.6 L  (12.0-15.0)  d/dL


 


Hct   29.3 L  (39.6-50.0)  %


 


MCHC   29.4 L  (32.0-37.0)  d/dL


 


RDW   17.6 H  (11.5-14.5)  %


 


Plt Count   613 H  (140-440)  X 10*3/uL


 


Lymphocytes # (Manual)   0.08 L  (0.90-5.00)  X 10*3/uL


 


Eosinophils # (Manual)   0 L  (0.04-0.35)  X 10*3/uL


 


NRBC/100 WBC Diff   0.02 H  (0.00-0.01)  X 10*3/uL


 


Schistocytes   1+ A  


 


Potassium  5.4 H   (3.5-5.1)  mmol/L


 


BUN  73 H   (9-20)  mg/dL


 


Creatinine  2.02 H   (0.66-1.25)  mg/dL


 


Glucose  102 H   (74-99)  mg/dL


 


Calcium  8.2 L   (8.4-10.2)  mg/dL














Assessment and Plan


Assessment: 





Acute COPD exacerbation


Acute hypoxic respiratory failure


Hyperkalemia


Acute kidney injury on Chronic kidney disease stage III


Bilateral Hydroureteronephrosis, 


Urinary Tract Infection 


Obstructive uropathy


Normochromic, normocytic anemia


Chronic kidney disease stage III


Plan: 





Continue with IV Solu-Medrol


Nephrology and urology consult


Start ceftriaxone and follow-up urine culture


 continue with Flomax


Labs and medication were reviewed..  Continue same treatment.  Continue with 

symptomatic treatment.  Resume home medication.  Monitor labs and vitals.  DVT 

and GI prophylaxis.  Further recommendations as per clinical course of the 

patient


DVT prophylaxis: Subcutaneous heparin


GI Prophylaxis: Ppi


Prognosis is guarded

## 2023-07-25 LAB
ANION GAP SERPL CALC-SCNC: 8.8 MMOL/L (ref 4–12)
BUN SERPL-SCNC: 61.1 MG/DL (ref 9–27)
BUN/CREAT SERPL: 38.19 RATIO (ref 12–20)
CALCIUM SPEC-MCNC: 7.9 MG/DL (ref 8.7–10.3)
CHLORIDE SERPL-SCNC: 108 MMOL/L (ref 96–109)
CO2 SERPL-SCNC: 26.2 MMOL/L (ref 21.6–31.8)
GLUCOSE SERPL-MCNC: 116 MG/DL (ref 70–110)
MAGNESIUM SPEC-SCNC: 2.4 MG/DL (ref 1.5–2.4)
POTASSIUM SERPL-SCNC: 5.5 MMOL/L (ref 3.5–5.5)
SODIUM SERPL-SCNC: 143 MMOL/L (ref 135–145)

## 2023-07-25 RX ADMIN — IPRATROPIUM BROMIDE AND ALBUTEROL SULFATE SCH ML: .5; 3 SOLUTION RESPIRATORY (INHALATION) at 08:21

## 2023-07-25 RX ADMIN — ATORVASTATIN CALCIUM SCH MG: 20 TABLET, FILM COATED ORAL at 08:52

## 2023-07-25 RX ADMIN — CEFAZOLIN SCH MLS/HR: 330 INJECTION, POWDER, FOR SOLUTION INTRAMUSCULAR; INTRAVENOUS at 00:09

## 2023-07-25 RX ADMIN — HEPARIN SODIUM SCH UNIT: 5000 INJECTION INTRAVENOUS; SUBCUTANEOUS at 08:52

## 2023-07-25 RX ADMIN — CITALOPRAM HYDROBROMIDE SCH MG: 20 TABLET ORAL at 08:52

## 2023-07-25 RX ADMIN — SODIUM ZIRCONIUM CYCLOSILICATE SCH GM: 10 POWDER, FOR SUSPENSION ORAL at 13:13

## 2023-07-25 RX ADMIN — METHYLPREDNISOLONE SODIUM SUCCINATE SCH MG: 125 INJECTION, POWDER, FOR SOLUTION INTRAMUSCULAR; INTRAVENOUS at 05:32

## 2023-07-25 RX ADMIN — METHYLPREDNISOLONE SODIUM SUCCINATE SCH MG: 125 INJECTION, POWDER, FOR SOLUTION INTRAMUSCULAR; INTRAVENOUS at 00:06

## 2023-07-25 RX ADMIN — HEPARIN SODIUM SCH UNIT: 5000 INJECTION INTRAVENOUS; SUBCUTANEOUS at 21:29

## 2023-07-25 RX ADMIN — TAMSULOSIN HYDROCHLORIDE SCH MG: 0.4 CAPSULE ORAL at 18:04

## 2023-07-25 RX ADMIN — IPRATROPIUM BROMIDE AND ALBUTEROL SULFATE SCH ML: .5; 3 SOLUTION RESPIRATORY (INHALATION) at 20:46

## 2023-07-25 RX ADMIN — PANTOPRAZOLE SODIUM SCH MG: 40 TABLET, DELAYED RELEASE ORAL at 06:40

## 2023-07-25 RX ADMIN — BUDESONIDE SCH MG: 0.5 INHALANT ORAL at 20:46

## 2023-07-25 RX ADMIN — IPRATROPIUM BROMIDE AND ALBUTEROL SULFATE SCH ML: .5; 3 SOLUTION RESPIRATORY (INHALATION) at 11:18

## 2023-07-25 RX ADMIN — LATANOPROST SCH DROPS: 50 SOLUTION OPHTHALMIC at 21:29

## 2023-07-25 RX ADMIN — FORMOTEROL FUMARATE DIHYDRATE SCH MCG: 20 SOLUTION RESPIRATORY (INHALATION) at 20:46

## 2023-07-25 RX ADMIN — IPRATROPIUM BROMIDE AND ALBUTEROL SULFATE SCH ML: .5; 3 SOLUTION RESPIRATORY (INHALATION) at 15:28

## 2023-07-25 RX ADMIN — BUDESONIDE SCH MG: 0.5 INHALANT ORAL at 08:21

## 2023-07-25 RX ADMIN — FORMOTEROL FUMARATE DIHYDRATE SCH MCG: 20 SOLUTION RESPIRATORY (INHALATION) at 08:21

## 2023-07-25 NOTE — P.PN
Subjective





This is an 87-year-old male who presented to the emergency room with shortness 

of breath which has increased over the last several weeks.  Upon arrival of EMS 

he was in respiratory distress, hypertensive and tachycardic, with a respiratory

rate of 30.  Patient has a medical history of severe oxygen-dependent COPD, 

pulmonary hypertension, hypertension, hyperlipidemia, DVT, and bladder cancer.  

He is maintained on 2 L of oxygen via nasal cannula at home.  This morning he is

seen sitting up in bed, maintained on 2 L of oxygen.  He reports shortness of 

breath has somewhat improved, but does feel fatigued.





07/22/2023


Patient is still short of breath and tachypnea with mild to moderate respiratory

distress.  However patient is able to eat and talk with his mild difficulty.  

Occasional coughing but no chest pain.


Patient currently on IV Solu-Medrol and followed closely by pulmonary team.


Creatinine at baseline 2.0 and 1.9 however potassium is getting up 5.7 6.1 

today.  3 going to give cocktail and consult nephrology team.





07/23/2023


Patient is the same or slightly better, especially when he talks about his 

breathing he feels little better but still patient has significant wheezing and 

he is still mildly hypoxic requiring 2-3 L of oxygen via nasal cannula and 

currently Solu-Medrol 60 Mg


Also Patient with Evidence with Acute Kidney Injury and Chronic Kidney Disease 

with Imaging Showing Bilateral Hydroureteronephrosis, Neurology and Nephrology 

Team Input Is Appreciated and Patient Has Evidence of Urinary Tract Infection 

Which May Be Contributing to His Obstructive Uropathy Therefore Started on 

Ceftriaxone and Follow-Up Urine Culture.


Flomax was on board.  Continue with I be iv solumedrol 60 mg.


Discussed with staff


                                        


07/24/2023


Patient is improving slowly and gradually, his breathing slightly better, he is 

saturating 100% with oxygen via nasal cannula however is still somewhat 

tachypneic with mild respiratory distress


Is currently kept on salmeterol with pulmonary follow closely.


Labs reviewed and stable.  Potassium 5.4.


Urology and nephrology team on the case for acute kidney injury on chronic 

kidney disease with mild hyperkalemia.  Patient with history of urinary bladder 

cancers and evidence of bilateral hydronephrosis.


Also patient on ceftriaxone for possible urinary tract infection.  Urine culture

is pending..  No Null currently.  There is no evidence of urinary retention and

bladder scan.


PT evaluation





07/25/2023


Patient is clinically improving, although he is still generally weak.  Reason 

wife is doing better and pulmonary team has switched him to oral prednisone.


His UTI and bilateral hydronephrosis still active problems.  His been covered 

with ceftriaxone, today in axis was admitted because of used, one in the urine 

culture which is not finally at area Diflucan is rather good choice because of 

chronic obstructive interaction.


Neurology team are considering cystoscopy with transurethral resection of the 

bladder trigone with stent placement versus ureteral nephrostomy tube placement 

to help relieve of obstruction.


Creatinine improving down to 1.6 and potassium 5.5 while patient is on normal 

saline 50 mL/h and status post lokelma





Objective





- Vital Signs


Vital signs: 


                                   Vital Signs











Temp  97.6 F   07/25/23 07:05


 


Pulse  98   07/25/23 11:34


 


Resp  20   07/25/23 07:05


 


BP  165/80   07/25/23 07:05


 


Pulse Ox  98   07/25/23 08:22


 


FiO2  40   07/19/23 11:43








                                 Intake & Output











 07/24/23 07/25/23 07/25/23





 18:59 06:59 18:59


 


Intake Total 100  220


 


Balance 100  220


 


Weight 59 kg 59 kg 


 


Intake:   


 


  Intake, IV Titration 100  100





  Amount   


 


    cefTRIAXone 1 gm In 100  100





    Sodium Chloride 0.9% 50   





    ml @ 100 mls/hr IVPB   





    Q24HR [facility] Rx#:[id number]   


 


  Oral   120


 


Other:   


 


  Voiding Method Toilet Toilet Toilet





  Diaper Diaper


 


  # Voids 2 2 2














- Exam





GENERAL: The patient is alert and oriented x3, not in any acute distress. Well 

developed, well nourished. 


HEENT: Pupils are round and equally reacting to light. EOMI. No scleral icterus.

No conjunctival pallor. Normocephalic, atraumatic. No pharyngeal erythema. No 

thyromegaly. 


CARDIOVASCULAR: S1 and S2 present. No murmurs, rubs, or gallops. 


-PULMONARY: Chest is clear to auscultation, bilateral expiratory wheezing , no 

crackles. 


ABDOMEN: Soft, nontender, nondistended, normoactive bowel sounds. No palpable 

organomegaly. 


MUSCULOSKELETAL: No joint swelling or deformity. 


EXTREMITIES: No cyanosis, clubbing, or pedal edema. 


NEUROLOGICAL: Gross neurological examination did not reveal any focal deficits. 


SKIN: No rashes. no petechiae.





- Labs


CBC & Chem 7: 


                                 07/24/23 06:37





                                 07/25/23 05:00


Labs: 


                  Abnormal Lab Results - Last 24 Hours (Table)











  07/24/23 07/25/23 Range/Units





  14:10 05:00 


 


Potassium  5.3 H   (3.5-5.1)  mmol/L


 


BUN   61.1 H  (9.0-27.0)  mg/dL


 


Creatinine   1.6 H  (0.6-1.5)  mg/dL


 


Est GFR (CKD-EPI)   41 L  (>=60)   


 


BUN/Creatinine Ratio   38.19 H  (12.00-20.00)  Ratio


 


Glucose   116 H  ()  mg/dL


 


Calcium   7.9 L  (8.7-10.3)  mg/dL








                      Microbiology - Last 24 Hours (Table)











 07/23/23 12:30 Urine Culture - Preliminary





 Urine,Voided    Yeast














Assessment and Plan


Assessment: 





Acute COPD exacerbation, improving


Acute hypoxic respiratory failure


Hyperkalemia, improved


Acute kidney injury on Chronic kidney disease stage III


Bilateral Hydroureteronephrosis, suspected secondary to a known history of 

coronary bladder cancer with UTI as well


Urinary Tract Infection 


Obstructive uropathy


Normochromic, normocytic anemia


Chronic kidney disease stage III


Plan: 





Continue with IV Solu-Medrol


Nephrology and urology consult


Start ceftriaxone and follow-up urine culture


 continue with Flomax


Labs and medication were reviewed..  Continue same treatment.  Continue with 

symptomatic treatment.  Resume home medication.  Monitor labs and vitals.  DVT 

and GI prophylaxis.  Further recommendations as per clinical course of the 

patient


DVT prophylaxis: Subcutaneous heparin


GI Prophylaxis: Ppi


Prognosis is guarded

## 2023-07-25 NOTE — P.PN
Subjective


Progress Note Date: 07/24/23


Principal diagnosis: 


Left hydronephrosis


The patient denies hematuria and flank pain.  








Objective





- Vital Signs


Vital signs: 


                                   Vital Signs











Temp  97.6 F   07/24/23 07:26


 


Pulse  90   07/24/23 11:46


 


Resp  12   07/24/23 07:26


 


BP  158/81   07/24/23 07:26


 


Pulse Ox  100   07/24/23 08:05


 


FiO2  40   07/19/23 11:43








                                 Intake & Output











 07/23/23 07/24/23 07/24/23





 18:59 06:59 18:59


 


Intake Total 118  100


 


Balance 118  100


 


Weight  59 kg 


 


Intake:   


 


  Intake, IV Titration   100





  Amount   


 


    cefTRIAXone 1 gm In   100





    Sodium Chloride 0.9% 50   





    ml @ 100 mls/hr IVPB   





    Q24HR [facility] Rx#:[id number]   


 


  Oral 118  


 


Other:   


 


  Voiding Method Toilet Toilet Toilet


 


  # Voids 2 1 2














- Constitutional


General appearance: Present: average body habitus, cooperative, no acute 

distress





- Psychiatric


Psychiatric: Present: A&O x's 3





- Labs


CBC & Chem 7: 


                                 07/24/23 06:37





                                 07/24/23 14:10


Labs: 


                  Abnormal Lab Results - Last 24 Hours (Table)











  07/23/23 07/24/23 Range/Units





  14:07 06:37 


 


RBC   3.12 L  (4.40-5.60)  X 10*6/uL


 


Hgb   8.6 L  (12.0-15.0)  d/dL


 


Hct   29.3 L  (39.6-50.0)  %


 


MCHC   29.4 L  (32.0-37.0)  d/dL


 


RDW   17.6 H  (11.5-14.5)  %


 


Plt Count   613 H  (140-440)  X 10*3/uL


 


Lymphocytes # (Manual)   0.08 L  (0.90-5.00)  X 10*3/uL


 


Eosinophils # (Manual)   0 L  (0.04-0.35)  X 10*3/uL


 


NRBC/100 WBC Diff   0.02 H  (0.00-0.01)  X 10*3/uL


 


Schistocytes   1+ A  


 


Potassium  5.4 H   (3.5-5.1)  mmol/L


 


BUN  73 H   (9-20)  mg/dL


 


Creatinine  2.02 H   (0.66-1.25)  mg/dL


 


Glucose  102 H   (74-99)  mg/dL


 


Calcium  8.2 L   (8.4-10.2)  mg/dL














- Imaging and Cardiology


CT scan - abdomen: report reviewed, image reviewed





Assessment and Plan


Assessment: 


CT scan shows bilateral hydroureteronephrosis, moderate on the right and marked 

on the left with renal parenchymal thinning (on the left).  The left ureter is 

markedly dilated and tortuous.  The ureters are dilated down to the bladder.  

The patient has a history of high-grade urothelial carcinoma of the bladder with

focal muscle invasion, being treated by Dr. Kim.  Recent cystoscopy showed no 

tumors.  He is receiving monthly intravesical installation of gemcitabine.





(1) Malignant neoplasm of bladder, unspecified


Current Visit: Yes   Status: Acute   Code(s): C67.9 - MALIGNANT NEOPLASM OF 

BLADDER, UNSPECIFIED   SNOMED Code(s): 260327025


   





(2) Hydronephrosis


Current Visit: Yes   Status: Acute   Code(s): N13.30 - UNSPECIFIED 

HYDRONEPHROSIS   SNOMED Code(s): 58072589


   


Plan: 


The patient has hydroureteronephrosis, likely due to his known bladder cancer.  

It would be my recommendation he undergo cystoscopy, transurethral resection of 

the bladder trigone with attempted stent placement.  However, this will require 

general anesthesia or spinal anesthesia.  Alternatively, nephrostomy tubes could

be placed to provide renal drainage and optimize kidney function, but this would

not address the primary cause of obstruction.  I intend to discuss this in more 

detail with both the patient and Dr. Kim.

## 2023-07-25 NOTE — P.PN
Subjective





Patient is seen in follow-up for acute kidney injury on chronic kidney disease. 

Renal function improved.  Has been voiding.  On 2 L nasal cannula.  Hard of 

hearing.





Vital signs are stable.


General: No acute distress.


HEENT: Head exam is unremarkable.  On nasal cannula.


LUNGS: Scattered rhonchi.


HEART: Rate and Rhythm are regular.


ABDOMEN: Nontender.


EXTREMITITES: No edema.





Objective





- Vital Signs


Vital signs: 


                                   Vital Signs











Temp  97.6 F   07/25/23 07:05


 


Pulse  98   07/25/23 11:34


 


Resp  20   07/25/23 07:05


 


BP  165/80   07/25/23 07:05


 


Pulse Ox  98   07/25/23 08:22


 


FiO2  40   07/19/23 11:43








                                 Intake & Output











 07/24/23 07/25/23 07/25/23





 18:59 06:59 18:59


 


Intake Total 100  220


 


Balance 100  220


 


Weight 59 kg 59 kg 


 


Intake:   


 


  Intake, IV Titration 100  100





  Amount   


 


    cefTRIAXone 1 gm In 100  100





    Sodium Chloride 0.9% 50   





    ml @ 100 mls/hr IVPB   





    Q24HR [facility] Rx#:[id number]   


 


  Oral   120


 


Other:   


 


  Voiding Method Toilet Toilet Toilet





  Diaper Diaper


 


  # Voids 2 2 2














- Labs


CBC & Chem 7: 


                                 07/24/23 06:37





                                 07/25/23 05:00


Labs: 


                  Abnormal Lab Results - Last 24 Hours (Table)











  07/24/23 07/25/23 Range/Units





  14:10 05:00 


 


Potassium  5.3 H   (3.5-5.1)  mmol/L


 


BUN   61.1 H  (9.0-27.0)  mg/dL


 


Creatinine   1.6 H  (0.6-1.5)  mg/dL


 


Est GFR (CKD-EPI)   41 L  (>=60)   


 


BUN/Creatinine Ratio   38.19 H  (12.00-20.00)  Ratio


 


Glucose   116 H  ()  mg/dL


 


Calcium   7.9 L  (8.7-10.3)  mg/dL








                      Microbiology - Last 24 Hours (Table)











 07/23/23 12:30 Urine Culture - Preliminary





 Urine,Voided    Yeast














Assessment and Plan


Plan: 





Assessment:


1.  Acute kidney injury secondary to ATN and component of obstructive uropathy. 

UA suggestive of UTI.  Trace protein.  Renal function improved.  Creatinine 1.6 

today.


2.  Bilateral hydronephrosis.  Urology following.


3.  History of bladder cancer.


4.  Hyperkalemia secondary to acute kidney injury and obstructive uropathy.  

Stable.


5.  Chronic kidney disease stage IIIB/4 with baseline creatinine near 1.5.


6.  Hypertension with chronic kidney disease.  Stable.  Exacerbated by steroids.





Plan:


Hep-Lock IV fluids.


Encouraged oral intake.  


Avoid nephrotoxins.


Continue to monitor renal function and urine output.


Low potassium diet.


Add lokelma 10 g once daily x 4 days.


Repeat BMP and magnesium level 2-3 days postdischarge.


Follow-up patient in one week.

## 2023-07-25 NOTE — P.PN
Subjective


Progress Note Date: 07/25/23





I am seeing this patient in new consultation today 07/20/2023 for suspected 

acute COPD exacerbation.  Patient is an 87-year-old white male with past medical

history significant for severe oxygen dependent COPD, pulmonary hypertension, 

hypertension, hyperlipidemia, DVT, bladder cancer.  Patient does follow up in 

the office with Dr. Terrazas, for management of his severe COPD with FEV1 41% of 

predicted.  Patient does utilize 2 L/m nasal cannula 24/7. Patient's primary 

care provider is Dr. Whatley.  Patient is fairly hard of hearing, making it 

difficult to communicate.  Apparently, the patient has had some progressive 

shortness of breath and cough for the last several weeks.  He presented to the 

emergency room via EMS yesterday afternoon.  He was found to be in some acute 

respiratory distress.  He was also hypertensive and tachycardic.  Chest x-ray on

arrival showed no acute cardiopulmonary process.  Patient is currently sitting 

up in bed, on 2 L/m nasal cannula, in no acute distress.  He is currently on a 

combination of DuoNeb's, desonide, formoterol, and IV Solu-Medrol.  He seems 

fairly comfortable, and is already showing improvement in his respiratory 

status. Denies any fever, chills, myalgias, cough, hemoptysis.  Denies any chest

pain, heart palpitations, lower extremity edema, orthopnea.  CBC on arrival 

showed a WBC count of 7.3, hemoglobin 9.2, hematocrit 32.6, platelets 383.  BMP 

on arrival shows sodium 136, potassium 5.4, chloride 103, serum bicarb 24, BUN 

50, creatinine 1.46, glucose 104.  NT proBNP was mildly elevated at 1150, but 

not significant for age.  Troponin was 0.025.  Overall, the patient's condition 

seems to be improving nicely.





The patient is seen today 07/21/2023 in follow-up on the regular medical floor. 

He is currently sitting up in bed.  Awake and alert.  Still with wheezing and 

bronchospasm.  Maintaining good O2 saturations up to 100% on 2 L/m per nasal 

cannula.  Continued on DuoNeb inhalations, Perforomist and Pulmicort 

inhalations, Solu-Medrol.  Antibiotics in the form of azithromycin.  Corona 

virus not detected.





The patient is seen today 07/22/2023 in follow-up on the regular medical floor. 

He is awake and alert in no acute distress.  Maintaining O2 saturations in the 

90s on 2 L/m per nasal cannula.  He is still having significant cough and 

congestion and wheezing.  Continued on DuoNeb inhalations, Pulmicort and 

perforomist inhalations, IV Solu-Medrol.  Completed azithromycin.  White count 

10.4.  Hemoglobin 9.0.  Platelets 6:15.  Sodium 138.  Potassium 6.1.  BUN 70.  

Creatinine 1.9.  Glucose 123.





Reevaluated today on 7/23/23, patient is basically about the same, he states 

that his breathing a bit easier but nonetheless on physical examination 

continues to have significant rhonchi and wheezes bilaterally, patient is not 

showing much improvement in spite of maximal bronchodilators and IV steroids as 

well as antibiotics.  He is being followed by nephrology for his acute kidney 

injury.  Potassium today is 5.4.  BUN is 70.8 creatinine is 1.19








The patient is seen today 07/24/2023 in follow-up on the regular medical floor. 

He is awake and alert in no acute distress.  Sitting up in bed.  His been slow 

to progress.  Still with a loose cough.  Normal saline at 50 ML's per hour.  

Procalcitonin was 0.10.  He is continued on ceftriaxone.  Suspected urinary 

tract infection.  White count 7.6.  Hemoglobin 8.6.  Platelets 613.  He is 

continued on DuoNeb inhalations, Pulmicort and perform as inhalations, Solu-

Medrol.  Maintaining O2 saturations in the 90s on 2 L/m per nasal cannula.  

Heparin for DVT prophylaxis.





The patient is seen today 07/25/2023 in follow-up on the regular medical floor. 

He is currently resting comfortably in bed.  Awake and alert in no acute 

distress.  Breathing easier today compared to yesterday.  Sodium 143.  Potassium

5.5.  Bicarb 26.  BUN 61.  Creatinine 1.6.  Urine culture pending.  Remains on c

eftriaxone.  He is continued on DuoNeb inhalations, Pulmicort and Perforomist 

inhalations, IV Solu-Medrol.  Normal saline at 50 MLS per hour.  Heparin for DVT

prophylaxis.  He is tolerating a diet.





Objective





- Vital Signs


Vital signs: 


                                   Vital Signs











Temp  97.6 F   07/25/23 07:05


 


Pulse  96   07/25/23 08:44


 


Resp  20   07/25/23 07:05


 


BP  165/80   07/25/23 07:05


 


Pulse Ox  98   07/25/23 08:22


 


FiO2  40   07/19/23 11:43








                                 Intake & Output











 07/24/23 07/25/23 07/25/23





 18:59 06:59 18:59


 


Intake Total 100  


 


Balance 100  


 


Weight 59 kg 59 kg 


 


Intake:   


 


  Intake, IV Titration 100  





  Amount   


 


    cefTRIAXone 1 gm In 100  





    Sodium Chloride 0.9% 50   





    ml @ 100 mls/hr IVPB   





    Q24HR [facility] Rx#:[id number]   


 


Other:   


 


  Voiding Method Toilet Toilet Toilet





  Diaper Diaper


 


  # Voids 2 2 














- Exam





GENERAL EXAM: Alert, hard of hearing 87-year-old male, resting in bed, 2 L nasal

cannula, comfortable in no apparent distress.


HEAD: Normocephalic and atraumatic


EYES: Normal reaction of pupils, equal size.


NOSE: Clear with pink turbinates.


THROAT: No erythema or exudates.


NECK: No masses, no JVD.


CHEST: No chest wall deformity.


LUNGS: Equal air entry with right lower lobe inspiratory crackles.  Bilateral 

wheeze. 


CVS: S1 and S2 normal with no audible murmur, regular rhythm. No extra heart 

sounds


ABDOMEN: No hepatosplenomegaly, active bowel sounds, no guarding or rigidity. 


SPINE: No scoliosis or deformity


SKIN: No rashes


CENTRAL NERVOUS SYSTEM: No focal deficits, tone is normal in all 4 extremities.


EXTREMITIES: There is no peripheral edema, clubbing, or cyanosis.  Peripheral 

pulses are intact.





- Labs


CBC & Chem 7: 


                                 07/24/23 06:37





                                 07/25/23 05:00


Labs: 


                  Abnormal Lab Results - Last 24 Hours (Table)











  07/24/23 07/24/23 07/25/23 Range/Units





  06:37 14:10 05:00 


 


Lymphocytes # (Manual)  0.08 L    (0.90-5.00)  X 10*3/uL


 


Eosinophils # (Manual)  0 L    (0.04-0.35)  X 10*3/uL


 


Schistocytes  1+ A    


 


Potassium   5.3 H   (3.5-5.1)  mmol/L


 


BUN    61.1 H  (9.0-27.0)  mg/dL


 


Creatinine    1.6 H  (0.6-1.5)  mg/dL


 


Est GFR (CKD-EPI)    41 L  (>=60)   


 


BUN/Creatinine Ratio    38.19 H  (12.00-20.00)  Ratio


 


Glucose    116 H  ()  mg/dL


 


Calcium    7.9 L  (8.7-10.3)  mg/dL














Assessment and Plan


Assessment: 





Acute exacerbation of obstructive pulmonary disease.  Chest x-ray showed no 

acute process





Chronic hypoxemic respiratory failure, currently on 2 L/m nasal cannula





History of bladder cancer.  Renal computed tomography scan revealed moderate 

left and mild right hydroureteronephrosis without obstructing calculus 

identified.  Urinary bladder diverticulum with mildly prominent prostate gland 

which could represent chronic outlet obstruction.  





Urinary tract infection suspected, culture pending, currently on ceftriaxone





Colonic diverticulosis without evidence of acute diverticulitis





History of pulmonary hypertension





Benign essential hypertension





Hyperlipidemia





Chronic kidney disease stage III





Anemia of chronic disease





History DVT





Ex-smoker





Plan:





The patient was seen and evaluated


Medications and labs are reviewed


Continued on bronchodilators, steroids


Will transition to oral steroids


Cleared for discharge from the pulmonary standpoint


The patient does have severe COPD and condition is guarded


DO NOT RESUSCITATE/DO NOT INTUBATE CODE STATUS


Follow-up with Dr. Terrazas in 1 week





I have personally seen and examined the patient, performed the documentation and

the assessment and plan as written.  Number of minutes spent on the visit: 10.

## 2023-07-26 VITALS — DIASTOLIC BLOOD PRESSURE: 77 MMHG | TEMPERATURE: 97.7 F | SYSTOLIC BLOOD PRESSURE: 166 MMHG

## 2023-07-26 VITALS — HEART RATE: 96 BPM

## 2023-07-26 VITALS — RESPIRATION RATE: 18 BRPM

## 2023-07-26 LAB
ANION GAP SERPL CALC-SCNC: 8.5 MMOL/L (ref 4–12)
BUN SERPL-SCNC: 56.4 MG/DL (ref 9–27)
BUN/CREAT SERPL: 37.6 RATIO (ref 12–20)
CALCIUM SPEC-MCNC: 7.8 MG/DL (ref 8.7–10.3)
CHLORIDE SERPL-SCNC: 108 MMOL/L (ref 96–109)
CO2 SERPL-SCNC: 26.5 MMOL/L (ref 21.6–31.8)
GLUCOSE SERPL-MCNC: 73 MG/DL (ref 70–110)
MAGNESIUM SPEC-SCNC: 2.3 MG/DL (ref 1.5–2.4)
POTASSIUM SERPL-SCNC: 4.8 MMOL/L (ref 3.5–5.5)
SODIUM SERPL-SCNC: 143 MMOL/L (ref 135–145)

## 2023-07-26 RX ADMIN — SODIUM ZIRCONIUM CYCLOSILICATE SCH GM: 10 POWDER, FOR SUSPENSION ORAL at 08:35

## 2023-07-26 RX ADMIN — BUDESONIDE SCH MG: 0.5 INHALANT ORAL at 09:35

## 2023-07-26 RX ADMIN — FORMOTEROL FUMARATE DIHYDRATE SCH MCG: 20 SOLUTION RESPIRATORY (INHALATION) at 09:35

## 2023-07-26 RX ADMIN — PANTOPRAZOLE SODIUM SCH MG: 40 TABLET, DELAYED RELEASE ORAL at 06:59

## 2023-07-26 RX ADMIN — ATORVASTATIN CALCIUM SCH MG: 20 TABLET, FILM COATED ORAL at 08:34

## 2023-07-26 RX ADMIN — IPRATROPIUM BROMIDE AND ALBUTEROL SULFATE SCH ML: .5; 3 SOLUTION RESPIRATORY (INHALATION) at 12:46

## 2023-07-26 RX ADMIN — IPRATROPIUM BROMIDE AND ALBUTEROL SULFATE SCH ML: .5; 3 SOLUTION RESPIRATORY (INHALATION) at 15:58

## 2023-07-26 RX ADMIN — CITALOPRAM HYDROBROMIDE SCH MG: 20 TABLET ORAL at 08:34

## 2023-07-26 RX ADMIN — HEPARIN SODIUM SCH UNIT: 5000 INJECTION INTRAVENOUS; SUBCUTANEOUS at 08:34

## 2023-07-26 RX ADMIN — IPRATROPIUM BROMIDE AND ALBUTEROL SULFATE SCH ML: .5; 3 SOLUTION RESPIRATORY (INHALATION) at 09:35

## 2023-07-26 NOTE — P.PN
Subjective





Patient is seen in follow-up for acute kidney injury on chronic kidney disease. 

Renal function improved.  Has been voiding.  On 2 L nasal cannula.  Hard of 

hearing.  No changes overnight.





Vital signs are stable.


General: No acute distress.


HEENT: Head exam is unremarkable.  On nasal cannula.


LUNGS: Scattered rhonchi.


HEART: Rate and Rhythm are regular.


ABDOMEN: Nontender.


EXTREMITITES: No edema.





Objective





- Vital Signs


Vital signs: 


                                   Vital Signs











Temp  98.5 F   07/26/23 07:05


 


Pulse  94   07/26/23 10:02


 


Resp  18   07/26/23 07:05


 


BP  165/75   07/26/23 07:05


 


Pulse Ox  99   07/26/23 09:35


 


FiO2  40   07/19/23 11:43








                                 Intake & Output











 07/25/23 07/26/23 07/26/23





 18:59 06:59 18:59


 


Intake Total 220  


 


Output Total  1 


 


Balance 220 -1 


 


Weight  59 kg 


 


Intake:   


 


  Intake, IV Titration 100  





  Amount   


 


    cefTRIAXone 1 gm In 100  





    Sodium Chloride 0.9% 50   





    ml @ 100 mls/hr IVPB   





    Q24HR [facility] Rx#:[id number]   


 


  Oral 120  


 


Output:   


 


  Urine  1 


 


Other:   


 


  Voiding Method Toilet Toilet 





 Diaper  


 


  # Voids 1  2














- Labs


CBC & Chem 7: 


                                 07/24/23 06:37





                                 07/26/23 05:52


Labs: 


                  Abnormal Lab Results - Last 24 Hours (Table)











  07/26/23 Range/Units





  05:52 


 


BUN  56.4 H  (9.0-27.0)  mg/dL


 


Est GFR (CKD-EPI)  45 L  (>=60)   


 


BUN/Creatinine Ratio  37.60 H  (12.00-20.00)  Ratio


 


Calcium  7.8 L  (8.7-10.3)  mg/dL








                      Microbiology - Last 24 Hours (Table)











 07/23/23 12:30 Urine Culture - Preliminary





 Urine,Voided    Yeast














Assessment and Plan


Plan: 





Assessment:


1.  Acute kidney injury secondary to ATN and component of obstructive uropathy. 

UA suggestive of UTI.  Trace protein.  Renal function improved.  Creatinine 1.5 

today.


2.  Bilateral hydronephrosis.  Urology following.  Has Null catheter.


3.  History of bladder cancer.


4.  Hyperkalemia secondary to acute kidney injury and obstructive uropathy.  

Potassium level normal.


5.  Chronic kidney disease stage IIIB/4 with baseline creatinine near 1.5.


6.  Hypertension with chronic kidney disease.  Stable.  Exacerbated by steroids.





Plan:


Encouraged oral intake.  


Avoid nephrotoxins.


Continue to monitor renal function and urine output.


Low potassium diet.


Maintain lokelma 10 g once daily x 4 days.


Repeat BMP and magnesium level 2-3 days postdischarge.


Follow-up patient in one week.

## 2023-07-26 NOTE — P.PN
Subjective


Progress Note Date: 07/26/23





I am seeing this patient in new consultation today 07/20/2023 for suspected 

acute COPD exacerbation.  Patient is an 87-year-old white male with past medical

history significant for severe oxygen dependent COPD, pulmonary hypertension, 

hypertension, hyperlipidemia, DVT, bladder cancer.  Patient does follow up in 

the office with Dr. Terrazas, for management of his severe COPD with FEV1 41% of 

predicted.  Patient does utilize 2 L/m nasal cannula 24/7. Patient's primary 

care provider is Dr. Whatley.  Patient is fairly hard of hearing, making it 

difficult to communicate.  Apparently, the patient has had some progressive 

shortness of breath and cough for the last several weeks.  He presented to the 

emergency room via EMS yesterday afternoon.  He was found to be in some acute 

respiratory distress.  He was also hypertensive and tachycardic.  Chest x-ray on

arrival showed no acute cardiopulmonary process.  Patient is currently sitting 

up in bed, on 2 L/m nasal cannula, in no acute distress.  He is currently on a 

combination of DuoNeb's, desonide, formoterol, and IV Solu-Medrol.  He seems 

fairly comfortable, and is already showing improvement in his respiratory 

status. Denies any fever, chills, myalgias, cough, hemoptysis.  Denies any chest

pain, heart palpitations, lower extremity edema, orthopnea.  CBC on arrival 

showed a WBC count of 7.3, hemoglobin 9.2, hematocrit 32.6, platelets 383.  BMP 

on arrival shows sodium 136, potassium 5.4, chloride 103, serum bicarb 24, BUN 

50, creatinine 1.46, glucose 104.  NT proBNP was mildly elevated at 1150, but 

not significant for age.  Troponin was 0.025.  Overall, the patient's condition 

seems to be improving nicely.





The patient is seen today 07/21/2023 in follow-up on the regular medical floor. 

He is currently sitting up in bed.  Awake and alert.  Still with wheezing and 

bronchospasm.  Maintaining good O2 saturations up to 100% on 2 L/m per nasal 

cannula.  Continued on DuoNeb inhalations, Perforomist and Pulmicort 

inhalations, Solu-Medrol.  Antibiotics in the form of azithromycin.  Corona 

virus not detected.





The patient is seen today 07/22/2023 in follow-up on the regular medical floor. 

He is awake and alert in no acute distress.  Maintaining O2 saturations in the 

90s on 2 L/m per nasal cannula.  He is still having significant cough and 

congestion and wheezing.  Continued on DuoNeb inhalations, Pulmicort and 

perforomist inhalations, IV Solu-Medrol.  Completed azithromycin.  White count 

10.4.  Hemoglobin 9.0.  Platelets 6:15.  Sodium 138.  Potassium 6.1.  BUN 70.  

Creatinine 1.9.  Glucose 123.





Reevaluated today on 7/23/23, patient is basically about the same, he states 

that his breathing a bit easier but nonetheless on physical examination 

continues to have significant rhonchi and wheezes bilaterally, patient is not 

showing much improvement in spite of maximal bronchodilators and IV steroids as 

well as antibiotics.  He is being followed by nephrology for his acute kidney 

injury.  Potassium today is 5.4.  BUN is 70.8 creatinine is 1.19








The patient is seen today 07/24/2023 in follow-up on the regular medical floor. 

He is awake and alert in no acute distress.  Sitting up in bed.  His been slow 

to progress.  Still with a loose cough.  Normal saline at 50 ML's per hour.  

Procalcitonin was 0.10.  He is continued on ceftriaxone.  Suspected urinary 

tract infection.  White count 7.6.  Hemoglobin 8.6.  Platelets 613.  He is 

continued on DuoNeb inhalations, Pulmicort and perform as inhalations, Solu-

Medrol.  Maintaining O2 saturations in the 90s on 2 L/m per nasal cannula.  

Heparin for DVT prophylaxis.





The patient is seen today 07/25/2023 in follow-up on the regular medical floor. 

He is currently resting comfortably in bed.  Awake and alert in no acute 

distress.  Breathing easier today compared to yesterday.  Sodium 143.  Potassium

5.5.  Bicarb 26.  BUN 61.  Creatinine 1.6.  Urine culture pending.  Remains on c

eftriaxone.  He is continued on DuoNeb inhalations, Pulmicort and Perforomist 

inhalations, IV Solu-Medrol.  Normal saline at 50 MLS per hour.  Heparin for DVT

prophylaxis.  He is tolerating a diet.





The patient is seen today 07/26/2023 in follow-up on the regular medical floor. 

He is currently sitting up in bed.  Awake and alert in no acute distress.  

Maintaining O2 saturations in the 90s and 2 L/m per nasal cannula.  No worsening

shortness of breath, cough or congestion.  Still dyspneic with exertion.  

Dyspneic with conversation.  Feeling back to his baseline.  Sodium 143.  

Potassium 4.8.  Bicarb 26.  BUN 56.  Creatinine 1.5.  Glucose 73.  He is 

continued on DuoNeb inhalations, Symbicort, IV Solu-Medrol.  Heparin for DVT 

prophylaxis.  Antibiotics in the form of ceftriaxone.  On Eraxis. 





Objective





- Vital Signs


Vital signs: 


                                   Vital Signs











Temp  98.5 F   07/26/23 07:05


 


Pulse  100   07/26/23 12:59


 


Resp  18   07/26/23 07:05


 


BP  165/75   07/26/23 07:05


 


Pulse Ox  99   07/26/23 09:35


 


FiO2  40   07/19/23 11:43








                                 Intake & Output











 07/25/23 07/26/23 07/26/23





 18:59 06:59 18:59


 


Intake Total 220  


 


Output Total  1 


 


Balance 220 -1 


 


Weight  59 kg 


 


Intake:   


 


  Intake, IV Titration 100  





  Amount   


 


    cefTRIAXone 1 gm In 100  





    Sodium Chloride 0.9% 50   





    ml @ 100 mls/hr IVPB   





    Q24HR [facility] Rx#:[id number]   


 


  Oral 120  


 


Output:   


 


  Urine  1 


 


Other:   


 


  Voiding Method Toilet Toilet 





 Diaper  


 


  # Voids 1  2














- Exam





GENERAL EXAM: Alert, very pleasant frail 87-year-old male, resting in bed, 2 L 

nasal cannula, comfortable in no apparent distress.


HEAD: Normocephalic and atraumatic


EYES: Normal reaction of pupils, equal size.


NOSE: Clear with pink turbinates.


THROAT: No erythema or exudates.


NECK: No masses, no JVD.


CHEST: No chest wall deformity.


LUNGS: Equal air entry with right lower lobe inspiratory crackles.  Bilateral 

wheeze. 


CVS: S1 and S2 normal with no audible murmur, regular rhythm. No extra heart 

sounds


ABDOMEN: No hepatosplenomegaly, active bowel sounds, no guarding or rigidity. 


SPINE: No scoliosis or deformity


SKIN: No rashes


CENTRAL NERVOUS SYSTEM: No focal deficits, tone is normal in all 4 extremities.


EXTREMITIES: There is no peripheral edema, clubbing, or cyanosis.  Peripheral 

pulses are intact.





- Labs


CBC & Chem 7: 


                                 07/24/23 06:37





                                 07/26/23 05:52


Labs: 


                  Abnormal Lab Results - Last 24 Hours (Table)











  07/26/23 Range/Units





  05:52 


 


BUN  56.4 H  (9.0-27.0)  mg/dL


 


Est GFR (CKD-EPI)  45 L  (>=60)   


 


BUN/Creatinine Ratio  37.60 H  (12.00-20.00)  Ratio


 


Calcium  7.8 L  (8.7-10.3)  mg/dL








                      Microbiology - Last 24 Hours (Table)











 07/23/23 12:30 Urine Culture - Preliminary





 Urine,Voided    Yeast














Assessment and Plan


Assessment: 





Acute exacerbation of obstructive pulmonary disease.  Chest x-ray showed no 

acute process





Chronic hypoxemic respiratory failure, currently on 2 L/m nasal cannula





History of bladder cancer.  Renal computed tomography scan revealed moderate 

left and mild right hydroureteronephrosis without obstructing calculus 

identified.  Urinary bladder diverticulum with mildly prominent prostate gland 

which could represent chronic outlet obstruction.  





Urinary tract infection suspected, culture pending, currently on ceftriaxone





Colonic diverticulosis without evidence of acute diverticulitis





History of pulmonary hypertension





Benign essential hypertension





Hyperlipidemia





Chronic kidney disease stage III





Anemia of chronic disease





History DVT





Ex-smoker





Plan:





The patient was seen and evaluated


Medications and labs are reviewed


Cleared for discharge from the pulmonary standpoint


The patient does have severe COPD and condition is guarded


Follow-up with Dr. Terrazas in 1 week





I have personally seen and examined the patient, performed the documentation and

the assessment and plan as written.  Number of minutes spent on the visit: 10.

## 2023-07-26 NOTE — P.PN
Subjective


Progress Note Date: 07/26/23


Principal diagnosis: 


Hydronephrosis


The patient denies difficulty voiding.  Specifically, he denies dysuria, 

hematuria, and flank pain.  








Objective





- Vital Signs


Vital signs: 


                                   Vital Signs











Temp  98.5 F   07/26/23 07:05


 


Pulse  94   07/26/23 10:02


 


Resp  18   07/26/23 07:05


 


BP  165/75   07/26/23 07:05


 


Pulse Ox  99   07/26/23 09:35


 


FiO2  40   07/19/23 11:43








                                 Intake & Output











 07/25/23 07/26/23 07/26/23





 18:59 06:59 18:59


 


Intake Total 220  


 


Output Total  1 


 


Balance 220 -1 


 


Weight  59 kg 


 


Intake:   


 


  Intake, IV Titration 100  





  Amount   


 


    cefTRIAXone 1 gm In 100  





    Sodium Chloride 0.9% 50   





    ml @ 100 mls/hr IVPB   





    Q24HR [facility] Rx#:[id number]   


 


  Oral 120  


 


Output:   


 


  Urine  1 


 


Other:   


 


  Voiding Method Toilet Toilet 





 Diaper  


 


  # Voids 1  2














- Constitutional


General appearance: Present: average body habitus, cooperative, no acute 

distress





- Psychiatric


Psychiatric: Present: A&O x's 3





- Labs


CBC & Chem 7: 


                                 07/24/23 06:37





                                 07/26/23 05:52


Labs: 


                  Abnormal Lab Results - Last 24 Hours (Table)











  07/26/23 Range/Units





  05:52 


 


BUN  56.4 H  (9.0-27.0)  mg/dL


 


Est GFR (CKD-EPI)  45 L  (>=60)   


 


BUN/Creatinine Ratio  37.60 H  (12.00-20.00)  Ratio


 


Calcium  7.8 L  (8.7-10.3)  mg/dL








                      Microbiology - Last 24 Hours (Table)











 07/23/23 12:30 Urine Culture - Preliminary





 Urine,Voided    Yeast














Assessment and Plan


Assessment: 


CT scan shows bilateral hydroureteronephrosis, moderate on the right and marked 

on the left with renal parenchymal thinning (on the left).  The left ureter is 

markedly dilated and tortuous.  The ureters are dilated down to the bladder.  

The patient has a history of high-grade urothelial carcinoma of the bladder with

focal muscle invasion, being treated by Dr. Kim.  Recent cystoscopy showed no 

tumors.  Urinary retention can be a cause of bilateral hydronephrosis.  Renal 

function is gradually improving.


(1) Malignant neoplasm of bladder, unspecified


Current Visit: Yes   Status: Acute   Code(s): C67.9 - MALIGNANT NEOPLASM OF 

BLADDER, UNSPECIFIED   SNOMED Code(s): 720583939


   





(2) Hydronephrosis


Current Visit: Yes   Status: Acute   Code(s): N13.30 - UNSPECIFIED 

HYDRONEPHROSIS   SNOMED Code(s): 53916800


   


Plan: 


The patient has hydroureteronephrosis, likely due to his known bladder cancer or

incomplete bladder emptying.  The postvoid residual will be checked.  If he is 

found to empty his bladder adequately, it would be reasonable to proceed with 

cystoscopy, transurethral resection of the bladder trigone with attempted stent 

placement.  However, this will require general anesthesia or spinal anesthesia 

and he is a poor anesthetic risk.

## 2023-08-06 ENCOUNTER — HOSPITAL ENCOUNTER (EMERGENCY)
Dept: HOSPITAL 47 - EC | Age: 88
Discharge: HOME | End: 2023-08-06
Payer: MEDICARE

## 2023-08-06 VITALS — SYSTOLIC BLOOD PRESSURE: 165 MMHG | TEMPERATURE: 97.6 F | HEART RATE: 85 BPM | DIASTOLIC BLOOD PRESSURE: 84 MMHG

## 2023-08-06 VITALS — RESPIRATION RATE: 16 BRPM

## 2023-08-06 DIAGNOSIS — J44.9: ICD-10-CM

## 2023-08-06 DIAGNOSIS — F41.9: ICD-10-CM

## 2023-08-06 DIAGNOSIS — Z79.51: ICD-10-CM

## 2023-08-06 DIAGNOSIS — F32.A: ICD-10-CM

## 2023-08-06 DIAGNOSIS — M19.90: ICD-10-CM

## 2023-08-06 DIAGNOSIS — Z79.899: ICD-10-CM

## 2023-08-06 DIAGNOSIS — E78.5: ICD-10-CM

## 2023-08-06 DIAGNOSIS — I10: ICD-10-CM

## 2023-08-06 DIAGNOSIS — Z87.891: ICD-10-CM

## 2023-08-06 DIAGNOSIS — N13.30: Primary | ICD-10-CM

## 2023-08-06 LAB
ALBUMIN SERPL-MCNC: 3.3 G/DL (ref 3.5–5)
ALP SERPL-CCNC: 69 U/L (ref 38–126)
ALT SERPL-CCNC: 40 U/L (ref 4–49)
ANION GAP SERPL CALC-SCNC: 4 MMOL/L
APTT BLD: 19.5 SEC (ref 22–30)
AST SERPL-CCNC: 31 U/L (ref 17–59)
BASOPHILS # BLD AUTO: 0 K/UL (ref 0–0.2)
BASOPHILS NFR BLD AUTO: 0 %
BUN SERPL-SCNC: 42 MG/DL (ref 9–20)
CALCIUM SPEC-MCNC: 8.2 MG/DL (ref 8.4–10.2)
CHLORIDE SERPL-SCNC: 103 MMOL/L (ref 98–107)
CO2 SERPL-SCNC: 30 MMOL/L (ref 22–30)
EOSINOPHIL # BLD AUTO: 0.1 K/UL (ref 0–0.7)
EOSINOPHIL NFR BLD AUTO: 0 %
ERYTHROCYTE [DISTWIDTH] IN BLOOD BY AUTOMATED COUNT: 3.37 M/UL (ref 4.3–5.9)
ERYTHROCYTE [DISTWIDTH] IN BLOOD: 18 % (ref 11.5–15.5)
GLUCOSE SERPL-MCNC: 99 MG/DL (ref 74–99)
HCT VFR BLD AUTO: 30.5 % (ref 39–53)
HGB BLD-MCNC: 9.8 GM/DL (ref 13–17.5)
INR PPP: 0.9 (ref ?–1.2)
LYMPHOCYTES # SPEC AUTO: 0.4 K/UL (ref 1–4.8)
LYMPHOCYTES NFR SPEC AUTO: 3 %
MCH RBC QN AUTO: 29.1 PG (ref 25–35)
MCHC RBC AUTO-ENTMCNC: 32.1 G/DL (ref 31–37)
MCV RBC AUTO: 90.5 FL (ref 80–100)
MONOCYTES # BLD AUTO: 0.7 K/UL (ref 0–1)
MONOCYTES NFR BLD AUTO: 5 %
NEUTROPHILS # BLD AUTO: 12.7 K/UL (ref 1.3–7.7)
NEUTROPHILS NFR BLD AUTO: 91 %
PH UR: 8 [PH] (ref 5–8)
PLATELET # BLD AUTO: 208 K/UL (ref 150–450)
POTASSIUM SERPL-SCNC: 4.7 MMOL/L (ref 3.5–5.1)
PROT SERPL-MCNC: 6.5 G/DL (ref 6.3–8.2)
PROT UR QL: (no result)
PT BLD: 9.6 SEC (ref 9–12)
RBC UR QL: >182 /HPF (ref 0–5)
SODIUM SERPL-SCNC: 137 MMOL/L (ref 137–145)
SP GR UR: 1.01 (ref 1–1.03)
UROBILINOGEN UR QL STRIP: <2 MG/DL (ref ?–2)
WBC # BLD AUTO: 14 K/UL (ref 3.8–10.6)
WBC #/AREA URNS HPF: 74 /HPF (ref 0–5)

## 2023-08-06 PROCEDURE — 36415 COLL VENOUS BLD VENIPUNCTURE: CPT

## 2023-08-06 PROCEDURE — 76770 US EXAM ABDO BACK WALL COMP: CPT

## 2023-08-06 PROCEDURE — 81001 URINALYSIS AUTO W/SCOPE: CPT

## 2023-08-06 PROCEDURE — 80053 COMPREHEN METABOLIC PANEL: CPT

## 2023-08-06 PROCEDURE — 85025 COMPLETE CBC W/AUTO DIFF WBC: CPT

## 2023-08-06 PROCEDURE — 87086 URINE CULTURE/COLONY COUNT: CPT

## 2023-08-06 PROCEDURE — 85610 PROTHROMBIN TIME: CPT

## 2023-08-06 PROCEDURE — 99285 EMERGENCY DEPT VISIT HI MDM: CPT

## 2023-08-06 PROCEDURE — 85730 THROMBOPLASTIN TIME PARTIAL: CPT

## 2023-08-06 PROCEDURE — 83605 ASSAY OF LACTIC ACID: CPT

## 2023-08-06 PROCEDURE — 96374 THER/PROPH/DIAG INJ IV PUSH: CPT

## 2023-08-06 NOTE — US
EXAMINATION TYPE: US renals and bladder

 

DATE OF EXAM: 8/6/2023

 

COMPARISON: US & CT

 

CLINICAL INDICATION: Male, 87 years old with history of hematuria, hx bladder cancer; Hematuria, h/o 
bladder CA

 

EXAM MEASUREMENTS:

 

Right Kidney:  10.5 x 4.7 x 4.5 cm

Left Kidney: 10.3 x 4.7 x 4.1 cm

 

Right Kidney: Moderate hydro. [place] to be similar comparing to prior CT and ultrasound.

Left Kidney: Moderate hydro, [place] to be similar comparing to prior CT and ultrasound.

Bladder: [place] distended with multiple septations throughout

**Bilateral Jets seen: No

 

IMPRESSION:

1.  Complex debris within the urinary bladder correlate with direct visualization. Consider outpatien
t MRI.

2.  Moderate bilateral hydronephrosis as seen on prior CT 7/23/2023

## 2023-08-06 NOTE — ED
General Adult HPI





- General


Chief complaint: GI Bleed


Stated complaint: poss GI bleed


Time Seen by Provider: 23 18:00


Source: family, EMS


Mode of arrival: EMS


Limitations: no limitations





- History of Present Illness


Initial comments: 





87-year-old male with past medical history of bladder cancer who presents to the

emergency department for possible rectal bleed.  Wife is at bedside.  States 

that she saw blood in the patient's brief and was concerned that he was bleeding

from his rectum.  He has no history of GI bleed.  He is not on any blood thi

nners.  Patient has advanced dementia and cannot provide much history.  He 

denies any pain.  No fevers.  No other alleviating, precipitating or modifying 

factors





- Related Data


                                Home Medications











 Medication  Instructions  Recorded  Confirmed


 


Simvastatin [Zocor] 40 mg PO DAILY 16


 


ALPRAZolam [Xanax] 0.5 mg PO TID PRN 18


 


Latanoprost/Pf [Latanoprost 0.005% 1 drop BOTH EYES HS 10/14/22 07/19/23





Eye Drop]   


 


Budesonide [Pulmicort] 0.5 mg INHALATION RT-BID 23


 


Formoterol Fumarate [Perforomist] 20 mcg INHALATION RT-BID 23








                                  Previous Rx's











 Medication  Instructions  Recorded


 


Citalopram Hydrobromide [CeleXA] 20 mg PO DAILY #30 tab 18


 


Tamsulosin [Flomax] 0.4 mg PO HS #30 cap 10/19/22


 


amLODIPine [Norvasc] 2.5 mg PO BID #60 tab 10/19/22


 


predniSONE 5 mg PO DAILY #0 10/19/22


 


Albuterol Inhaler [Ventolin Hfa 2 puff INHALATION Q6H PRN #1 each 23





Inhaler]  


 


predniSONE 10 mg PO AS DIRECTED #40 tab 23


 


Levofloxacin [Levaquin] 750 mg PO DAILY 1 Days #7 tab 23











                                    Allergies











Allergy/AdvReac Type Severity Reaction Status Date / Time


 


No Known Allergies Allergy   Verified 23 18:03














Review of Systems


ROS Statement: 


Those systems with pertinent positive or pertinent negative responses have been 

documented in the HPI.





ROS Other: All systems not noted in ROS Statement are negative.





Past Medical History


Past Medical History: Cancer, Chest Pain / Angina, COPD, Deep Vein Thrombosis 

(DVT), Eye Disorder, Hearing Disorder / Deafness, Hyperlipidemia, Hypertension, 

Osteoarthritis (OA), Pneumonia, Prostate Disorder, Renal Disease


Additional Past Medical History / Comment(s): Severe COPD with an FEV1 of 33% of

 predicted, chronic hypoxic respiratory failure, recurrent hospitalization for 

COPD exacerbation, stenotrophomonas tracheal bronchitis - HAD PFT 18.  

Bladder Cancer w/ prev surgery.  RLE DVT.  BPH.  Chronic Back Pain.  VARICOSE 

VEINS.  O2 2L NC.  ACUTE RENAL FAILURE 3/2018 R/T DEHYDRATION/DEPRESSION, PER 

WIFE.


History of Any Multi-Drug Resistant Organisms: None Reported


Past Surgical History: Bladder Surgery, Hernia Repair, Tonsillectomy


Additional Past Surgical History / Comment(s): Bladder CA Surgery, Vasectomy.  

BRONCHIAL WASHING.   EXC CATARACTS KATH.,


Past Anesthesia/Blood Transfusion Reactions: No Reported Reaction


Past Psychological History: Anxiety, Depression


Smoking Status: Former smoker





- Past Family History


  ** Father


Family Medical History: Cancer


Additional Family Medical History / Comment(s): Father had scoliosis.  Father 

 of  lung cancer





  ** Mother


History Unknown: Yes


Family Medical History: CVA/TIA


Additional Family Medical History / Comment(s): Mother . Unknown history





General Exam


Limitations: physical limitation (hard of hearing)


General appearance: alert, in no apparent distress


Head exam: Present: atraumatic, normocephalic, normal inspection


Eye exam: Present: normal appearance, PERRL, EOMI.  Absent: scleral icterus, 

conjunctival injection, periorbital swelling


ENT exam: Present: normal exam, mucous membranes moist


Neck exam: Present: normal inspection.  Absent: tenderness, meningismus, 

lymphadenopathy


Respiratory exam: Present: normal lung sounds bilaterally.  Absent: respiratory 

distress, wheezes, rales, rhonchi, stridor


Cardiovascular Exam: Present: regular rate, normal rhythm, normal heart sounds. 

 Absent: systolic murmur, diastolic murmur, rubs, gallop, clicks


GI/Abdominal exam: Present: soft, normal bowel sounds.  Absent: distended, 

tenderness, guarding, rebound, rigid


Rectal exam: Present: normal inspection.  Absent: black stool, bloody stool


 exam: Present: urethral discharge (bloody urine).  Absent: testicular 

tenderness


Extremities exam: Present: normal inspection, full ROM, normal capillary refill.

  Absent: tenderness, pedal edema, joint swelling, calf tenderness


Back exam: Present: normal inspection


Neurological exam: Present: alert, oriented X3, CN II-XII intact


Psychiatric exam: Present: normal affect, normal mood


Skin exam: Present: warm, dry, intact, normal color.  Absent: rash





Course


                                   Vital Signs











  23





  17:54 21:27


 


Temperature 98.1 F 97.6 F


 


Pulse Rate 90 85


 


Respiratory 16 16





Rate  


 


Blood Pressure 168/88 165/84


 


O2 Sat by Pulse 94 L 96





Oximetry  














Medical Decision Making





- Medical Decision Making





Was pt. sent in by a medical professional or institution (, PA, NP, urgent 

care, hospital, or nursing home...) When possible be specific


@  -No


Did you speak to anyone other than the patient for history (EMS, parent, family,

 police, friend...)? What history was obtained from this source 


@  -Spoke with patient's wife for history


Did you review nursing and triage notes (agree or disagree)?  Why? 


@  -I reviewed and agree with nursing and triage notes


Were old charts reviewed (outside hosp., previous admission, EMS record, old 

EKG, old radiological studies, urgent care reports/EKG's, nursing home records)?

 Report findings 


@  -Reviewed patient's consultation from urology during his hospitalization last

 month


Differential Diagnosis (chest pain, altered mental status, abdominal pain women,

 abdominal pain men, vaginal bleeding, weakness, fever, dyspnea, syncope, 

headache, dizziness, GI bleed, back pain, seizure, CVA, palpatations, mental 

health, musculoskeletal)? 


@  -Differential GI Bleed:


Esophageal varices, aortoenteric fistula, Sharonda-Matson, gastritis, peptic ulcer

 disease, diverticulosis, inflammatory bowel disease, hematuria, bladder cancer,

 hemorrhoids, fissure, colitis, malignancy, Meckels diverticulum, this is not 

meant to be an all-inclusive list.


EKG interpreted by me (3pts min.).


@  -Not done


X-rays interpreted by me (1pt min.).


@  -None done


CT interpreted by me (1pt min.).


@  -None done


U/S interpreted by me (1pt. min.).


@  -Yes and demonstrates debris within the bladder


What testing was considered but not performed or refused? (CT, X-rays, U/S, 

labs)? Why?


@  -None


What meds were considered but not given or refused? Why?


@  -None


Did you discuss the management of the patient with other professionals 

(professionals i.e. , PA, NP, lab, RT, psych nurse, , , 

teacher, , )? Give summary


@  -Yes, Dr. Tesfaye.  Recommends placing the patient on antibiotics.  Urine will

 be sent for culture.  Does not want the patient to have a catheter


Was smoking cessation discussed for >3mins.?


@  -No


Was critical care preformed (if so, how long)?


@  -No


Were there social determinants of health that impacted care today? How? 

(Homelessness, low income, unemployed, alcoholism, drug addiction, 

transportation, low edu. Level, literacy, decrease access to med. care, assisted, 

rehab)?


@  -No


Was there de-escalation of care discussed even if they declined (Discuss DNR or 

withdrawal of care, Hospice)? DNR status


@  -No


What co-morbidities impacted this encounter? (DM, HTN, Smoking, COPD, CAD, 

Cancer, CVA, ARF, Chemo, Hep., AIDS, mental health diagnosis, sleep apnea, 

morbid obesity)?


@  -Bladder cancer


Was patient admitted / discharged? Hospital course, mention meds given and 

route, prescriptions, significant lab abnormalities, going to OR and other 

pertinent info.


@  -Upon arrival patient is placed in room 13.  Thorough history and physical 

exam was performed.  IV is established laboratory studies were conducted.  

Patient does have chronic anemia which is improved from last blood draw.  He 

does have a leukocytosis of 14.  Urinalysis does demonstrate large blood and 

white blood cells.  Ultrasound demonstrates debris within the bladder.  Called 

and spoke with Dr. Tesfaye.  He does not want the patient to have a catheter.  

Would like the patient on empiric antibiotics does have an appointment in the 

morning.  This was discussed with the patient.  Patient and wife were agreeable 

to discharge.  Instructed to return for any new or worsening symptoms.  Patient 

discharged in stable condition


Undiagnosed new problem with uncertain prognosis?


@  -yes


Drug Therapy requiring intensive monitoring for toxicity (Heparin, Nitro, 

Insulin, Cardizem)?


@  -No


Were any procedures done?


@  -No


Diagnosis/symptom?


@  -acute hematuria, abn ua, hx bladder cancer   


Acute, or Chronic, or Acute on Chronic?


@  -acute


Uncomplicated (without systemic symptoms) or Complicated (systemic symptoms)?


@  -complicated


Side effects of treatment?


@  -No


Exacerbation, Progression, or Severe Exacerbation?


@  -No


Poses a threat to life or bodily function? How? (Chest pain, USA, MI, pneumonia,

 PE, COPD, DKA, ARF, appy, cholecystitis, CVA, Diverticulitis, Homicidal, 

Suicidal, threat to staff... and all critical care pts)


@  -No





- Lab Data


Result diagrams: 


                                 23 18:45





                                 23 18:45


                                   Lab Results











  23 Range/Units





  18:45 18:45 18:45 


 


WBC  14.0 H    (3.8-10.6)  k/uL


 


RBC  3.37 L    (4.30-5.90)  m/uL


 


Hgb  9.8 L    (13.0-17.5)  gm/dL


 


Hct  30.5 L    (39.0-53.0)  %


 


MCV  90.5    (80.0-100.0)  fL


 


MCH  29.1    (25.0-35.0)  pg


 


MCHC  32.1    (31.0-37.0)  g/dL


 


RDW  18.0 H    (11.5-15.5)  %


 


Plt Count  208    (150-450)  k/uL


 


MPV  7.5    


 


Neutrophils %  91    %


 


Lymphocytes %  3    %


 


Monocytes %  5    %


 


Eosinophils %  0    %


 


Basophils %  0    %


 


Neutrophils #  12.7 H    (1.3-7.7)  k/uL


 


Lymphocytes #  0.4 L    (1.0-4.8)  k/uL


 


Monocytes #  0.7    (0-1.0)  k/uL


 


Eosinophils #  0.1    (0-0.7)  k/uL


 


Basophils #  0.0    (0-0.2)  k/uL


 


Anisocytosis  Slight    


 


PT   9.6   (9.0-12.0)  sec


 


INR   0.9   (<1.2)  


 


APTT   19.5 L   (22.0-30.0)  sec


 


Sodium    137  (137-145)  mmol/L


 


Potassium    4.7  (3.5-5.1)  mmol/L


 


Chloride    103  ()  mmol/L


 


Carbon Dioxide    30  (22-30)  mmol/L


 


Anion Gap    4  mmol/L


 


BUN    42 H  (9-20)  mg/dL


 


Creatinine    1.72 H  (0.66-1.25)  mg/dL


 


Est GFR (CKD-EPI)AfAm    40  (>60 ml/min/1.73 sqM)  


 


Est GFR (CKD-EPI)NonAf    35  (>60 ml/min/1.73 sqM)  


 


Glucose    99  (74-99)  mg/dL


 


Plasma Lactic Acid Samuel     (0.7-2.0)  mmol/L


 


Calcium    8.2 L  (8.4-10.2)  mg/dL


 


Total Bilirubin    0.4  (0.2-1.3)  mg/dL


 


AST    31  (17-59)  U/L


 


ALT    40  (4-49)  U/L


 


Alkaline Phosphatase    69  ()  U/L


 


Total Protein    6.5  (6.3-8.2)  g/dL


 


Albumin    3.3 L  (3.5-5.0)  g/dL


 


Urine Color     


 


Urine Appearance     (Clear)  


 


Urine pH     (5.0-8.0)  


 


Ur Specific Gravity     (1.001-1.035)  


 


Urine Protein     (Negative)  


 


Urine Glucose (UA)     (Negative)  


 


Urine Ketones     (Negative)  


 


Urine Blood     (Negative)  


 


Urine Nitrite     (Negative)  


 


Urine Bilirubin     (Negative)  


 


Urine Urobilinogen     (<2.0)  mg/dL


 


Ur Leukocyte Esterase     (Negative)  


 


Urine RBC     (0-5)  /hpf


 


Urine WBC     (0-5)  /hpf














  23 Range/Units





  18:45 18:45 


 


WBC    (3.8-10.6)  k/uL


 


RBC    (4.30-5.90)  m/uL


 


Hgb    (13.0-17.5)  gm/dL


 


Hct    (39.0-53.0)  %


 


MCV    (80.0-100.0)  fL


 


MCH    (25.0-35.0)  pg


 


MCHC    (31.0-37.0)  g/dL


 


RDW    (11.5-15.5)  %


 


Plt Count    (150-450)  k/uL


 


MPV    


 


Neutrophils %    %


 


Lymphocytes %    %


 


Monocytes %    %


 


Eosinophils %    %


 


Basophils %    %


 


Neutrophils #    (1.3-7.7)  k/uL


 


Lymphocytes #    (1.0-4.8)  k/uL


 


Monocytes #    (0-1.0)  k/uL


 


Eosinophils #    (0-0.7)  k/uL


 


Basophils #    (0-0.2)  k/uL


 


Anisocytosis    


 


PT    (9.0-12.0)  sec


 


INR    (<1.2)  


 


APTT    (22.0-30.0)  sec


 


Sodium    (137-145)  mmol/L


 


Potassium    (3.5-5.1)  mmol/L


 


Chloride    ()  mmol/L


 


Carbon Dioxide    (22-30)  mmol/L


 


Anion Gap    mmol/L


 


BUN    (9-20)  mg/dL


 


Creatinine    (0.66-1.25)  mg/dL


 


Est GFR (CKD-EPI)AfAm    (>60 ml/min/1.73 sqM)  


 


Est GFR (CKD-EPI)NonAf    (>60 ml/min/1.73 sqM)  


 


Glucose    (74-99)  mg/dL


 


Plasma Lactic Acid Samuel  0.8   (0.7-2.0)  mmol/L


 


Calcium    (8.4-10.2)  mg/dL


 


Total Bilirubin    (0.2-1.3)  mg/dL


 


AST    (17-59)  U/L


 


ALT    (4-49)  U/L


 


Alkaline Phosphatase    ()  U/L


 


Total Protein    (6.3-8.2)  g/dL


 


Albumin    (3.5-5.0)  g/dL


 


Urine Color   Red  


 


Urine Appearance   Cloudy  (Clear)  


 


Urine pH   8.0  (5.0-8.0)  


 


Ur Specific Gravity   1.015  (1.001-1.035)  


 


Urine Protein   2+ H  (Negative)  


 


Urine Glucose (UA)   Negative  (Negative)  


 


Urine Ketones   Negative  (Negative)  


 


Urine Blood   Large H  (Negative)  


 


Urine Nitrite   Negative  (Negative)  


 


Urine Bilirubin   Negative  (Negative)  


 


Urine Urobilinogen   <2.0  (<2.0)  mg/dL


 


Ur Leukocyte Esterase   Small H  (Negative)  


 


Urine RBC   >182 H  (0-5)  /hpf


 


Urine WBC   74 H  (0-5)  /hpf














Disposition


Clinical Impression: 


 Hematuria, Abnormal urinalysis





Disposition: HOME SELF-CARE


Condition: Stable


Instructions (If sedation given, give patient instructions):  Hematuria (ED)


Additional Instructions: 


You will be started on antibiotics.  Follow up with the urologist at your 

scheduled appointment tomorrow.  Return for any new or worsening symptoms


Prescriptions: 


Levofloxacin [Levaquin] 750 mg PO DAILY 1 Days #7 tab


Is patient prescribed a controlled substance at d/c from ED?: No


Referrals: 


Nik Whatley MD [Primary Care Provider] - 1-2 days


Uday Tesfaye MD [STAFF PHYSICIAN] - 1-2 days


Time of Disposition: 21:09

## 2025-04-24 NOTE — P.PN
Subjective


Progress Note Date: 09/27/19


Principal diagnosis: 


Acute exacerbation of chronic obstructive pulmonary disease with purulent 

tracheobronchitis secondary to Haemophilus influenza





 This is a 82-year-old male patient of Dr. Whatley who also sees Dr. Terrazas in the

pulmonary clinic for his advanced oxygen-dependent COPD with chronic hypoxemic 

and steroid-dependent restaurant failure with a baseline FEV1 of 33% of 

predicted.  Patient has had the multiple pulmonary complications and pulmonary 

infections with history of stenotrophomonas in the bronchial wash cultures, 

treated with Bactrim.  Patient smoking history is in remission, other medical 

history includes hypertension, hyperlipidemia, neoplasm of bladder, history of 

DVT and patient is currently not on any anticoagulants. 





On 09/23/2019 patient was sent in from his primary care physician's office where

he went for evaluation of worsening dyspnea, cough, congestion, and production 

of yellow sputum.  Denied any fever or chills.  Chest x-ray on admission showed 

diffuse increased mid lung markings, with the possibility of developing 

pulmonary fibrosis.  Admission lab work showed a white blood cell count of 8.5, 

hemoglobin of 8.2, platelet count is 474, electrodes were within normal limits, 

B1 is 38 and creatinine was 1.48.  Patient reports no nausea, vomiting, no 

diarrhea.  She was started on oral Omnicef, nebulized bronchodilators and IV 

steroids.  Patient is on a combination of Breo-Ellipta and DuoNeb nebs at home. 

We were asked to see this patient in evaluation for COPD exacerbation





The patient is seen today 09/25/2019 in follow-up on the regular medical floor. 

He is currently resting in bed.  Still somewhat bronchospastic and wheezy.  

Still not back to his baseline.  Continues with a productive cough.  Sputum 

culture pending.  He is continued on DuoNeb inhalations, Pulmicort and 

Perforomist inhalations, IV Solu-Medrol.  Antibiotics in the form of Omnicef.





On 09/26/2019 the patient is feeling slightly better compared to yesterday.  He 

remains bronchospastic and wheezy and shortness of breath with limited amount of

activity.  No chest pain.  No hemoptysis or pleurisy.  No altered mentation.  

The sputum cultures positive for Haemophilus influenza and the patient is 

currently on oral Omnicef 300 mg by mouth twice a day.  He remains on IV Solu-

Medrol 60 mg IV push every 6 hours. 





On 09/27/2019 patient seen in follow-up on medical surgical floor.  He is awake 

and alert, oriented 3, he states he is breathing easier, however still remains 

quite wheezy on physical exam, but less congested, he's been treated for 

Haemophilus influenza tracheobronchitis, his been afebrile.  He is on 2 L of 

oxygen with a pulse of 94%.  His been tolerating ambulation.  Is currently on 

amoxicillin, and IV steroids in addition to bronchodilators

















Objective





- Vital Signs


Vital signs: 


                                   Vital Signs











Temp  98.3 F   09/27/19 07:00


 


Pulse  84   09/27/19 11:18


 


Resp  22   09/27/19 07:00


 


BP  146/76   09/27/19 07:00


 


Pulse Ox  94 L  09/27/19 07:10








                                 Intake & Output











 09/26/19 09/27/19 09/27/19





 18:59 06:59 18:59


 


Intake Total 200 550 840


 


Balance 200 550 840


 


Weight 56.608 kg  


 


Intake:   


 


  Oral 200 550 840


 


Other:   


 


  Voiding Method Toilet Toilet 


 


  # Voids 2 1 


 


  # Bowel Movements  0 














- Exam


 GENERAL EXAM: Alert, pleasant, cachectic looking white male, and 2 L of oxygen 

with a pulse ox of 95%, comfortable in no apparent distress.


HEAD: Normocephalic/atraumatic.


EYES: Normal reaction of pupils, equal size.  Conjunctiva pink, sclera white.


NOSE: Clear with pink turbinates.


THROAT: No erythema or exudates.


NECK: No masses, no JVD, no thyroid enlargement, no adenopathy.


CHEST: No chest wall deformity.  Symmetrical expansion. 


LUNGS: Equal air entry with diffuse wheezes


CVS: Regular rate and rhythm, normal S1 and S2, no gallops, no murmurs, no rubs


ABDOMEN: Soft, nontender.  No hepatosplenomegaly, normal bowel sounds, no 

guarding or rigidity.


EXTREMITIES: No clubbing, no edema, no cyanosis, 2+ pulses and upper and lower 

extremities.


MUSCULOSKELETAL: Muscle strength and tone normal.


SPINE: No scoliosis or deformity


SKIN: No rashes


CENTRAL NERVOUS SYSTEM: Alert and oriented -3.  No focal deficits, tone is 

normal in all 4 extremities.


PSYCHIATRIC: Alert and oriented -3.  Appropriate affect.  Intact judgment and 

insight.








- Labs


CBC & Chem 7: 


                                 09/24/19 07:24





                                 09/24/19 07:24


Labs: 


                      Microbiology - Last 24 Hours (Table)











 09/24/19 13:00 Gram Stain - Final





 Sputum Sputum Culture - Final





    Haemophilus influenzae














Assessment and Plan


Plan: 


 Assessment:





#1.  Acute exacerbation of chronic obstructive pulmonary disease with purulent 

tracheobronchitis secondary to Haemophilus influenza





#2.  Acute kidney injury, improved with IV hydration, may be related to diuretic

therapy and intravascular volume depletion





#3.  Advanced steroid-dependent COPD with a baseline FEV1 of 33% of predicted 

oxygen dependent and steroid dependent at baseline





#4.  History of nicotine dependence, currently in remission





#5.  Hypertension





#6.  Hyperlipidemia





#7.  Bladder cancer





#8.  History of deep venous thrombosis, not currently on any anticoagulants





Plan:





Continue with current plan and treatment, continue IV steroids, Augmentin, 

nebulized bronchodilators, patient is stable, he is improving, less congested, 

no acute events overnight, no fever or chills, anticipate discharge home in the 

next 24 hours.





I performed a history & physical examination of the patient and discussed their 

management with my nurse practitioner, Mayda Álvarez.  I reviewed the nurse 

practitioner's note and agree with the documented findings and plan of care.  

Lung sounds are positive for diffuse wheezes throughout the lung fields.  The 

findings and the impression was discussed with the patient.  I attest to the 

documentation by the nurse practitioner. 








Time with Patient: Less than 30 No (0)